# Patient Record
Sex: FEMALE | Race: WHITE | NOT HISPANIC OR LATINO | Employment: OTHER | URBAN - METROPOLITAN AREA
[De-identification: names, ages, dates, MRNs, and addresses within clinical notes are randomized per-mention and may not be internally consistent; named-entity substitution may affect disease eponyms.]

---

## 2017-01-17 ENCOUNTER — OFFICE VISIT (OUTPATIENT)
Dept: INTERNAL MEDICINE | Age: 82
End: 2017-01-17

## 2017-01-17 VITALS
TEMPERATURE: 96.7 F | HEART RATE: 74 BPM | SYSTOLIC BLOOD PRESSURE: 122 MMHG | WEIGHT: 123 LBS | DIASTOLIC BLOOD PRESSURE: 70 MMHG | OXYGEN SATURATION: 98 % | HEIGHT: 57 IN | BODY MASS INDEX: 26.54 KG/M2

## 2017-01-17 DIAGNOSIS — M47.816 OSTEOARTHRITIS OF LUMBAR SPINE, UNSPECIFIED SPINAL OSTEOARTHRITIS COMPLICATION STATUS: Chronic | ICD-10-CM

## 2017-01-17 DIAGNOSIS — I10 ESSENTIAL HYPERTENSION: Primary | Chronic | ICD-10-CM

## 2017-01-17 DIAGNOSIS — E03.9 HYPOTHYROIDISM, UNSPECIFIED TYPE: Chronic | ICD-10-CM

## 2017-01-17 DIAGNOSIS — E78.5 DYSLIPIDEMIA: Chronic | ICD-10-CM

## 2017-01-17 PROCEDURE — 99214 OFFICE O/P EST MOD 30 MIN: CPT | Performed by: INTERNAL MEDICINE

## 2017-01-17 NOTE — PROGRESS NOTES
"Maria Fernanda Gaviria / 89 y.o. / female  01/17/2017    VITALS    Visit Vitals   • /70   • Pulse 74   • Temp 96.7 °F (35.9 °C)   • Ht 57\" (144.8 cm)   • Wt 123 lb (55.8 kg)   • SpO2 98%   • BMI 26.62 kg/m2     BP Readings from Last 3 Encounters:   01/17/17 122/70   07/11/16 144/84   03/24/16 134/64     Wt Readings from Last 3 Encounters:   01/17/17 123 lb (55.8 kg)   07/11/16 121 lb (54.9 kg)   03/24/16 124 lb (56.2 kg)      Body mass index is 26.62 kg/(m^2).    CC:  Main reason(s) for today's visit: Follow up on chronic medical problems      HPI:     Chronic essential hypertension :  Compliant with medication(s).  Denies significant problems or side effects from therapy.  Most recent in-office blood pressure readings have been:   BP Readings from Last 3 Encounters:   01/17/17 122/70   07/11/16 144/84   03/24/16 134/64     Chronic hyperlipidemia :  Compliant with therapy.  Denies significant problems with medication(s).  Most recent labs:   Lab Results   Component Value Date     (H) 02/08/2016    HDL 52 02/08/2016    TRIG 154 (H) 02/08/2016    CHOLHDLRATIO 3.6 02/08/2016     Chronic hypothyroidism :  No significant weight change. No change in energy level, change in bowel movements or cold/heat intolerance. Denies significant mood changes. Compliant with medication. Most recent thyroid labs reviewed with the patient:   Lab Results   Component Value Date    TSH 1.480 02/08/2016    TSH 1.53 06/15/2015    FREET4 1.28 02/08/2016    FREET4 1.09 06/15/2015     OA of lumbar spine on acetaminophen and tramadol as needed for pain w/o significant problems.     ____________________________________________________________________    ASSESSMENT & PLAN:    Problem List Items Addressed This Visit        High    Hypertension - Primary (Chronic)    Overview     Stable. Continue same plan and/or medications.          Relevant Medications    valsartan (DIOVAN) 160 MG tablet    Hypothyroidism (Chronic)    Overview     Stable. " Continue levothyroxine.          Relevant Medications    levothyroxine (SYNTHROID, LEVOTHROID) 50 MCG tablet       Medium    Dyslipidemia (Chronic)    Overview     Stable. Continue atorvastatin.          Relevant Medications    atorvastatin (LIPITOR) 20 MG tablet        No orders of the defined types were placed in this encounter.      Summary/Discussion:     ·       No Follow-up on file.    No future appointments.    ____________________________________________________________________    REVIEW OF SYSTEMS    Review of Systems  As noted per HPI  Constitutional neg   Resp neg   CV neg    Other: As noted per HPI      PHYSICAL EXAMINATION    Physical Exam  Constitutional  No distress   Cardiovascular Rate  normal . Rhythm: regular . Heart sounds:  normal    Pulmonary/Chest  Effort normal. Breath sounds:  normal   Psychiatric  Alert. Judgment and thought content normal. Mood normal      REVIEWED DATA:    Labs:   Lab Results   Component Value Date     03/18/2015    K 4.5 03/18/2015    AST 45 (H) 03/17/2015    ALT 24 03/17/2015    BUN 12 03/18/2015    CREATININE 0.87 03/18/2015    CREATININE 0.72 03/17/2015    CREATININE 0.97 03/16/2015       No results found for: GLU, HGBA1C    Lab Results   Component Value Date     (H) 02/08/2016    HDL 52 02/08/2016    TRIG 154 (H) 02/08/2016    CHOLHDLRATIO 3.6 02/08/2016       Lab Results   Component Value Date    TSH 1.480 02/08/2016    FREET4 1.28 02/08/2016          Lab Results   Component Value Date    WBC 6.0 07/11/2016    HGB 14.8 07/11/2016     07/11/2016        Imaging:        Medical Tests:        Summary of old records / correspondence / consultant report:        Request outside records:          ALLERGIES:    Aspirin and Buffered aspirin    MEDICATIONS:  Current Outpatient Prescriptions   Medication Sig Dispense Refill   • acetaminophen (TYLENOL) 650 MG 8 hr tablet Take  by mouth daily.     • atorvastatin (LIPITOR) 20 MG tablet Take 1 tablet by mouth  daily. 90 tablet 0   • calcium carbonate-cholecalciferol 500-400 MG-UNIT tablet tablet Take 1 tablet by mouth daily.     • cetirizine (ZyrTEC) 10 MG tablet Take 10 mg by mouth daily.     • Cholecalciferol (VITAMIN D3) 2000 UNITS tablet Take  by mouth daily.     • desonide (DESOWEN) 0.05 % cream      • diclofenac (VOLTAREN) 1 % gel gel Place  on the skin.     • fluticasone (VERAMYST) 27.5 MCG/SPRAY nasal spray into each nostril daily.     • Glucosamine-Chondroit-Vit C-Mn (GLUCOSAMINE CHONDR 1500 COMPLX PO) Take 1 capsule by mouth 2 (two) times a day.     • lansoprazole (PREVACID) 30 MG capsule Take 1 capsule by mouth every morning before breakfast. 90 capsule 2   • levothyroxine (SYNTHROID, LEVOTHROID) 50 MCG tablet Take 1 tablet by mouth every morning. 90 tablet 2   • montelukast (SINGULAIR) 10 MG tablet Take 10 mg by mouth daily.     • Multiple Vitamins-Minerals (CENTRUM SILVER PO) Take  by mouth daily.     • raloxifene (EVISTA) 60 MG tablet Take  by mouth daily.     • traMADol-acetaminophen (ULTRACET) 37.5-325 MG per tablet 45 45 tablet 1   • valsartan (DIOVAN) 160 MG tablet TAKE 1 TABLET DAILY 90 tablet 1     No current facility-administered medications for this visit.        [facility]    The following portions of the patient's history were reviewed and updated as appropriate: Allergies / Current Medications / Past Medical History / Surgical History / Social History / Family History    PROBLEM LIST:    Patient Active Problem List   Diagnosis   • Osteoarthritis of lumbar spine   • Osteoarthritis   • Sciatic leg pain   • Asthma   • Biceps tendinitis   • Stenosis of carotid artery   • Dyslipidemia   • Gastroesophageal reflux disease   • Hypertension   • Hypothyroidism   • Osteopenia   • Restless legs syndrome   • Thrombocytopenia       PAST MEDICAL HX:    History reviewed. No pertinent past medical history.    PAST SURGICAL HX:    History reviewed. No pertinent past surgical history.    SOCIAL HX:    Social History      Social History   • Marital status:      Spouse name: N/A   • Number of children: N/A   • Years of education: N/A     Social History Main Topics   • Smoking status: Former Smoker   • Smokeless tobacco: Never Used   • Alcohol use No   • Drug use: None   • Sexual activity: Not Asked     Other Topics Concern   • None     Social History Narrative       FAMILY HX:    History reviewed. No pertinent family history.

## 2017-01-17 NOTE — MR AVS SNAPSHOT
Maria Fernanda Gaviria   1/17/2017 9:40 AM   Office Visit    Dept Phone:  [phone]   Encounter #:  [id number]    Provider:  Henok Salcido MD   Department:  [facility]                Your Full Care Plan              Today's Medication Changes          These changes are accurate as of: 1/17/17 10:21 AM.  If you have any questions, ask your nurse or doctor.               Medication(s)that have changed:     atorvastatin 20 MG tablet   Commonly known as:  LIPITOR   Take 1 tablet by mouth daily.   What changed:  Another medication with the same name was removed. Continue taking this medication, and follow the directions you see here.   Changed by:  Henok Salcido MD                  Your Updated Medication List          This list is accurate as of: 1/17/17 10:21 AM.  Always use your most recent med list.                acetaminophen 650 MG 8 hr tablet   Commonly known as:  TYLENOL       atorvastatin 20 MG tablet   Commonly known as:  LIPITOR       calcium carbonate-cholecalciferol 500-400 MG-UNIT tablet tablet       CENTRUM SILVER PO       cetirizine 10 MG tablet   Commonly known as:  zyrTEC       desonide 0.05 % cream   Commonly known as:  DESOWEN       diclofenac 1 % gel gel   Commonly known as:  VOLTAREN       fluticasone 27.5 MCG/SPRAY nasal spray   Commonly known as:  VERAMYST       GLUCOSAMINE CHONDR 1500 COMPLX PO       lansoprazole 30 MG capsule   Commonly known as:  PREVACID   Take 1 capsule by mouth every morning before breakfast.       levothyroxine 50 MCG tablet   Commonly known as:  SYNTHROID, LEVOTHROID   Take 1 tablet by mouth every morning.       montelukast 10 MG tablet   Commonly known as:  SINGULAIR       raloxifene 60 MG tablet   Commonly known as:  EVISTA       traMADol-acetaminophen 37.5-325 MG per tablet   Commonly known as:  ULTRACET       valsartan 160 MG tablet   Commonly known as:  DIOVAN   TAKE 1 TABLET DAILY       Vitamin D3 2000 UNITS tablet                  We Performed the Following     Comprehensive Metabolic Panel     LDL Cholesterol, Direct     TSH+Free T4       You Were Diagnosed With        Codes Comments    Essential hypertension    -  Primary ICD-10-CM: I10  ICD-9-CM: 401.9     Hypothyroidism, unspecified type     ICD-10-CM: E03.9  ICD-9-CM: 244.9     Dyslipidemia     ICD-10-CM: E78.5  ICD-9-CM: 272.4       Instructions     None    Patient Instructions History      Upcoming Appointments     Visit Type Date Time Department    OFFICE VISIT 2017  9:40 AM [facility]    OFFICE VISIT 2017 10:20 AM [facility]      [employer] Signup     [facility] [employer] allows you to send messages to your doctor, view your test results, renew your prescriptions, schedule appointments, and more. To sign up, go to [employer] and click on the Sign Up Now link in the New User? box. Enter your [employer] Activation Code exactly as it appears below along with the last four digits of your Social Security Number and your Date of Birth () to complete the sign-up process. If you do not sign up before the expiration date, you must request a new code.    [employer] Activation Code: [email or web address]  Expires: 2017 10:16 AM    If you have questions, you can email [email or web address] or call [phone] to talk to our [employer] staff. Remember, [employer] is NOT to be used for urgent needs. For medical emergencies, dial 911.               Other Info from Your Visit           Your Appointments     May 17, 2017 10:20 AM EDT   Office Visit with Henok Salcido MD   [facility] (--)    [address]  [facility] [address] [phone]           Arrive 15 minutes prior to appointment.              Allergies     Aspirin  Nausea And Vomiting, Nausea Only    Buffered Aspirin  Nausea And Vomiting      Reason for Visit     Follow up on chronic medical problems           Vital Signs     Blood Pressure Pulse  "Temperature Height Weight Oxygen Saturation    122/70 74 96.7 °F (35.9 °C) 57\" (144.8 cm) 123 lb (55.8 kg) 98%    Body Mass Index Smoking Status                26.62 kg/m2 Former Smoker          Problems and Diagnoses Noted     Dyslipidemia    High blood pressure    Underactive thyroid        "

## 2017-02-02 RX ORDER — ATORVASTATIN CALCIUM 20 MG/1
TABLET, FILM COATED ORAL
Qty: 90 TABLET | Refills: 1 | Status: SHIPPED | OUTPATIENT
Start: 2017-02-02 | End: 2017-05-17 | Stop reason: SDUPTHER

## 2017-02-17 DIAGNOSIS — I10 ESSENTIAL HYPERTENSION: ICD-10-CM

## 2017-02-17 RX ORDER — VALSARTAN 160 MG/1
TABLET ORAL
Qty: 90 TABLET | Refills: 1 | Status: SHIPPED | OUTPATIENT
Start: 2017-02-17 | End: 2017-08-16 | Stop reason: SDUPTHER

## 2017-04-26 DIAGNOSIS — K21.9 GASTROESOPHAGEAL REFLUX DISEASE, ESOPHAGITIS PRESENCE NOT SPECIFIED: Chronic | ICD-10-CM

## 2017-04-26 DIAGNOSIS — E03.9 HYPOTHYROIDISM: ICD-10-CM

## 2017-04-26 RX ORDER — LEVOTHYROXINE SODIUM 0.05 MG/1
TABLET ORAL
Qty: 90 TABLET | Refills: 1 | Status: SHIPPED | OUTPATIENT
Start: 2017-04-26 | End: 2017-10-23 | Stop reason: SDUPTHER

## 2017-04-26 RX ORDER — LANSOPRAZOLE 30 MG/1
CAPSULE, DELAYED RELEASE ORAL
Qty: 90 CAPSULE | Refills: 1 | Status: SHIPPED | OUTPATIENT
Start: 2017-04-26 | End: 2017-10-23 | Stop reason: SDUPTHER

## 2017-05-17 ENCOUNTER — OFFICE VISIT (OUTPATIENT)
Dept: INTERNAL MEDICINE | Age: 82
End: 2017-05-17

## 2017-05-17 VITALS
SYSTOLIC BLOOD PRESSURE: 124 MMHG | BODY MASS INDEX: 24.81 KG/M2 | DIASTOLIC BLOOD PRESSURE: 80 MMHG | WEIGHT: 115 LBS | HEART RATE: 91 BPM | HEIGHT: 57 IN | OXYGEN SATURATION: 95 % | TEMPERATURE: 97.3 F

## 2017-05-17 DIAGNOSIS — G89.29 CHRONIC LEFT SHOULDER PAIN: ICD-10-CM

## 2017-05-17 DIAGNOSIS — M47.816 OSTEOARTHRITIS OF LUMBAR SPINE, UNSPECIFIED SPINAL OSTEOARTHRITIS COMPLICATION STATUS: Chronic | ICD-10-CM

## 2017-05-17 DIAGNOSIS — I10 ESSENTIAL HYPERTENSION: Primary | Chronic | ICD-10-CM

## 2017-05-17 DIAGNOSIS — M25.512 CHRONIC LEFT SHOULDER PAIN: ICD-10-CM

## 2017-05-17 DIAGNOSIS — E78.5 DYSLIPIDEMIA: Chronic | ICD-10-CM

## 2017-05-17 DIAGNOSIS — E03.9 HYPOTHYROIDISM, UNSPECIFIED TYPE: Chronic | ICD-10-CM

## 2017-05-17 LAB
ALBUMIN SERPL-MCNC: 4.5 G/DL (ref 3.5–5.2)
ALBUMIN/GLOB SERPL: 1.7 G/DL
ALP SERPL-CCNC: 77 U/L (ref 39–117)
ALT SERPL-CCNC: 20 U/L (ref 1–33)
AST SERPL-CCNC: 34 U/L (ref 1–32)
BILIRUB SERPL-MCNC: 0.8 MG/DL (ref 0.1–1.2)
BUN SERPL-MCNC: 13 MG/DL (ref 8–23)
BUN/CREAT SERPL: 14.3 (ref 7–25)
CALCIUM SERPL-MCNC: 10.1 MG/DL (ref 8.2–9.6)
CHLORIDE SERPL-SCNC: 101 MMOL/L (ref 98–107)
CHOLEST SERPL-MCNC: 185 MG/DL (ref 0–200)
CHOLEST/HDLC SERPL: 2.89 {RATIO}
CO2 SERPL-SCNC: 29.6 MMOL/L (ref 22–29)
CREAT SERPL-MCNC: 0.91 MG/DL (ref 0.57–1)
GLOBULIN SER CALC-MCNC: 2.7 GM/DL
GLUCOSE SERPL-MCNC: 91 MG/DL (ref 65–99)
HDLC SERPL-MCNC: 64 MG/DL (ref 40–60)
LDLC SERPL CALC-MCNC: 97 MG/DL (ref 0–100)
POTASSIUM SERPL-SCNC: 4 MMOL/L (ref 3.5–5.2)
PROT SERPL-MCNC: 7.2 G/DL (ref 6–8.5)
SODIUM SERPL-SCNC: 143 MMOL/L (ref 136–145)
T4 FREE SERPL-MCNC: 1.1 NG/DL (ref 0.93–1.7)
TRIGL SERPL-MCNC: 122 MG/DL (ref 0–150)
TSH SERPL DL<=0.005 MIU/L-ACNC: 1.71 MIU/ML (ref 0.27–4.2)
VLDLC SERPL CALC-MCNC: 24.4 MG/DL (ref 5–40)

## 2017-05-17 PROCEDURE — 99214 OFFICE O/P EST MOD 30 MIN: CPT | Performed by: INTERNAL MEDICINE

## 2017-05-17 RX ORDER — ATORVASTATIN CALCIUM 20 MG/1
20 TABLET, FILM COATED ORAL NIGHTLY
Qty: 90 TABLET | Refills: 1 | COMMUNITY
Start: 2017-05-17 | End: 2018-01-29 | Stop reason: SDUPTHER

## 2017-05-18 ENCOUNTER — TELEPHONE (OUTPATIENT)
Dept: INTERNAL MEDICINE | Age: 82
End: 2017-05-18

## 2017-07-14 ENCOUNTER — APPOINTMENT (OUTPATIENT)
Dept: CT IMAGING | Facility: HOSPITAL | Age: 82
End: 2017-07-14

## 2017-07-14 ENCOUNTER — HOSPITAL ENCOUNTER (EMERGENCY)
Facility: HOSPITAL | Age: 82
Discharge: HOME | End: 2017-07-14
Attending: FAMILY MEDICINE | Admitting: FAMILY MEDICINE

## 2017-07-14 VITALS
TEMPERATURE: 98.1 F | HEART RATE: 57 BPM | WEIGHT: 116 LBS | BODY MASS INDEX: 23.39 KG/M2 | OXYGEN SATURATION: 98 % | SYSTOLIC BLOOD PRESSURE: 129 MMHG | RESPIRATION RATE: 18 BRPM | HEIGHT: 59 IN | DIASTOLIC BLOOD PRESSURE: 75 MMHG

## 2017-07-14 DIAGNOSIS — S22.22XA CLOSED FRACTURE OF BODY OF STERNUM, INITIAL ENCOUNTER: Primary | ICD-10-CM

## 2017-07-14 LAB
ALBUMIN SERPL-MCNC: 4.3 G/DL (ref 3.5–5.2)
ALBUMIN/GLOB SERPL: 1.4 G/DL
ALP SERPL-CCNC: 77 U/L (ref 39–117)
ALT SERPL W P-5'-P-CCNC: 21 U/L (ref 1–33)
ANION GAP SERPL CALCULATED.3IONS-SCNC: 12.1 MMOL/L
AST SERPL-CCNC: 33 U/L (ref 1–32)
BILIRUB SERPL-MCNC: 0.7 MG/DL (ref 0.1–1.2)
BUN BLD-MCNC: 13 MG/DL (ref 8–23)
BUN/CREAT SERPL: 14.8 (ref 7–25)
CALCIUM SPEC-SCNC: 9.8 MG/DL (ref 8.2–9.6)
CHLORIDE SERPL-SCNC: 104 MMOL/L (ref 98–107)
CO2 SERPL-SCNC: 25.9 MMOL/L (ref 22–29)
CREAT BLD-MCNC: 0.88 MG/DL (ref 0.57–1)
GFR SERPL CREATININE-BSD FRML MDRD: 60 ML/MIN/1.73
GLOBULIN UR ELPH-MCNC: 3 GM/DL
GLUCOSE BLD-MCNC: 106 MG/DL (ref 65–99)
POTASSIUM BLD-SCNC: 4.2 MMOL/L (ref 3.5–5.2)
PROT SERPL-MCNC: 7.3 G/DL (ref 6–8.5)
SODIUM BLD-SCNC: 142 MMOL/L (ref 136–145)
TROPONIN T SERPL-MCNC: <0.01 NG/ML (ref 0–0.03)
TROPONIN T SERPL-MCNC: <0.01 NG/ML (ref 0–0.03)

## 2017-07-14 PROCEDURE — 71275 CT ANGIOGRAPHY CHEST: CPT

## 2017-07-14 PROCEDURE — 93010 ELECTROCARDIOGRAM REPORT: CPT | Performed by: INTERNAL MEDICINE

## 2017-07-14 PROCEDURE — 93005 ELECTROCARDIOGRAM TRACING: CPT | Performed by: FAMILY MEDICINE

## 2017-07-14 PROCEDURE — 25010000002 ONDANSETRON PER 1 MG: Performed by: FAMILY MEDICINE

## 2017-07-14 PROCEDURE — 96374 THER/PROPH/DIAG INJ IV PUSH: CPT

## 2017-07-14 PROCEDURE — 84484 ASSAY OF TROPONIN QUANT: CPT | Performed by: FAMILY MEDICINE

## 2017-07-14 PROCEDURE — 0 IOPAMIDOL PER 1 ML: Performed by: FAMILY MEDICINE

## 2017-07-14 PROCEDURE — 96375 TX/PRO/DX INJ NEW DRUG ADDON: CPT

## 2017-07-14 PROCEDURE — 25010000002 MORPHINE PER 10 MG: Performed by: FAMILY MEDICINE

## 2017-07-14 PROCEDURE — 99284 EMERGENCY DEPT VISIT MOD MDM: CPT

## 2017-07-14 PROCEDURE — 80053 COMPREHEN METABOLIC PANEL: CPT | Performed by: FAMILY MEDICINE

## 2017-07-14 RX ORDER — HYDROCODONE BITARTRATE AND ACETAMINOPHEN 5; 325 MG/1; MG/1
1 TABLET ORAL EVERY 4 HOURS PRN
Qty: 18 TABLET | Refills: 0 | Status: SHIPPED | OUTPATIENT
Start: 2017-07-14 | End: 2017-11-17

## 2017-07-14 RX ORDER — MORPHINE SULFATE 2 MG/ML
2 INJECTION, SOLUTION INTRAMUSCULAR; INTRAVENOUS ONCE
Status: COMPLETED | OUTPATIENT
Start: 2017-07-14 | End: 2017-07-14

## 2017-07-14 RX ORDER — ONDANSETRON 2 MG/ML
4 INJECTION INTRAMUSCULAR; INTRAVENOUS ONCE
Status: COMPLETED | OUTPATIENT
Start: 2017-07-14 | End: 2017-07-14

## 2017-07-14 RX ADMIN — ONDANSETRON 4 MG: 2 INJECTION INTRAMUSCULAR; INTRAVENOUS at 13:40

## 2017-07-14 RX ADMIN — MORPHINE SULFATE 2 MG: 2 INJECTION, SOLUTION INTRAMUSCULAR; INTRAVENOUS at 13:44

## 2017-07-14 RX ADMIN — IOPAMIDOL 85 ML: 755 INJECTION, SOLUTION INTRAVENOUS at 12:46
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
## 2017-07-17 ENCOUNTER — TELEPHONE (OUTPATIENT)
Dept: SOCIAL WORK | Facility: HOSPITAL | Age: 82
End: 2017-07-17

## 2017-07-17 ENCOUNTER — OFFICE VISIT (OUTPATIENT)
Dept: INTERNAL MEDICINE | Age: 82
End: 2017-07-17

## 2017-07-17 VITALS
SYSTOLIC BLOOD PRESSURE: 120 MMHG | BODY MASS INDEX: 23.59 KG/M2 | HEIGHT: 59 IN | WEIGHT: 117 LBS | TEMPERATURE: 98.7 F | OXYGEN SATURATION: 95 % | DIASTOLIC BLOOD PRESSURE: 66 MMHG | HEART RATE: 96 BPM

## 2017-07-17 DIAGNOSIS — M47.816 OSTEOARTHRITIS OF LUMBAR SPINE, UNSPECIFIED SPINAL OSTEOARTHRITIS COMPLICATION STATUS: Chronic | ICD-10-CM
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
## 2017-07-17 DIAGNOSIS — S22.22XD CLOSED FRACTURE OF BODY OF STERNUM WITH ROUTINE HEALING: Primary | ICD-10-CM
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
## 2017-07-17 PROCEDURE — 99214 OFFICE O/P EST MOD 30 MIN: CPT | Performed by: INTERNAL MEDICINE
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
## 2017-07-17 NOTE — TELEPHONE ENCOUNTER
ED f/u phone call: spoke w/ daughter, Ephraim, who states that patient is taking prescribed pain meds and following d/c inatructions and still has some soreness. Has f/u hermes't w/ PCP today. No questions/concerns

## 2017-07-18 ENCOUNTER — TELEPHONE (OUTPATIENT)
Dept: INTERNAL MEDICINE | Age: 82
End: 2017-07-18

## 2017-07-18 NOTE — TELEPHONE ENCOUNTER
----- Message from Henok Salcido MD sent at 7/17/2017  4:14 PM EDT -----  Regarding: Call in Rx today for tramadolo/apap

## 2017-08-01 DIAGNOSIS — E78.5 DYSLIPIDEMIA: Primary | ICD-10-CM

## 2017-08-01 RX ORDER — ATORVASTATIN CALCIUM 20 MG/1
TABLET, FILM COATED ORAL
Qty: 90 TABLET | Refills: 0 | Status: SHIPPED | OUTPATIENT
Start: 2017-08-01 | End: 2017-10-18

## 2017-08-16 DIAGNOSIS — I10 ESSENTIAL HYPERTENSION: ICD-10-CM

## 2017-08-16 RX ORDER — VALSARTAN 160 MG/1
TABLET ORAL
Qty: 90 TABLET | Refills: 0 | Status: SHIPPED | OUTPATIENT
Start: 2017-08-16 | End: 2017-11-14 | Stop reason: SDUPTHER

## 2017-10-03 ENCOUNTER — TELEPHONE (OUTPATIENT)
Dept: INTERNAL MEDICINE | Age: 82
End: 2017-10-03

## 2017-10-03 RX ORDER — CEPHALEXIN 250 MG/1
250 CAPSULE ORAL 2 TIMES DAILY
Qty: 6 CAPSULE | Refills: 0 | Status: SHIPPED | OUTPATIENT
Start: 2017-10-03 | End: 2017-10-18

## 2017-10-03 NOTE — TELEPHONE ENCOUNTER
Pt called stating she has been urinating a lot, 5-6 times last night, stated a little pain in her urinary track. Pt also stated last night she got very cold and was shivering a lot. Denies any fever, cloudy urine or odor.  Pt said symptoms has been going on for a week.  Pt's # [phone]  Thanks SP

## 2017-10-03 NOTE — TELEPHONE ENCOUNTER
Advised with Dr. Salcido and he put the pt on keflex 250 mg bid x's 3 days if not better come back to be seen

## 2017-10-18 ENCOUNTER — OFFICE VISIT (OUTPATIENT)
Dept: INTERNAL MEDICINE | Age: 82
End: 2017-10-18

## 2017-10-18 VITALS
HEIGHT: 59 IN | HEART RATE: 78 BPM | DIASTOLIC BLOOD PRESSURE: 66 MMHG | OXYGEN SATURATION: 97 % | BODY MASS INDEX: 22.38 KG/M2 | WEIGHT: 111 LBS | TEMPERATURE: 97.9 F | SYSTOLIC BLOOD PRESSURE: 114 MMHG

## 2017-10-18 DIAGNOSIS — N39.0 URINARY TRACT INFECTION WITH HEMATURIA, SITE UNSPECIFIED: Primary | ICD-10-CM

## 2017-10-18 DIAGNOSIS — R31.9 URINARY TRACT INFECTION WITH HEMATURIA, SITE UNSPECIFIED: Primary | ICD-10-CM

## 2017-10-18 DIAGNOSIS — R30.0 DYSURIA: Primary | ICD-10-CM

## 2017-10-18 LAB
BILIRUB BLD-MCNC: ABNORMAL MG/DL
CLARITY, POC: CLEAR
COLOR UR: YELLOW
GLUCOSE UR STRIP-MCNC: NEGATIVE MG/DL
KETONES UR QL: NEGATIVE
LEUKOCYTE EST, POC: ABNORMAL
NITRITE UR-MCNC: NEGATIVE MG/ML
PH UR: 5.5 [PH] (ref 5–8)
PROT UR STRIP-MCNC: NEGATIVE MG/DL
RBC # UR STRIP: ABNORMAL /UL
SP GR UR: 1.01 (ref 1–1.03)
UROBILINOGEN UR QL: NORMAL

## 2017-10-18 PROCEDURE — 99213 OFFICE O/P EST LOW 20 MIN: CPT | Performed by: INTERNAL MEDICINE

## 2017-10-18 PROCEDURE — 81003 URINALYSIS AUTO W/O SCOPE: CPT | Performed by: INTERNAL MEDICINE

## 2017-10-18 RX ORDER — SULFAMETHOXAZOLE AND TRIMETHOPRIM 800; 160 MG/1; MG/1
1 TABLET ORAL 2 TIMES DAILY
Qty: 10 TABLET | Refills: 0 | Status: SHIPPED | OUTPATIENT
Start: 2017-10-18 | End: 2017-10-19 | Stop reason: SDUPTHER
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
## 2017-10-19 DIAGNOSIS — R31.9 URINARY TRACT INFECTION WITH HEMATURIA, SITE UNSPECIFIED: ICD-10-CM

## 2017-10-19 DIAGNOSIS — N39.0 URINARY TRACT INFECTION WITH HEMATURIA, SITE UNSPECIFIED: ICD-10-CM

## 2017-10-19 RX ORDER — SULFAMETHOXAZOLE AND TRIMETHOPRIM 800; 160 MG/1; MG/1
1 TABLET ORAL 2 TIMES DAILY
Qty: 10 TABLET | Refills: 0 | Status: SHIPPED | OUTPATIENT
Start: 2017-10-19 | End: 2017-10-24

## 2017-10-20 LAB
BACTERIA UR CULT: ABNORMAL
BACTERIA UR CULT: ABNORMAL
OTHER ANTIBIOTIC SUSC ISLT: ABNORMAL

## 2017-10-23 ENCOUNTER — TELEPHONE (OUTPATIENT)
Dept: INTERNAL MEDICINE | Age: 82
End: 2017-10-23

## 2017-10-23 DIAGNOSIS — E03.9 HYPOTHYROIDISM: ICD-10-CM

## 2017-10-23 DIAGNOSIS — K21.9 GASTROESOPHAGEAL REFLUX DISEASE, ESOPHAGITIS PRESENCE NOT SPECIFIED: Chronic | ICD-10-CM

## 2017-10-23 RX ORDER — LEVOTHYROXINE SODIUM 0.05 MG/1
TABLET ORAL
Qty: 90 TABLET | Refills: 1 | Status: SHIPPED | OUTPATIENT
Start: 2017-10-23 | End: 2018-04-21 | Stop reason: SDUPTHER

## 2017-10-23 RX ORDER — LANSOPRAZOLE 30 MG/1
CAPSULE, DELAYED RELEASE ORAL
Qty: 90 CAPSULE | Refills: 1 | Status: SHIPPED | OUTPATIENT
Start: 2017-10-23 | End: 2018-04-21 | Stop reason: SDUPTHER

## 2017-10-23 NOTE — TELEPHONE ENCOUNTER
----- Message from Henok Salcido MD sent at 10/23/2017 12:48 PM EDT -----  Send result letter to patient.     Maria Fernanda, these are your recent lab results:    Urine culture grew E. Coli bacteria which is sensitive to antibiotic prescribed.  Call if not better.     Please call my office if you have any questions. Otherwise, we can discuss the results in further detail on your next visit.

## 2017-10-24 ENCOUNTER — TELEPHONE (OUTPATIENT)
Dept: INTERNAL MEDICINE | Age: 82
End: 2017-10-24

## 2017-10-24 NOTE — TELEPHONE ENCOUNTER
In re: to LDOS 10/18/17, p/pt completed antibiotics for UTI. Pt states she feels weak but she thinks once she starts eating again & gains control of her appetite that her weakness will go away. She states her weakness came from laying around so much from the UTI.     P/pt no sx of burning while urinating, no fever or chills. Feels fine just weak & working on getting her appetite back.    Pt can be reached #[phone].

## 2017-10-30 DIAGNOSIS — E78.5 DYSLIPIDEMIA: ICD-10-CM

## 2017-10-30 RX ORDER — ATORVASTATIN CALCIUM 20 MG/1
TABLET, FILM COATED ORAL
Qty: 90 TABLET | Refills: 0 | Status: SHIPPED | OUTPATIENT
Start: 2017-10-30 | End: 2017-11-17

## 2017-11-14 DIAGNOSIS — I10 ESSENTIAL HYPERTENSION: ICD-10-CM

## 2017-11-14 RX ORDER — VALSARTAN 160 MG/1
TABLET ORAL
Qty: 90 TABLET | Refills: 0 | Status: SHIPPED | OUTPATIENT
Start: 2017-11-14 | End: 2018-02-13 | Stop reason: SDUPTHER

## 2017-11-17 ENCOUNTER — OFFICE VISIT (OUTPATIENT)
Dept: INTERNAL MEDICINE | Age: 82
End: 2017-11-17

## 2017-11-17 VITALS
WEIGHT: 112 LBS | BODY MASS INDEX: 22.58 KG/M2 | HEART RATE: 74 BPM | DIASTOLIC BLOOD PRESSURE: 80 MMHG | HEIGHT: 59 IN | TEMPERATURE: 97.4 F | SYSTOLIC BLOOD PRESSURE: 128 MMHG | OXYGEN SATURATION: 98 %

## 2017-11-17 DIAGNOSIS — M47.816 OSTEOARTHRITIS OF LUMBAR SPINE, UNSPECIFIED SPINAL OSTEOARTHRITIS COMPLICATION STATUS: Chronic | ICD-10-CM

## 2017-11-17 DIAGNOSIS — E78.5 DYSLIPIDEMIA: Chronic | ICD-10-CM

## 2017-11-17 DIAGNOSIS — I10 ESSENTIAL HYPERTENSION: Primary | Chronic | ICD-10-CM

## 2017-11-17 DIAGNOSIS — E03.9 HYPOTHYROIDISM, UNSPECIFIED TYPE: Chronic | ICD-10-CM

## 2017-11-17 PROCEDURE — 99214 OFFICE O/P EST MOD 30 MIN: CPT | Performed by: INTERNAL MEDICINE

## 2017-11-17 RX ORDER — TRIAMCINOLONE ACETONIDE 55 UG/1
2 SPRAY, METERED NASAL DAILY PRN
Status: ON HOLD | COMMUNITY
End: 2023-02-22
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
## 2017-11-18 LAB
T4 FREE SERPL-MCNC: 1.47 NG/DL (ref 0.93–1.7)
TSH SERPL DL<=0.005 MIU/L-ACNC: 0.66 MIU/ML (ref 0.27–4.2)

## 2017-11-20 ENCOUNTER — TELEPHONE (OUTPATIENT)
Dept: INTERNAL MEDICINE | Age: 82
End: 2017-11-20

## 2017-11-20 NOTE — TELEPHONE ENCOUNTER
----- Message from Henok Salcido MD sent at 11/20/2017  8:28 AM EST -----  Send result letter to patient.     Maria Fernanda, your recent thyroid labs were normal/stable. Continue current plans.     Please call my office if you have any questions. Otherwise, we can discuss the results in further detail on your next visit.

## 2017-12-12 ENCOUNTER — PROCEDURE VISIT (OUTPATIENT)
Dept: OBSTETRICS AND GYNECOLOGY | Facility: CLINIC | Age: 82
End: 2017-12-12

## 2017-12-12 ENCOUNTER — OFFICE VISIT (OUTPATIENT)
Dept: OBSTETRICS AND GYNECOLOGY | Facility: CLINIC | Age: 82
End: 2017-12-12

## 2017-12-12 VITALS — DIASTOLIC BLOOD PRESSURE: 92 MMHG | BODY MASS INDEX: 22.68 KG/M2 | SYSTOLIC BLOOD PRESSURE: 120 MMHG | WEIGHT: 112.3 LBS

## 2017-12-12 DIAGNOSIS — M85.80 OSTEOPENIA, UNSPECIFIED LOCATION: Primary | ICD-10-CM

## 2017-12-12 DIAGNOSIS — Z13.820 SCREENING FOR OSTEOPOROSIS: Primary | ICD-10-CM

## 2017-12-12 DIAGNOSIS — M85.88 OSTEOPENIA OF OTHER SITE: ICD-10-CM

## 2017-12-12 PROCEDURE — 77080 DXA BONE DENSITY AXIAL: CPT | Performed by: OBSTETRICS & GYNECOLOGY

## 2017-12-12 PROCEDURE — 99212 OFFICE O/P EST SF 10 MIN: CPT | Performed by: OBSTETRICS & GYNECOLOGY

## 2017-12-12 NOTE — PROGRESS NOTES
HPI   Maria Fernanda Gaviria  is a 89 y.o. female who presents for review of her bone density     Chief Complaint   Patient presents with   • Results     Discuss bone density results she does not want an exam       Past Medical History:   Diagnosis Date   • Arthritis    • Cancer     skin   • Disease of thyroid gland    • GERD (gastroesophageal reflux disease)    • Hyperlipidemia    • Hypertension    • Osteoporosis        Past Surgical History:   Procedure Laterality Date   • REPLACEMENT TOTAL KNEE Right    • THYROID SURGERY     • TUBAL ABDOMINAL LIGATION         Social History     Social History   • Marital status:      Spouse name: N/A   • Number of children: N/A   • Years of education: N/A     Occupational History   • Not on file.     Social History Main Topics   • Smoking status: Former Smoker   • Smokeless tobacco: Never Used   • Alcohol use Yes      Comment: wine at night   • Drug use: No   • Sexual activity: Defer     Other Topics Concern   • Not on file     Social History Narrative       The following portions of the patient's history were reviewed and updated as appropriate: allergies, current medications, past family history, past medical history, past social history, past surgical history and problem list.    Review of Systems    Objective     Physical Exam   Constitutional: She appears well-developed and well-nourished.   Nursing note and vitals reviewed.      Assessment    Maria Fernanda was seen today for results.    Diagnoses and all orders for this visit:    Osteopenia, unspecified location        Plan  1. Counseling visit regarding osteopenia.  10 minutes of a 15 minute visit were spent in direct face-to-face counseling on this issue.  The patient's bone density was reviewed and discussed with her.  It shows normal bone density at the spine and moderate osteopenia at the hip.  He discussed options for management.  Patient is taking calcium, vitamin D and regular weightbearing exercise.  I recommend that  she continue this, rather than adding a prescription medication.  She agrees with this recommendation.    2. Return in about 1 year (around 12/12/2018).    History   Smoking Status   • Former Smoker   3.     4.

## 2018-01-28 DIAGNOSIS — E78.5 DYSLIPIDEMIA: ICD-10-CM

## 2018-01-29 RX ORDER — ATORVASTATIN CALCIUM 20 MG/1
TABLET, FILM COATED ORAL
Qty: 90 TABLET | Refills: 1 | Status: SHIPPED | OUTPATIENT
Start: 2018-01-29 | End: 2018-07-28 | Stop reason: SDUPTHER

## 2018-02-13 DIAGNOSIS — I10 ESSENTIAL HYPERTENSION: ICD-10-CM

## 2018-02-13 RX ORDER — VALSARTAN 160 MG/1
TABLET ORAL
Qty: 90 TABLET | Refills: 0 | Status: SHIPPED | OUTPATIENT
Start: 2018-02-13 | End: 2018-05-08 | Stop reason: SDUPTHER

## 2018-03-20 ENCOUNTER — OFFICE VISIT (OUTPATIENT)
Dept: INTERNAL MEDICINE | Age: 83
End: 2018-03-20

## 2018-03-20 VITALS
OXYGEN SATURATION: 98 % | TEMPERATURE: 97.2 F | BODY MASS INDEX: 22.58 KG/M2 | SYSTOLIC BLOOD PRESSURE: 124 MMHG | WEIGHT: 112 LBS | DIASTOLIC BLOOD PRESSURE: 66 MMHG | HEIGHT: 59 IN | HEART RATE: 104 BPM

## 2018-03-20 DIAGNOSIS — E03.9 HYPOTHYROIDISM, UNSPECIFIED TYPE: Chronic | ICD-10-CM

## 2018-03-20 DIAGNOSIS — E78.5 DYSLIPIDEMIA: Chronic | ICD-10-CM

## 2018-03-20 DIAGNOSIS — I10 ESSENTIAL HYPERTENSION: Primary | Chronic | ICD-10-CM

## 2018-03-20 LAB
T4 FREE SERPL-MCNC: 1.31 NG/DL (ref 0.93–1.7)
TSH SERPL DL<=0.005 MIU/L-ACNC: 0.88 MIU/ML (ref 0.27–4.2)

## 2018-03-20 PROCEDURE — 99214 OFFICE O/P EST MOD 30 MIN: CPT | Performed by: INTERNAL MEDICINE
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
## 2018-03-21 ENCOUNTER — TELEPHONE (OUTPATIENT)
Dept: INTERNAL MEDICINE | Age: 83
End: 2018-03-21

## 2018-03-21 NOTE — TELEPHONE ENCOUNTER
----- Message from Henok Salcido MD sent at 3/20/2018  5:41 PM EDT -----  Send result letter to patient.     Maria Fernanda, your recent thyroid labs were normal/stable. Continue current plans.     Please call my office if you have any questions. Otherwise, we can discuss the results in further detail on your next visit.

## 2018-04-21 DIAGNOSIS — E03.9 HYPOTHYROIDISM: ICD-10-CM

## 2018-04-21 DIAGNOSIS — K21.9 GASTROESOPHAGEAL REFLUX DISEASE, ESOPHAGITIS PRESENCE NOT SPECIFIED: Chronic | ICD-10-CM

## 2018-04-23 RX ORDER — LEVOTHYROXINE SODIUM 0.05 MG/1
TABLET ORAL
Qty: 90 TABLET | Refills: 1 | Status: SHIPPED | OUTPATIENT
Start: 2018-04-23 | End: 2018-10-20 | Stop reason: SDUPTHER

## 2018-04-23 RX ORDER — LANSOPRAZOLE 30 MG/1
CAPSULE, DELAYED RELEASE ORAL
Qty: 90 CAPSULE | Refills: 1 | Status: SHIPPED | OUTPATIENT
Start: 2018-04-23 | End: 2018-10-20 | Stop reason: SDUPTHER

## 2018-04-26 ENCOUNTER — TELEPHONE (OUTPATIENT)
Dept: INTERNAL MEDICINE | Age: 83
End: 2018-04-26

## 2018-04-26 NOTE — TELEPHONE ENCOUNTER
Pt recd the HepA vaccine on Tuesday, 4/17/18 from [facility] [place].    Pt states diarrhea took effect on Sunday, 4/22/18 & pt called [facility] today to hear the side effects of the Hep a vaccine & was told diarrhea is NOT a side effect.    Pt states she does not have a fever nor stomach pains. Pt is having diarrhea every 30-45mins.    Pt is asking what can she take to stop her diarrhea sx.    Pls advise.    Pt can be reached #[phone].

## 2018-04-26 NOTE — TELEPHONE ENCOUNTER
Patient can take OTC Imodium as long as she has no fever, abdominal pain, blood or mucus in stool. I would recommend that if diarrhea continues, she come in to be seen tomorrow.

## 2018-04-27 ENCOUNTER — OFFICE VISIT (OUTPATIENT)
Dept: INTERNAL MEDICINE | Age: 83
End: 2018-04-27

## 2018-04-27 VITALS
WEIGHT: 110.8 LBS | HEART RATE: 99 BPM | DIASTOLIC BLOOD PRESSURE: 72 MMHG | OXYGEN SATURATION: 98 % | BODY MASS INDEX: 22.34 KG/M2 | SYSTOLIC BLOOD PRESSURE: 124 MMHG | TEMPERATURE: 97.4 F | HEIGHT: 59 IN

## 2018-04-27 DIAGNOSIS — R19.7 ACUTE DIARRHEA: Primary | ICD-10-CM

## 2018-04-27 PROCEDURE — 99214 OFFICE O/P EST MOD 30 MIN: CPT | Performed by: NURSE PRACTITIONER

## 2018-04-27 NOTE — PROGRESS NOTES
Subjective   Maria Fernanda Gaviria is a 89 y.o. female.     Diarrhea    This is a new problem. Episode onset: 5 days ago. The problem occurs 5 to 10 times per day. The stool consistency is described as watery (one episode of bleeding, one episode of mucus). Pertinent negatives include no abdominal pain, arthralgias, chills, coughing, fever or vomiting. There are no known risk factors (Recently ate at [employer] prior to onset of diarrhea. No recent antibiotics, sick contacts, hospitalization, or travel. ). She has tried anti-motility drug for the symptoms. The treatment provided moderate relief. There is no history of bowel resection, inflammatory bowel disease, irritable bowel syndrome, malabsorption, a recent abdominal surgery or short gut syndrome.    Unknown last colonoscopy.     The following portions of the patient's history were reviewed and updated as appropriate: allergies, current medications, past family history, past medical history, past social history, past surgical history and problem list.    Review of Systems   Constitutional: Negative for chills and fever.   HENT: Negative for congestion, ear pain, rhinorrhea and sore throat.    Respiratory: Negative for cough and shortness of breath.    Gastrointestinal: Positive for diarrhea. Negative for abdominal distention, abdominal pain, blood in stool, nausea and vomiting.   Genitourinary: Negative for dysuria, flank pain and frequency.   Musculoskeletal: Negative for arthralgias.       Objective   Physical Exam   Constitutional: She appears well-developed and well-nourished. She is active. She does not appear ill. No distress.   Cardiovascular: Normal rate, regular rhythm and normal heart sounds.    Pulmonary/Chest: Effort normal and breath sounds normal. She has no decreased breath sounds. She has no wheezes. She has no rhonchi. She has no rales.   Abdominal: Soft. Normal appearance. There is no hepatosplenomegaly. There is no tenderness. There is no  CVA tenderness.   Neurological: She is alert.   Nursing note and vitals reviewed.        Assessment/Plan   Problems Addressed this Visit     None      Visit Diagnoses     Acute diarrhea    -  Primary    Relevant Orders    Clostridium Difficile Toxin, PCR - Stool, Per Rectum    Ova & Parasite Examination - Stool, Per Rectum    Stool Culture - Stool, Per Rectum    Fecal Leukocytes - Stool, Per Rectum    CBC & Differential    Comprehensive Metabolic Panel    Urinalysis With / Culture If Indicated - Urine, Clean Catch        1. Acute diarrhea  Patient presents with onset of acute diarrhea ×5 days.  She denies any abdominal pain, fever, chills.  No significant abdominal or GI history.  She is taking Imodium, which has helped with the diarrhea.  She had a couple of isolated episodes of blood when wiping and mucus in the stool, which has since resolved.  Exam today within normal limits.  I suspect this is likely viral in nature, but will check labs today in addition to stool testing to rule out bacterial infection.  We'll also check stool for occult blood, patient has been given collection equipment for home and has been directed to return sample to office for testing as she was unable to produce sample in office today.  Discussed importance of increased by mouth fluid intake, may continue bland diet as tolerated.  Instructed patient should her diarrhea worsen or develop new symptoms such as fever, significant abdominal pain, she will need to go to the ER immediately for evaluation.  Patient expresses understanding.    - Clostridium Difficile Toxin, PCR - Stool, Per Rectum  - Ova & Parasite Examination - Stool, Per Rectum  - Stool Culture - Stool, Per Rectum  - Fecal Leukocytes - Stool, Per Rectum  - CBC & Differential  - Comprehensive Metabolic Panel  - Urinalysis With / Culture If Indicated - Urine, Clean Catch

## 2018-05-02 LAB
ALBUMIN SERPL-MCNC: 4.4 G/DL (ref 3.5–5.2)
ALBUMIN/GLOB SERPL: 2 G/DL
ALP SERPL-CCNC: 81 U/L (ref 39–117)
ALT SERPL-CCNC: 11 U/L (ref 1–33)
APPEARANCE UR: CLEAR
AST SERPL-CCNC: 22 U/L (ref 1–32)
BACTERIA #/AREA URNS HPF: ABNORMAL /HPF
BACTERIA UR CULT: ABNORMAL
BACTERIA UR CULT: ABNORMAL
BASOPHILS # BLD AUTO: 0.03 10*3/MM3 (ref 0–0.2)
BASOPHILS NFR BLD AUTO: 0.5 % (ref 0–1.5)
BILIRUB SERPL-MCNC: 0.7 MG/DL (ref 0.1–1.2)
BILIRUB UR QL STRIP: NEGATIVE
BUN SERPL-MCNC: 11 MG/DL (ref 8–23)
BUN/CREAT SERPL: 13.1 (ref 7–25)
CALCIUM SERPL-MCNC: 9.7 MG/DL (ref 8.2–9.6)
CHLORIDE SERPL-SCNC: 103 MMOL/L (ref 98–107)
CO2 SERPL-SCNC: 28.9 MMOL/L (ref 22–29)
COLOR UR: YELLOW
CREAT SERPL-MCNC: 0.84 MG/DL (ref 0.57–1)
CRYSTALS URNS MICRO: ABNORMAL
EOSINOPHIL # BLD AUTO: 0.28 10*3/MM3 (ref 0–0.7)
EOSINOPHIL NFR BLD AUTO: 5.1 % (ref 0.3–6.2)
EPI CELLS #/AREA URNS HPF: ABNORMAL /HPF
ERYTHROCYTE [DISTWIDTH] IN BLOOD BY AUTOMATED COUNT: 13.6 % (ref 11.7–13)
GFR SERPLBLD CREATININE-BSD FMLA CKD-EPI: 63 ML/MIN/1.73
GFR SERPLBLD CREATININE-BSD FMLA CKD-EPI: 76 ML/MIN/1.73
GLOBULIN SER CALC-MCNC: 2.2 GM/DL
GLUCOSE SERPL-MCNC: 83 MG/DL (ref 65–99)
GLUCOSE UR QL: NEGATIVE
HCT VFR BLD AUTO: 45.9 % (ref 35.6–45.5)
HGB BLD-MCNC: 15.2 G/DL (ref 11.9–15.5)
HGB UR QL STRIP: NEGATIVE
IMM GRANULOCYTES # BLD: 0 10*3/MM3 (ref 0–0.03)
IMM GRANULOCYTES NFR BLD: 0 % (ref 0–0.5)
KETONES UR QL STRIP: NEGATIVE
LEUKOCYTE ESTERASE UR QL STRIP: ABNORMAL
LYMPHOCYTES # BLD AUTO: 1.17 10*3/MM3 (ref 0.9–4.8)
LYMPHOCYTES NFR BLD AUTO: 21.2 % (ref 19.6–45.3)
MCH RBC QN AUTO: 31.6 PG (ref 26.9–32)
MCHC RBC AUTO-ENTMCNC: 33.1 G/DL (ref 32.4–36.3)
MCV RBC AUTO: 95.4 FL (ref 80.5–98.2)
MICRO URNS: ABNORMAL
MONOCYTES # BLD AUTO: 0.5 10*3/MM3 (ref 0.2–1.2)
MONOCYTES NFR BLD AUTO: 9.1 % (ref 5–12)
MUCOUS THREADS URNS QL MICRO: PRESENT /HPF
NEUTROPHILS # BLD AUTO: 3.54 10*3/MM3 (ref 1.9–8.1)
NEUTROPHILS NFR BLD AUTO: 64.1 % (ref 42.7–76)
NITRITE UR QL STRIP: NEGATIVE
OTHER ANTIBIOTIC SUSC ISLT: ABNORMAL
PH UR STRIP: 5 [PH] (ref 5–7.5)
PLATELET # BLD AUTO: 165 10*3/MM3 (ref 140–500)
POTASSIUM SERPL-SCNC: 3.8 MMOL/L (ref 3.5–5.2)
PROT SERPL-MCNC: 6.6 G/DL (ref 6–8.5)
PROT UR QL STRIP: NEGATIVE
RBC # BLD AUTO: 4.81 10*6/MM3 (ref 3.9–5.2)
RBC #/AREA URNS HPF: ABNORMAL /HPF
SODIUM SERPL-SCNC: 143 MMOL/L (ref 136–145)
SP GR UR: 1.02 (ref 1–1.03)
UNIDENT CRYS URNS QL MICRO: PRESENT /LPF
URINALYSIS REFLEX: ABNORMAL
UROBILINOGEN UR STRIP-MCNC: 0.2 MG/DL (ref 0.2–1)
WBC # BLD AUTO: 5.52 10*3/MM3 (ref 4.5–10.7)
WBC #/AREA URNS HPF: ABNORMAL /HPF

## 2018-05-03 DIAGNOSIS — N39.0 ACUTE LOWER UTI: Primary | ICD-10-CM

## 2018-05-03 LAB — C DIFF TOX GENS STL QL NAA+PROBE: NEGATIVE

## 2018-05-03 RX ORDER — SULFAMETHOXAZOLE AND TRIMETHOPRIM 800; 160 MG/1; MG/1
1 TABLET ORAL 2 TIMES DAILY
Qty: 6 TABLET | Refills: 0 | Status: SHIPPED | OUTPATIENT
Start: 2018-05-03 | End: 2018-05-06

## 2018-05-06 LAB
BACTERIA SPEC CULT: NORMAL
BACTERIA SPEC CULT: NORMAL
CAMPYLOBACTER STL CULT: NORMAL
E COLI SXT STL QL IA: NEGATIVE
O+P SPEC MICRO: NORMAL
O+P STL CONC: NORMAL
SALM + SHIG STL CULT: NORMAL
WBC STL QL MICRO: NORMAL
WBC STL QL MICRO: NORMAL

## 2018-05-08 DIAGNOSIS — I10 ESSENTIAL HYPERTENSION: ICD-10-CM

## 2018-05-08 RX ORDER — VALSARTAN 160 MG/1
160 TABLET ORAL DAILY
Qty: 90 TABLET | Refills: 1 | Status: SHIPPED | OUTPATIENT
Start: 2018-05-08 | End: 2018-09-18

## 2018-07-23 ENCOUNTER — TELEPHONE (OUTPATIENT)
Dept: INTERNAL MEDICINE | Age: 83
End: 2018-07-23

## 2018-07-23 RX ORDER — LOSARTAN POTASSIUM 50 MG/1
50 TABLET ORAL DAILY
Qty: 90 TABLET | Refills: 1 | Status: SHIPPED | OUTPATIENT
Start: 2018-07-23 | End: 2018-08-22 | Stop reason: SDUPTHER

## 2018-07-23 RX ORDER — LOSARTAN POTASSIUM 50 MG/1
50 TABLET ORAL DAILY
Qty: 30 TABLET | Refills: 0 | Status: SHIPPED | OUTPATIENT
Start: 2018-07-23 | End: 2018-07-23 | Stop reason: SDUPTHER

## 2018-07-23 NOTE — TELEPHONE ENCOUNTER
Pt is taking Valsartan and [facility] told her it was recalled. She now needs a new prescription sent to [facility].     She wants to know if she should take the Valsartan today? I told the patient to not take it until we can as the doctor and call her back.     Since all of her prescriptions go thru [facility] she won't have any of the new prescription and request 1 weeks of the medication be sent to her local pharmacy, [facility] off [address].

## 2018-07-23 NOTE — TELEPHONE ENCOUNTER
Please advise on alternative for Valsartan. Cannot get through to [facility] to find out where they get their Valsartan from.     Please advise.

## 2018-07-23 NOTE — TELEPHONE ENCOUNTER
Discontinue valsartan and start losartan 50 mg qd (dx: hypertension)  May send in to local pharmacy and mail order.   Send blood pressure readings in 1 month.     (REMINDER: Update med list, maintain dx association, and update change on CURRENT ASSESSMENT/PLAN)

## 2018-07-28 DIAGNOSIS — E78.5 DYSLIPIDEMIA: ICD-10-CM

## 2018-07-30 RX ORDER — ATORVASTATIN CALCIUM 20 MG/1
TABLET, FILM COATED ORAL
Qty: 90 TABLET | Refills: 1 | Status: SHIPPED | OUTPATIENT
Start: 2018-07-30 | End: 2019-01-26 | Stop reason: SDUPTHER

## 2018-08-21 ENCOUNTER — TELEPHONE (OUTPATIENT)
Dept: INTERNAL MEDICINE | Age: 83
End: 2018-08-21

## 2018-08-21 DIAGNOSIS — I10 ESSENTIAL HYPERTENSION: Primary | Chronic | ICD-10-CM

## 2018-08-21 NOTE — TELEPHONE ENCOUNTER
Pt pharmacy faxed over a request for a refill on Valsartan Due to the recall. What alternative would you like to prescribe? Please advise?

## 2018-08-22 RX ORDER — LOSARTAN POTASSIUM 50 MG/1
100 TABLET ORAL DAILY
Qty: 90 TABLET | Refills: 3 | Status: SHIPPED | OUTPATIENT
Start: 2018-08-22 | End: 2019-01-02 | Stop reason: SDUPTHER

## 2018-08-22 NOTE — TELEPHONE ENCOUNTER
Losartan 100 mg qd.     (REMINDER: Update med list, maintain dx association, and update change on CURRENT ASSESSMENT/PLAN)

## 2018-09-18 ENCOUNTER — OFFICE VISIT (OUTPATIENT)
Dept: INTERNAL MEDICINE | Age: 83
End: 2018-09-18

## 2018-09-18 VITALS
OXYGEN SATURATION: 97 % | BODY MASS INDEX: 22.58 KG/M2 | WEIGHT: 112 LBS | HEART RATE: 84 BPM | SYSTOLIC BLOOD PRESSURE: 160 MMHG | DIASTOLIC BLOOD PRESSURE: 82 MMHG | HEIGHT: 59 IN | TEMPERATURE: 98.2 F

## 2018-09-18 DIAGNOSIS — E78.5 DYSLIPIDEMIA: Chronic | ICD-10-CM

## 2018-09-18 DIAGNOSIS — I10 ESSENTIAL HYPERTENSION: Primary | Chronic | ICD-10-CM

## 2018-09-18 DIAGNOSIS — E03.9 HYPOTHYROIDISM, UNSPECIFIED TYPE: Chronic | ICD-10-CM

## 2018-09-18 DIAGNOSIS — S41.111A LACERATION OF RIGHT UPPER EXTREMITY, INITIAL ENCOUNTER: Primary | ICD-10-CM

## 2018-09-18 DIAGNOSIS — T14.8XXA AVULSION OF SKIN: ICD-10-CM

## 2018-09-18 PROCEDURE — 90715 TDAP VACCINE 7 YRS/> IM: CPT | Performed by: INTERNAL MEDICINE

## 2018-09-18 PROCEDURE — 90471 IMMUNIZATION ADMIN: CPT | Performed by: INTERNAL MEDICINE

## 2018-09-18 PROCEDURE — 99214 OFFICE O/P EST MOD 30 MIN: CPT | Performed by: INTERNAL MEDICINE

## 2018-09-18 NOTE — ASSESSMENT & PLAN NOTE
Previously changed to losartan. Take 50 mg 2 tabs daily. Will change to 100 mg qd later when refill needed.     Let me know blood pressure readings this Friday.
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
## 2018-09-20 ENCOUNTER — APPOINTMENT (OUTPATIENT)
Dept: GENERAL RADIOLOGY | Facility: HOSPITAL | Age: 83
End: 2018-09-20

## 2018-09-20 ENCOUNTER — TELEPHONE (OUTPATIENT)
Dept: INTERNAL MEDICINE | Age: 83
End: 2018-09-20

## 2018-09-20 ENCOUNTER — HOSPITAL ENCOUNTER (EMERGENCY)
Facility: HOSPITAL | Age: 83
Discharge: HOME | End: 2018-09-20
Attending: EMERGENCY MEDICINE | Admitting: EMERGENCY MEDICINE

## 2018-09-20 VITALS
SYSTOLIC BLOOD PRESSURE: 175 MMHG | DIASTOLIC BLOOD PRESSURE: 91 MMHG | HEIGHT: 59 IN | WEIGHT: 115 LBS | BODY MASS INDEX: 23.18 KG/M2 | HEART RATE: 64 BPM | TEMPERATURE: 98.5 F | RESPIRATION RATE: 16 BRPM | OXYGEN SATURATION: 98 %

## 2018-09-20 DIAGNOSIS — R42 LIGHTHEADEDNESS: Primary | ICD-10-CM

## 2018-09-20 DIAGNOSIS — S41.111A SKIN TEAR OF RIGHT UPPER ARM WITHOUT COMPLICATION, INITIAL ENCOUNTER: ICD-10-CM

## 2018-09-20 DIAGNOSIS — I10 ELEVATED BLOOD PRESSURE READING WITH DIAGNOSIS OF HYPERTENSION: ICD-10-CM

## 2018-09-20 LAB
ALBUMIN SERPL-MCNC: 4.2 G/DL (ref 3.5–5.2)
ALBUMIN/GLOB SERPL: 1.6 G/DL
ALP SERPL-CCNC: 100 U/L (ref 39–117)
ALT SERPL W P-5'-P-CCNC: 13 U/L (ref 1–33)
ANION GAP SERPL CALCULATED.3IONS-SCNC: 9.9 MMOL/L
AST SERPL-CCNC: 25 U/L (ref 1–32)
BASOPHILS # BLD AUTO: 0.03 10*3/MM3 (ref 0–0.2)
BASOPHILS NFR BLD AUTO: 0.6 % (ref 0–1.5)
BILIRUB SERPL-MCNC: 0.5 MG/DL (ref 0.1–1.2)
BILIRUB UR QL STRIP: NEGATIVE
BUN BLD-MCNC: 13 MG/DL (ref 8–23)
BUN/CREAT SERPL: 16.5 (ref 7–25)
CALCIUM SPEC-SCNC: 9.3 MG/DL (ref 8.2–9.6)
CHLORIDE SERPL-SCNC: 105 MMOL/L (ref 98–107)
CLARITY UR: CLEAR
CO2 SERPL-SCNC: 28.1 MMOL/L (ref 22–29)
COLOR UR: YELLOW
CREAT BLD-MCNC: 0.79 MG/DL (ref 0.57–1)
DEPRECATED RDW RBC AUTO: 46.6 FL (ref 37–54)
EOSINOPHIL # BLD AUTO: 0.09 10*3/MM3 (ref 0–0.7)
EOSINOPHIL NFR BLD AUTO: 1.8 % (ref 0.3–6.2)
ERYTHROCYTE [DISTWIDTH] IN BLOOD BY AUTOMATED COUNT: 13.8 % (ref 11.7–13)
GFR SERPL CREATININE-BSD FRML MDRD: 68 ML/MIN/1.73
GLOBULIN UR ELPH-MCNC: 2.7 GM/DL
GLUCOSE BLD-MCNC: 88 MG/DL (ref 65–99)
GLUCOSE UR STRIP-MCNC: NEGATIVE MG/DL
HCT VFR BLD AUTO: 43.4 % (ref 35.6–45.5)
HGB BLD-MCNC: 14.6 G/DL (ref 11.9–15.5)
HGB UR QL STRIP.AUTO: NEGATIVE
HOLD SPECIMEN: NORMAL
HOLD SPECIMEN: NORMAL
IMM GRANULOCYTES # BLD: 0.02 10*3/MM3 (ref 0–0.03)
IMM GRANULOCYTES NFR BLD: 0.4 % (ref 0–0.5)
KETONES UR QL STRIP: NEGATIVE
LEUKOCYTE ESTERASE UR QL STRIP.AUTO: NEGATIVE
LYMPHOCYTES # BLD AUTO: 1.03 10*3/MM3 (ref 0.9–4.8)
LYMPHOCYTES NFR BLD AUTO: 20.2 % (ref 19.6–45.3)
MCH RBC QN AUTO: 30.9 PG (ref 26.9–32)
MCHC RBC AUTO-ENTMCNC: 33.6 G/DL (ref 32.4–36.3)
MCV RBC AUTO: 91.9 FL (ref 80.5–98.2)
MONOCYTES # BLD AUTO: 0.48 10*3/MM3 (ref 0.2–1.2)
MONOCYTES NFR BLD AUTO: 9.4 % (ref 5–12)
NEUTROPHILS # BLD AUTO: 3.46 10*3/MM3 (ref 1.9–8.1)
NEUTROPHILS NFR BLD AUTO: 68 % (ref 42.7–76)
NITRITE UR QL STRIP: NEGATIVE
PH UR STRIP.AUTO: 6.5 [PH] (ref 5–8)
PLATELET # BLD AUTO: 141 10*3/MM3 (ref 140–500)
PMV BLD AUTO: 9.7 FL (ref 6–12)
POTASSIUM BLD-SCNC: 4.2 MMOL/L (ref 3.5–5.2)
PROT SERPL-MCNC: 6.9 G/DL (ref 6–8.5)
PROT UR QL STRIP: NEGATIVE
RBC # BLD AUTO: 4.72 10*6/MM3 (ref 3.9–5.2)
SODIUM BLD-SCNC: 143 MMOL/L (ref 136–145)
SP GR UR STRIP: 1.01 (ref 1–1.03)
TROPONIN T SERPL-MCNC: <0.01 NG/ML (ref 0–0.03)
UROBILINOGEN UR QL STRIP: NORMAL
WBC NRBC COR # BLD: 5.09 10*3/MM3 (ref 4.5–10.7)
WHOLE BLOOD HOLD SPECIMEN: NORMAL
WHOLE BLOOD HOLD SPECIMEN: NORMAL

## 2018-09-20 PROCEDURE — 93005 ELECTROCARDIOGRAM TRACING: CPT | Performed by: PHYSICIAN ASSISTANT

## 2018-09-20 PROCEDURE — 81003 URINALYSIS AUTO W/O SCOPE: CPT | Performed by: PHYSICIAN ASSISTANT

## 2018-09-20 PROCEDURE — 99285 EMERGENCY DEPT VISIT HI MDM: CPT

## 2018-09-20 PROCEDURE — 93010 ELECTROCARDIOGRAM REPORT: CPT | Performed by: INTERNAL MEDICINE

## 2018-09-20 PROCEDURE — 85025 COMPLETE CBC W/AUTO DIFF WBC: CPT | Performed by: PHYSICIAN ASSISTANT

## 2018-09-20 PROCEDURE — 71045 X-RAY EXAM CHEST 1 VIEW: CPT

## 2018-09-20 PROCEDURE — 84484 ASSAY OF TROPONIN QUANT: CPT | Performed by: PHYSICIAN ASSISTANT

## 2018-09-20 PROCEDURE — 96374 THER/PROPH/DIAG INJ IV PUSH: CPT

## 2018-09-20 PROCEDURE — 80053 COMPREHEN METABOLIC PANEL: CPT | Performed by: PHYSICIAN ASSISTANT

## 2018-09-20 RX ORDER — SODIUM CHLORIDE 0.9 % (FLUSH) 0.9 %
10 SYRINGE (ML) INJECTION AS NEEDED
Status: DISCONTINUED | OUTPATIENT
Start: 2018-09-20 | End: 2018-09-20 | Stop reason: HOSPADM

## 2018-09-20 RX ADMIN — Medication 10 ML: at 16:54

## 2018-09-20 RX ADMIN — METOPROLOL TARTRATE 5 MG: 5 INJECTION, SOLUTION INTRAVENOUS at 16:49
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
## 2018-09-20 NOTE — DISCHARGE INSTRUCTIONS
Follow up with Dr. Salcido regarding your blood pressure    Use caution changing positions, get up slowly    Leave steri strips on the right arm wound until they fall off    Return Precautions    Although you are being discharged from the ED today, I encourage you to return for worsening symptoms.  Things can, and do, change such that treatment at home with medication may not be adequate.      Specifically, return for any of the following:    Chest pain, shortness of breath, pain or nausea and vomiting not controlled by medications provided.    Please make a follow up with your Primary Care Provider for a blood pressure recheck.   \

## 2018-09-20 NOTE — ED PROVIDER NOTES
The OLLIE and I have discussed this patient's history, physical exam, and treatment plan. I have reviewed the documentation and personally had a face to face interaction with the patient  I affirm the documentation and agree with the treatment and plan.  The following describes my personal findings.    The patient presents complaining of lightheaded episode 2 days ago while at home. Pt c/o fall without LOC at that time. She fell into the wall and reports a skin tear to her upper R arm. Pt denies LOC. Pt reports another episode of lightheadedness this morning and had to sit down. Pt denies weakness/numbness, confusion, new problems with balance/vision, CP, SOA, palpitations, HA. Pt ambulates with a cane. Pt was supposed to be taking 100mg Losartan but was only taking 50mg. Pt started taking 100mg 2 days ago.     Patient is nontoxic appearing, alert, oriented  LALITHA, oropharynx WNL  cspine nonTTPalp   Lungs/cardiovascular: CTAB, RRR  Abdomen: soft, non tender  Back/extremities: RUE abrasion seen distal to clean, dry, intact bandage, good ROM, pulse, sens intact bilat x 4 ext  Ears: TMs normal bilaterally       Pt has ambulated well with a cane in the ED    5:11 PM  Discussed having a family member stay with the pt tonight. Advised pt to keep well hydrated and have a good diet.       Documentation assistance provided by taran Arellano.  Information recorded by the taran was done at my direction and has been verified and validated by me.           Ruth Arellano  09/20/18 [id number]       Rita Dawson MD  09/22/18 [id number]

## 2018-09-20 NOTE — TELEPHONE ENCOUNTER
Pt had a fall prior to coming in for her appt to see  on Tuesday, 9/18/18. Pt states she did hit her head & currently experiencing some dizziness & thinks it could be vertigo.    I asked pt if she was sent to [facility] ER on 9/18/18 for further eval & pt declined.    I instructed pt to visit her nearest ER in re: to hitting her head on 9/18/18 from the fall to have further testing done. Pt verbalized understanding. MO    I did make  & his MA-Leobardo aware of my instructions to the pt.  & Leobardo agreed.

## 2018-09-20 NOTE — ED TRIAGE NOTES
Pt reports that for the last month or so she has had intermittent dizziness. Pt reports that she gets light headed. Pt had a fall earlier this week causing a skin tear on her right arm. Pt states that she has had her BP meds adjusted lately.

## 2018-09-21 ENCOUNTER — TELEPHONE (OUTPATIENT)
Dept: INTERNAL MEDICINE | Age: 83
End: 2018-09-21

## 2018-09-21 NOTE — TELEPHONE ENCOUNTER
Blood pressure looks fine currently. Let us know is she develops significant lightheadedness. We may need to reduce her blood pressure medication. If she is feeling lightheaded when she stands, have her take losartan 50 mg just one daily.     She may be having increased allergy symptoms. If not major, monitor and continue same medications. If she is having worsening problems, she can call her allergist.

## 2018-09-21 NOTE — TELEPHONE ENCOUNTER
Phoned pt and gave her Dr. Salcido's recommendations. PT VERBALIZED AN UNDERSTANDING.    Pt will monitor bp over the weekend and let us know if she has any more light headed episodes.

## 2018-09-21 NOTE — TELEPHONE ENCOUNTER
Patient was dizzy and felt lightheaded and just didn't feel well.  They went to the hospital 201/119 standing  And then laying down   189/100 after 5mg metoprolol after that it went to 175/91  They could not find anything else wrong with her.  They suggested she call her PCP and see if it may be her new medication.  She was taking it incorrectly but since Tuesday when she saw you she has been taking the correct dosage. Do you want to change the medication or have her be seen.  Please call patient at [phone]

## 2018-09-21 NOTE — TELEPHONE ENCOUNTER
S/W pt she stated her BP was much better it was 128/74 early this morning and @ 11:00a it was 121/95. However pt was calling stating she was light headed and has head congestion, she thinks it maybe allergies and she would like to know if she should see an ENT? She see's Dr. Shah her allergist, she gets allergy injections with his offices and she is currently Nasacort, afrin, zyrtec, and singular.       Would you like a referral placed for an ENT or for pt to contact her Allergist?     Please advise?

## 2018-09-24 ENCOUNTER — TELEPHONE (OUTPATIENT)
Dept: INTERNAL MEDICINE | Age: 83
End: 2018-09-24

## 2018-09-24 NOTE — TELEPHONE ENCOUNTER
S/W she called back to give me an update on her dizzy spells, she states she is just light headed enough that she has to use her cane just incase she might fall. I again ask her to call her allergist because her blood pressure has been regular and not fluctuating. PT VERBALIZED AN UNDERSTANDING.

## 2018-09-28 ENCOUNTER — TELEPHONE (OUTPATIENT)
Dept: INTERNAL MEDICINE | Age: 83
End: 2018-09-28

## 2018-09-28 ENCOUNTER — OFFICE VISIT (OUTPATIENT)
Dept: INTERNAL MEDICINE | Age: 83
End: 2018-09-28

## 2018-09-28 VITALS
TEMPERATURE: 97.3 F | OXYGEN SATURATION: 98 % | HEIGHT: 59 IN | HEART RATE: 78 BPM | BODY MASS INDEX: 22.58 KG/M2 | DIASTOLIC BLOOD PRESSURE: 70 MMHG | WEIGHT: 112 LBS | SYSTOLIC BLOOD PRESSURE: 110 MMHG

## 2018-09-28 DIAGNOSIS — R42 DIZZINESS: Primary | ICD-10-CM

## 2018-09-28 DIAGNOSIS — Z23 NEED FOR INFLUENZA VACCINATION: ICD-10-CM

## 2018-09-28 DIAGNOSIS — R94.31 ABNORMAL EKG: ICD-10-CM

## 2018-09-28 DIAGNOSIS — I10 ESSENTIAL HYPERTENSION: Chronic | ICD-10-CM

## 2018-09-28 PROCEDURE — 99214 OFFICE O/P EST MOD 30 MIN: CPT | Performed by: INTERNAL MEDICINE

## 2018-09-28 PROCEDURE — 90662 IIV NO PRSV INCREASED AG IM: CPT | Performed by: INTERNAL MEDICINE

## 2018-09-28 PROCEDURE — G0008 ADMIN INFLUENZA VIRUS VAC: HCPCS | Performed by: INTERNAL MEDICINE
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
## 2018-09-28 NOTE — TELEPHONE ENCOUNTER
Pt Daughter called and stated pt is still having HA and dizzy spells. Pt has a high BP when she gets up in the morning of 150/89 or 152/90. After pt takes her Losartan 100mg her BP levels out. Pt see's the allergist on 10.08.18 to discuss dizziness, HA and congestion. But pt daughter would like to see if pt can take a 50mg losartan in the AM and one in the PM?       Please advise

## 2018-09-28 NOTE — TELEPHONE ENCOUNTER
She seems to be having lot of issues and we have gone back and forth with her about her blood pressure. See me today.

## 2018-09-28 NOTE — ASSESSMENT & PLAN NOTE
EKG from 2017 and recent EKG shows probably anterior/inferior MI's. She is asymptomatic. She has no known prior history of heart disease. Referral to cardiologist for further assessment. She cannot tolerate ASA. On atorvastatin.

## 2018-10-03 ENCOUNTER — OFFICE VISIT (OUTPATIENT)
Dept: CARDIOLOGY | Age: 83
End: 2018-10-03

## 2018-10-03 VITALS
SYSTOLIC BLOOD PRESSURE: 147 MMHG | HEIGHT: 59 IN | BODY MASS INDEX: 22.78 KG/M2 | WEIGHT: 113 LBS | DIASTOLIC BLOOD PRESSURE: 75 MMHG | HEART RATE: 84 BPM

## 2018-10-03 DIAGNOSIS — E78.5 DYSLIPIDEMIA: Chronic | ICD-10-CM

## 2018-10-03 DIAGNOSIS — I10 ESSENTIAL HYPERTENSION: Chronic | ICD-10-CM

## 2018-10-03 DIAGNOSIS — R94.31 ABNORMAL EKG: Primary | ICD-10-CM

## 2018-10-03 DIAGNOSIS — I49.1 PAC (PREMATURE ATRIAL CONTRACTION): ICD-10-CM

## 2018-10-03 PROCEDURE — 99203 OFFICE O/P NEW LOW 30 MIN: CPT | Performed by: INTERNAL MEDICINE
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
## 2018-10-10 ENCOUNTER — APPOINTMENT (OUTPATIENT)
Dept: PHYSICAL THERAPY | Facility: HOSPITAL | Age: 83
End: 2018-10-10

## 2018-10-20 DIAGNOSIS — E03.9 HYPOTHYROIDISM: ICD-10-CM

## 2018-10-20 DIAGNOSIS — K21.9 GASTROESOPHAGEAL REFLUX DISEASE, ESOPHAGITIS PRESENCE NOT SPECIFIED: Chronic | ICD-10-CM

## 2018-10-22 RX ORDER — LEVOTHYROXINE SODIUM 0.05 MG/1
TABLET ORAL
Qty: 90 TABLET | Refills: 3 | Status: SHIPPED | OUTPATIENT
Start: 2018-10-22 | End: 2019-10-19 | Stop reason: SDUPTHER

## 2018-10-22 RX ORDER — LANSOPRAZOLE 30 MG/1
CAPSULE, DELAYED RELEASE ORAL
Qty: 90 CAPSULE | Refills: 3 | Status: SHIPPED | OUTPATIENT
Start: 2018-10-22 | End: 2019-10-19 | Stop reason: SDUPTHER

## 2018-11-28 ENCOUNTER — OFFICE VISIT (OUTPATIENT)
Dept: INTERNAL MEDICINE | Age: 83
End: 2018-11-28

## 2018-11-28 VITALS
TEMPERATURE: 98.1 F | HEIGHT: 59 IN | WEIGHT: 113 LBS | DIASTOLIC BLOOD PRESSURE: 76 MMHG | OXYGEN SATURATION: 96 % | HEART RATE: 94 BPM | SYSTOLIC BLOOD PRESSURE: 124 MMHG | BODY MASS INDEX: 22.78 KG/M2

## 2018-11-28 DIAGNOSIS — I10 ESSENTIAL HYPERTENSION: Primary | Chronic | ICD-10-CM

## 2018-11-28 DIAGNOSIS — E78.5 DYSLIPIDEMIA: Chronic | ICD-10-CM

## 2018-11-28 DIAGNOSIS — R94.31 ABNORMAL EKG: ICD-10-CM
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
## 2018-11-28 PROCEDURE — 99214 OFFICE O/P EST MOD 30 MIN: CPT | Performed by: INTERNAL MEDICINE

## 2018-11-28 NOTE — PROGRESS NOTES
[facility] [place] [facility]  HERNANDEZ ROBERTSON M.D.      Maria Fernanda Gaviria / 89 y.o. / female  11/28/2018      ASSESSMENT & PLAN:    Problem List Items Addressed This Visit        High    Hypertension - Primary (Chronic)    Relevant Medications    losartan (COZAAR) 50 MG tablet    Abnormal EKG       Medium    Dyslipidemia (Chronic)    Overview     Stable. Continue atorvastatin.          Relevant Medications    atorvastatin (LIPITOR) 20 MG tablet        No orders of the defined types were placed in this encounter.    No orders of the defined types were placed in this encounter.      Summary/Discussion:  ·     No Follow-up on file.    ____________________________________________________________________    MEDICATIONS  Current Outpatient Medications   Medication Sig Dispense Refill   • acetaminophen (TYLENOL) 650 MG 8 hr tablet Take  by mouth daily.     • atorvastatin (LIPITOR) 20 MG tablet TAKE 1 TABLET DAILY 90 tablet 1   • cetirizine (ZyrTEC) 10 MG tablet Take 10 mg by mouth daily.     • Cholecalciferol (VITAMIN D3) 2000 UNITS tablet Take  by mouth daily.     • desonide (DESOWEN) 0.05 % cream Apply  topically As Needed.     • diclofenac (VOLTAREN) 1 % gel gel Place  on the skin.     • fluticasone (VERAMYST) 27.5 MCG/SPRAY nasal spray into each nostril daily.     • Glucosamine-Chondroit-Vit C-Mn (GLUCOSAMINE CHONDR 1500 COMPLX PO) Take 1 capsule by mouth 2 (two) times a day.     • lansoprazole (PREVACID) 30 MG capsule TAKE 1 CAPSULE EVERY MORNING BEFORE BREAKFAST 90 capsule 3   • levothyroxine (SYNTHROID, LEVOTHROID) 50 MCG tablet TAKE 1 TABLET EVERY MORNING 90 tablet 3   • losartan (COZAAR) 50 MG tablet Take 2 tablets by mouth Daily. 90 tablet 3   • montelukast (SINGULAIR) 10 MG tablet Take 10 mg by mouth daily.     • Triamcinolone Acetonide (NASACORT) 55 MCG/ACT nasal inhaler 2 sprays into each nostril Daily.       No current facility-administered medications for this visit.          VITALS:    Visit Vitals  /76   Pulse  "94   Temp 98.1 °F (36.7 °C)   Ht 149.9 cm (59.02\")   Wt 51.3 kg (113 lb)   SpO2 96%   BMI 22.81 kg/m²       BP Readings from Last 3 Encounters:   11/28/18 124/76   10/03/18 147/75   09/28/18 110/70     Wt Readings from Last 3 Encounters:   11/28/18 51.3 kg (113 lb)   10/03/18 51.3 kg (113 lb)   09/28/18 50.8 kg (112 lb)      Body mass index is 22.81 kg/m².    CC:  Main reason(s) for today's visit: Hypertension      HPI:     ***    Patient Care Team:  Henok Salcido MD as PCP - General  Christiano Dewitt MD as Consulting Physician (Obstetrics and Gynecology)  Rodolfo Shah MD as Consulting Physician (Allergy)  Michael Mendoza MD as Consulting Physician (Dermatology)    ____________________________________________________________________    REVIEW OF SYSTEMS    Review of Systems  Constitutional neg  Resp neg  CV neg      PHYSICAL EXAMINATION    Physical Exam  Constitutional  No distress  Cardiovascular Rate  normal . Rhythm: regular . Heart sounds:  Normal  Pulmonary/Chest  Effort normal. Breath sounds:  Normal  Psychiatric  Alert. Judgment and thought content normal. Mood normal    REVIEWED DATA:    Labs:     Lab Results   Component Value Date     09/20/2018    K 4.2 09/20/2018    AST 25 09/20/2018    ALT 13 09/20/2018    BUN 13 09/20/2018    CREATININE 0.79 09/20/2018    CREATININE 0.84 04/27/2018    CREATININE 0.88 07/14/2017    EGFRIFNONA 68 09/20/2018    EGFRIFAFRI 76 04/27/2018       Lab Results   Component Value Date    GLUCOSE 88 09/20/2018    GLUCOSE 106 (H) 07/14/2017    GLUCOSE 85 03/18/2015       Lab Results   Component Value Date    LDL 97 05/17/2017     (H) 02/08/2016    HDL 64 (H) 05/17/2017    HDL 52 02/08/2016    TRIG 122 05/17/2017    TRIG 154 (H) 02/08/2016    CHOLHDLRATIO 2.89 05/17/2017    CHOLHDLRATIO 3.6 02/08/2016       Lab Results   Component Value Date    TSH 0.875 03/20/2018    FREET4 1.31 03/20/2018       Lab Results   Component Value Date    WBC 5.09 09/20/2018    HGB 14.6 " 09/20/2018    HGB 15.2 04/27/2018    HGB 14.8 07/11/2016     09/20/2018       Lab Results   Component Value Date    PROTEIN Negative 04/27/2018    GLUCOSEU Negative 09/20/2018    BLOODU Negative 09/20/2018    NITRITEU Negative 09/20/2018    LEUKOCYTESUR Negative 09/20/2018       Imaging:         Medical Tests:         Summary of old records / correspondence / consultant report:         Request outside records:         ALLERGIES  Allergies   Allergen Reactions   • Aspirin Nausea And Vomiting and Nausea Only   • Buffered Aspirin Nausea And Vomiting        PFSH:     The following portions of the patient's history were reviewed and updated as appropriate: Allergies / Current Medications / Past Medical History / Surgical History / Social History / Family History    PROBLEM LIST   Patient Active Problem List   Diagnosis   • Osteoarthritis of lumbar spine   • Osteoarthritis   • Sciatic leg pain   • Asthma   • Stenosis of carotid artery   • Dyslipidemia   • Gastroesophageal reflux disease   • Hypertension   • Hypothyroidism   • Osteopenia   • Restless legs syndrome   • Thrombocytopenia (CMS/HCC)   • Abnormal EKG       PAST MEDICAL HISTORY  Past Medical History:   Diagnosis Date   • Arthritis    • Cancer (CMS/HCC)     skin   • Disease of thyroid gland    • GERD (gastroesophageal reflux disease)    • Hyperlipidemia    • Hypertension    • Osteoporosis        SURGICAL HISTORY  Past Surgical History:   Procedure Laterality Date   • REPLACEMENT TOTAL KNEE Right    • THYROID SURGERY     • TUBAL ABDOMINAL LIGATION         SOCIAL HISTORY  Social History     Socioeconomic History   • Marital status:      Spouse name: Not on file   • Number of children: Not on file   • Years of education: Not on file   • Highest education level: Not on file   Tobacco Use   • Smoking status: Former Smoker   • Smokeless tobacco: Never Used   Substance and Sexual Activity   • Alcohol use: Yes     Comment: wine at night   • Drug use: No   •  Sexual activity: Defer       FAMILY HISTORY  History reviewed. No pertinent family history.      **Keith Disclaimer:   Much of this encounter note is an electronic transcription/translation of spoken language to printed text. The electronic translation of spoken language may permit erroneous, or at times, nonsensical words or phrases to be inadvertently transcribed. Although I have reviewed the note for such errors, some may still exist.

## 2018-11-28 NOTE — PROGRESS NOTES
[facility] [place] [facility]  HERNANDEZ ROBERTSON M.D.      Maria Fernanda Gaviria / 89 y.o. / female  11/28/2018      ASSESSMENT & PLAN:    Problem List Items Addressed This Visit        High    Hypertension - Primary (Chronic)    Overview     Stable. Continue losartan 50 mg qd.          Current Assessment & Plan     Verify recall status with pharmacy.         Relevant Medications    losartan (COZAAR) 50 MG tablet    Abnormal EKG    Current Assessment & Plan     Patient cancelled the stress test but was evaluated by cardiologist.   Denies any chest pain or ANNE. Will follow.             Medium    Dyslipidemia (Chronic)    Overview     Stable. Continue atorvastatin 20 mg.          Relevant Medications    atorvastatin (LIPITOR) 20 MG tablet        No orders of the defined types were placed in this encounter.    No orders of the defined types were placed in this encounter.      Summary/Discussion:  ·     Return in about 4 months (around 3/28/2019) for Reassess chronic medical problems, COME FASTING FOR LABS.    ____________________________________________________________________    MEDICATIONS  Current Outpatient Medications   Medication Sig Dispense Refill   • acetaminophen (TYLENOL) 650 MG 8 hr tablet Take  by mouth daily.     • atorvastatin (LIPITOR) 20 MG tablet TAKE 1 TABLET DAILY 90 tablet 1   • cetirizine (ZyrTEC) 10 MG tablet Take 10 mg by mouth daily.     • Cholecalciferol (VITAMIN D3) 2000 UNITS tablet Take  by mouth daily.     • desonide (DESOWEN) 0.05 % cream Apply  topically As Needed.     • diclofenac (VOLTAREN) 1 % gel gel Place  on the skin.     • fluticasone (VERAMYST) 27.5 MCG/SPRAY nasal spray into each nostril daily.     • Glucosamine-Chondroit-Vit C-Mn (GLUCOSAMINE CHONDR 1500 COMPLX PO) Take 1 capsule by mouth 2 (two) times a day.     • lansoprazole (PREVACID) 30 MG capsule TAKE 1 CAPSULE EVERY MORNING BEFORE BREAKFAST 90 capsule 3   • levothyroxine (SYNTHROID, LEVOTHROID) 50 MCG tablet TAKE 1 TABLET EVERY MORNING 90  "tablet 3   • losartan (COZAAR) 50 MG tablet Take 2 tablets by mouth Daily. 90 tablet 3   • montelukast (SINGULAIR) 10 MG tablet Take 10 mg by mouth daily.     • Triamcinolone Acetonide (NASACORT) 55 MCG/ACT nasal inhaler 2 sprays into each nostril Daily.       No current facility-administered medications for this visit.          VITALS:    Visit Vitals  /76   Pulse 94   Temp 98.1 °F (36.7 °C)   Ht 149.9 cm (59.02\")   Wt 51.3 kg (113 lb)   SpO2 96%   BMI 22.81 kg/m²       BP Readings from Last 3 Encounters:   11/28/18 124/76   10/03/18 147/75   09/28/18 110/70     Wt Readings from Last 3 Encounters:   11/28/18 51.3 kg (113 lb)   10/03/18 51.3 kg (113 lb)   09/28/18 50.8 kg (112 lb)      Body mass index is 22.81 kg/m².    CC:  Main reason(s) for today's visit: Hypertension      HPI:     She saw the cardiologist regarding abnormal EKG but canceled the scheduled stress test.  Patient denies angina, dyspnea on exertions or palpitations.    Hypertension remains controlled on losartan 50 mg daily.    Dyslipidemia on atorvastatin 20 mg without significant myalgia.    Hypothyroidism remained stable on levothyroxine 50 µg.    Patient Care Team:  Henok Salcido MD as PCP - General  Christiano Dewitt MD as Consulting Physician (Obstetrics and Gynecology)  Rodolfo Shah MD as Consulting Physician (Allergy)  Michael Mendoza MD as Consulting Physician (Dermatology)    ____________________________________________________________________    REVIEW OF SYSTEMS    Review of Systems  Constitutional neg  Resp neg  CV neg  GI neg    PHYSICAL EXAMINATION    Physical Exam  Constitutional  No distress  Cardiovascular Rate  normal . Rhythm: regular . Heart sounds:  Normal  Pulmonary/Chest  Effort normal. Breath sounds:  Normal  Psychiatric  Alert. Judgment and thought content normal. Mood normal    REVIEWED DATA:    Labs:     Lab Results   Component Value Date     09/20/2018    K 4.2 09/20/2018    AST 25 09/20/2018    ALT 13 " 09/20/2018    BUN 13 09/20/2018    CREATININE 0.79 09/20/2018    CREATININE 0.84 04/27/2018    CREATININE 0.88 07/14/2017    EGFRIFNONA 68 09/20/2018    EGFRIFAFRI 76 04/27/2018       Lab Results   Component Value Date    GLUCOSE 88 09/20/2018    GLUCOSE 106 (H) 07/14/2017    GLUCOSE 85 03/18/2015       Lab Results   Component Value Date    LDL 97 05/17/2017     (H) 02/08/2016    HDL 64 (H) 05/17/2017    HDL 52 02/08/2016    TRIG 122 05/17/2017    TRIG 154 (H) 02/08/2016    CHOLHDLRATIO 2.89 05/17/2017    CHOLHDLRATIO 3.6 02/08/2016       Lab Results   Component Value Date    TSH 0.875 03/20/2018    FREET4 1.31 03/20/2018       Lab Results   Component Value Date    WBC 5.09 09/20/2018    HGB 14.6 09/20/2018    HGB 15.2 04/27/2018    HGB 14.8 07/11/2016     09/20/2018       Lab Results   Component Value Date    PROTEIN Negative 04/27/2018    GLUCOSEU Negative 09/20/2018    BLOODU Negative 09/20/2018    NITRITEU Negative 09/20/2018    LEUKOCYTESUR Negative 09/20/2018       Imaging:         Medical Tests:         Summary of old records / correspondence / consultant report:     Cardiologist office note: stress test recommended    Request outside records:         ALLERGIES  Allergies   Allergen Reactions   • Aspirin Nausea And Vomiting and Nausea Only   • Buffered Aspirin Nausea And Vomiting        PFSH:     The following portions of the patient's history were reviewed and updated as appropriate: Allergies / Current Medications / Past Medical History / Surgical History / Social History / Family History    PROBLEM LIST   Patient Active Problem List   Diagnosis   • Osteoarthritis of lumbar spine   • Osteoarthritis   • Sciatic leg pain   • Asthma   • Stenosis of carotid artery   • Dyslipidemia   • Gastroesophageal reflux disease   • Hypertension   • Hypothyroidism   • Osteopenia   • Restless legs syndrome   • Thrombocytopenia (CMS/HCC)   • Abnormal EKG       PAST MEDICAL HISTORY  Past Medical History:    Diagnosis Date   • Arthritis    • Cancer (CMS/HCC)     skin   • Disease of thyroid gland    • GERD (gastroesophageal reflux disease)    • Hyperlipidemia    • Hypertension    • Osteoporosis        SURGICAL HISTORY  Past Surgical History:   Procedure Laterality Date   • REPLACEMENT TOTAL KNEE Right    • THYROID SURGERY     • TUBAL ABDOMINAL LIGATION         SOCIAL HISTORY  Social History     Socioeconomic History   • Marital status:      Spouse name: Not on file   • Number of children: Not on file   • Years of education: Not on file   • Highest education level: Not on file   Tobacco Use   • Smoking status: Former Smoker   • Smokeless tobacco: Never Used   Substance and Sexual Activity   • Alcohol use: Yes     Comment: wine at night   • Drug use: No   • Sexual activity: Defer       FAMILY HISTORY  History reviewed. No pertinent family history.      **Dragon Disclaimer:   Much of this encounter note is an electronic transcription/translation of spoken language to printed text. The electronic translation of spoken language may permit erroneous, or at times, nonsensical words or phrases to be inadvertently transcribed. Although I have reviewed the note for such errors, some may still exist.

## 2018-11-28 NOTE — ASSESSMENT & PLAN NOTE
Patient cancelled the stress test but was evaluated by cardiologist.   Denies any chest pain or ANNE. Will follow.

## 2018-12-26 ENCOUNTER — TELEPHONE (OUTPATIENT)
Dept: INTERNAL MEDICINE | Age: 83
End: 2018-12-26

## 2018-12-26 NOTE — TELEPHONE ENCOUNTER
losartan (COZAAR) 50 MG tablet  (90 day supply)    [facility] - [place], [place] [address] [phone] [facility] [phone] [facility]    Refill

## 2018-12-26 NOTE — TELEPHONE ENCOUNTER
Please return patient's call. She was given a years worth [facility]. Thank you

## 2019-01-02 RX ORDER — LOSARTAN POTASSIUM 50 MG/1
TABLET ORAL
Qty: 90 TABLET | Refills: 3 | Status: SHIPPED | OUTPATIENT
Start: 2019-01-02 | End: 2019-04-09 | Stop reason: SDUPTHER

## 2019-01-02 RX ORDER — LOSARTAN POTASSIUM 50 MG/1
100 TABLET ORAL DAILY
Qty: 30 TABLET | Refills: 3 | Status: SHIPPED | OUTPATIENT
Start: 2019-01-02 | End: 2019-04-09

## 2019-01-02 NOTE — TELEPHONE ENCOUNTER
Patient is waiting on [facility] to send her a refill of her Losartin but the patient is now out. She needs a 30 day prescription of this sent to [facility] off [facility]. Patient is requesting this be sent there today.   Where she is currently living they have been having trouble with [facility] delivery being sent to them.

## 2019-01-26 DIAGNOSIS — E78.5 DYSLIPIDEMIA: ICD-10-CM

## 2019-01-28 RX ORDER — ATORVASTATIN CALCIUM 20 MG/1
TABLET, FILM COATED ORAL
Qty: 90 TABLET | Refills: 3 | Status: SHIPPED | OUTPATIENT
Start: 2019-01-28 | End: 2019-07-09 | Stop reason: HOSPADM

## 2019-03-05 ENCOUNTER — TELEPHONE (OUTPATIENT)
Dept: INTERNAL MEDICINE | Age: 84
End: 2019-03-05

## 2019-03-05 NOTE — TELEPHONE ENCOUNTER
Patient called and said that [facility] pharmacy told her that the Losartin 50mg has a manu factor backorder on it.  What would you like to switch her to?  Send new RX to [facility].

## 2019-04-09 ENCOUNTER — OFFICE VISIT (OUTPATIENT)
Dept: INTERNAL MEDICINE | Age: 84
End: 2019-04-09

## 2019-04-09 VITALS
TEMPERATURE: 97.5 F | HEART RATE: 84 BPM | BODY MASS INDEX: 22.78 KG/M2 | DIASTOLIC BLOOD PRESSURE: 82 MMHG | OXYGEN SATURATION: 97 % | SYSTOLIC BLOOD PRESSURE: 132 MMHG | WEIGHT: 113 LBS | HEIGHT: 59 IN

## 2019-04-09 DIAGNOSIS — I10 ESSENTIAL HYPERTENSION: Primary | Chronic | ICD-10-CM

## 2019-04-09 DIAGNOSIS — E78.5 DYSLIPIDEMIA: Chronic | ICD-10-CM

## 2019-04-09 DIAGNOSIS — E03.9 HYPOTHYROIDISM, UNSPECIFIED TYPE: Chronic | ICD-10-CM

## 2019-04-09 LAB
BUN SERPL-MCNC: 15 MG/DL (ref 8–23)
BUN/CREAT SERPL: 20.5 (ref 7–25)
CALCIUM SERPL-MCNC: 9.3 MG/DL (ref 8.2–9.6)
CHLORIDE SERPL-SCNC: 102 MMOL/L (ref 98–107)
CHOLEST SERPL-MCNC: 184 MG/DL (ref 0–200)
CHOLEST/HDLC SERPL: 3.41 {RATIO}
CO2 SERPL-SCNC: 27.5 MMOL/L (ref 22–29)
CREAT SERPL-MCNC: 0.73 MG/DL (ref 0.57–1)
GLUCOSE SERPL-MCNC: 93 MG/DL (ref 65–99)
HDLC SERPL-MCNC: 54 MG/DL (ref 40–60)
LDLC SERPL CALC-MCNC: 104 MG/DL (ref 0–100)
POTASSIUM SERPL-SCNC: 4 MMOL/L (ref 3.5–5.2)
SODIUM SERPL-SCNC: 143 MMOL/L (ref 136–145)
T4 FREE SERPL-MCNC: 1.24 NG/DL (ref 0.93–1.7)
TRIGL SERPL-MCNC: 132 MG/DL (ref 0–150)
TSH SERPL DL<=0.005 MIU/L-ACNC: 1.24 MIU/ML (ref 0.27–4.2)
VLDLC SERPL CALC-MCNC: 26.4 MG/DL (ref 5–40)

## 2019-04-09 PROCEDURE — 99214 OFFICE O/P EST MOD 30 MIN: CPT | Performed by: INTERNAL MEDICINE

## 2019-04-09 RX ORDER — LOSARTAN POTASSIUM 50 MG/1
50 TABLET ORAL 2 TIMES DAILY
Qty: 60 TABLET | Refills: 3 | COMMUNITY
Start: 2019-04-09 | End: 2019-04-10 | Stop reason: SDUPTHER

## 2019-04-09 NOTE — PROGRESS NOTES
[facility] [place] [facility]  HERNANDEZ ROBERTSON M.D.      Maria Fernanda Gaviria / 89 y.o. / female  04/09/2019      ASSESSMENT & PLAN:    Problem List Items Addressed This Visit        High    Hypertension - Primary (Chronic)    Current Assessment & Plan     Verify if taking losartan 50 mg BID or QD.          Relevant Medications    losartan (COZAAR) 50 MG tablet    Other Relevant Orders    Basic Metabolic Panel       Medium    Dyslipidemia (Chronic)    Overview     Stable. Continue atorvastatin 20 mg.          Relevant Medications    atorvastatin (LIPITOR) 20 MG tablet    Other Relevant Orders    Lipid Panel With / Chol / HDL Ratio    Hypothyroidism (Chronic)    Overview     Stable. Continue current dose of levothyroxine 50 mcg.         Relevant Medications    levothyroxine (SYNTHROID, LEVOTHROID) 50 MCG tablet    Other Relevant Orders    TSH+Free T4        Orders Placed This Encounter   Procedures   • Basic Metabolic Panel   • Lipid Panel With / Chol / HDL Ratio   • TSH+Free T4     No orders of the defined types were placed in this encounter.      Summary/Discussion:      Return in about 4 months (around 8/9/2019) for COMBINED INITIAL AWV and medical f/u (30 MIN).  ____________________________________________________________________    MEDICATIONS  Current Outpatient Medications   Medication Sig Dispense Refill   • acetaminophen (TYLENOL) 650 MG 8 hr tablet Take  by mouth daily.     • atorvastatin (LIPITOR) 20 MG tablet TAKE 1 TABLET DAILY 90 tablet 3   • cetirizine (ZyrTEC) 10 MG tablet Take 10 mg by mouth daily.     • Cholecalciferol (VITAMIN D3) 2000 UNITS tablet Take  by mouth daily.     • desonide (DESOWEN) 0.05 % cream Apply  topically As Needed.     • diclofenac (VOLTAREN) 1 % gel gel Place  on the skin.     • fluticasone (VERAMYST) 27.5 MCG/SPRAY nasal spray into each nostril daily.     • Glucosamine-Chondroit-Vit C-Mn (GLUCOSAMINE CHONDR 1500 COMPLX PO) Take 1 capsule by mouth 2 (two) times a day.     • lansoprazole  "(PREVACID) 30 MG capsule TAKE 1 CAPSULE EVERY MORNING BEFORE BREAKFAST 90 capsule 3   • levothyroxine (SYNTHROID, LEVOTHROID) 50 MCG tablet TAKE 1 TABLET EVERY MORNING 90 tablet 3   • losartan (COZAAR) 50 MG tablet Take 2 tablets by mouth Daily. 30 tablet 3   • montelukast (SINGULAIR) 10 MG tablet Take 10 mg by mouth daily.     • Triamcinolone Acetonide (NASACORT) 55 MCG/ACT nasal inhaler 2 sprays into each nostril Daily.       No current facility-administered medications for this visit.        VITALS    Visit Vitals  /82   Pulse 84   Temp 97.5 °F (36.4 °C)   Ht 149.9 cm (59.02\")   Wt 51.3 kg (113 lb)   SpO2 97%   BMI 22.81 kg/m²       BP Readings from Last 3 Encounters:   04/09/19 132/82   11/28/18 124/76   10/03/18 147/75     Wt Readings from Last 3 Encounters:   04/09/19 51.3 kg (113 lb)   11/28/18 51.3 kg (113 lb)   10/03/18 51.3 kg (113 lb)      Body mass index is 22.81 kg/m².    CC:  Main reason(s) for today's visit: Follow-up for hypertension     HPI:     Chronic essential hypertension:  Since prior visit: compliant with medication(s) and denies significant problems with medication(s).  Most recent in-office blood pressure readings:   BP Readings from Last 3 Encounters:   04/09/19 132/82   11/28/18 124/76   10/03/18 147/75     Chronic hyperlipidemia:  Current therapy include atorvastatin, denies problems with medication.  Most recent labs:   Lab Results   Component Value Date    LDL 97 05/17/2017     (H) 02/08/2016    HDL 64 (H) 05/17/2017    HDL 52 02/08/2016    TRIG 122 05/17/2017    CHOLHDLRATIO 2.89 05/17/2017     Chronic hypothyroidism:  On stable dose of levothyroxine, no changes in physical symptoms  Lab Results   Component Value Date    TSH 0.875 03/20/2018    TSH 0.664 11/17/2017    FREET4 1.31 03/20/2018    FREET4 1.47 11/17/2017         Patient Care Team:  Henok Salcido MD as PCP - General  Christiano Dewitt MD as Consulting Physician (Obstetrics and Gynecology)  Rodolfo Shah MD as " Consulting Physician (Allergy)  Michael Mendoza MD as Consulting Physician (Dermatology)  ____________________________________________________________________      REVIEW OF SYSTEMS    Review of Systems  As noted per HPI  Constitutional neg  Resp neg  CV neg   Neuro neg  Psych neg       PHYSICAL EXAMINATION    Physical Exam  Constitutional  No distress  Cardiovascular Rate  normal . Rhythm: regular . Heart sounds:  Normal. No carotid bruits   Pulmonary/Chest  Effort normal. Breath sounds:  normal  Psychiatric  Alert. Judgment and thought content normal. Mood normal    REVIEWED DATA:    Labs:   Lab Results   Component Value Date     09/20/2018    K 4.2 09/20/2018    AST 25 09/20/2018    ALT 13 09/20/2018    BUN 13 09/20/2018    CREATININE 0.79 09/20/2018    CREATININE 0.84 04/27/2018    CREATININE 0.88 07/14/2017    EGFRIFNONA 68 09/20/2018    EGFRIFAFRI 76 04/27/2018       Lab Results   Component Value Date    GLUCOSE 88 09/20/2018    GLUCOSE 106 (H) 07/14/2017    GLUCOSE 85 03/18/2015       Lab Results   Component Value Date    LDL 97 05/17/2017     (H) 02/08/2016    HDL 64 (H) 05/17/2017    HDL 52 02/08/2016    TRIG 122 05/17/2017    TRIG 154 (H) 02/08/2016    CHOLHDLRATIO 2.89 05/17/2017    CHOLHDLRATIO 3.6 02/08/2016       Lab Results   Component Value Date    TSH 0.875 03/20/2018    FREET4 1.31 03/20/2018          Lab Results   Component Value Date    WBC 5.09 09/20/2018    HGB 14.6 09/20/2018    HGB 15.2 04/27/2018    HGB 14.8 07/11/2016     09/20/2018       Lab Results   Component Value Date    PROTEIN Negative 04/27/2018    GLUCOSEU Negative 09/20/2018    BLOODU Negative 09/20/2018    NITRITEU Negative 09/20/2018    LEUKOCYTESUR Negative 09/20/2018       Imaging:        Medical Tests:     12 Lead EKG       Narrative     RR Interval= 923 ms  DE Interval= 178 ms  QRSD Interval= 101 ms  QT Interval= 406 ms  QTc Interval= 423 ms  Heart Rate= 65 ms  P Axis= 69 deg  QRS Axis= -5 deg  T Wave  Axis= 58 deg  I: 40 Axis= -13 deg  T: 40 Axis= 159 deg  ST Axis= 72 deg  Sinus rhythm  Atrial premature complexes  Inferior infarct, old  Anterior infarct, old  No Change from Prior Tracing  Electronically Signed by:  Deny Khanna ([facility]) 20-Sep-2018 19:13:05                Summary of old records / correspondence / consultant report:        Request outside records:        ALLERGIES  Allergies   Allergen Reactions   • Aspirin Nausea And Vomiting and Nausea Only   • Buffered Aspirin Nausea And Vomiting        PFSH:     The following portions of the patient's history were reviewed and updated as appropriate: Allergies / Current Medications / Past Medical History / Surgical History / Social History / Family History    PROBLEM LIST   Patient Active Problem List   Diagnosis   • Osteoarthritis of lumbar spine   • Osteoarthritis   • Sciatic leg pain   • Asthma   • Stenosis of carotid artery   • Dyslipidemia   • Gastroesophageal reflux disease   • Hypertension   • Hypothyroidism   • Osteopenia   • Restless legs syndrome   • Thrombocytopenia (CMS/HCC)       PAST MEDICAL HISTORY  Past Medical History:   Diagnosis Date   • Arthritis    • Cancer (CMS/HCC)     skin   • Disease of thyroid gland    • GERD (gastroesophageal reflux disease)    • Hyperlipidemia    • Hypertension    • Osteoporosis        SURGICAL HISTORY  Past Surgical History:   Procedure Laterality Date   • REPLACEMENT TOTAL KNEE Right    • THYROID SURGERY     • TUBAL ABDOMINAL LIGATION         SOCIAL HISTORY  Social History     Socioeconomic History   • Marital status:      Spouse name: Not on file   • Number of children: Not on file   • Years of education: Not on file   • Highest education level: Not on file   Tobacco Use   • Smoking status: Former Smoker   • Smokeless tobacco: Never Used   Substance and Sexual Activity   • Alcohol use: Yes     Comment: wine at night   • Drug use: No   • Sexual activity: Defer       FAMILY HISTORY  History reviewed.  No pertinent family history.      **Keith Disclaimer:   Much of this encounter note is an electronic transcription/translation of spoken language to printed text. The electronic translation of spoken language may permit erroneous, or at times, nonsensical words or phrases to be inadvertently transcribed. Although I have reviewed the note for such errors, some may still exist.       Template created by Clayton Salcido MD

## 2019-04-10 ENCOUNTER — TELEPHONE (OUTPATIENT)
Dept: INTERNAL MEDICINE | Age: 84
End: 2019-04-10

## 2019-04-10 DIAGNOSIS — I10 ESSENTIAL HYPERTENSION: Chronic | ICD-10-CM

## 2019-04-10 RX ORDER — LOSARTAN POTASSIUM 50 MG/1
50 TABLET ORAL DAILY
Qty: 30 TABLET | Refills: 3 | COMMUNITY
Start: 2019-04-10 | End: 2019-12-30

## 2019-07-06 ENCOUNTER — APPOINTMENT (OUTPATIENT)
Dept: CT IMAGING | Facility: HOSPITAL | Age: 84
End: 2019-07-06

## 2019-07-06 ENCOUNTER — HOSPITAL ENCOUNTER (INPATIENT)
Facility: HOSPITAL | Age: 84
LOS: 3 days | Discharge: HOME | End: 2019-07-09
Attending: EMERGENCY MEDICINE | Admitting: INTERNAL MEDICINE

## 2019-07-06 ENCOUNTER — APPOINTMENT (OUTPATIENT)
Dept: ULTRASOUND IMAGING | Facility: HOSPITAL | Age: 84
End: 2019-07-06

## 2019-07-06 DIAGNOSIS — E80.6 HYPERBILIRUBINEMIA: ICD-10-CM

## 2019-07-06 DIAGNOSIS — R41.82 ALTERED MENTAL STATUS, UNSPECIFIED ALTERED MENTAL STATUS TYPE: Primary | ICD-10-CM

## 2019-07-06 DIAGNOSIS — R79.89 ABNORMAL LFTS: ICD-10-CM

## 2019-07-06 DIAGNOSIS — E87.20 LACTIC ACIDOSIS: ICD-10-CM

## 2019-07-06 PROBLEM — B17.9 ACUTE HEPATITIS: Status: ACTIVE | Noted: 2019-07-06

## 2019-07-06 LAB
ALBUMIN SERPL-MCNC: 4.2 G/DL (ref 3.5–5.2)
ALBUMIN/GLOB SERPL: 1.6 G/DL
ALP SERPL-CCNC: 168 U/L (ref 39–117)
ALT SERPL W P-5'-P-CCNC: 665 U/L (ref 1–33)
ANION GAP SERPL CALCULATED.3IONS-SCNC: 12.3 MMOL/L (ref 5–15)
APAP SERPL-MCNC: <5 MCG/ML (ref 10–30)
APTT PPP: 27.3 SECONDS (ref 22.7–35.4)
AST SERPL-CCNC: 1764 U/L (ref 1–32)
BACTERIA UR QL AUTO: ABNORMAL /HPF
BILIRUB SERPL-MCNC: 2.8 MG/DL (ref 0.2–1.2)
BILIRUB UR QL STRIP: NEGATIVE
BUN BLD-MCNC: 14 MG/DL (ref 8–23)
BUN/CREAT SERPL: 18.4 (ref 7–25)
CALCIUM SPEC-SCNC: 9.9 MG/DL (ref 8.2–9.6)
CHLORIDE SERPL-SCNC: 96 MMOL/L (ref 98–107)
CLARITY UR: CLEAR
CO2 SERPL-SCNC: 26.7 MMOL/L (ref 22–29)
COLOR UR: ABNORMAL
CREAT BLD-MCNC: 0.76 MG/DL (ref 0.57–1)
D-LACTATE SERPL-SCNC: 2 MMOL/L (ref 0.5–2)
D-LACTATE SERPL-SCNC: 2.7 MMOL/L (ref 0.5–2)
DEPRECATED RDW RBC AUTO: 43.8 FL (ref 37–54)
ERYTHROCYTE [DISTWIDTH] IN BLOOD BY AUTOMATED COUNT: 13 % (ref 12.3–15.4)
GFR SERPL CREATININE-BSD FRML MDRD: 71 ML/MIN/1.73
GLOBULIN UR ELPH-MCNC: 2.6 GM/DL
GLUCOSE BLD-MCNC: 171 MG/DL (ref 65–99)
GLUCOSE UR STRIP-MCNC: ABNORMAL MG/DL
HAV IGM SERPL QL IA: NORMAL
HBV CORE IGM SERPL QL IA: NORMAL
HBV SURFACE AG SERPL QL IA: NORMAL
HCT VFR BLD AUTO: 44.2 % (ref 34–46.6)
HCV AB SER DONR QL: NORMAL
HGB BLD-MCNC: 15 G/DL (ref 12–15.9)
HGB UR QL STRIP.AUTO: ABNORMAL
HOLD SPECIMEN: NORMAL
HYALINE CASTS UR QL AUTO: ABNORMAL /LPF
INR PPP: 1.01 (ref 0.9–1.1)
KETONES UR QL STRIP: NEGATIVE
LEUKOCYTE ESTERASE UR QL STRIP.AUTO: NEGATIVE
LIPASE SERPL-CCNC: 30 U/L (ref 13–60)
LYMPHOCYTES # BLD MANUAL: 0.08 10*3/MM3 (ref 0.7–3.1)
LYMPHOCYTES NFR BLD MANUAL: 1 % (ref 19.6–45.3)
LYMPHOCYTES NFR BLD MANUAL: 2 % (ref 5–12)
MCH RBC QN AUTO: 31.2 PG (ref 26.6–33)
MCHC RBC AUTO-ENTMCNC: 33.9 G/DL (ref 31.5–35.7)
MCV RBC AUTO: 91.9 FL (ref 79–97)
MONOCYTES # BLD AUTO: 0.16 10*3/MM3 (ref 0.1–0.9)
NEUTROPHILS # BLD AUTO: 7.8 10*3/MM3 (ref 1.7–7)
NEUTROPHILS NFR BLD MANUAL: 97 % (ref 42.7–76)
NITRITE UR QL STRIP: NEGATIVE
PH UR STRIP.AUTO: 6.5 [PH] (ref 5–8)
PLAT MORPH BLD: NORMAL
PLATELET # BLD AUTO: 120 10*3/MM3 (ref 140–450)
PMV BLD AUTO: 10 FL (ref 6–12)
POTASSIUM BLD-SCNC: 3.4 MMOL/L (ref 3.5–5.2)
PROT SERPL-MCNC: 6.8 G/DL (ref 6–8.5)
PROT UR QL STRIP: ABNORMAL
PROTHROMBIN TIME: 13 SECONDS (ref 11.7–14.2)
RBC # BLD AUTO: 4.81 10*6/MM3 (ref 3.77–5.28)
RBC # UR: ABNORMAL /HPF
RBC CASTS #/AREA URNS LPF: ABNORMAL /LPF
RBC MORPH BLD: NORMAL
REF LAB TEST METHOD: ABNORMAL
SODIUM BLD-SCNC: 135 MMOL/L (ref 136–145)
SP GR UR STRIP: 1.02 (ref 1–1.03)
SQUAMOUS #/AREA URNS HPF: ABNORMAL /HPF
TROPONIN T SERPL-MCNC: <0.01 NG/ML (ref 0–0.03)
UROBILINOGEN UR QL STRIP: ABNORMAL
WBC MORPH BLD: NORMAL
WBC NRBC COR # BLD: 8.04 10*3/MM3 (ref 3.4–10.8)
WBC UR QL AUTO: ABNORMAL /HPF

## 2019-07-06 PROCEDURE — 93005 ELECTROCARDIOGRAM TRACING: CPT | Performed by: EMERGENCY MEDICINE

## 2019-07-06 PROCEDURE — 84484 ASSAY OF TROPONIN QUANT: CPT | Performed by: EMERGENCY MEDICINE

## 2019-07-06 PROCEDURE — 70450 CT HEAD/BRAIN W/O DYE: CPT

## 2019-07-06 PROCEDURE — 85730 THROMBOPLASTIN TIME PARTIAL: CPT | Performed by: EMERGENCY MEDICINE

## 2019-07-06 PROCEDURE — 83690 ASSAY OF LIPASE: CPT | Performed by: INTERNAL MEDICINE

## 2019-07-06 PROCEDURE — 99222 1ST HOSP IP/OBS MODERATE 55: CPT | Performed by: INTERNAL MEDICINE

## 2019-07-06 PROCEDURE — 85610 PROTHROMBIN TIME: CPT | Performed by: EMERGENCY MEDICINE

## 2019-07-06 PROCEDURE — 83605 ASSAY OF LACTIC ACID: CPT | Performed by: EMERGENCY MEDICINE

## 2019-07-06 PROCEDURE — P9612 CATHETERIZE FOR URINE SPEC: HCPCS

## 2019-07-06 PROCEDURE — 99285 EMERGENCY DEPT VISIT HI MDM: CPT

## 2019-07-06 PROCEDURE — 36415 COLL VENOUS BLD VENIPUNCTURE: CPT | Performed by: EMERGENCY MEDICINE

## 2019-07-06 PROCEDURE — 80307 DRUG TEST PRSMV CHEM ANLYZR: CPT | Performed by: INTERNAL MEDICINE

## 2019-07-06 PROCEDURE — 81001 URINALYSIS AUTO W/SCOPE: CPT | Performed by: EMERGENCY MEDICINE

## 2019-07-06 PROCEDURE — 85025 COMPLETE CBC W/AUTO DIFF WBC: CPT | Performed by: EMERGENCY MEDICINE

## 2019-07-06 PROCEDURE — 76705 ECHO EXAM OF ABDOMEN: CPT

## 2019-07-06 PROCEDURE — 25010000002 ENOXAPARIN PER 10 MG: Performed by: INTERNAL MEDICINE

## 2019-07-06 PROCEDURE — 93010 ELECTROCARDIOGRAM REPORT: CPT | Performed by: INTERNAL MEDICINE

## 2019-07-06 PROCEDURE — 80074 ACUTE HEPATITIS PANEL: CPT | Performed by: INTERNAL MEDICINE

## 2019-07-06 PROCEDURE — 80053 COMPREHEN METABOLIC PANEL: CPT | Performed by: EMERGENCY MEDICINE

## 2019-07-06 PROCEDURE — 85007 BL SMEAR W/DIFF WBC COUNT: CPT | Performed by: EMERGENCY MEDICINE

## 2019-07-06 RX ORDER — LOSARTAN POTASSIUM 50 MG/1
50 TABLET ORAL DAILY
Status: DISCONTINUED | OUTPATIENT
Start: 2019-07-06 | End: 2019-07-09 | Stop reason: HOSPADM

## 2019-07-06 RX ORDER — ONDANSETRON 2 MG/ML
4 INJECTION INTRAMUSCULAR; INTRAVENOUS EVERY 6 HOURS PRN
Status: DISCONTINUED | OUTPATIENT
Start: 2019-07-06 | End: 2019-07-09 | Stop reason: HOSPADM

## 2019-07-06 RX ORDER — POTASSIUM CHLORIDE 750 MG/1
40 CAPSULE, EXTENDED RELEASE ORAL ONCE
Status: COMPLETED | OUTPATIENT
Start: 2019-07-06 | End: 2019-07-06

## 2019-07-06 RX ORDER — SODIUM CHLORIDE 0.9 % (FLUSH) 0.9 %
3-10 SYRINGE (ML) INJECTION AS NEEDED
Status: DISCONTINUED | OUTPATIENT
Start: 2019-07-06 | End: 2019-07-09 | Stop reason: HOSPADM

## 2019-07-06 RX ORDER — LEVOTHYROXINE SODIUM 0.05 MG/1
50 TABLET ORAL EVERY MORNING
Status: DISCONTINUED | OUTPATIENT
Start: 2019-07-06 | End: 2019-07-09 | Stop reason: HOSPADM

## 2019-07-06 RX ORDER — SODIUM CHLORIDE 0.9 % (FLUSH) 0.9 %
3 SYRINGE (ML) INJECTION EVERY 12 HOURS SCHEDULED
Status: DISCONTINUED | OUTPATIENT
Start: 2019-07-06 | End: 2019-07-09 | Stop reason: HOSPADM

## 2019-07-06 RX ORDER — SODIUM CHLORIDE 9 MG/ML
100 INJECTION, SOLUTION INTRAVENOUS CONTINUOUS
Status: DISCONTINUED | OUTPATIENT
Start: 2019-07-06 | End: 2019-07-08

## 2019-07-06 RX ORDER — SODIUM CHLORIDE 0.9 % (FLUSH) 0.9 %
10 SYRINGE (ML) INJECTION AS NEEDED
Status: DISCONTINUED | OUTPATIENT
Start: 2019-07-06 | End: 2019-07-09 | Stop reason: HOSPADM

## 2019-07-06 RX ORDER — PANTOPRAZOLE SODIUM 40 MG/1
40 TABLET, DELAYED RELEASE ORAL EVERY MORNING
Status: DISCONTINUED | OUTPATIENT
Start: 2019-07-06 | End: 2019-07-09 | Stop reason: HOSPADM

## 2019-07-06 RX ADMIN — PANTOPRAZOLE SODIUM 40 MG: 40 TABLET, DELAYED RELEASE ORAL at 12:07

## 2019-07-06 RX ADMIN — SODIUM CHLORIDE, PRESERVATIVE FREE 3 ML: 5 INJECTION INTRAVENOUS at 11:10

## 2019-07-06 RX ADMIN — LEVOTHYROXINE SODIUM 50 MCG: 50 TABLET ORAL at 12:07

## 2019-07-06 RX ADMIN — ENOXAPARIN SODIUM 40 MG: 40 INJECTION SUBCUTANEOUS at 12:07

## 2019-07-06 RX ADMIN — POTASSIUM CHLORIDE 40 MEQ: 750 CAPSULE, EXTENDED RELEASE ORAL at 09:49

## 2019-07-06 RX ADMIN — SODIUM CHLORIDE 100 ML/HR: 9 INJECTION, SOLUTION INTRAVENOUS at 10:58

## 2019-07-06 RX ADMIN — LOSARTAN POTASSIUM 50 MG: 50 TABLET, FILM COATED ORAL at 12:07

## 2019-07-06 RX ADMIN — SODIUM CHLORIDE 100 ML/HR: 9 INJECTION, SOLUTION INTRAVENOUS at 20:19

## 2019-07-06 RX ADMIN — SODIUM CHLORIDE, PRESERVATIVE FREE 3 ML: 5 INJECTION INTRAVENOUS at 20:19

## 2019-07-06 NOTE — H&P
[facility] Admission H&P    Patient Care Team:  Henok Salcido MD as PCP - General  Christiano Dewitt MD as Consulting Physician (Obstetrics and Gynecology)  Rodolfo Shah MD as Consulting Physician (Allergy)  Michael Mendoza MD as Consulting Physician (Dermatology)    Chief complaint: Nausea, vomiting, disorientation    History of Present Illness    This is a 89-year-old relatively healthy female who presented to the emergency room with acute onset of nausea with vomiting at home late last night/early this morning.  She called her daughters who came over to check on her around 3:00 this morning and they felt that she was also disoriented.  She was brought to the emergency room and had work-up including head CT that was negative.  Her liver function tests were found to be significantly elevated.  The patient denies any previous history of hepatitis or other liver disorders.  She drinks alcohol occasionally but not on a daily basis.  She does not use Tylenol.  Her nausea and vomiting have subsided and her disorientation is resolved at this time.  She complains of a little bit of epigastric discomfort that she states is chronic and due to her reflux.  She was having worsening of the symptoms yesterday throughout the day but otherwise had a pretty normal day and was out and about most of the day with her daughter.  Her daughter does not think that she ate much yesterday.  She has not had any diarrhea, fevers, chills.    Past Medical History:   Diagnosis Date   • Arthritis    • Cancer (CMS/HCC)     skin   • Disease of thyroid gland    • GERD (gastroesophageal reflux disease)    • Hyperlipidemia    • Hypertension    • Osteoporosis      Past Surgical History:   Procedure Laterality Date   • REPLACEMENT TOTAL KNEE Right    • THYROID SURGERY     • TUBAL ABDOMINAL LIGATION       History reviewed. No pertinent family history.  Social History     Tobacco Use   • Smoking status: Former Smoker   • Smokeless tobacco: Never Used    Substance Use Topics   • Alcohol use: Yes     Comment: wine at night   • Drug use: No       (Not in a hospital admission)  Allergies:  Aspirin and Buffered aspirin    Review of Systems   Constitutional: Negative for chills and fever.   HENT: Negative for congestion and sore throat.    Eyes: Negative for visual disturbance.   Respiratory: Negative for cough, chest tightness, shortness of breath and wheezing.    Cardiovascular: Negative for chest pain, palpitations and leg swelling.   Gastrointestinal: Positive for nausea and vomiting. Negative for abdominal distention, abdominal pain and diarrhea.        Mild epigastric pain   Endocrine: Negative for polydipsia and polyuria.   Genitourinary: Negative for difficulty urinating, dysuria, frequency and urgency.   Musculoskeletal: Negative for arthralgias and myalgias.   Skin: Negative for color change and rash.   Neurological: Negative for dizziness and light-headedness.   Psychiatric/Behavioral: Positive for confusion.        PHYSICAL EXAM    Vital Signs  tMax 24 hrs:  Temp (24hrs), Av.3 °F (36.3 °C), Min:97.3 °F (36.3 °C), Max:97.3 °F (36.3 °C)    Vitals Ranges:  Temp:  [97.3 °F (36.3 °C)] 97.3 °F (36.3 °C)  Heart Rate:  [88-97] 90  Resp:  [16-20] 16  BP: (147-158)/(78) 147/78    Physical Exam   Constitutional: She is oriented to person, place, and time. She appears well-developed.   Elderly and somewhat frail-appearing   HENT:   Head: Normocephalic and atraumatic.   Eyes: EOM are normal. Pupils are equal, round, and reactive to light.   Neck: Neck supple. No tracheal deviation present.   Cardiovascular: Normal rate and regular rhythm. Exam reveals no gallop.   No murmur heard.  Pulmonary/Chest: Effort normal. No respiratory distress. She has no wheezes.   Abdominal: Soft. Bowel sounds are normal. She exhibits no distension.   Very slight epigastric and right upper quadrant tenderness upon deep palpation.  Otherwise, a very benign appearing abdomen    Musculoskeletal: She exhibits no edema or tenderness.   Neurological: She is alert and oriented to person, place, and time. No cranial nerve deficit.   No disorientation at this time   Skin: Skin is warm and dry.   Nursing note and vitals reviewed.      Results Review:  Results from last 7 days   Lab Units 07/06/19  0511   WBC 10*3/mm3 8.04   HEMOGLOBIN g/dL 15.0   HEMATOCRIT % 44.2   PLATELETS 10*3/mm3 120*     Results from last 7 days   Lab Units 07/06/19  0511   SODIUM mmol/L 135*   POTASSIUM mmol/L 3.4*   CHLORIDE mmol/L 96*   CO2 mmol/L 26.7   BUN mg/dL 14   CREATININE mg/dL 0.76   CALCIUM mg/dL 9.9*   BILIRUBIN mg/dL 2.8*   ALK PHOS U/L 168*   ALT (SGPT) U/L 665*   AST (SGOT) U/L 1,764*   GLUCOSE mg/dL 171*        I reviewed the patient's new clinical results.  I reviewed the patient's new imaging results and agree with the interpretation.        Active Hospital Problems    Diagnosis  POA   • **Acute hepatitis [B17.9]  Unknown   • Altered mental status [R41.82]  Yes   • Hypertension [I10]  Yes   • Hypothyroidism [E03.9]  Yes   • Dyslipidemia [E78.5]  Yes   • Gastroesophageal reflux disease [K21.9]  Yes      Resolved Hospital Problems   No resolved problems to display.       Assessment & Plan    The patient will be admitted.  The etiology of her elevated liver function tests is not clear at this time.  Will start by checking an acute hepatitis panel, right upper quadrant ultrasound, Tylenol level, lipase.  Her abdominal exam is benign at this time.  Will ask gastroenterology to evaluate.  I will hold her statin.  Continue other outpatient medications for hypothyroid and hypertension.  Her disorientation is resolved at this time and most likely due to her underlying acute illness.  Head CT was negative.  Supportive care with IV fluids, antiemetics.  Her lactic acid was elevated upon presentation and we are waiting a repeat level.  I do not think that she is septic.  Replace potassium this morning.  Additional  plans based on her clinical course.    I discussed the patients findings and my recommendations with patient and family    Morgan Alan MD  07/06/19  9:21 AM

## 2019-07-06 NOTE — CONSULTS
[facility]  Initial Inpatient Consult Note    Referring Provider: MCKINLEY    Reason for Consultation: Hepatitis    Subjective     History of present illness:    89 y.o. female, not previously known to our service, that we are asked to see for abdominal pain, nausea vomiting and elevated LFTs.  Patient reports being in her usual state of health until yesterday.  She developed upper abdominal pain that radiated around to her sides under her rib cage and concentrated in her back.  She reports that the back pain was very intense.  She was nauseated and had an episode of vomiting associated with this pain.  She denies any change in her bowel habits or blood in her stool.  She had no noted fever.  She reports that she had no recent change in her appetite.  She does have occasional acid reflux but this pain felt different to her.  Denies any new medications or recent antibiotic use.  She reports that she takes her blood pressure every other day and it is been normal.    Her LFTs were noted to be markedly abnormal.  Her AST was 1764 with an ALT of 665.  Alkaline phosphatase was 168 with a total bilirubin of 2.8.  Acute viral hepatitis panel was normal.  Acetaminophen level was normal.  Lactic acid was normal.  She has no prior history of liver disease.    Past Medical History:  Past Medical History:   Diagnosis Date   • Arthritis    • Cancer (CMS/HCC)     skin   • Disease of thyroid gland    • GERD (gastroesophageal reflux disease)    • Hyperlipidemia    • Hypertension    • Osteoporosis      Past Surgical History:  Past Surgical History:   Procedure Laterality Date   • REPLACEMENT TOTAL KNEE Right    • THYROID SURGERY     • TUBAL ABDOMINAL LIGATION        Social History:   Social History     Tobacco Use   • Smoking status: Former Smoker   • Smokeless tobacco: Never Used   Substance Use Topics   • Alcohol use: Yes     Comment: wine at night      Family History:  History reviewed. No pertinent family  history.    Home Meds:  Medications Prior to Admission   Medication Sig Dispense Refill Last Dose   • acetaminophen (TYLENOL) 650 MG 8 hr tablet Take  by mouth daily.   Past Week at Unknown time   • atorvastatin (LIPITOR) 20 MG tablet TAKE 1 TABLET DAILY 90 tablet 3 7/5/2019 at Unknown time   • Cholecalciferol (VITAMIN D3) 2000 UNITS tablet Take  by mouth daily.   7/5/2019 at Unknown time   • lansoprazole (PREVACID) 30 MG capsule TAKE 1 CAPSULE EVERY MORNING BEFORE BREAKFAST 90 capsule 3 7/5/2019 at Unknown time   • levothyroxine (SYNTHROID, LEVOTHROID) 50 MCG tablet TAKE 1 TABLET EVERY MORNING 90 tablet 3 7/5/2019 at Unknown time   • losartan (COZAAR) 50 MG tablet Take 1 tablet by mouth Daily. 30 tablet 3 7/5/2019 at Unknown time   • montelukast (SINGULAIR) 10 MG tablet Take 10 mg by mouth daily.   7/5/2019 at Unknown time   • Triamcinolone Acetonide (NASACORT) 55 MCG/ACT nasal inhaler 2 sprays into each nostril Daily.   7/5/2019 at Unknown time     Current Meds:     enoxaparin 40 mg Subcutaneous Q24H   levothyroxine 50 mcg Oral QAM   losartan 50 mg Oral Daily   pantoprazole 40 mg Oral QAM   sodium chloride 3 mL Intravenous Q12H     Allergies:  Allergies   Allergen Reactions   • Aspirin Nausea And Vomiting and Nausea Only   • Buffered Aspirin Nausea And Vomiting     Review of Systems  Pertinent items are noted in HPI, all other systems reviewed and negative     Objective     Vital Signs  Temp:  [97.3 °F (36.3 °C)-98.2 °F (36.8 °C)] 97.8 °F (36.6 °C)  Heart Rate:  [79-98] 81  Resp:  [16-20] 16  BP: (130-158)/(71-87) 130/71  Physical Exam:  General Appearance:    Alert, cooperative, in no acute distress   Head:    Normocephalic, without obvious abnormality, atraumatic   Eyes:            Lids and lashes normal, conjunctivae and sclerae normal, no   icterus   Throat:   No oral lesions, no thrush, oral mucosa moist   Neck:  Supple, no adenopathy   Lungs:     Clear to auscultation,respirations regular, even and                    unlabored    Heart:    Regular rhythm and normal rate, normal S1 and S2, no            Murmur    Chest Wall:    No abnormalities observed   Abdomen:    Soft, nontender, nondistended-normal bowel sounds   Rectal:     Deferred   Extremities:   no edema, no cyanosis, no redness   Skin:   No bleeding, bruising or rash       Psychiatric:  Judgement and insight: normal   Orientation to person place and time: normal   Mood and affect: normal   Results Review:   I reviewed the patient's new clinical results.    Results from last 7 days   Lab Units 07/06/19  0511   WBC 10*3/mm3 8.04   HEMOGLOBIN g/dL 15.0   HEMATOCRIT % 44.2   PLATELETS 10*3/mm3 120*     Results from last 7 days   Lab Units 07/06/19  0511   SODIUM mmol/L 135*   POTASSIUM mmol/L 3.4*   CHLORIDE mmol/L 96*   CO2 mmol/L 26.7   BUN mg/dL 14   CREATININE mg/dL 0.76   CALCIUM mg/dL 9.9*   BILIRUBIN mg/dL 2.8*   ALK PHOS U/L 168*   ALT (SGPT) U/L 665*   AST (SGOT) U/L 1,764*   GLUCOSE mg/dL 171*     Results from last 7 days   Lab Units 07/06/19  0511   INR  1.01     Lab Results   Lab Value Date/Time    LIPASE 30 07/06/2019 1049       Radiology:  CT Head Without Contrast   Final Result   No acute intracranial process identified.       Radiation dose reduction techniques were utilized, including automated   exposure control and exposure modulation based on body size.       This report was finalized on 7/6/2019 5:27 AM by Dr. Ricarda Cespedes M.D.          US Gallbladder    (Results Pending)       Assessment/Plan   Patient Active Problem List   Diagnosis   • Osteoarthritis of lumbar spine   • Osteoarthritis   • Sciatic leg pain   • Asthma   • Stenosis of carotid artery   • Dyslipidemia   • Gastroesophageal reflux disease   • Hypertension   • Hypothyroidism   • Osteopenia   • Restless legs syndrome   • Thrombocytopenia (CMS/HCC)   • Altered mental status   • Acute hepatitis     Assessment-  1.  Acute hepatitis  2.  Abdominal pain, nausea and  vomiting    Plan-  · Liver ultrasound today  · Doppler ultrasound of the liver vasculature in the morning  · We will draw additional serologies and follow trend of the liver enzymes  · Clinically she is feeling better- will continue to follow closely        I discussed the patients findings and my recommendations with patient and family.    Tiffani Singh MD

## 2019-07-06 NOTE — PLAN OF CARE
Problem: Patient Care Overview  Goal: Plan of Care Review  Outcome: Ongoing (interventions implemented as appropriate)   07/06/19 8221   Coping/Psychosocial   Plan of Care Reviewed With patient;family   Plan of Care Review   Progress no change   OTHER   Outcome Summary Admitted through ER vomiting and confusion. A&Ox4 now and no c/o nausea or pain. Gallbladder ultrasound tonight, liver doppler tomorrow. GI consulted. Family at bedside.

## 2019-07-06 NOTE — ED NOTES
Pt states that she was going to bed when she started having vomiting, head and neck pain, unsteady on her feet. Pt having difficulty putting a full idea together.     Rosette Milligan RN  07/06/19 4787

## 2019-07-07 ENCOUNTER — APPOINTMENT (OUTPATIENT)
Dept: CARDIOLOGY | Facility: HOSPITAL | Age: 84
End: 2019-07-07

## 2019-07-07 PROBLEM — Z66 DNR (DO NOT RESUSCITATE): Status: ACTIVE | Noted: 2019-07-07

## 2019-07-07 PROBLEM — J30.89 NON-SEASONAL ALLERGIC RHINITIS: Status: ACTIVE | Noted: 2019-07-07

## 2019-07-07 LAB
ALBUMIN SERPL-MCNC: 2.8 G/DL (ref 3.5–5.2)
ALBUMIN/GLOB SERPL: 1.4 G/DL
ALP SERPL-CCNC: 118 U/L (ref 39–117)
ALT SERPL W P-5'-P-CCNC: 371 U/L (ref 1–33)
ANION GAP SERPL CALCULATED.3IONS-SCNC: 11.2 MMOL/L (ref 5–15)
AST SERPL-CCNC: 471 U/L (ref 1–32)
BH CV VAS HEPATIC PORTAL VEIN DIAMETER: 1 CM
BH CV VAS SMA AORTA PSV: 67 CM/S
BH CV VAS SMA HEPATIC EDV: 19 CM/S
BH CV VAS SMA HEPATIC PSV: 99 CM/S
BH CV VAS SMA SPLENIC EDV: 21 CM/S
BH CV VAS SMA SPLENIC PSV: 133 CM/S
BILIRUB SERPL-MCNC: 3.9 MG/DL (ref 0.2–1.2)
BUN BLD-MCNC: 12 MG/DL (ref 8–23)
BUN/CREAT SERPL: 16 (ref 7–25)
CALCIUM SPEC-SCNC: 7.6 MG/DL (ref 8.2–9.6)
CHLORIDE SERPL-SCNC: 111 MMOL/L (ref 98–107)
CO2 SERPL-SCNC: 20.8 MMOL/L (ref 22–29)
CREAT BLD-MCNC: 0.75 MG/DL (ref 0.57–1)
DEPRECATED RDW RBC AUTO: 45.1 FL (ref 37–54)
EOSINOPHIL # BLD MANUAL: 0.15 10*3/MM3 (ref 0–0.4)
EOSINOPHIL NFR BLD MANUAL: 2 % (ref 0.3–6.2)
ERYTHROCYTE [DISTWIDTH] IN BLOOD BY AUTOMATED COUNT: 13.4 % (ref 12.3–15.4)
GFR SERPL CREATININE-BSD FRML MDRD: 72 ML/MIN/1.73
GLOBULIN UR ELPH-MCNC: 2 GM/DL
GLUCOSE BLD-MCNC: 81 MG/DL (ref 65–99)
HCT VFR BLD AUTO: 35.3 % (ref 34–46.6)
HGB BLD-MCNC: 12 G/DL (ref 12–15.9)
INR PPP: 1.31 (ref 0.9–1.1)
LYMPHOCYTES # BLD MANUAL: 0.08 10*3/MM3 (ref 0.7–3.1)
LYMPHOCYTES NFR BLD MANUAL: 1 % (ref 19.6–45.3)
LYMPHOCYTES NFR BLD MANUAL: 3 % (ref 5–12)
MCH RBC QN AUTO: 31.3 PG (ref 26.6–33)
MCHC RBC AUTO-ENTMCNC: 34 G/DL (ref 31.5–35.7)
MCV RBC AUTO: 92.2 FL (ref 79–97)
MONOCYTES # BLD AUTO: 0.23 10*3/MM3 (ref 0.1–0.9)
NEUTROPHILS # BLD AUTO: 7.07 10*3/MM3 (ref 1.7–7)
NEUTROPHILS NFR BLD MANUAL: 94 % (ref 42.7–76)
PLAT MORPH BLD: NORMAL
PLATELET # BLD AUTO: 88 10*3/MM3 (ref 140–450)
PMV BLD AUTO: 10.7 FL (ref 6–12)
POTASSIUM BLD-SCNC: 3.4 MMOL/L (ref 3.5–5.2)
PROT SERPL-MCNC: 4.8 G/DL (ref 6–8.5)
PROTHROMBIN TIME: 16 SECONDS (ref 11.7–14.2)
RBC # BLD AUTO: 3.83 10*6/MM3 (ref 3.77–5.28)
RBC MORPH BLD: NORMAL
SODIUM BLD-SCNC: 143 MMOL/L (ref 136–145)
WBC MORPH BLD: NORMAL
WBC NRBC COR # BLD: 7.52 10*3/MM3 (ref 3.4–10.8)

## 2019-07-07 PROCEDURE — 83516 IMMUNOASSAY NONANTIBODY: CPT | Performed by: INTERNAL MEDICINE

## 2019-07-07 PROCEDURE — 99232 SBSQ HOSP IP/OBS MODERATE 35: CPT | Performed by: INTERNAL MEDICINE

## 2019-07-07 PROCEDURE — 85025 COMPLETE CBC W/AUTO DIFF WBC: CPT | Performed by: INTERNAL MEDICINE

## 2019-07-07 PROCEDURE — 85610 PROTHROMBIN TIME: CPT | Performed by: INTERNAL MEDICINE

## 2019-07-07 PROCEDURE — 25010000002 ENOXAPARIN PER 10 MG: Performed by: INTERNAL MEDICINE

## 2019-07-07 PROCEDURE — 80053 COMPREHEN METABOLIC PANEL: CPT | Performed by: INTERNAL MEDICINE

## 2019-07-07 PROCEDURE — 93975 VASCULAR STUDY: CPT

## 2019-07-07 PROCEDURE — 85007 BL SMEAR W/DIFF WBC COUNT: CPT | Performed by: INTERNAL MEDICINE

## 2019-07-07 RX ORDER — FLUTICASONE PROPIONATE 50 MCG
2 SPRAY, SUSPENSION (ML) NASAL DAILY
Status: DISCONTINUED | OUTPATIENT
Start: 2019-07-07 | End: 2019-07-09 | Stop reason: HOSPADM

## 2019-07-07 RX ORDER — OXYMETAZOLINE HYDROCHLORIDE 0.05 G/100ML
1 SPRAY NASAL DAILY PRN
Status: DISCONTINUED | OUTPATIENT
Start: 2019-07-07 | End: 2019-07-09 | Stop reason: HOSPADM

## 2019-07-07 RX ORDER — CETIRIZINE HYDROCHLORIDE 10 MG/1
10 TABLET ORAL DAILY
Status: DISCONTINUED | OUTPATIENT
Start: 2019-07-07 | End: 2019-07-09 | Stop reason: HOSPADM

## 2019-07-07 RX ORDER — MONTELUKAST SODIUM 10 MG/1
10 TABLET ORAL DAILY
Status: DISCONTINUED | OUTPATIENT
Start: 2019-07-07 | End: 2019-07-09 | Stop reason: HOSPADM

## 2019-07-07 RX ADMIN — FLUTICASONE PROPIONATE 2 SPRAY: 50 SPRAY, METERED NASAL at 18:22

## 2019-07-07 RX ADMIN — SODIUM CHLORIDE, PRESERVATIVE FREE 3 ML: 5 INJECTION INTRAVENOUS at 20:54

## 2019-07-07 RX ADMIN — LEVOTHYROXINE SODIUM 50 MCG: 50 TABLET ORAL at 06:05

## 2019-07-07 RX ADMIN — ENOXAPARIN SODIUM 40 MG: 40 INJECTION SUBCUTANEOUS at 11:33

## 2019-07-07 RX ADMIN — LOSARTAN POTASSIUM 50 MG: 50 TABLET, FILM COATED ORAL at 08:09

## 2019-07-07 RX ADMIN — SODIUM CHLORIDE 100 ML/HR: 9 INJECTION, SOLUTION INTRAVENOUS at 23:09

## 2019-07-07 RX ADMIN — CETIRIZINE HYDROCHLORIDE 10 MG: 10 TABLET, FILM COATED ORAL at 18:21

## 2019-07-07 RX ADMIN — PANTOPRAZOLE SODIUM 40 MG: 40 TABLET, DELAYED RELEASE ORAL at 06:05

## 2019-07-07 RX ADMIN — MONTELUKAST SODIUM 10 MG: 10 TABLET, FILM COATED ORAL at 18:21

## 2019-07-07 RX ADMIN — SODIUM CHLORIDE, PRESERVATIVE FREE 3 ML: 5 INJECTION INTRAVENOUS at 08:59

## 2019-07-07 RX ADMIN — SODIUM CHLORIDE 100 ML/HR: 9 INJECTION, SOLUTION INTRAVENOUS at 03:29

## 2019-07-07 NOTE — PLAN OF CARE
Problem: Patient Care Overview  Goal: Plan of Care Review  Outcome: Ongoing (interventions implemented as appropriate)   07/07/19 0415   Coping/Psychosocial   Plan of Care Reviewed With patient   Plan of Care Review   Progress improving   OTHER   Outcome Summary pt is alert and oriented, room air, no complaints of pain/nausea, IV fluids, no falls, up with assist, bed alarm on, continue to monitor     Goal: Individualization and Mutuality  Outcome: Ongoing (interventions implemented as appropriate)    Goal: Discharge Needs Assessment  Outcome: Ongoing (interventions implemented as appropriate)      Problem: Nausea/Vomiting (Adult)  Goal: Symptom Relief  Outcome: Ongoing (interventions implemented as appropriate)    Goal: Adequate Hydration  Outcome: Ongoing (interventions implemented as appropriate)      Problem: Pain, Acute (Adult)  Goal: Acceptable Pain Control/Comfort Level  Outcome: Ongoing (interventions implemented as appropriate)

## 2019-07-07 NOTE — PROGRESS NOTES
[facility]  Inpatient Progress Note    Reason for Follow Up:  hepatitis    Subjective     Interval History:   Feels better today - no n/v/abdominal pain.  Appetite is at baseline.  Had normal BM this am    Current Facility-Administered Medications:   •  enoxaparin (LOVENOX) syringe 40 mg, 40 mg, Subcutaneous, Q24H, Morgan Alan MD, 40 mg at 07/06/19 1207  •  levothyroxine (SYNTHROID, LEVOTHROID) tablet 50 mcg, 50 mcg, Oral, QAALEJANDRA, Morgan Alan MD, 50 mcg at 07/07/19 0605  •  losartan (COZAAR) tablet 50 mg, 50 mg, Oral, Daily, Morgan Alan MD, 50 mg at 07/07/19 0809  •  ondansetron (ZOFRAN) injection 4 mg, 4 mg, Intravenous, Q6H PRN, Morgan Alan MD  •  pantoprazole (PROTONIX) EC tablet 40 mg, 40 mg, Oral, QA, Morgan Alan MD, 40 mg at 07/07/19 0605  •  [COMPLETED] Insert peripheral IV, , , Once **AND** sodium chloride 0.9 % flush 10 mL, 10 mL, Intravenous, PRN, Daniel Fontenot MD  •  sodium chloride 0.9 % flush 3 mL, 3 mL, Intravenous, Q12H, Morgan Alan MD, 3 mL at 07/06/19 2019  •  sodium chloride 0.9 % flush 3-10 mL, 3-10 mL, Intravenous, PRN, Morgan Alan MD  •  sodium chloride 0.9 % infusion, 100 mL/hr, Intravenous, Continuous, Morgan Alan MD, Last Rate: 100 mL/hr at 07/07/19 0329, 100 mL/hr at 07/07/19 0329  Review of Systems:    The following systems were reviewed and negative;  constitution and gastrointestinal    Objective     Vital Signs  Temp:  [97.6 °F (36.4 °C)-98.3 °F (36.8 °C)] 98 °F (36.7 °C)  Heart Rate:  [64-98] 83  Resp:  [16-18] 16  BP: (120-137)/(58-87) 137/67  Body mass index is 23.21 kg/m².    Intake/Output Summary (Last 24 hours) at 7/7/2019 0826  Last data filed at 7/7/2019 0740  Gross per 24 hour   Intake 360 ml   Output 750 ml   Net -390 ml     I/O this shift:  In: -   Out: 300 [Urine:300]     Physical Exam:   General: patient awake, alert and  cooperative   Eyes: Normal lids and lashes, no scleral icterus   Neck: supple, normal ROM   Skin: warm and dry, not jaundiced   Abdomen: soft, nontender, nondistended; normal bowel sounds   Rectal: deferred   Extremities: no rash or edema   Psychiatric: Normal mood and behavior; memory intact     Results Review:     I reviewed the patient's new clinical results.    Results from last 7 days   Lab Units 07/07/19  0551 07/06/19  0511   WBC 10*3/mm3 7.52 8.04   HEMOGLOBIN g/dL 12.0 15.0   HEMATOCRIT % 35.3 44.2   PLATELETS 10*3/mm3 88* 120*     Results from last 7 days   Lab Units 07/07/19  0551 07/06/19  0511   SODIUM mmol/L 143 135*   POTASSIUM mmol/L 3.4* 3.4*   CHLORIDE mmol/L 111* 96*   CO2 mmol/L 20.8* 26.7   BUN mg/dL 12 14   CREATININE mg/dL 0.75 0.76   CALCIUM mg/dL 7.6* 9.9*   BILIRUBIN mg/dL 3.9* 2.8*   ALK PHOS U/L 118* 168*   ALT (SGPT) U/L 371* 665*   AST (SGOT) U/L 471* 1,764*   GLUCOSE mg/dL 81 171*     Results from last 7 days   Lab Units 07/07/19  0551 07/06/19  0511   INR  1.31* 1.01     Lab Results   Lab Value Date/Time    LIPASE 30 07/06/2019 1049       Radiology:  US Gallbladder   Final Result       1. Mildly distended gallbladder with gallbladder sludge identified.   There is also some gallbladder wall edema. The edema is nonspecific.  It   can be seen in etiologies such as hepatitis and right-sided heart   failure, but cholecystitis cannot be excluded. Correlation with clinical   presentation is recommended. If there is clinical concern for   cholecystitis, correlation with HIDA scan is recommended.       This report was finalized on 7/6/2019 7:37 PM by Dr. Ricarda Cespedes M.D.          CT Head Without Contrast   Final Result   No acute intracranial process identified.       Radiation dose reduction techniques were utilized, including automated   exposure control and exposure modulation based on body size.       This report was finalized on 7/6/2019 5:27 AM by Dr. Ricarda Cespedes M.D.             GALLBLADDER ULTRASOUND     HISTORY: Abnormal elevated liver function test     COMPARISON: 07/14/2017     TECHNIQUE: Gray scale and color Doppler sonographic images were obtained  through the right upper quadrant     FINDINGS:  Visualized portions of the pancreas appear unremarkable. The liver  contains at least 2 cysts. The larger of the cysts measures up to 6.4 x  6.8 x 6.7 cm. This was also present on the prior exam from 07/14/2017.  The common bile duct is mildly prominent at 7 mm. No obvious obstructing  lesion is identified. This actually may be age related in this  89-year-old patient. I do not think it is significantly changed when  compared to July 2017. The gallbladder appears mildly distended. There  is sludge identified layering within the gallbladder, and there is some  gallbladder wall edema. The gallbladder wall is at the upper limits of  normal, measuring 3 mm. The right kidney measures 8.9 x 3.2 x 3.9 cm.  There is no hydronephrosis. No solid or cystic renal masses are seen.     IMPRESSION:     1. Mildly distended gallbladder with gallbladder sludge identified.  There is also some gallbladder wall edema. The edema is nonspecific.  It  can be seen in etiologies such as hepatitis and right-sided heart  failure, but cholecystitis cannot be excluded. Correlation with clinical  presentation is recommended. If there is clinical concern for  cholecystitis, correlation with HIDA scan is recommended.       Assessment/Plan     Patient Active Problem List   Diagnosis   • Osteoarthritis of lumbar spine   • Osteoarthritis   • Sciatic leg pain   • Asthma   • Stenosis of carotid artery   • Dyslipidemia   • Gastroesophageal reflux disease   • Hypertension   • Hypothyroidism   • Osteopenia   • Restless legs syndrome   • Thrombocytopenia (CMS/HCC)   • Altered mental status   • Acute hepatitis     Assessment-  1.  Acute hepatitis  2.  Abdominal pain, nausea and vomiting     Plan-  · Liver ultrasound with GB  sludge - cbd dilated but may be age related  · Doppler ultrasound of the liver vasculature pending  · Await results additional serologies   · LFTs trending down - continue to follow  · Etiology unclear at this point - higher than expected with passing sludge or stone from GB - could be infection related - f/u as above           I discussed the patients findings and my recommendations with patient and family.    Tiffani Singh MD

## 2019-07-07 NOTE — PROGRESS NOTES
Name: Maria Fernanda Gaviria ADMIT: 2019   : 1923  PCP: Henok Salcido MD    MRN: [id number] LOS: 1 days   AGE/SEX: 89 y.o. female  ROOM: [facility]   Subjective   Chief Complaint   Patient presents with   • Altered Mental Status   • Dizziness   • Vomiting     Abdominal pain resolved  Nausea resolved  Had Doppler earlier  IV fluids    ROS  No f/c  No n/v  No cp/palp  No soa/cough    Objective   Vital Signs  Temp:  [97.6 °F (36.4 °C)-98.3 °F (36.8 °C)] 98 °F (36.7 °C)  Heart Rate:  [64-83] 76  Resp:  [16-18] 16  BP: (120-144)/(58-71) 144/70  SpO2:  [93 %-98 %] 93 %  on   ;   Device (Oxygen Therapy): room air  Body mass index is 23.21 kg/m².    Physical Exam   Constitutional: She is oriented to person, place, and time. She appears well-developed and well-nourished.   HENT:   Head: Normocephalic and atraumatic.   Eyes: Conjunctivae are normal. No scleral icterus.   Neck: Neck supple. No tracheal deviation present.   Cardiovascular: Normal rate and regular rhythm.   No murmur heard.  Pulmonary/Chest: Effort normal and breath sounds normal.   Abdominal: Soft. There is no tenderness. There is no guarding.   Neurological: She is alert and oriented to person, place, and time. She exhibits normal muscle tone.   Skin: Skin is warm and dry.   Psychiatric: She has a normal mood and affect. Her behavior is normal.       Results Review:       I reviewed the patient's new clinical results.  Results from last 7 days   Lab Units 19  0551 19  0511   WBC 10*3/mm3 7.52 8.04   HEMOGLOBIN g/dL 12.0 15.0   PLATELETS 10*3/mm3 88* 120*     Results from last 7 days   Lab Units 19  0551 19  0511   SODIUM mmol/L 143 135*   POTASSIUM mmol/L 3.4* 3.4*   CHLORIDE mmol/L 111* 96*   CO2 mmol/L 20.8* 26.7   BUN mg/dL 12 14   CREATININE mg/dL 0.75 0.76   GLUCOSE mg/dL 81 171*   Estimated Creatinine Clearance: 33.8 mL/min (by C-G formula based on SCr of 0.75 mg/dL).  Results from last 7 days   Lab Units 19  0981  07/06/19  0511   ALBUMIN g/dL 2.80* 4.20   BILIRUBIN mg/dL 3.9* 2.8*   ALK PHOS U/L 118* 168*   AST (SGOT) U/L 471* 1,764*   ALT (SGPT) U/L 371* 665*     Results from last 7 days   Lab Units 07/07/19  0551 07/06/19  0511   CALCIUM mg/dL 7.6* 9.9*   ALBUMIN g/dL 2.80* 4.20     Results from last 7 days   Lab Units 07/06/19  1049 07/06/19  0511   LACTATE mmol/L 2.0 2.7*       Coag   Results from last 7 days   Lab Units 07/07/19  0551 07/06/19  0511   INR  1.31* 1.01   APTT seconds  --  27.3     HbA1C No results found for: HGBA1C  Infection     Radiology(recent) Ct Head Without Contrast    Result Date: 7/6/2019  No acute intracranial process identified.  Radiation dose reduction techniques were utilized, including automated exposure control and exposure modulation based on body size.  This report was finalized on 7/6/2019 5:27 AM by Dr. Ricarda Cespedes M.D.      Us Gallbladder    Result Date: 7/6/2019   1. Mildly distended gallbladder with gallbladder sludge identified. There is also some gallbladder wall edema. The edema is nonspecific.  It can be seen in etiologies such as hepatitis and right-sided heart failure, but cholecystitis cannot be excluded. Correlation with clinical presentation is recommended. If there is clinical concern for cholecystitis, correlation with HIDA scan is recommended.  This report was finalized on 7/6/2019 7:37 PM by Dr. Ricarda Cespedes M.D.      Lab Results   Component Value Date    TROPONINT <0.010 07/06/2019     No components found for: TSH;2      cetirizine 10 mg Oral Daily   enoxaparin 40 mg Subcutaneous Q24H   fluticasone 2 spray Each Nare Daily   levothyroxine 50 mcg Oral QAM   losartan 50 mg Oral Daily   montelukast 10 mg Oral Daily   pantoprazole 40 mg Oral QAM   sodium chloride 3 mL Intravenous Q12H       sodium chloride 100 mL/hr Last Rate: 100 mL/hr (07/07/19 0329)   Diet Regular      Assessment/Plan      Active Hospital Problems    Diagnosis  POA   • **Acute hepatitis [B17.9]   Yes   • DNR (do not resuscitate) [Z66]  Unknown   • Non-seasonal allergic rhinitis [J30.89]  Unknown   • Altered mental status [R41.82]  Yes   • Hypertension [I10]  Yes   • Hypothyroidism [E03.9]  Yes   • Dyslipidemia [E78.5]  Yes   • Gastroesophageal reflux disease [K21.9]  Yes      Resolved Hospital Problems   No resolved problems to display.       · Monitor LFTs, symptoms have resolved  · GI following, follow up full read of doppler  · Follow up pending labs  · Above medications  · Add agents for her AR  · Discussed code status- DNR/DNI and will order      DW RN  ESPERANZA family    Forrest Shaver MD  [facility]  07/07/19  4:22 PM

## 2019-07-08 LAB
ALBUMIN SERPL-MCNC: 3.4 G/DL (ref 3.5–5.2)
ALBUMIN/GLOB SERPL: 1.3 G/DL
ALP SERPL-CCNC: 204 U/L (ref 39–117)
ALT SERPL W P-5'-P-CCNC: 278 U/L (ref 1–33)
ANION GAP SERPL CALCULATED.3IONS-SCNC: 10.4 MMOL/L (ref 5–15)
AST SERPL-CCNC: 210 U/L (ref 1–32)
BASOPHILS # BLD AUTO: 0.02 10*3/MM3 (ref 0–0.2)
BASOPHILS NFR BLD AUTO: 0.3 % (ref 0–1.5)
BILIRUB SERPL-MCNC: 2.7 MG/DL (ref 0.2–1.2)
BUN BLD-MCNC: 9 MG/DL (ref 8–23)
BUN/CREAT SERPL: 13.2 (ref 7–25)
CALCIUM SPEC-SCNC: 8.7 MG/DL (ref 8.2–9.6)
CHLORIDE SERPL-SCNC: 107 MMOL/L (ref 98–107)
CO2 SERPL-SCNC: 24.6 MMOL/L (ref 22–29)
CREAT BLD-MCNC: 0.68 MG/DL (ref 0.57–1)
DEPRECATED RDW RBC AUTO: 44.4 FL (ref 37–54)
EOSINOPHIL # BLD AUTO: 0.23 10*3/MM3 (ref 0–0.4)
EOSINOPHIL NFR BLD AUTO: 3.5 % (ref 0.3–6.2)
ERYTHROCYTE [DISTWIDTH] IN BLOOD BY AUTOMATED COUNT: 13.1 % (ref 12.3–15.4)
GFR SERPL CREATININE-BSD FRML MDRD: 80 ML/MIN/1.73
GLOBULIN UR ELPH-MCNC: 2.6 GM/DL
GLUCOSE BLD-MCNC: 89 MG/DL (ref 65–99)
HCT VFR BLD AUTO: 40.2 % (ref 34–46.6)
HGB BLD-MCNC: 13.6 G/DL (ref 12–15.9)
IMM GRANULOCYTES # BLD AUTO: 0.03 10*3/MM3 (ref 0–0.05)
IMM GRANULOCYTES NFR BLD AUTO: 0.5 % (ref 0–0.5)
INR PPP: 1.06 (ref 0.9–1.1)
LYMPHOCYTES # BLD AUTO: 0.69 10*3/MM3 (ref 0.7–3.1)
LYMPHOCYTES NFR BLD AUTO: 10.6 % (ref 19.6–45.3)
MCH RBC QN AUTO: 30.9 PG (ref 26.6–33)
MCHC RBC AUTO-ENTMCNC: 33.8 G/DL (ref 31.5–35.7)
MCV RBC AUTO: 91.4 FL (ref 79–97)
MONOCYTES # BLD AUTO: 0.44 10*3/MM3 (ref 0.1–0.9)
MONOCYTES NFR BLD AUTO: 6.8 % (ref 5–12)
NEUTROPHILS # BLD AUTO: 5.1 10*3/MM3 (ref 1.7–7)
NEUTROPHILS NFR BLD AUTO: 78.3 % (ref 42.7–76)
NRBC BLD AUTO-RTO: 0 /100 WBC (ref 0–0.2)
PLATELET # BLD AUTO: 96 10*3/MM3 (ref 140–450)
PMV BLD AUTO: 10.3 FL (ref 6–12)
POTASSIUM BLD-SCNC: 3.2 MMOL/L (ref 3.5–5.2)
PROT SERPL-MCNC: 6 G/DL (ref 6–8.5)
PROTHROMBIN TIME: 13.5 SECONDS (ref 11.7–14.2)
RBC # BLD AUTO: 4.4 10*6/MM3 (ref 3.77–5.28)
SODIUM BLD-SCNC: 142 MMOL/L (ref 136–145)
WBC NRBC COR # BLD: 6.51 10*3/MM3 (ref 3.4–10.8)

## 2019-07-08 PROCEDURE — 25010000002 ENOXAPARIN PER 10 MG: Performed by: INTERNAL MEDICINE

## 2019-07-08 PROCEDURE — 80053 COMPREHEN METABOLIC PANEL: CPT | Performed by: INTERNAL MEDICINE

## 2019-07-08 PROCEDURE — 99232 SBSQ HOSP IP/OBS MODERATE 35: CPT | Performed by: INTERNAL MEDICINE

## 2019-07-08 PROCEDURE — 85610 PROTHROMBIN TIME: CPT | Performed by: INTERNAL MEDICINE

## 2019-07-08 PROCEDURE — 85025 COMPLETE CBC W/AUTO DIFF WBC: CPT | Performed by: INTERNAL MEDICINE

## 2019-07-08 RX ORDER — POTASSIUM CHLORIDE 1.5 G/1.77G
40 POWDER, FOR SOLUTION ORAL AS NEEDED
Status: DISCONTINUED | OUTPATIENT
Start: 2019-07-08 | End: 2019-07-09 | Stop reason: HOSPADM

## 2019-07-08 RX ORDER — MAGNESIUM SULFATE HEPTAHYDRATE 40 MG/ML
2 INJECTION, SOLUTION INTRAVENOUS AS NEEDED
Status: DISCONTINUED | OUTPATIENT
Start: 2019-07-08 | End: 2019-07-09 | Stop reason: HOSPADM

## 2019-07-08 RX ORDER — MAGNESIUM SULFATE HEPTAHYDRATE 40 MG/ML
4 INJECTION, SOLUTION INTRAVENOUS AS NEEDED
Status: DISCONTINUED | OUTPATIENT
Start: 2019-07-08 | End: 2019-07-09 | Stop reason: HOSPADM

## 2019-07-08 RX ORDER — POTASSIUM CHLORIDE 750 MG/1
40 CAPSULE, EXTENDED RELEASE ORAL AS NEEDED
Status: DISCONTINUED | OUTPATIENT
Start: 2019-07-08 | End: 2019-07-09 | Stop reason: HOSPADM

## 2019-07-08 RX ADMIN — PANTOPRAZOLE SODIUM 40 MG: 40 TABLET, DELAYED RELEASE ORAL at 06:11

## 2019-07-08 RX ADMIN — LOSARTAN POTASSIUM 50 MG: 50 TABLET, FILM COATED ORAL at 08:18

## 2019-07-08 RX ADMIN — LEVOTHYROXINE SODIUM 50 MCG: 50 TABLET ORAL at 06:10

## 2019-07-08 RX ADMIN — SODIUM CHLORIDE, PRESERVATIVE FREE 3 ML: 5 INJECTION INTRAVENOUS at 08:21

## 2019-07-08 RX ADMIN — CETIRIZINE HYDROCHLORIDE 10 MG: 10 TABLET, FILM COATED ORAL at 08:18

## 2019-07-08 RX ADMIN — SODIUM CHLORIDE 100 ML/HR: 9 INJECTION, SOLUTION INTRAVENOUS at 12:31

## 2019-07-08 RX ADMIN — POTASSIUM CHLORIDE 40 MEQ: 1.5 POWDER, FOR SOLUTION ORAL at 11:36

## 2019-07-08 RX ADMIN — SODIUM CHLORIDE, PRESERVATIVE FREE 3 ML: 5 INJECTION INTRAVENOUS at 21:26

## 2019-07-08 RX ADMIN — FLUTICASONE PROPIONATE 2 SPRAY: 50 SPRAY, METERED NASAL at 08:19

## 2019-07-08 RX ADMIN — MONTELUKAST SODIUM 10 MG: 10 TABLET, FILM COATED ORAL at 08:19

## 2019-07-08 RX ADMIN — ENOXAPARIN SODIUM 40 MG: 40 INJECTION SUBCUTANEOUS at 11:37

## 2019-07-08 NOTE — PROGRESS NOTES
Name: Maria Fernanda Gaviria ADMIT: 2019   : 1923  PCP: Henok Salcido MD    MRN: [id number] LOS: 2 days   AGE/SEX: 89 y.o. female  ROOM: [facility]   Subjective   Chief Complaint   Patient presents with   • Altered Mental Status   • Dizziness   • Vomiting     Abdominal pain resolved  Nausea resolved  Had Doppler yesterday      ROS  No f/c  No n/v  No cp/palp  No soa/cough    Objective   Vital Signs  Temp:  [97 °F (36.1 °C)-98 °F (36.7 °C)] 97.7 °F (36.5 °C)  Heart Rate:  [] 72  Resp:  [16-18] 16  BP: (159-163)/(77-93) 163/77  SpO2:  [95 %-98 %] 97 %  on   ;   Device (Oxygen Therapy): room air  Body mass index is 23.21 kg/m².    Physical Exam   Constitutional: She is oriented to person, place, and time. She appears well-developed and well-nourished.   HENT:   Head: Normocephalic and atraumatic.   Eyes: Conjunctivae are normal. No scleral icterus.   Neck: Neck supple. No tracheal deviation present.   Cardiovascular: Normal rate and regular rhythm.   No murmur heard.  Pulmonary/Chest: Effort normal and breath sounds normal.   Abdominal: Soft. There is no tenderness. There is no guarding.   Neurological: She is alert and oriented to person, place, and time. She exhibits normal muscle tone.   Skin: Skin is warm and dry.   Psychiatric: She has a normal mood and affect. Her behavior is normal.       Results Review:       I reviewed the patient's new clinical results.  Results from last 7 days   Lab Units 19  0602 19  0551 19  0511   WBC 10*3/mm3 6.51 7.52 8.04   HEMOGLOBIN g/dL 13.6 12.0 15.0   PLATELETS 10*3/mm3 96* 88* 120*     Results from last 7 days   Lab Units 19  0602 19  0551 19  0511   SODIUM mmol/L 142 143 135*   POTASSIUM mmol/L 3.2* 3.4* 3.4*   CHLORIDE mmol/L 107 111* 96*   CO2 mmol/L 24.6 20.8* 26.7   BUN mg/dL 9 12 14   CREATININE mg/dL 0.68 0.75 0.76   GLUCOSE mg/dL 89 81 171*   Estimated Creatinine Clearance: 33.8 mL/min (by C-G formula based on SCr of 0.68  mg/dL).  Results from last 7 days   Lab Units 07/08/19  0602 07/07/19  0551 07/06/19  0511   ALBUMIN g/dL 3.40* 2.80* 4.20   BILIRUBIN mg/dL 2.7* 3.9* 2.8*   ALK PHOS U/L 204* 118* 168*   AST (SGOT) U/L 210* 471* 1,764*   ALT (SGPT) U/L 278* 371* 665*     Results from last 7 days   Lab Units 07/08/19  0602 07/07/19  0551 07/06/19  0511   CALCIUM mg/dL 8.7 7.6* 9.9*   ALBUMIN g/dL 3.40* 2.80* 4.20     Results from last 7 days   Lab Units 07/06/19  1049 07/06/19  0511   LACTATE mmol/L 2.0 2.7*       Coag   Results from last 7 days   Lab Units 07/08/19  0602 07/07/19  0551 07/06/19  0511   INR  1.06 1.31* 1.01   APTT seconds  --   --  27.3     HbA1C No results found for: HGBA1C  Infection     Radiology(recent) Us Gallbladder    Result Date: 7/6/2019   1. Mildly distended gallbladder with gallbladder sludge identified. There is also some gallbladder wall edema. The edema is nonspecific.  It can be seen in etiologies such as hepatitis and right-sided heart failure, but cholecystitis cannot be excluded. Correlation with clinical presentation is recommended. If there is clinical concern for cholecystitis, correlation with HIDA scan is recommended.  This report was finalized on 7/6/2019 7:37 PM by Dr. Ricarda Cespedes M.D.      Lab Results   Component Value Date    TROPONINT <0.010 07/06/2019     No components found for: TSH;2      cetirizine 10 mg Oral Daily   enoxaparin 40 mg Subcutaneous Q24H   fluticasone 2 spray Each Nare Daily   levothyroxine 50 mcg Oral QAM   losartan 50 mg Oral Daily   montelukast 10 mg Oral Daily   pantoprazole 40 mg Oral QAM   sodium chloride 3 mL Intravenous Q12H       sodium chloride 100 mL/hr Last Rate: 100 mL/hr (07/08/19 1231)   Diet Regular      Assessment/Plan      Active Hospital Problems    Diagnosis  POA   • **Acute hepatitis [B17.9]  Yes   • DNR (do not resuscitate) [Z66]  Unknown   • Non-seasonal allergic rhinitis [J30.89]  Unknown   • Altered mental status [R41.82]  Yes   •  Hypertension [I10]  Yes   • Hypothyroidism [E03.9]  Yes   • Dyslipidemia [E78.5]  Yes   • Gastroesophageal reflux disease [K21.9]  Yes      Resolved Hospital Problems   No resolved problems to display.       · Monitor LFTs, symptoms have resolved. Discussed with pt and family and would rather not be too aggressive (wouldn't want to have surgery unless was absolutely necessary)  · GI following  · Follow up pending labs  · Above medications  · agents for her AR  · Discussed code status- DNR/DNI  · Stop fluids, replace K      DW RN  DW family    Potential discharge 7/9 if no further symptoms or testing planned from GI    Forrest Shaver MD  [facility]  07/08/19  4:22 PM

## 2019-07-08 NOTE — PROGRESS NOTES
Discharge Planning Assessment  [facility]     Patient Name: Maria Fernanda Gaviria  MRN: [id number]  Today's Date: 7/8/2019    Admit Date: 7/6/2019    Discharge Needs Assessment     Row Name 07/08/19 1219       Living Environment    Lives With  alone    Current Living Arrangements  home/apartment/condo    Primary Care Provided by  self    Provides Primary Care For  no one    Family Caregiver if Needed  child(dejon), adult    Quality of Family Relationships  helpful;involved;supportive    Able to Return to Prior Arrangements  yes       Resource/Environmental Concerns    Resource/Environmental Concerns  none    Transportation Concerns  car, none       Transition Planning    Patient/Family Anticipates Transition to  home    Patient/Family Anticipated Services at Transition  none    Transportation Anticipated  family or friend will provide       Discharge Needs Assessment    Readmission Within the Last 30 Days  no previous admission in last 30 days    Concerns to be Addressed  no discharge needs identified;denies needs/concerns at this time    Equipment Currently Used at Home  none    Anticipated Changes Related to Illness  none    Equipment Needed After Discharge  none    Offered/Gave Vendor List  yes    Current Discharge Risk  lives alone        Discharge Plan     Row Name 07/08/19 1212       Plan    Plan  Home denies any dc needs- daughters to transport    Patient/Family in Agreement with Plan  yes    Plan Comments  Spoke with pt and daughters Ephraim ([phone]) and Jillian Jorge ([phone]) at bedside, introduced self and explained CCP role. Verified facesheet and confirmed local pharmacy is [facility]. Pt denies any problems with medications or management. Prior to admission pt was IADL at home with mobility. Pt lives alone in a 1st floor apartment with 2 steps to enter. Pt denies any DME. Pt has been to [facility] ([place] now) in the past, but denies HH. Pt is familiar with HH service as her  spouse used [facility]. Pt denies any dc needs or concerns. Her daughters at bedside verbalize agreement as well. Plan is home with daughters to transport. kuldeep PETERS/CCP        Destination      No service coordination in this encounter.      Durable Medical Equipment      No service coordination in this encounter.      Dialysis/Infusion      No service coordination in this encounter.      Home Medical Care      No service coordination in this encounter.      Therapy      No service coordination in this encounter.      Community Resources      No service coordination in this encounter.          Demographic Summary     Row Name 07/08/19 1218       General Information    Admission Type  inpatient    Referral Source  admission list    Reason for Consult  discharge planning    Preferred Language  English     Used During This Interaction  no       Contact Information    Permission Granted to Share Info With  family/designee        Functional Status     Row Name 07/08/19 1219       Functional Status    Usual Activity Tolerance  excellent;good    Current Activity Tolerance  good       Functional Status, IADL    Medications  independent    Meal Preparation  independent    Housekeeping  independent    Laundry  independent    Shopping  independent       Mental Status    General Appearance WDL  WDL       Mental Status Summary    Recent Changes in Mental Status/Cognitive Functioning  no changes       Employment/    Employment Status  retired        Psychosocial    No documentation.       Abuse/Neglect    No documentation.       Legal    No documentation.       Substance Abuse    No documentation.       Patient Forms     Row Name 07/08/19 1219       Patient Forms    Provider Choice List  Delivered    Delivered to  Patient    Method of delivery  In person            Cony Nelson, RN

## 2019-07-08 NOTE — PLAN OF CARE
Problem: Patient Care Overview  Goal: Plan of Care Review  Outcome: Ongoing (interventions implemented as appropriate)   07/08/19 0633   Coping/Psychosocial   Plan of Care Reviewed With patient   Plan of Care Review   Progress improving   OTHER   Outcome Summary No c/o pain during the shift. Frequent urination causing exhaustion for patient. Pure wick applied. Normal sinus rhythm on the monitor. Denies chest pain and no soa.       Problem: Nausea/Vomiting (Adult)  Goal: Identify Related Risk Factors and Signs and Symptoms  Outcome: Ongoing (interventions implemented as appropriate)

## 2019-07-08 NOTE — PROGRESS NOTES
[facility], [place], operated by [facility]/[place]     Inpatient Follow Up Note    Patient Identification:  Name: Maria Fernanda Gaviria  Age: 89 y.o.  Sex: female  :  1923  MRN: [id number]    Information from:patient and family     CC: Abnormal LFTs    History:   Feeling better.  She has no nausea or abdominal pain.  She is tolerating a diet although she is very picky about what she eats and does not much care for the hospital who is seen.  She reports that with her acute onset of present illness that she had severe abdominal pain that radiated across her upper abdomen in a bandlike fashion and it was associated with intense nausea.  Laboratory evaluation demonstrates that her liver chemistries are improving.  Hepatitis panel was negative.  Vascular studies of the liver were negative.  Autoimmune studies are pending.  I discussed whether there is any chance she may have inadvertently taken too much Tylenol and she says no, she keeps all of her medications under close control and she is very conscientious about their usage.    Review of Systems:  Constitutional:  Negative   Cardiovascular:  Negative   Respiratory:  Negative             Problem List:  Patient Active Problem List    Diagnosis   • *Acute hepatitis [B17.9]   • DNR (do not resuscitate) [Z66]   • Non-seasonal allergic rhinitis [J30.89]   • Altered mental status [R41.82]   • Thrombocytopenia (CMS/HCC) [D69.6]   • Restless legs syndrome [G25.81]   • Osteoarthritis of lumbar spine [M47.816]   • Sciatic leg pain [M54.30]   • Osteoarthritis [M19.90]   • Asthma [J45.909]   • Stenosis of carotid artery [I65.29]   • Dyslipidemia [E78.5]   • Gastroesophageal reflux disease [K21.9]   • Hypertension [I10]   • Hypothyroidism [E03.9]   • Osteopenia [M85.80]     Current Meds:  MAR Reviewed  Scheduled Meds:  cetirizine 10 mg Oral Daily   enoxaparin 40 mg Subcutaneous Q24H   fluticasone 2 spray Each Nare Daily   levothyroxine 50 mcg Oral QAM   losartan 50 mg Oral Daily  "  montelukast 10 mg Oral Daily   pantoprazole 40 mg Oral QAM   sodium chloride 3 mL Intravenous Q12H     Continuous Infusions:  sodium chloride 100 mL/hr Last Rate: 100 mL/hr (19 1231)     PRN Meds:.magnesium sulfate **OR** magnesium sulfate **OR** magnesium sulfate  •  ondansetron  •  oxymetazoline  •  potassium chloride  •  potassium chloride  •  [COMPLETED] Insert peripheral IV **AND** sodium chloride  •  sodium chloride  Allergies:  Allergies   Allergen Reactions   • Aspirin Nausea And Vomiting and Nausea Only   • Buffered Aspirin Nausea And Vomiting       Intake/Output:     Intake/Output Summary (Last 24 hours) at 2019 1754  Last data filed at 2019 1710  Gross per 24 hour   Intake --   Output 1725 ml   Net -1725 ml     New allergies/reactions:  None    Physical Exam:  Vitals:   Temp (24hrs), Av.5 °F (36.4 °C), Min:97 °F (36.1 °C), Max:98 °F (36.7 °C)    Temp:  [97 °F (36.1 °C)-98 °F (36.7 °C)] 97.7 °F (36.5 °C)  Heart Rate:  [] 72  Resp:  [16-18] 16  BP: (159-163)/(77-93) 163/77  /77 (BP Location: Right arm, Patient Position: Lying)   Pulse 72   Temp 97.7 °F (36.5 °C) (Oral)   Resp 16   Ht 149.9 cm (59\")   Wt 52.1 kg (114 lb 14.4 oz)   SpO2 97%   BMI 23.21 kg/m²     Exam:  NAD  PERRLA. Sclerae and conjunctivae normal  HENT: external inspection normal. Hearing intact.  No respiratory distress. Lungs clear.  Cardiac: no MRG.  Abdomen: bowel sounds active. Soft, non-tender, no HSM.  Alert, oriented, normal affect.         DATA:  Radiology and Labs:   Recent Results (from the past 24 hour(s))   Protime-INR    Collection Time: 19  6:02 AM   Result Value Ref Range    Protime 13.5 11.7 - 14.2 Seconds    INR 1.06 0.90 - 1.10   Comprehensive Metabolic Panel    Collection Time: 19  6:02 AM   Result Value Ref Range    Glucose 89 65 - 99 mg/dL    BUN 9 8 - 23 mg/dL    Creatinine 0.68 0.57 - 1.00 mg/dL    Sodium 142 136 - 145 mmol/L    Potassium 3.2 (L) 3.5 - 5.2 mmol/L    " Chloride 107 98 - 107 mmol/L    CO2 24.6 22.0 - 29.0 mmol/L    Calcium 8.7 8.2 - 9.6 mg/dL    Total Protein 6.0 6.0 - 8.5 g/dL    Albumin 3.40 (L) 3.50 - 5.20 g/dL    ALT (SGPT) 278 (H) 1 - 33 U/L    AST (SGOT) 210 (H) 1 - 32 U/L    Alkaline Phosphatase 204 (H) 39 - 117 U/L    Total Bilirubin 2.7 (H) 0.2 - 1.2 mg/dL    eGFR Non African Amer 80 >60 mL/min/1.73    Globulin 2.6 gm/dL    A/G Ratio 1.3 g/dL    BUN/Creatinine Ratio 13.2 7.0 - 25.0    Anion Gap 10.4 5.0 - 15.0 mmol/L   CBC Auto Differential    Collection Time: 07/08/19  6:02 AM   Result Value Ref Range    WBC 6.51 3.40 - 10.80 10*3/mm3    RBC 4.40 3.77 - 5.28 10*6/mm3    Hemoglobin 13.6 12.0 - 15.9 g/dL    Hematocrit 40.2 34.0 - 46.6 %    MCV 91.4 79.0 - 97.0 fL    MCH 30.9 26.6 - 33.0 pg    MCHC 33.8 31.5 - 35.7 g/dL    RDW 13.1 12.3 - 15.4 %    RDW-SD 44.4 37.0 - 54.0 fl    MPV 10.3 6.0 - 12.0 fL    Platelets 96 (L) 140 - 450 10*3/mm3    Neutrophil % 78.3 (H) 42.7 - 76.0 %    Lymphocyte % 10.6 (L) 19.6 - 45.3 %    Monocyte % 6.8 5.0 - 12.0 %    Eosinophil % 3.5 0.3 - 6.2 %    Basophil % 0.3 0.0 - 1.5 %    Immature Grans % 0.5 0.0 - 0.5 %    Neutrophils, Absolute 5.10 1.70 - 7.00 10*3/mm3    Lymphocytes, Absolute 0.69 (L) 0.70 - 3.10 10*3/mm3    Monocytes, Absolute 0.44 0.10 - 0.90 10*3/mm3    Eosinophils, Absolute 0.23 0.00 - 0.40 10*3/mm3    Basophils, Absolute 0.02 0.00 - 0.20 10*3/mm3    Immature Grans, Absolute 0.03 0.00 - 0.05 10*3/mm3    nRBC 0.0 0.0 - 0.2 /100 WBC       Assessment:   Problem List:     Acute hepatitis    Dyslipidemia    Gastroesophageal reflux disease    Hypertension    Hypothyroidism    Altered mental status    DNR (do not resuscitate)    Non-seasonal allergic rhinitis    This attack sounds like biliary colic.  The right upper quadrant ultrasound suggested the possibility with a minimally dilated common bile duct and the presence of gallbladder sludge and mild gallbladder wall edema.    Plan:   She is improving rapidly and there  is nothing to suggest at this time persistent choledocholithiasis, if indeed she passed a stone.  For this reason I would follow her conservatively.  However, should she experience another attack, she would need at the minimum an MRCP and quite possibly an ERCP.  If her liver chemistries continue to improve she will probably be able to go home tomorrow.      Ousmane Rhodes MD  [facility]  7/8/2019

## 2019-07-09 VITALS
WEIGHT: 114.9 LBS | DIASTOLIC BLOOD PRESSURE: 87 MMHG | RESPIRATION RATE: 16 BRPM | OXYGEN SATURATION: 92 % | TEMPERATURE: 98.5 F | SYSTOLIC BLOOD PRESSURE: 159 MMHG | BODY MASS INDEX: 23.16 KG/M2 | HEART RATE: 74 BPM | HEIGHT: 59 IN

## 2019-07-09 LAB
ACTIN IGG SERPL-ACNC: 5 UNITS (ref 0–19)
ALBUMIN SERPL-MCNC: 3.1 G/DL (ref 3.5–5.2)
ALP SERPL-CCNC: 237 U/L (ref 39–117)
ALT SERPL W P-5'-P-CCNC: 187 U/L (ref 1–33)
AST SERPL-CCNC: 90 U/L (ref 1–32)
BILIRUB CONJ SERPL-MCNC: 0.8 MG/DL (ref 0.2–0.3)
BILIRUB INDIRECT SERPL-MCNC: 0.7 MG/DL
BILIRUB SERPL-MCNC: 1.5 MG/DL (ref 0.2–1.2)
DEPRECATED MITOCHONDRIA M2 IGG SER-ACNC: <20 UNITS (ref 0–20)
INR PPP: 1 (ref 0.9–1.1)
PROT SERPL-MCNC: 5.8 G/DL (ref 6–8.5)
PROTHROMBIN TIME: 12.9 SECONDS (ref 11.7–14.2)

## 2019-07-09 PROCEDURE — 85610 PROTHROMBIN TIME: CPT | Performed by: INTERNAL MEDICINE

## 2019-07-09 PROCEDURE — 80076 HEPATIC FUNCTION PANEL: CPT | Performed by: INTERNAL MEDICINE

## 2019-07-09 RX ADMIN — MONTELUKAST SODIUM 10 MG: 10 TABLET, FILM COATED ORAL at 08:35

## 2019-07-09 RX ADMIN — SODIUM CHLORIDE, PRESERVATIVE FREE 3 ML: 5 INJECTION INTRAVENOUS at 08:36

## 2019-07-09 RX ADMIN — FLUTICASONE PROPIONATE 2 SPRAY: 50 SPRAY, METERED NASAL at 08:35

## 2019-07-09 RX ADMIN — LOSARTAN POTASSIUM 50 MG: 50 TABLET, FILM COATED ORAL at 08:35

## 2019-07-09 RX ADMIN — PANTOPRAZOLE SODIUM 40 MG: 40 TABLET, DELAYED RELEASE ORAL at 06:06

## 2019-07-09 RX ADMIN — LEVOTHYROXINE SODIUM 50 MCG: 50 TABLET ORAL at 06:06

## 2019-07-09 RX ADMIN — CETIRIZINE HYDROCHLORIDE 10 MG: 10 TABLET, FILM COATED ORAL at 08:35

## 2019-07-09 NOTE — DISCHARGE SUMMARY
NAME: Maria Fernanda Gaviria ADMIT: 2019   : 1923  PCP: Henok Salcido MD    MRN: [id number] LOS: 3 days   AGE/SEX: 89 y.o. female  ROOM: [facility]     Date of Admission:  2019  Date of Discharge:  2019    PCP: Henok Salcido MD    CHIEF COMPLAINT  Altered Mental Status; Dizziness; and Vomiting      DISCHARGE DIAGNOSIS  Active Hospital Problems    Diagnosis  POA   • **Acute hepatitis [B17.9]  Yes   • DNR (do not resuscitate) [Z66]  No   • Non-seasonal allergic rhinitis [J30.89]  No   • Altered mental status [R41.82]  Yes   • Hypertension [I10]  Yes   • Hypothyroidism [E03.9]  Yes   • Dyslipidemia [E78.5]  Yes   • Gastroesophageal reflux disease [K21.9]  Yes      Resolved Hospital Problems   No resolved problems to display.       SECONDARY DIAGNOSES  Past Medical History:   Diagnosis Date   • Arthritis    • Cancer (CMS/HCC)     skin   • Disease of thyroid gland    • GERD (gastroesophageal reflux disease)    • Hyperlipidemia    • Hypertension    • Osteoporosis        CONSULTS   GI    HOSPITAL COURSE  Patient is a 89 y.o. female currently healthy who presents with acute onset of nausea, vomiting as well as abdominal pain.  She had significantly elevated LFTs with AST 1700, , bilirubin 2.8.  She had imaging including gallbladder ultrasound which did not show any cholecystitis but had mildly distended gallbladder with sludge, hepatic Doppler had incidental liver cysts but no evidence of any thrombus.  She was seen by GI and her LFTs gradually improved.  Etiology was not totally clear but it sounded like biliary colic.  With the patient's age and the fact that now she is been multiple days symptom-free with improving LFTs both GI, the patient, and her family all felt conservative approach would be best.  They wish for discharge today now that she is better, however if she experienced another attack she likely would need MRCP or ERCP or may be even cholecystectomy.  Hold statin at discharge.            DIAGNOSTICS    Radiology Results (last 21 days)     Procedure Component Value Units Date/Time   Duplex Portal Hepatic Complete CAR [[id number]] Reviewed: 07/08/19 0718   Order Status: Completed Collected: 07/07/19 1419    Updated: 07/07/19 1836    Portal Vein Diameter 1 cm     SMA Aorta PSV 67 cm/s     SMA Hepatic PSV 99 cm/s     SMA Hepatic EDV 19 cm/s     SMA Splenic  cm/s     SMA Splenic EDV 21 cm/s    Narrative:     · No arterial or venous thrombus noted. Normal flow directions throughout.  · Incidental finding of liver cysts noted.      US Gallbladder [[id number]] Chad as Reviewed   Order Status: Completed Collected: 07/06/19 1931    Updated: 07/06/19 1940   Narrative:        GALLBLADDER ULTRASOUND     HISTORY: Abnormal elevated liver function test     COMPARISON: 07/14/2017     TECHNIQUE: Gray scale and color Doppler sonographic images were obtained  through the right upper quadrant     FINDINGS:  Visualized portions of the pancreas appear unremarkable. The liver  contains at least 2 cysts. The larger of the cysts measures up to 6.4 x  6.8 x 6.7 cm. This was also present on the prior exam from 07/14/2017.  The common bile duct is mildly prominent at 7 mm. No obvious obstructing  lesion is identified. This actually may be age related in this  89-year-old patient. I do not think it is significantly changed when  compared to July 2017. The gallbladder appears mildly distended. There  is sludge identified layering within the gallbladder, and there is some  gallbladder wall edema. The gallbladder wall is at the upper limits of  normal, measuring 3 mm. The right kidney measures 8.9 x 3.2 x 3.9 cm.  There is no hydronephrosis. No solid or cystic renal masses are seen.      Impression:        1. Mildly distended gallbladder with gallbladder sludge identified.  There is also some gallbladder wall edema. The edema is nonspecific.  It  can be seen in etiologies such as hepatitis and right-sided heart  failure,  but cholecystitis cannot be excluded. Correlation with clinical  presentation is recommended. If there is clinical concern for  cholecystitis, correlation with HIDA scan is recommended.     This report was finalized on 7/6/2019 7:37 PM by Dr. Ricarda Cespedes M.D.      CT Head Without Contrast [[id number]] Chad as Reviewed   Order Status: Completed Collected: 07/06/19 0523    Updated: 07/06/19 0530   Narrative:     CT OF THE HEAD WITHOUT CONTRAST     HISTORY: Confusion and delirium.     COMPARISON: 03/16/2015     TECHNIQUE: Axial CT imaging was obtained from vertex of skull to the  skull base. No contrast was administered     FINDINGS:  No acute intracranial hemorrhage is seen. There is diffuse atrophy.  There is periventricular and deep white matter microangiopathic disease.  There is no midline shift or mass effect. 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 The visualized paranasal sinuses are  essentially clear. The right mastoid air cells are mildly under  pneumatized. This may reflect changes of chronic mastoiditis/otitis  media.      Impression:     No acute intracranial process identified.     07/09/2019 0504  07/09/2019 0623  Protime-INR [[id number]]   Blood     Final result  Protime 12.9 Seconds   INR 1.00           07/09/2019 0504  07/09/2019 0634  Hepatic Function Panel [[id number]]   (Abnormal)   Blood     Final result  Total Protein 5.8 Abnormally low  g/dL   Albumin 3.10 Abnormally low  g/dL   ALT (SGPT) 187 Abnormally high  U/L   AST (SGOT) 90 Abnormally high  U/L   Alkaline Phosphatase 237 Abnormally high  U/L   Total Bilirubin 1.5 Abnormally high  mg/dL   Bilirubin, Direct 0.8 Abnormally high  mg/dL   Bilirubin, Indirect 0.7 mg/dL          07/08/2019 0602  07/08/2019 0706  Protime-INR [[id number]]   Blood     Final result  Protime 13.5 Seconds   INR 1.06             PHYSICAL EXAM  Objective    Alert  nad  No resp distress  Soft, nt    CONDITION ON DISCHARGE  Stable.      DISCHARGE DISPOSITION    Home or Self Care      DISCHARGE MEDICATIONS       Your medication list      CONTINUE taking these medications      Instructions Last Dose Given Next Dose Due   acetaminophen 650 MG 8 hr tablet  Commonly known as:  TYLENOL      Take  by mouth daily.       lansoprazole 30 MG capsule  Commonly known as:  PREVACID      TAKE 1 CAPSULE EVERY MORNING BEFORE BREAKFAST       levothyroxine 50 MCG tablet  Commonly known as:  SYNTHROID, LEVOTHROID      TAKE 1 TABLET EVERY MORNING       losartan 50 MG tablet  Commonly known as:  COZAAR      Take 1 tablet by mouth Daily.       montelukast 10 MG tablet  Commonly known as:  SINGULAIR      Take 10 mg by mouth daily.       Triamcinolone Acetonide 55 MCG/ACT nasal inhaler  Commonly known as:  NASACORT      2 sprays into each nostril Daily.       Vitamin D3 2000 units tablet      Take  by mouth daily.          STOP taking these medications    atorvastatin 20 MG tablet  Commonly known as:  LIPITOR                Future Appointments   Date Time Provider Department Center   8/22/2019 10:45 AM Henok Salcido MD [facility] None   12/17/2019 10:15 AM [facility] None   12/17/2019 10:30 AM [facility] None   12/17/2019 11:00 AM Christiano Dewitt MD [facility] None     Additional Instructions for the Follow-ups that You Need to Schedule     Discharge Follow-up with PCP   As directed       Currently Documented PCP:    Henok Salcido MD    PCP Phone Number:    [phone]     Follow Up Details:  2 weeks         Discharge Follow-up with Specialty: GI   As directed      Specialty:  GI    Follow Up Details:  2-3 weeks           Follow-up Information     Henok Salcido MD .    Specialty:  Internal Medicine  Why:  2 weeks  Contact information:  [address] [place] [address]  [phone]                   TEST  RESULTS PENDING AT DISCHARGE   Order Current Status    Anti-Smooth Muscle Antibody Titer In process    Mitochondrial Antibodies,  M2 In process             Forrest Shaver MD  [facility]  07/09/19  10:59 AM      Time: greater than 32 minutes on discharge  It was a pleasure taking care of this patient while in the hospital.

## 2019-07-09 NOTE — PLAN OF CARE
Problem: Patient Care Overview  Goal: Plan of Care Review  Outcome: Ongoing (interventions implemented as appropriate)   07/09/19 0624   Coping/Psychosocial   Plan of Care Reviewed With patient   Plan of Care Review   Progress improving   OTHER   Outcome Summary No c/o pain or n/v. Large bm this morning.        Problem: Nausea/Vomiting (Adult)  Goal: Identify Related Risk Factors and Signs and Symptoms  Outcome: Ongoing (interventions implemented as appropriate)      Problem: Pain, Acute (Adult)  Goal: Identify Related Risk Factors and Signs and Symptoms  Outcome: Ongoing (interventions implemented as appropriate)

## 2019-07-10 ENCOUNTER — READMISSION MANAGEMENT (OUTPATIENT)
Dept: CALL CENTER | Facility: HOSPITAL | Age: 84
End: 2019-07-10

## 2019-07-10 NOTE — OUTREACH NOTE
Prep Survey      Responses   Facility patient discharged from?  [place]   Is patient eligible?  Yes   Discharge diagnosis  Acute hepatitis    Does the patient have one of the following disease processes/diagnoses(primary or secondary)?  Other   Does the patient have Home health ordered?  No   Is there a DME ordered?  No   Prep survey completed?  Yes          Bertha Dos Santos RN

## 2019-07-11 ENCOUNTER — READMISSION MANAGEMENT (OUTPATIENT)
Dept: CALL CENTER | Facility: HOSPITAL | Age: 84
End: 2019-07-11

## 2019-07-11 NOTE — OUTREACH NOTE
Medical Week 1 Survey      Responses   Facility patient discharged from?  [place]   Does the patient have one of the following disease processes/diagnoses(primary or secondary)?  Other   Is there a successful TCM telephone encounter documented?  No   Week 1 attempt successful?  Yes   Call start time  1544   Call end time  1549   Discharge diagnosis  Acute hepatitis    Is patient permission given to speak with other caregiver?  No   Meds reviewed with patient/caregiver?  Yes   Is the patient having any side effects they believe may be caused by any medication additions or changes?  No   Does the patient have all medications ordered at discharge?  Yes   Is the patient taking all medications as directed (includes completed medication regime)?  Yes   Does the patient have a primary care provider?   Yes   Does the patient have an appointment with their PCP within 7 days of discharge?  Yes   Comments regarding PCP  Henok Salcido MD, Wednesday July 17th @ 3:00    Has the patient kept scheduled appointments due by today?  N/A   Has home health visited the patient within 72 hours of discharge?  N/A   Psychosocial issues?  No   Did the patient receive a copy of their discharge instructions?  Yes   Nursing interventions  Reviewed instructions with patient   What is the patient's perception of their health status since discharge?  Improving   Is the patient/caregiver able to teach back signs and symptoms related to disease process for when to call PCP?  Yes   Is the patient/caregiver able to teach back signs and symptoms related to disease process for when to call 911?  Yes   Is the patient/caregiver able to teach back the hierarchy of who to call/visit for symptoms/problems? PCP, Specialist, Home health nurse, Urgent Care, ED, 911  Yes   Week 1 call completed?  Yes          Krystle Valles RN

## 2019-07-17 ENCOUNTER — OFFICE VISIT (OUTPATIENT)
Dept: INTERNAL MEDICINE | Age: 84
End: 2019-07-17

## 2019-07-17 VITALS
WEIGHT: 112.4 LBS | HEART RATE: 70 BPM | RESPIRATION RATE: 18 BRPM | OXYGEN SATURATION: 97 % | DIASTOLIC BLOOD PRESSURE: 70 MMHG | TEMPERATURE: 97.4 F | HEIGHT: 59 IN | SYSTOLIC BLOOD PRESSURE: 136 MMHG | BODY MASS INDEX: 22.66 KG/M2

## 2019-07-17 DIAGNOSIS — E78.5 DYSLIPIDEMIA: Chronic | ICD-10-CM

## 2019-07-17 DIAGNOSIS — B17.9 ACUTE HEPATITIS: Primary | ICD-10-CM

## 2019-07-17 PROCEDURE — 99214 OFFICE O/P EST MOD 30 MIN: CPT | Performed by: INTERNAL MEDICINE

## 2019-07-17 RX ORDER — ATORVASTATIN CALCIUM 10 MG/1
10 TABLET, FILM COATED ORAL DAILY
COMMUNITY
End: 2019-07-17

## 2019-07-17 RX ORDER — CETIRIZINE HYDROCHLORIDE 10 MG/1
10 TABLET ORAL DAILY
COMMUNITY
End: 2021-03-02 | Stop reason: ALTCHOICE

## 2019-07-17 NOTE — ASSESSMENT & PLAN NOTE
Atorvastatin was held upon hospital DC July 2019 for acute hepatitis.  She had resumed it mistakenly for about a week. Instructed to discontinue medication at this time.

## 2019-07-18 ENCOUNTER — READMISSION MANAGEMENT (OUTPATIENT)
Dept: CALL CENTER | Facility: HOSPITAL | Age: 84
End: 2019-07-18

## 2019-07-18 LAB
ALBUMIN SERPL-MCNC: 3.8 G/DL (ref 3.5–5.2)
ALBUMIN/GLOB SERPL: 1.4 G/DL
ALP SERPL-CCNC: 277 U/L (ref 39–117)
ALT SERPL-CCNC: 51 U/L (ref 1–33)
AST SERPL-CCNC: 57 U/L (ref 1–32)
BASOPHILS # BLD AUTO: 0.06 10*3/MM3 (ref 0–0.2)
BASOPHILS NFR BLD AUTO: 0.9 % (ref 0–1.5)
BILIRUB SERPL-MCNC: 0.7 MG/DL (ref 0.2–1.2)
BUN SERPL-MCNC: 12 MG/DL (ref 8–23)
BUN/CREAT SERPL: 15.4 (ref 7–25)
CALCIUM SERPL-MCNC: 9 MG/DL (ref 8.2–9.6)
CHLORIDE SERPL-SCNC: 100 MMOL/L (ref 98–107)
CO2 SERPL-SCNC: 28.7 MMOL/L (ref 22–29)
CREAT SERPL-MCNC: 0.78 MG/DL (ref 0.57–1)
EOSINOPHIL # BLD AUTO: 0.22 10*3/MM3 (ref 0–0.4)
EOSINOPHIL NFR BLD AUTO: 3.2 % (ref 0.3–6.2)
ERYTHROCYTE [DISTWIDTH] IN BLOOD BY AUTOMATED COUNT: 13.4 % (ref 12.3–15.4)
GLOBULIN SER CALC-MCNC: 2.7 GM/DL
GLUCOSE SERPL-MCNC: 75 MG/DL (ref 65–99)
HCT VFR BLD AUTO: 44.6 % (ref 34–46.6)
HGB BLD-MCNC: 14.1 G/DL (ref 12–15.9)
IMM GRANULOCYTES # BLD AUTO: 0.04 10*3/MM3 (ref 0–0.05)
IMM GRANULOCYTES NFR BLD AUTO: 0.6 % (ref 0–0.5)
LYMPHOCYTES # BLD AUTO: 1.4 10*3/MM3 (ref 0.7–3.1)
LYMPHOCYTES NFR BLD AUTO: 20.6 % (ref 19.6–45.3)
MCH RBC QN AUTO: 30.3 PG (ref 26.6–33)
MCHC RBC AUTO-ENTMCNC: 31.6 G/DL (ref 31.5–35.7)
MCV RBC AUTO: 95.9 FL (ref 79–97)
MONOCYTES # BLD AUTO: 0.57 10*3/MM3 (ref 0.1–0.9)
MONOCYTES NFR BLD AUTO: 8.4 % (ref 5–12)
NEUTROPHILS # BLD AUTO: 4.52 10*3/MM3 (ref 1.7–7)
NEUTROPHILS NFR BLD AUTO: 66.3 % (ref 42.7–76)
NRBC BLD AUTO-RTO: 0 /100 WBC (ref 0–0.2)
PLATELET # BLD AUTO: 232 10*3/MM3 (ref 140–450)
POTASSIUM SERPL-SCNC: 4.4 MMOL/L (ref 3.5–5.2)
PROT SERPL-MCNC: 6.5 G/DL (ref 6–8.5)
RBC # BLD AUTO: 4.65 10*6/MM3 (ref 3.77–5.28)
SODIUM SERPL-SCNC: 142 MMOL/L (ref 136–145)
WBC # BLD AUTO: 6.81 10*3/MM3 (ref 3.4–10.8)

## 2019-07-18 NOTE — OUTREACH NOTE
Medical Week 2 Survey      Responses   Facility patient discharged from?  [facility]   Does the patient have one of the following disease processes/diagnoses(primary or secondary)?  Other   Week 2 attempt successful?  Yes   Call start time  1102   Discharge diagnosis  Acute hepatitis    Call end time  1110   Meds reviewed with patient/caregiver?  Yes   Is the patient having any side effects they believe may be caused by any medication additions or changes?  No   Does the patient have all medications ordered at discharge?  N/A   Prescription comments  Tylenol has been d/c'd, pt states had misunderstood med instructions and was still taking Lipitor until 7/17 when it was clarified by MD   Is the patient taking all medications as directed (includes completed medication regime)?  N/A   Does the patient have a primary care provider?   Yes   Does the patient have an appointment with their PCP within 7 days of discharge?  Yes   Has the patient kept scheduled appointments due by today?  Yes   Has home health visited the patient within 72 hours of discharge?  N/A   Psychosocial issues?  No   Comments  pt states feeling weak, but otherwise feels great   Did the patient receive a copy of their discharge instructions?  Yes   Nursing interventions  Reviewed instructions with patient   What is the patient's perception of their health status since discharge?  Improving   Is the patient/caregiver able to teach back signs and symptoms related to disease process for when to call PCP?  Yes   Is the patient/caregiver able to teach back signs and symptoms related to disease process for when to call 911?  Yes   Is the patient/caregiver able to teach back the hierarchy of who to call/visit for symptoms/problems? PCP, Specialist, Home health nurse, Urgent Care, ED, 911  Yes   Week 2 Call Completed?  Yes          Anna Atkins RN

## 2019-07-18 NOTE — PROGRESS NOTES
Call patient with results:    Liver labs are improving. Bilirubin level has normalized. Alkaline phosphatase level remains mildly elevated. Low platelets have normalized.     Continue current plans as we discussed. Continue to hold atorvastatin.

## 2019-07-25 ENCOUNTER — READMISSION MANAGEMENT (OUTPATIENT)
Dept: CALL CENTER | Facility: HOSPITAL | Age: 84
End: 2019-07-25

## 2019-07-25 NOTE — OUTREACH NOTE
Medical Week 3 Survey      Responses   Facility patient discharged from?  [place]   Does the patient have one of the following disease processes/diagnoses(primary or secondary)?  Other   Week 3 attempt successful?  Yes   Call start time  1600   Call end time  1602   Discharge diagnosis  Acute hepatitis    Meds reviewed with patient/caregiver?  Yes   Is the patient taking all medications as directed (includes completed medication regime)?  Yes   Has the patient kept scheduled appointments due by today?  Yes   What is the patient's perception of their health status since discharge?  Improving   Is the patient/caregiver able to teach back the hierarchy of who to call/visit for symptoms/problems? PCP, Specialist, Home health nurse, Urgent Care, ED, 911  Yes   Additional teach back comments  Pt says she is doing well, no questions or concerns at this time.   Week 3 Call Completed?  Yes          Ivonne Decker RN

## 2019-08-01 ENCOUNTER — READMISSION MANAGEMENT (OUTPATIENT)
Dept: CALL CENTER | Facility: HOSPITAL | Age: 84
End: 2019-08-01

## 2019-08-01 NOTE — OUTREACH NOTE
Medical Week 4 Survey      Responses   Facility patient discharged from?  [facility]   Does the patient have one of the following disease processes/diagnoses(primary or secondary)?  Other   Week 4 attempt successful?  Yes   Call start time  1523   Call end time  1527   Discharge diagnosis  Acute hepatitis    Meds reviewed with patient/caregiver?  Yes   Is the patient having any side effects they believe may be caused by any medication additions or changes?  No   Is the patient taking all medications as directed (includes completed medication regime)?  Yes   Has the patient kept scheduled appointments due by today?  Yes   Is the patient still receiving Home Health Services?  N/A   Psychosocial issues?  No   What is the patient's perception of their health status since discharge?  Improving   Is the patient/caregiver able to teach back signs and symptoms related to disease process for when to call PCP?  Yes   Is the patient/caregiver able to teach back signs and symptoms related to disease process for when to call 911?  Yes   Is the patient/caregiver able to teach back the hierarchy of who to call/visit for symptoms/problems? PCP, Specialist, Home health nurse, Urgent Care, ED, 911  Yes   Week 4 Call Completed?  Yes   Would the patient like one additional call?  No   Graduated  Yes          Hernan Longoria RN

## 2019-08-22 ENCOUNTER — OFFICE VISIT (OUTPATIENT)
Dept: INTERNAL MEDICINE | Age: 84
End: 2019-08-22

## 2019-08-22 VITALS
WEIGHT: 110.6 LBS | DIASTOLIC BLOOD PRESSURE: 76 MMHG | TEMPERATURE: 97.1 F | HEIGHT: 59 IN | BODY MASS INDEX: 22.3 KG/M2 | SYSTOLIC BLOOD PRESSURE: 126 MMHG | OXYGEN SATURATION: 95 % | HEART RATE: 98 BPM

## 2019-08-22 DIAGNOSIS — E78.5 DYSLIPIDEMIA: Chronic | ICD-10-CM

## 2019-08-22 DIAGNOSIS — I10 ESSENTIAL HYPERTENSION: Primary | Chronic | ICD-10-CM

## 2019-08-22 DIAGNOSIS — E03.9 HYPOTHYROIDISM, UNSPECIFIED TYPE: Chronic | ICD-10-CM

## 2019-08-22 PROBLEM — B17.9 ACUTE HEPATITIS: Status: RESOLVED | Noted: 2019-07-06 | Resolved: 2019-08-22

## 2019-08-22 PROCEDURE — G0438 PPPS, INITIAL VISIT: HCPCS | Performed by: INTERNAL MEDICINE

## 2019-08-22 PROCEDURE — 99214 OFFICE O/P EST MOD 30 MIN: CPT | Performed by: INTERNAL MEDICINE

## 2019-08-22 RX ORDER — ATORVASTATIN CALCIUM 20 MG/1
20 TABLET, FILM COATED ORAL NIGHTLY
COMMUNITY
End: 2019-08-22

## 2019-08-22 NOTE — PATIENT INSTRUCTIONS
** IMPORTANT MESSAGE FROM DR. ROBERTSON **    In our office, your satisfaction is VERY important to us.     You may receive a survey from [facility] by mail or E-mail for you to provide feedback about your visit. This information is invaluable for me to know what we can do to improve our services.     I ask that you please take a few minutes to complete the survey and let us know how we are doing in serving your needs. (You may receive the survey more than once for multiple visits)    Thank You !    Dr. Robertson & Staff    __________________________________________________________      ADDITIONAL INSTRUCTION / REMINDERS FROM DR. ROBERTSON

## 2019-08-22 NOTE — PROGRESS NOTES
"08/22/2019      MEDICARE ANNUAL WELLNESS VISIT     Maria Fernanda Gaviria is a 89 y.o. female who presents for Medicare Wellness-Initial Visit (4 mo f/u); Hypertension; Hyperlipidemia; and Hypothyroidism      Patient's general assessment of her health since a year ago:     - Compared to one year ago, she feels her physical health is about the same without significant change.    - Compared to one year ago, she feels her mental health is about the same without significant change.      HPI for other active medical problems:     Chronic essential hypertension:  Since prior visit: compliant with medication(s) and denies significant problems with medication(s).  Most recent in-office blood pressure readings:   BP Readings from Last 3 Encounters:   08/22/19 126/76   07/17/19 136/70   07/09/19 159/87     Chronic hypothyroidism:  On stable dose of levothyroxine, no changes in physical symptoms  Lab Results   Component Value Date    TSH 1.240 04/09/2019    TSH 0.875 03/20/2018    FREET4 1.24 04/09/2019    FREET4 1.31 03/20/2018     Chronic hyperlipidemia: .    Most recent labs:   Lab Results   Component Value Date     (H) 04/09/2019    LDL 97 05/17/2017     (H) 02/08/2016    HDL 54 04/09/2019    HDL 64 (H) 05/17/2017    HDL 52 02/08/2016    TRIG 132 04/09/2019    CHOLHDLRATIO 3.41 04/09/2019    CHOLHDLRATIO 2.89 05/17/2017    CHOLHDLRATIO 3.6 02/08/2016           * The required components of Health Risk Assessment (HRA) that were completed by the patient and/or my staff are contained within this note and in the scanned documents titled \"Health Risk Assessment\" within the media section of the patient's chart in [facility].       HISTORY    Recent Hospitalizations:    Recent hospitalization?:     If YES, location, date, and diagnoses:     · Location:   · Date:   · Principle Discharge Dx:   · Secondary Dx:       Patient Care Team:    Patient Care Team:  Henok Salcido MD as PCP - Christiano Harmon MD as Consulting " Physician (Obstetrics and Gynecology)  Rodolfo Shah MD as Consulting Physician (Allergy)  Michael Mendoza MD as Consulting Physician (Dermatology)  Jluis Bain MD as Consulting Physician (Otolaryngology)  Lucas Rueda OD (Optometry)  Ousmane Rhodes MD as Consulting Physician (Gastroenterology)      Allergies:  Aspirin and Buffered aspirin    Medications:  Current Outpatient Medications   Medication Sig Dispense Refill   • acetaminophen (TYLENOL) 650 MG 8 hr tablet Take  by mouth daily.     • cetirizine (zyrTEC) 10 MG tablet Take 10 mg by mouth Daily.     • Cholecalciferol (VITAMIN D3) 2000 UNITS tablet Take  by mouth daily.     • Glucosamine-Chondroitin 250-200 MG tablet Take  by mouth.     • lansoprazole (PREVACID) 30 MG capsule TAKE 1 CAPSULE EVERY MORNING BEFORE BREAKFAST 90 capsule 3   • levothyroxine (SYNTHROID, LEVOTHROID) 50 MCG tablet TAKE 1 TABLET EVERY MORNING 90 tablet 3   • losartan (COZAAR) 50 MG tablet Take 1 tablet by mouth Daily. 30 tablet 3   • Misc Natural Products (OSTEO BI-FLEX JOINT SHIELD PO) Take  by mouth.     • montelukast (SINGULAIR) 10 MG tablet Take 10 mg by mouth daily.     • Triamcinolone Acetonide (NASACORT) 55 MCG/ACT nasal inhaler 2 sprays into each nostril Daily.       No current facility-administered medications for this visit.         PFSH:     The following portions of the patient's history were reviewed and updated as appropriate: Allergies / Current Medications / Past Medical History / Surgical History / Social History / Family History    Problem List:  Patient Active Problem List   Diagnosis   • Osteoarthritis of lumbar spine   • Osteoarthritis   • Sciatic leg pain   • Asthma   • Stenosis of carotid artery   • Dyslipidemia   • Gastroesophageal reflux disease   • Hypertension   • Hypothyroidism   • Osteopenia   • Restless legs syndrome   • Thrombocytopenia (CMS/HCC)   • Altered mental status   • DNR (do not resuscitate)   • Non-seasonal allergic rhinitis  "      Past Medical History:  Past Medical History:   Diagnosis Date   • Arthritis    • Cancer (CMS/HCC)     skin   • Disease of thyroid gland    • GERD (gastroesophageal reflux disease)    • Hyperlipidemia    • Hypertension    • Osteoporosis        Past Surgical History:  Past Surgical History:   Procedure Laterality Date   • REPLACEMENT TOTAL KNEE Right    • THYROID SURGERY     • TUBAL ABDOMINAL LIGATION         Social History:  Social History     Socioeconomic History   • Marital status:      Spouse name: Not on file   • Number of children: Not on file   • Years of education: Not on file   • Highest education level: Not on file   Tobacco Use   • Smoking status: Former Smoker   • Smokeless tobacco: Never Used   Substance and Sexual Activity   • Alcohol use: Yes     Comment: wine at night   • Drug use: No   • Sexual activity: Defer       Family History:  Family History   Problem Relation Age of Onset   • Hypertension Mother    • Heart disease Mother    • Thyroid disease Mother    • Hypertension Father    • Cancer Father    • Heart disease Brother    • Hypertension Daughter    • Thyroid disease Daughter    • Lung disease Daughter    • Depression Daughter    • Migraines Daughter    • Arthritis Daughter    • Heart disease Paternal Grandmother    • Stroke Paternal Grandfather    • Kidney disease Paternal Grandfather          PATIENT ASSESSMENT    Vitals:  /76   Pulse 98   Temp 97.1 °F (36.2 °C)   Ht 149.9 cm (59\")   Wt 50.2 kg (110 lb 9.6 oz)   SpO2 95%   BMI 22.34 kg/m²   BP Readings from Last 3 Encounters:   08/22/19 126/76   07/17/19 136/70   07/09/19 159/87     Wt Readings from Last 3 Encounters:   08/22/19 50.2 kg (110 lb 9.6 oz)   07/17/19 51 kg (112 lb 6.4 oz)   07/06/19 52.1 kg (114 lb 14.4 oz)      Body mass index is 22.34 kg/m².    Pain Score    08/22/19 1043   PainSc:   2   PainLoc: Hip         Review of Systems:    Review of Systems  Constitutional neg  Resp neg  CV neg   GI neg   " neg  Neuro neg  Psych neg     Physical Exam:    Physical Exam  Constitutional  No distress  Cardiovascular Rate  normal . Rhythm: regular . Heart sounds:  normal  Pulmonary/Chest  Effort normal. Breath sounds:  normal  Psychiatric  Alert. Judgment and thought content normal. Mood normal     Reviewed Data:    Labs:   Lab Results   Component Value Date     07/17/2019    K 4.4 07/17/2019    CALCIUM 9.0 07/17/2019    AST 57 (H) 07/17/2019    ALT 51 (H) 07/17/2019    BUN 12 07/17/2019    CREATININE 0.78 07/17/2019    CREATININE 0.68 07/08/2019    CREATININE 0.75 07/07/2019    EGFRIFNONA 68 07/17/2019    EGFRIFAFRI 83 07/17/2019       Lab Results   Component Value Date    GLU 75 07/17/2019    GLU 93 04/09/2019    GLU 83 04/27/2018       Lab Results   Component Value Date     (H) 04/09/2019    LDL 97 05/17/2017     (H) 02/08/2016    HDL 54 04/09/2019    TRIG 132 04/09/2019    CHOLHDLRATIO 3.41 04/09/2019       Lab Results   Component Value Date    TSH 1.240 04/09/2019    FREET4 1.24 04/09/2019          Lab Results   Component Value Date    WBC 6.81 07/17/2019    HGB 14.1 07/17/2019    HGB 13.6 07/08/2019    HGB 12.0 07/07/2019     07/17/2019                 No results found for: PSA    Imaging:          Medical Tests:          Screening for Glaucoma:  Previous screening for glaucoma?: Yes      Hearing Loss Screen:  Finger Rub Hearing Test (right ear): passed  Finger Rub Hearing Test (left ear): passed      Urinary Incontinence Screen:  Episodes of urinary incontinence? : No      Depression Screen:  PHQ-2/PHQ-9 Depression Screening 8/22/2019   Little interest or pleasure in doing things 0   Feeling down, depressed, or hopeless 0   Total Score 0        PHQ-2: 0 (Not depressed)    PHQ-9:        FUNCTIONAL, FALL RISK, & COGNITIVE SCREENING (Components below):    DATA:    Functional & Cognitive Status 8/22/2019   Do you have difficulty preparing food and eating? No   Do you have difficulty bathing  yourself, getting dressed or grooming yourself? No   Do you have difficulty using the toilet? No   Do you have difficulty moving around from place to place? No   Do you have trouble with steps or getting out of a bed or a chair? No   Current Diet Well Balanced Diet   Dental Exam Up to date   Eye Exam Not up to date   Exercise (times per week) 7 times per week   Current Exercise Activities Include Walking   Do you need help using the phone?  No   Are you deaf or do you have serious difficulty hearing?  No   Do you need help with transportation? No   Do you need help shopping? No   Do you need help preparing meals?  No   Do you need help with housework?  No   Do you need help with laundry? No   Do you need help taking your medications? No   Do you need help managing money? No   Do you ever drive or ride in a car without wearing a seat belt? No   Do you have difficulty concentrating, remembering or making decisions? No         A) Assessment of Functional Ability:  (Assessment of ability to perform ADL's (showering/bathing, using toilet, dressing, feeding self, moving self around) and IADL's (use telephone, shop, prepare food, housekeep, do laundry, transport independently, take medications independently, and handle finances)    Degree of functional impairment: MILD (based on assessment noted above)      B) Assessment of Fall Risk:  Fall Risk Assessment was completed, and patient is at low risk for falls.       Need for further evaluation of gait, strength, and balance? : No    Timed Up and Go (TUG):   (>= 12 seconds indicates high risk for falling)    Observable abnormalities included: Normal gait pattern       C. Assessment of Cognitive Function:    Mini-Cog Test:     1) Registration (3 objects): Yes   2) Number of objects recalled: 3   3) Clock Draw: Passed? : N/A       Further evaluation required? : No      COUNSELING    A. Identification of Health Risk Factors:    Risk factors include: cardiovascular risk  factors      B. Age-Appropriate Screening Schedule:  (Refer to the list below for future screening recommendations based on patient's age, sex and/or medical conditions. Orders for these recommended tests are listed in the plan section. The patient has been provided with a written plan)    Health Maintenance Topics  Health Maintenance   Topic Date Due   • INFLUENZA VACCINE  08/01/2019   • DXA SCAN  12/12/2019   • MAMMOGRAM  12/20/2019   • LIPID PANEL  04/09/2020   • TDAP/TD VACCINES (2 - Td) 09/18/2028   • PNEUMOCOCCAL VACCINES (65+ LOW/MEDIUM RISK)  Completed   • ZOSTER VACCINE  Completed       Health Maintenance Topics Due or Over-Due  Health Maintenance Due   Topic Date Due   • INFLUENZA VACCINE  08/01/2019         C. Advanced Care Planning:    has an advanced directive which is on file and has a medical power of       D. Patient Self-Management and Personalized Health Advice:    She has been provided with personalized counseling/information (including brochures/handouts) about:     -- reducing risk for cardiovascular disease (heart, stroke, vascular), fall prevention and management of chronic pain with focus on minimizing use of narcotic pain medications      She has been recommended for the following preventative services which has been performed today, will be ordered today or ordered/performed on upcoming follow-up visit:     -- nutrition counseling provided, exercise counseling provided, counseling for cardiovascular disease risk reduction, fall risk assessment / plan of care completed, urinary incontinence assessment done, screening for osteoporosis recommended (managed by gyne and DEXA will be performed through that office), screening for breast cancer is managed by another provider and mammogram will be arranged through that office, vaccination for influenza administered/recommended, vaccination for Hepatitis A administered (or recommended at pharmacy)      E. Miscellaneous Items:    -Aspirin use  counseling: Does not need ASA (and currently is not on it)    -Discussed BMI with her. The BMI is in the acceptable range    -Reviewed use of high risk medication in the elderly: YES    -Reviewed for potential of harmful drug interactions in the elderly: YES      IV. WRAP-UP    Assessment & Plan:    1) MEDICARE ANNUAL WELLNESS VISIT    2) OTHER MEDICAL CONDITIONS ADDRESSED TODAY:              Problem List Items Addressed This Visit        High    Hypertension - Primary (Chronic)    Overview     Continue losartan 50 mg qd.          Relevant Medications    losartan (COZAAR) 50 MG tablet       Medium    Dyslipidemia (Chronic)    Current Assessment & Plan     Hold off atorvastatin due to prior acute hepatitis. Discussed risks vs benefits.          Hypothyroidism (Chronic)    Overview     Stable. Continue current dose of levothyroxine 50 mcg.         Relevant Medications    levothyroxine (SYNTHROID, LEVOTHROID) 50 MCG tablet                  No orders of the defined types were placed in this encounter.      Discussion / Summary:        Medications as of TODAY:              Current Outpatient Medications   Medication Sig Dispense Refill   • acetaminophen (TYLENOL) 650 MG 8 hr tablet Take  by mouth daily.     • cetirizine (zyrTEC) 10 MG tablet Take 10 mg by mouth Daily.     • Cholecalciferol (VITAMIN D3) 2000 UNITS tablet Take  by mouth daily.     • Glucosamine-Chondroitin 250-200 MG tablet Take  by mouth.     • lansoprazole (PREVACID) 30 MG capsule TAKE 1 CAPSULE EVERY MORNING BEFORE BREAKFAST 90 capsule 3   • levothyroxine (SYNTHROID, LEVOTHROID) 50 MCG tablet TAKE 1 TABLET EVERY MORNING 90 tablet 3   • losartan (COZAAR) 50 MG tablet Take 1 tablet by mouth Daily. 30 tablet 3   • Misc Natural Products (OSTEO BI-FLEX JOINT SHIELD PO) Take  by mouth.     • montelukast (SINGULAIR) 10 MG tablet Take 10 mg by mouth daily.     • Triamcinolone Acetonide (NASACORT) 55 MCG/ACT nasal inhaler 2 sprays into each nostril Daily.        No current facility-administered medications for this visit.          FOLLOW-UP:            Return in about 6 months (around 2/22/2020) for Reassess chronic medical problems.              Future Appointments   Date Time Provider Department Center   12/17/2019 10:15 AM [facility] None   12/17/2019 10:30 AM [facility] None   12/17/2019 11:00 AM Christiano Dewitt MD [facility] None   2/20/2020  9:15 AM Henok Salcido MD [facility] None         ______________________________________________________________________      A printed After Visit Summary (AVS) including the Personalized Prevention  Plan Services (PPPS) was given to the patient at check-out today.     Educational Handouts    Strong & Stable / Balance / Physical Activity / Pain / Depression /  Forgetfulness / Healthy Eating / Alcohol / Smoking /  Medicine / Sexuality / Scams     **Dragon Disclaimer:   Much of this encounter note is an electronic transcription/translation of spoken language to printed text. The electronic translation of spoken language may permit erroneous, or at times, nonsensical words or phrases to be inadvertently transcribed. Although I have reviewed the note for such errors, some may still exist.     This template was created by Clayton Salcido MD.

## 2019-10-16 ENCOUNTER — HOSPITAL ENCOUNTER (EMERGENCY)
Facility: HOSPITAL | Age: 84
Discharge: HOME | End: 2019-10-17
Attending: EMERGENCY MEDICINE | Admitting: EMERGENCY MEDICINE

## 2019-10-16 ENCOUNTER — APPOINTMENT (OUTPATIENT)
Dept: CT IMAGING | Facility: HOSPITAL | Age: 84
End: 2019-10-16

## 2019-10-16 VITALS
SYSTOLIC BLOOD PRESSURE: 166 MMHG | HEART RATE: 87 BPM | DIASTOLIC BLOOD PRESSURE: 85 MMHG | WEIGHT: 110.6 LBS | BODY MASS INDEX: 22.3 KG/M2 | OXYGEN SATURATION: 94 % | TEMPERATURE: 97.5 F | HEIGHT: 59 IN | RESPIRATION RATE: 18 BRPM

## 2019-10-16 DIAGNOSIS — R10.10 PAIN OF UPPER ABDOMEN: Primary | ICD-10-CM

## 2019-10-16 LAB
ALBUMIN SERPL-MCNC: 4.1 G/DL (ref 3.5–5.2)
ALBUMIN/GLOB SERPL: 1.3 G/DL
ALP SERPL-CCNC: 88 U/L (ref 39–117)
ALT SERPL W P-5'-P-CCNC: 25 U/L (ref 1–33)
ANION GAP SERPL CALCULATED.3IONS-SCNC: 10 MMOL/L (ref 5–15)
AST SERPL-CCNC: 62 U/L (ref 1–32)
BASOPHILS # BLD AUTO: 0.04 10*3/MM3 (ref 0–0.2)
BASOPHILS NFR BLD AUTO: 0.3 % (ref 0–1.5)
BILIRUB SERPL-MCNC: 0.7 MG/DL (ref 0.2–1.2)
BILIRUB UR QL STRIP: NEGATIVE
BUN BLD-MCNC: 13 MG/DL (ref 8–23)
BUN/CREAT SERPL: 16 (ref 7–25)
CALCIUM SPEC-SCNC: 9.5 MG/DL (ref 8.2–9.6)
CHLORIDE SERPL-SCNC: 102 MMOL/L (ref 98–107)
CLARITY UR: CLEAR
CO2 SERPL-SCNC: 30 MMOL/L (ref 22–29)
COLOR UR: YELLOW
CREAT BLD-MCNC: 0.81 MG/DL (ref 0.57–1)
DEPRECATED RDW RBC AUTO: 45.2 FL (ref 37–54)
EOSINOPHIL # BLD AUTO: 0.06 10*3/MM3 (ref 0–0.4)
EOSINOPHIL NFR BLD AUTO: 0.5 % (ref 0.3–6.2)
ERYTHROCYTE [DISTWIDTH] IN BLOOD BY AUTOMATED COUNT: 13.2 % (ref 12.3–15.4)
GFR SERPL CREATININE-BSD FRML MDRD: 66 ML/MIN/1.73
GLOBULIN UR ELPH-MCNC: 3.2 GM/DL
GLUCOSE BLD-MCNC: 111 MG/DL (ref 65–99)
GLUCOSE UR STRIP-MCNC: NEGATIVE MG/DL
HCT VFR BLD AUTO: 47.6 % (ref 34–46.6)
HGB BLD-MCNC: 15.7 G/DL (ref 12–15.9)
HGB UR QL STRIP.AUTO: NEGATIVE
IMM GRANULOCYTES # BLD AUTO: 0.07 10*3/MM3 (ref 0–0.05)
IMM GRANULOCYTES NFR BLD AUTO: 0.6 % (ref 0–0.5)
KETONES UR QL STRIP: NEGATIVE
LEUKOCYTE ESTERASE UR QL STRIP.AUTO: NEGATIVE
LIPASE SERPL-CCNC: 15 U/L (ref 13–60)
LYMPHOCYTES # BLD AUTO: 0.54 10*3/MM3 (ref 0.7–3.1)
LYMPHOCYTES NFR BLD AUTO: 4.6 % (ref 19.6–45.3)
MCH RBC QN AUTO: 30.8 PG (ref 26.6–33)
MCHC RBC AUTO-ENTMCNC: 33 G/DL (ref 31.5–35.7)
MCV RBC AUTO: 93.5 FL (ref 79–97)
MONOCYTES # BLD AUTO: 0.75 10*3/MM3 (ref 0.1–0.9)
MONOCYTES NFR BLD AUTO: 6.4 % (ref 5–12)
NEUTROPHILS # BLD AUTO: 10.32 10*3/MM3 (ref 1.7–7)
NEUTROPHILS NFR BLD AUTO: 87.6 % (ref 42.7–76)
NITRITE UR QL STRIP: NEGATIVE
NRBC BLD AUTO-RTO: 0 /100 WBC (ref 0–0.2)
PH UR STRIP.AUTO: <=5 [PH] (ref 5–8)
PLATELET # BLD AUTO: 170 10*3/MM3 (ref 140–450)
PMV BLD AUTO: 10 FL (ref 6–12)
POTASSIUM BLD-SCNC: 4 MMOL/L (ref 3.5–5.2)
PROT SERPL-MCNC: 7.3 G/DL (ref 6–8.5)
PROT UR QL STRIP: NEGATIVE
RBC # BLD AUTO: 5.09 10*6/MM3 (ref 3.77–5.28)
SODIUM BLD-SCNC: 142 MMOL/L (ref 136–145)
SP GR UR STRIP: 1.01 (ref 1–1.03)
TROPONIN T SERPL-MCNC: <0.01 NG/ML (ref 0–0.03)
UROBILINOGEN UR QL STRIP: NORMAL
WBC NRBC COR # BLD: 11.78 10*3/MM3 (ref 3.4–10.8)

## 2019-10-16 PROCEDURE — 84484 ASSAY OF TROPONIN QUANT: CPT | Performed by: NURSE PRACTITIONER

## 2019-10-16 PROCEDURE — 80053 COMPREHEN METABOLIC PANEL: CPT | Performed by: NURSE PRACTITIONER

## 2019-10-16 PROCEDURE — 25010000002 IOPAMIDOL 61 % SOLUTION: Performed by: EMERGENCY MEDICINE

## 2019-10-16 PROCEDURE — 99284 EMERGENCY DEPT VISIT MOD MDM: CPT

## 2019-10-16 PROCEDURE — 93005 ELECTROCARDIOGRAM TRACING: CPT | Performed by: NURSE PRACTITIONER

## 2019-10-16 PROCEDURE — 93010 ELECTROCARDIOGRAM REPORT: CPT | Performed by: INTERNAL MEDICINE

## 2019-10-16 PROCEDURE — 85025 COMPLETE CBC W/AUTO DIFF WBC: CPT | Performed by: NURSE PRACTITIONER

## 2019-10-16 PROCEDURE — 81003 URINALYSIS AUTO W/O SCOPE: CPT | Performed by: NURSE PRACTITIONER

## 2019-10-16 PROCEDURE — 83690 ASSAY OF LIPASE: CPT | Performed by: NURSE PRACTITIONER

## 2019-10-16 PROCEDURE — 74177 CT ABD & PELVIS W/CONTRAST: CPT

## 2019-10-16 RX ORDER — LIDOCAINE HYDROCHLORIDE 20 MG/ML
15 SOLUTION OROPHARYNGEAL ONCE
Status: COMPLETED | OUTPATIENT
Start: 2019-10-16 | End: 2019-10-16

## 2019-10-16 RX ORDER — ALUMINA, MAGNESIA, AND SIMETHICONE 2400; 2400; 240 MG/30ML; MG/30ML; MG/30ML
15 SUSPENSION ORAL ONCE
Status: COMPLETED | OUTPATIENT
Start: 2019-10-16 | End: 2019-10-16

## 2019-10-16 RX ORDER — SODIUM CHLORIDE 0.9 % (FLUSH) 0.9 %
10 SYRINGE (ML) INJECTION AS NEEDED
Status: DISCONTINUED | OUTPATIENT
Start: 2019-10-16 | End: 2019-10-17 | Stop reason: HOSPADM

## 2019-10-16 RX ADMIN — LIDOCAINE HYDROCHLORIDE 15 ML: 20 SOLUTION ORAL; TOPICAL at 23:57

## 2019-10-16 RX ADMIN — ALUMINUM HYDROXIDE, MAGNESIUM HYDROXIDE, AND DIMETHICONE 15 ML: 400; 400; 40 SUSPENSION ORAL at 23:57

## 2019-10-16 RX ADMIN — IOPAMIDOL 85 ML: 612 INJECTION, SOLUTION INTRAVENOUS at 22:46

## 2019-10-17 NOTE — ED PROVIDER NOTES
MD ATTESTATION NOTE    The OLLIE and I have discussed this patient's history, physical exam, and treatment plan.  I have reviewed the documentation and personally had a face to face interaction with the patient. I affirm the documentation and agree with the treatment and plan.  The attached note describes my personal findings.      History  89-year-old female presents with abdominal pain onset earlier today.  Admitted earlier this year with similar symptoms possibly due to gallbladder disease.    Physical Exam  Vital Signs reviewed  Alert, Well Appearing in NAD  Abdomen-there is mild diffuse tenderness    EKG          EKG time: 2141  Rhythm/Rate: Sinus 80  P waves and GA: Normal P waves and GA interval  QRS, axis: Left axis deviation, inferior and anterior Q waves  ST and T waves: Unremarkable    Interpreted Contemporaneously by me, independently viewed  Bundle branch block noted before is no longer present Q waves are persistent.      Disposition  We will get labs and CT scan of abdomen for further evaluation.     Hernan Angel MD  10/16/19 [id number]

## 2019-10-17 NOTE — ED NOTES
"Pt c/o abdominal pain starting around 3:30pm this afternoon. Pt tried \"ginger ale\" with decreasing symptoms. Pt states she is on an acid reflux diet for the last 2-3 months. Pt states around 5pm this afternoon that the pain increased and induced vomiting - pt states she \"has only eaten a small pastry today.\" Pt states pain is located in upper middle quadrant of the abdomin, with radiating pain \"around my ribs and to my back.\"      Masood Del Cid, RN  10/16/19 2028    "

## 2019-10-17 NOTE — DISCHARGE INSTRUCTIONS
Continue current home medications  Diet as tolerated  Increase fluids  Follow up with PMD in 3 to 5 days if symptoms not improving  Keep appointment with GI in November  Return to er for fever, chills, vomiting, diarrhea, increased pain or any new or worsening symptoms
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
## 2019-10-18 ENCOUNTER — OFFICE VISIT (OUTPATIENT)
Dept: INTERNAL MEDICINE | Age: 84
End: 2019-10-18

## 2019-10-18 VITALS
SYSTOLIC BLOOD PRESSURE: 122 MMHG | TEMPERATURE: 96.8 F | HEART RATE: 77 BPM | DIASTOLIC BLOOD PRESSURE: 68 MMHG | HEIGHT: 59 IN | OXYGEN SATURATION: 95 % | WEIGHT: 103 LBS | BODY MASS INDEX: 20.76 KG/M2

## 2019-10-18 DIAGNOSIS — R10.10 UPPER ABDOMINAL PAIN: Primary | ICD-10-CM

## 2019-10-18 PROCEDURE — 99214 OFFICE O/P EST MOD 30 MIN: CPT | Performed by: INTERNAL MEDICINE

## 2019-10-18 NOTE — PROGRESS NOTES
"[facility] [place] [facility]  HERNANDEZ SALCIDO M.D.      Maria Fernanda Gaviria / 89 y.o. / female  10/18/2019    VITALS    Visit Vitals  /68   Pulse 77   Temp 96.8 °F (36 °C)   Ht 149.9 cm (59\")   Wt 46.7 kg (103 lb)   SpO2 95%   BMI 20.80 kg/m²       BP Readings from Last 3 Encounters:   10/18/19 122/68   10/16/19 166/85   08/22/19 126/76     Wt Readings from Last 3 Encounters:   10/18/19 46.7 kg (103 lb)   10/16/19 50.2 kg (110 lb 9.6 oz)   08/22/19 50.2 kg (110 lb 9.6 oz)      Body mass index is 20.8 kg/m².    CC:  Main reason(s) for today's visit: er follow up (10/15/19); Vomiting; and Abdominal Pain      HPI:     Reviewed chief complaint and details of complaint as documented by staff.     Date of emergency department encounter: 10/15/19  Emergency department: [facility]  Principle Dx: Epigastric abdominal pain  Secondary Dx:   History prior to ED visit: acute episode of epig abdominal pain and later episode of vomiting   Evaluation/Treatment: CT negative, lipase negative. White blood count mild elevated. Hemoglobin normal. Told to follow-up with GI.   Course: Denies ongoing abdominal pain, nausea/vomiting. No melena or bleeding. Apprehensive about eating due to \"bloating\". Weight loss noted.   Previously known to have GB sludge.     Patient Care Team:  Henok Salcido MD as PCP - General  Christiano Dewitt MD as Consulting Physician (Obstetrics and Gynecology)  Rodolfo Shah MD as Consulting Physician (Allergy)  Michael Mendoza MD as Consulting Physician (Dermatology)  Jluis Bain MD as Consulting Physician (Otolaryngology)  Lucas Rueda OD (Optometry)  Ousmane Rhodes MD as Consulting Physician (Gastroenterology)  ____________________________________________________________________    ASSESSMENT & PLAN:    1. Upper abdominal pain      Orders Placed This Encounter   Procedures   • US Gallbladder     No orders of the defined types were placed in this " encounter.      Summary/Discussion:  ·     Return for Return/call if not improving, Next scheduled follow up.    Future Appointments   Date Time Provider Department Center   11/5/2019  1:00 PM Ousmane Rhodes MD [facility] None   12/17/2019 10:15 AM [facility] None   12/17/2019 10:30 AM [facility] None   12/17/2019 11:00 AM Christiano Dewitt MD [facility] None   2/20/2020  9:15 AM Henok Salcido MD [facility] None     ____________________________________________________________________    REVIEW OF SYSTEMS    Review of Systems   Constitutional: Positive for appetite change and unexpected weight change (wt loss 7 lbs). Negative for chills and fever.   Respiratory: Negative for shortness of breath.    Cardiovascular: Negative for chest pain.   Gastrointestinal: Negative for blood in stool (no melena). Abdominal pain: not currently.         PHYSICAL EXAMINATION    Physical Exam   Constitutional: No distress.   Cardiovascular: Normal rate and regular rhythm.   Pulmonary/Chest: Effort normal and breath sounds normal.   Abdominal: Soft. Bowel sounds are normal. She exhibits no distension and no mass. There is no tenderness. There is no rebound and no guarding. No hernia.   Neurological: She is alert.   Skin:   No jaundice         REVIEWED DATA:    Labs:   Admission on 10/16/2019, Discharged on 10/17/2019   Component Date Value Ref Range Status   • Glucose 10/16/2019 111* 65 - 99 mg/dL Final   • BUN 10/16/2019 13  8 - 23 mg/dL Final   • Creatinine 10/16/2019 0.81  0.57 - 1.00 mg/dL Final   • Sodium 10/16/2019 142  136 - 145 mmol/L Final   • Potassium 10/16/2019 4.0  3.5 - 5.2 mmol/L Final   • Chloride 10/16/2019 102  98 - 107 mmol/L Final   • CO2 10/16/2019 30.0* 22.0 - 29.0 mmol/L Final   • Calcium 10/16/2019 9.5  8.2 - 9.6 mg/dL Final   • Total Protein 10/16/2019 7.3  6.0 - 8.5 g/dL Final   • Albumin 10/16/2019 4.10  3.50 - 5.20 g/dL Final   • ALT (SGPT) 10/16/2019 25   1 - 33 U/L Final   • AST (SGOT) 10/16/2019 62* 1 - 32 U/L Final   • Alkaline Phosphatase 10/16/2019 88  39 - 117 U/L Final   • Total Bilirubin 10/16/2019 0.7  0.2 - 1.2 mg/dL Final   • eGFR Non African Amer 10/16/2019 66  >60 mL/min/1.73 Final   • Globulin 10/16/2019 3.2  gm/dL Final   • A/G Ratio 10/16/2019 1.3  g/dL Final   • BUN/Creatinine Ratio 10/16/2019 16.0  7.0 - 25.0 Final   • Anion Gap 10/16/2019 10.0  5.0 - 15.0 mmol/L Final   • Lipase 10/16/2019 15  13 - 60 U/L Final   • Troponin T 10/16/2019 <0.010  0.000 - 0.030 ng/mL Final   • Color, UA 10/16/2019 Yellow  Yellow, Straw Final   • Appearance, UA 10/16/2019 Clear  Clear Final   • pH, UA 10/16/2019 <=5.0  5.0 - 8.0 Final   • Specific Gravity, UA 10/16/2019 1.011  1.005 - 1.030 Final   • Glucose, UA 10/16/2019 Negative  Negative Final   • Ketones, UA 10/16/2019 Negative  Negative Final   • Bilirubin, UA 10/16/2019 Negative  Negative Final   • Blood, UA 10/16/2019 Negative  Negative Final   • Protein, UA 10/16/2019 Negative  Negative Final   • Leuk Esterase, UA 10/16/2019 Negative  Negative Final   • Nitrite, UA 10/16/2019 Negative  Negative Final   • Urobilinogen, UA 10/16/2019 0.2 E.U./dL  0.2 - 1.0 E.U./dL Final   • WBC 10/16/2019 11.78* 3.40 - 10.80 10*3/mm3 Final   • RBC 10/16/2019 5.09  3.77 - 5.28 10*6/mm3 Final   • Hemoglobin 10/16/2019 15.7  12.0 - 15.9 g/dL Final   • Hematocrit 10/16/2019 47.6* 34.0 - 46.6 % Final   • MCV 10/16/2019 93.5  79.0 - 97.0 fL Final   • MCH 10/16/2019 30.8  26.6 - 33.0 pg Final   • MCHC 10/16/2019 33.0  31.5 - 35.7 g/dL Final   • RDW 10/16/2019 13.2  12.3 - 15.4 % Final   • RDW-SD 10/16/2019 45.2  37.0 - 54.0 fl Final   • MPV 10/16/2019 10.0  6.0 - 12.0 fL Final   • Platelets 10/16/2019 170  140 - 450 10*3/mm3 Final   • Neutrophil % 10/16/2019 87.6* 42.7 - 76.0 % Final   • Lymphocyte % 10/16/2019 4.6* 19.6 - 45.3 % Final   • Monocyte % 10/16/2019 6.4  5.0 - 12.0 % Final   • Eosinophil % 10/16/2019 0.5  0.3 - 6.2 % Final    • Basophil % 10/16/2019 0.3  0.0 - 1.5 % Final   • Immature Grans % 10/16/2019 0.6* 0.0 - 0.5 % Final   • Neutrophils, Absolute 10/16/2019 10.32* 1.70 - 7.00 10*3/mm3 Final   • Lymphocytes, Absolute 10/16/2019 0.54* 0.70 - 3.10 10*3/mm3 Final   • Monocytes, Absolute 10/16/2019 0.75  0.10 - 0.90 10*3/mm3 Final   • Eosinophils, Absolute 10/16/2019 0.06  0.00 - 0.40 10*3/mm3 Final   • Basophils, Absolute 10/16/2019 0.04  0.00 - 0.20 10*3/mm3 Final   • Immature Grans, Absolute 10/16/2019 0.07* 0.00 - 0.05 10*3/mm3 Final   • nRBC 10/16/2019 0.0  0.0 - 0.2 /100 WBC Final         Imaging:   Ct Abdomen Pelvis With Contrast    Result Date: 10/17/2019  Narrative: CT SCANS ABDOMEN AND PELVIS WITH IV CONTRAST.  HISTORY: upper abd pain  COMPARISON: None.  TECHNIQUE: Radiation dose reduction techniques were utilized, including automated exposure control and exposure modulation based on body size. Axial images were obtained from the lung bases to the symphysis pubis with IV contrast only.. Oral contrast was not administered per request.  FINDINGS :  There are multiple low-density liver lesions which are favored to reflect cysts. The largest located centrally measures 7 x 5.9 cm. Some measure less than 1 cm and are too small for detailed assessment. Low-density renal lesions, left greater than right are favored to reflect small cysts. Largest on the left measures 13 mm.  Remaining solid organs have an unremarkable appearance. Aorta nonaneurysmal. The appendix is not identified with certainty on this exam without oral contrast. Extensive diverticulosis. The GI tract not opacified for assessment but non obstructive in appearance. There is a broad-based urinary bladder diverticulum along the far right lateral aspect.        Impression: IMPRESSION : 1. Low-density renal hepatic lesions, likely cysts. Some are too small for detailed assessment. 2. Extensive colonic diverticulosis. 3. Small urinary bladder diverticulum   This report  was finalized on 10/17/2019 3:48 AM by Matthew Merritt M.D.         GALLBLADDER ULTRASOUND     HISTORY: Abnormal elevated liver function test     COMPARISON: 07/14/2017     TECHNIQUE: Gray scale and color Doppler sonographic images were obtained  through the right upper quadrant     FINDINGS:  Visualized portions of the pancreas appear unremarkable. The liver  contains at least 2 cysts. The larger of the cysts measures up to 6.4 x  6.8 x 6.7 cm. This was also present on the prior exam from 07/14/2017.  The common bile duct is mildly prominent at 7 mm. No obvious obstructing  lesion is identified. This actually may be age related in this  89-year-old patient. I do not think it is significantly changed when  compared to July 2017. The gallbladder appears mildly distended. There  is sludge identified layering within the gallbladder, and there is some  gallbladder wall edema. The gallbladder wall is at the upper limits of  normal, measuring 3 mm. The right kidney measures 8.9 x 3.2 x 3.9 cm.  There is no hydronephrosis. No solid or cystic renal masses are seen.     IMPRESSION:     1. Mildly distended gallbladder with gallbladder sludge identified.  There is also some gallbladder wall edema. The edema is nonspecific.  It  can be seen in etiologies such as hepatitis and right-sided heart  failure, but cholecystitis cannot be excluded. Correlation with clinical  presentation is recommended. If there is clinical concern for  cholecystitis, correlation with HIDA scan is recommended.     This report was finalized on 7/6/2019    Medical Tests:         Summary of old records / correspondence / consultant report:   ED encounter note re: issues addressed on HPI    Request outside records:       ALLERGIES  Allergies   Allergen Reactions   • Aspirin Nausea And Vomiting and Nausea Only   • Buffered Aspirin Nausea And Vomiting        MEDICATIONS  Current Outpatient Medications   Medication Sig Dispense Refill   • acetaminophen  (TYLENOL) 650 MG 8 hr tablet Take  by mouth daily.     • cetirizine (zyrTEC) 10 MG tablet Take 10 mg by mouth Daily.     • Cholecalciferol (VITAMIN D3) 2000 UNITS tablet Take  by mouth daily.     • Glucosamine-Chondroitin 250-200 MG tablet Take  by mouth.     • lansoprazole (PREVACID) 30 MG capsule TAKE 1 CAPSULE EVERY MORNING BEFORE BREAKFAST 90 capsule 3   • levothyroxine (SYNTHROID, LEVOTHROID) 50 MCG tablet TAKE 1 TABLET EVERY MORNING 90 tablet 3   • losartan (COZAAR) 50 MG tablet Take 1 tablet by mouth Daily. 30 tablet 3   • Misc Natural Products (OSTEO BI-FLEX JOINT SHIELD PO) Take  by mouth.     • montelukast (SINGULAIR) 10 MG tablet Take 10 mg by mouth daily.     • Triamcinolone Acetonide (NASACORT) 55 MCG/ACT nasal inhaler 2 sprays into each nostril Daily.       No current facility-administered medications for this visit.        PFSH:     The following portions of the patient's history were reviewed and updated as appropriate: Allergies / Current Medications / Past Medical History / Surgical History / Social History / Family History    PROBLEM LIST   Patient Active Problem List   Diagnosis   • Osteoarthritis of lumbar spine   • Osteoarthritis   • Sciatic leg pain   • Asthma   • Stenosis of carotid artery   • Dyslipidemia   • Gastroesophageal reflux disease   • Hypertension   • Hypothyroidism   • Osteopenia   • Restless legs syndrome   • Thrombocytopenia (CMS/HCC)   • Altered mental status   • DNR (do not resuscitate)   • Non-seasonal allergic rhinitis   • Upper abdominal pain       PAST MEDICAL HISTORY  Past Medical History:   Diagnosis Date   • Arthritis    • Cancer (CMS/HCC)     skin   • Disease of thyroid gland    • GERD (gastroesophageal reflux disease)    • Hyperlipidemia    • Hypertension    • Osteoporosis        SURGICAL HISTORY  Past Surgical History:   Procedure Laterality Date   • REPLACEMENT TOTAL KNEE Right    • THYROID SURGERY     • TUBAL ABDOMINAL LIGATION         SOCIAL HISTORY  Social  History     Socioeconomic History   • Marital status:      Spouse name: Not on file   • Number of children: Not on file   • Years of education: Not on file   • Highest education level: Not on file   Tobacco Use   • Smoking status: Former Smoker   • Smokeless tobacco: Never Used   Substance and Sexual Activity   • Alcohol use: Yes     Comment: wine at night   • Drug use: No   • Sexual activity: Defer       FAMILY HISTORY  Family History   Problem Relation Age of Onset   • Hypertension Mother    • Heart disease Mother    • Thyroid disease Mother    • Hypertension Father    • Cancer Father    • Heart disease Brother    • Hypertension Daughter    • Thyroid disease Daughter    • Lung disease Daughter    • Depression Daughter    • Migraines Daughter    • Arthritis Daughter    • Heart disease Paternal Grandmother    • Stroke Paternal Grandfather    • Kidney disease Paternal Grandfather          **Dragon Disclaimer:   Much of this encounter note is an electronic transcription/translation of spoken language to printed text. The electronic translation of spoken language may permit erroneous, or at times, nonsensical words or phrases to be inadvertently transcribed. Although I have reviewed the note for such errors, some may still exist.       Template created by Clayton Salcido MD

## 2019-10-18 NOTE — ASSESSMENT & PLAN NOTE
CT in ED was negative along with lipase.   She has known gallbladder sludge in the past.   Recheck gallbladder US next week.   Has appointment to see GI.   Continue PPI.

## 2019-10-19 DIAGNOSIS — K21.9 GASTROESOPHAGEAL REFLUX DISEASE, ESOPHAGITIS PRESENCE NOT SPECIFIED: Chronic | ICD-10-CM

## 2019-10-19 DIAGNOSIS — E03.9 HYPOTHYROIDISM: ICD-10-CM

## 2019-10-21 RX ORDER — LANSOPRAZOLE 30 MG/1
CAPSULE, DELAYED RELEASE ORAL
Qty: 90 CAPSULE | Refills: 3 | Status: SHIPPED | OUTPATIENT
Start: 2019-10-21 | End: 2020-10-13

## 2019-10-21 RX ORDER — LEVOTHYROXINE SODIUM 0.05 MG/1
TABLET ORAL
Qty: 90 TABLET | Refills: 3 | Status: SHIPPED | OUTPATIENT
Start: 2019-10-21 | End: 2020-10-13

## 2019-11-01 ENCOUNTER — HOSPITAL ENCOUNTER (OUTPATIENT)
Dept: ULTRASOUND IMAGING | Facility: HOSPITAL | Age: 84
Discharge: HOME | End: 2019-11-01
Admitting: INTERNAL MEDICINE

## 2019-11-01 PROCEDURE — 76705 ECHO EXAM OF ABDOMEN: CPT

## 2019-11-05 ENCOUNTER — TELEPHONE (OUTPATIENT)
Dept: INTERNAL MEDICINE | Age: 84
End: 2019-11-05

## 2019-11-05 ENCOUNTER — OFFICE VISIT (OUTPATIENT)
Dept: GASTROENTEROLOGY | Facility: CLINIC | Age: 84
End: 2019-11-05

## 2019-11-05 VITALS
BODY MASS INDEX: 21.57 KG/M2 | HEART RATE: 78 BPM | WEIGHT: 107 LBS | DIASTOLIC BLOOD PRESSURE: 78 MMHG | HEIGHT: 59 IN | SYSTOLIC BLOOD PRESSURE: 138 MMHG

## 2019-11-05 DIAGNOSIS — R10.10 UPPER ABDOMINAL PAIN: Primary | ICD-10-CM

## 2019-11-05 DIAGNOSIS — R79.89 ABNORMAL LIVER FUNCTION TESTS: ICD-10-CM

## 2019-11-05 PROCEDURE — 99213 OFFICE O/P EST LOW 20 MIN: CPT | Performed by: INTERNAL MEDICINE

## 2019-11-05 RX ORDER — ATORVASTATIN CALCIUM 20 MG/1
20 TABLET, FILM COATED ORAL DAILY
COMMUNITY
Start: 2019-10-25 | End: 2020-01-23 | Stop reason: SDUPTHER

## 2019-11-05 NOTE — TELEPHONE ENCOUNTER
Pt doing great on safe diet . Saw dr Dugan  . Told her nothing wrong , only couple cyst on the organ

## 2019-11-05 NOTE — TELEPHONE ENCOUNTER
Pt is requesting to hear her U/S Gallbladder result.    Pls advise.    Pt can be reached #[phone].    Pt is having some computer issues which complicates her from accessing her MyChart.

## 2019-11-05 NOTE — PROGRESS NOTES
Subjective   Maria Fernanda Gaviria is a 89 y.o.. female is here today for follow-up.    Chief Complaint   Patient presents with   • Elevated Hepatic Enzymes     Hosp FU   • Abdominal Pain     Resolved       History of Present Illness  Patient is doing quite well.  She has no further abdominal pain and really no particular symptoms.  She had been hospitalized with an attack of abdominal pain and encephalopathy.  Her liver chemistries were quite abnormal at that time but they have been dropping steadily down to or at least towards normal ever since.  Imaging studies as of late have included CT scan of the abdomen demonstrating multiple cystic lesions of the liver, and a right upper quadrant ultrasound showing the same.  There are no evidence of gallstones or biliary ductal dilation.    The following portions of the patient's history were reviewed and updated as appropriate: allergies, current medications, past family history, past medical history, past social history, past surgical history and problem list.      Current Outpatient Medications:   •  acetaminophen (TYLENOL) 650 MG 8 hr tablet, Take  by mouth daily., Disp: , Rfl:   •  atorvastatin (LIPITOR) 20 MG tablet, Take 20 mg by mouth Daily., Disp: , Rfl:   •  cetirizine (zyrTEC) 10 MG tablet, Take 10 mg by mouth Daily., Disp: , Rfl:   •  Cholecalciferol (VITAMIN D3) 2000 UNITS tablet, Take  by mouth daily., Disp: , Rfl:   •  lansoprazole (PREVACID) 30 MG capsule, TAKE 1 CAPSULE EVERY MORNING BEFORE BREAKFAST, Disp: 90 capsule, Rfl: 3  •  levothyroxine (SYNTHROID, LEVOTHROID) 50 MCG tablet, TAKE 1 TABLET EVERY MORNING, Disp: 90 tablet, Rfl: 3  •  losartan (COZAAR) 50 MG tablet, Take 1 tablet by mouth Daily., Disp: 30 tablet, Rfl: 3  •  Misc Natural Products (OSTEO BI-FLEX JOINT SHIELD PO), Take  by mouth., Disp: , Rfl:   •  montelukast (SINGULAIR) 10 MG tablet, Take 10 mg by mouth daily., Disp: , Rfl:   •  Triamcinolone Acetonide (NASACORT) 55 MCG/ACT nasal inhaler, 2  sprays into each nostril Daily., Disp: , Rfl:     Family History   Problem Relation Age of Onset   • Hypertension Mother    • Heart disease Mother    • Thyroid disease Mother    • Hypertension Father    • Cancer Father    • Heart disease Brother    • Hypertension Daughter    • Thyroid disease Daughter    • Lung disease Daughter    • Depression Daughter    • Migraines Daughter    • Arthritis Daughter    • Heart disease Paternal Grandmother    • Stroke Paternal Grandfather    • Kidney disease Paternal Grandfather        Review of Systems   Respiratory: Negative for shortness of breath.    Cardiovascular: Negative for chest pain.   All other systems reviewed and are negative.      Objective   Physical Exam   Constitutional: She is oriented to person, place, and time. She appears well-developed and well-nourished.   HENT:   Head: Normocephalic and atraumatic.   Right Ear: External ear normal.   Left Ear: External ear normal.   Eyes: Conjunctivae and EOM are normal. Pupils are equal, round, and reactive to light.   Pulmonary/Chest: Effort normal.   Neurological: She is alert and oriented to person, place, and time.   Psychiatric: She has a normal mood and affect. Her behavior is normal. Judgment and thought content normal.   Nursing note and vitals reviewed.      Pertinent laboratory results were reviewed. , Pertinent old records were reviewed.  and Pertinent radiology results were reviewed.     Assessment/Plan   Problems Addressed this Visit        Nervous and Auditory    Upper abdominal pain - Primary       Other    Abnormal liver function tests        What triggered this all is unclear but in any case she is doing well for now and I do not think we need to pursue any additional evaluation.  I would have her continue current medical regimen.  If she experiences a relapse of symptoms she is to let me know.

## 2019-12-17 ENCOUNTER — APPOINTMENT (OUTPATIENT)
Dept: WOMENS IMAGING | Facility: HOSPITAL | Age: 84
End: 2019-12-17

## 2019-12-17 ENCOUNTER — OFFICE VISIT (OUTPATIENT)
Dept: OBSTETRICS AND GYNECOLOGY | Facility: CLINIC | Age: 84
End: 2019-12-17

## 2019-12-17 ENCOUNTER — PROCEDURE VISIT (OUTPATIENT)
Dept: OBSTETRICS AND GYNECOLOGY | Facility: CLINIC | Age: 84
End: 2019-12-17

## 2019-12-17 VITALS
BODY MASS INDEX: 22.46 KG/M2 | SYSTOLIC BLOOD PRESSURE: 126 MMHG | HEIGHT: 58 IN | DIASTOLIC BLOOD PRESSURE: 78 MMHG | WEIGHT: 107 LBS

## 2019-12-17 DIAGNOSIS — Z87.39 HISTORY OF OSTEOPENIA: ICD-10-CM

## 2019-12-17 DIAGNOSIS — Z00.00 ANNUAL PHYSICAL EXAM: Primary | ICD-10-CM

## 2019-12-17 DIAGNOSIS — Z00.00 PREVENTATIVE HEALTH CARE: Primary | ICD-10-CM

## 2019-12-17 DIAGNOSIS — Z12.31 VISIT FOR SCREENING MAMMOGRAM: Primary | ICD-10-CM

## 2019-12-17 DIAGNOSIS — M85.859 OSTEOPENIA OF HIP, UNSPECIFIED LATERALITY: ICD-10-CM

## 2019-12-17 PROCEDURE — 77067 SCR MAMMO BI INCL CAD: CPT | Performed by: RADIOLOGY

## 2019-12-17 PROCEDURE — 77063 BREAST TOMOSYNTHESIS BI: CPT | Performed by: RADIOLOGY

## 2019-12-17 PROCEDURE — 77063 BREAST TOMOSYNTHESIS BI: CPT | Performed by: OBSTETRICS & GYNECOLOGY

## 2019-12-17 PROCEDURE — 77067 SCR MAMMO BI INCL CAD: CPT | Performed by: OBSTETRICS & GYNECOLOGY

## 2019-12-17 PROCEDURE — 99397 PER PM REEVAL EST PAT 65+ YR: CPT | Performed by: OBSTETRICS & GYNECOLOGY

## 2019-12-17 PROCEDURE — 77080 DXA BONE DENSITY AXIAL: CPT | Performed by: OBSTETRICS & GYNECOLOGY

## 2019-12-17 NOTE — PROGRESS NOTES
HPI   Maria Fernanda Gaviria  is a 89 y.o. female who presents for a routine gynecologic exam.  She is feeling well.  Bowels and bladder are functioning normally.  Mammogram was performed recently.  The patient is current with colon cancer screening.    Chief Complaint   Patient presents with   • Gynecologic Exam     Patient is here for a annual.       Past Medical History:   Diagnosis Date   • Arthritis    • Cancer (CMS/HCC)     skin   • Disease of thyroid gland    • GERD (gastroesophageal reflux disease)    • Hyperlipidemia    • Hypertension    • Osteoporosis        Past Surgical History:   Procedure Laterality Date   • CATARACT EXTRACTION     • COLONOSCOPY     • HYSTERECTOMY     • REPLACEMENT TOTAL KNEE Right    • THYROID SURGERY     • TUBAL ABDOMINAL LIGATION         Social History     Socioeconomic History   • Marital status:      Spouse name: Not on file   • Number of children: Not on file   • Years of education: Not on file   • Highest education level: Not on file   Tobacco Use   • Smoking status: Former Smoker   • Smokeless tobacco: Never Used   Substance and Sexual Activity   • Alcohol use: Yes     Comment: wine at night   • Drug use: No   • Sexual activity: Defer       The following portions of the patient's history were reviewed and updated as appropriate: allergies, current medications, past family history, past medical history, past social history, past surgical history and problem list.    Review of Systems   Constitutional: Negative.    HENT: Negative.    Respiratory: Negative.    Cardiovascular: Negative.    Gastrointestinal: Negative.    Genitourinary: Negative.    Musculoskeletal: Negative.    Skin: Negative.    Allergic/Immunologic: Negative.    Psychiatric/Behavioral: Negative.              Physical Exam   Constitutional: She is oriented to person, place, and time. She appears well-developed and well-nourished.   HENT:   Head: Normocephalic and atraumatic.   Cardiovascular: Normal rate and  regular rhythm.   Pulmonary/Chest: Effort normal and breath sounds normal. She has no wheezes. She has no rales.   The breasts are homogeneous.  There are no palpable lumps.  Nipple discharge and axillary adenopathy are absent.   Abdominal: Soft. She exhibits no distension. There is no tenderness.   Genitourinary: Vagina normal and uterus normal. There is no lesion on the right labia. There is no lesion on the left labia. Cervix exhibits no motion tenderness. Right adnexum displays no mass and no tenderness. Left adnexum displays no mass and no tenderness. No vaginal discharge found.   Neurological: She is alert and oriented to person, place, and time.   Skin: Skin is warm and dry.   Nursing note and vitals reviewed.      Assessment    Maria Fernanda was seen today for gynecologic exam.    Diagnoses and all orders for this visit:    Annual physical exam    Osteopenia of hip, unspecified laterality        Plan  1. Normal gynecologic exam  2. The patient had a mammogram today.  3. The patient is current with colon cancer screening  4. Osteopenia.  Counseled.  DEXA was reviewed and discussed with the patient.  She had a small decrease in bone density at the hip and is normal at the spine.  She takes calcium, vitamin D and exercise.    5. Return in about 2 years (around 12/17/2021).    Social History     Tobacco Use   Smoking Status Former Smoker   6.

## 2019-12-29 DIAGNOSIS — I10 ESSENTIAL HYPERTENSION: Chronic | ICD-10-CM

## 2019-12-30 RX ORDER — LOSARTAN POTASSIUM 50 MG/1
TABLET ORAL
Qty: 90 TABLET | Refills: 3 | Status: SHIPPED | OUTPATIENT
Start: 2019-12-30 | End: 2020-12-28 | Stop reason: SDUPTHER

## 2020-01-23 DIAGNOSIS — E78.5 DYSLIPIDEMIA: Primary | ICD-10-CM

## 2020-01-23 RX ORDER — ATORVASTATIN CALCIUM 20 MG/1
TABLET, FILM COATED ORAL
Qty: 90 TABLET | Refills: 3 | Status: SHIPPED | OUTPATIENT
Start: 2020-01-23 | End: 2021-01-18

## 2020-02-17 NOTE — TELEPHONE ENCOUNTER
"Subjective:       Patient ID: Lawanda Francis is a 60 y.o. female.    Chief Complaint: Diarrhea    Ms. Francis is here to discuss her "chronic diarrhea," that has been on going for 3-4 months.  She averages 3-5 BMs before lunch, and then "slows down" to 1-2 after lunch/at night.  No blood in the BMs.  Hasn't changed her diet any recently.  Trying to eat healthy.  Is eating spinach, broccoli, apples.  Has been for several years.\  And then became more worried when a friend  with pancreatic cancer, and his main symptom was diarrhea.  And weight loss.   But she has not lost any weight in the last 6 months.  (see weight table below.  Very stable).  Not on any new meds.  No changes in her thyroid meds.  Is due to see Dr. Vance for a check in the next few weeks.   No recent abx usage.  See last colonoscopy below.  Main finding: very redundant colon.      Last colonoscopy 2018  Impression:  - Non-bleeding internal hemorrhoids.                       - Redundant colon.                       - The examination was otherwise normal.                       - The examined portion of the ileum was normal.                       - No specimens collected.  Recommendation:      - Discharge patient to home.                       - High fiber diet.                       - Take a PROBIOTIC, such as a carton of GREEK YOGURT                        (Chobani or Oikos, or Activia or Dannon); or tablets                        of ALIGN or CULTURELLE or DREA-Q (all                        non-prescription), every day for a month.                       - Repeat colonoscopy in 10 years for screening                        purposes.                       - !!! USE ADULT COLONOSCOPE FOR FUTURE EXAMS. !!!                       - Continue present medications.                       - Return to normal activities tomorrow.  Juan Zhong MD  2018      Wt Readings from Last 20 Encounters:  20 : 77.1 kg (169 lb 15.6 " .   oz)  02/10/20 : 75.3 kg (166 lb 0.1 oz)  02/06/20 : 77 kg (169 lb 12.1 oz)  03/27/19 : 76.8 kg (169 lb 5 oz)  09/27/18 : 71.8 kg (158 lb 4.6 oz)  06/25/18 : 76.6 kg (168 lb 12.8 oz)  05/10/18 : 77.1 kg (170 lb)  04/02/18 : 81.7 kg (180 lb 1.9 oz)  02/28/18 : 77.1 kg (170 lb)  11/28/17 : 78.4 kg (172 lb 13.5 oz)  11/06/17 : 77 kg (169 lb 12.1 oz)  09/06/17 : 76.6 kg (168 lb 14 oz)  06/29/17 : 73.9 kg (162 lb 14.7 oz)  04/11/17 : 74.1 kg (163 lb 5.8 oz)  03/21/17 : 73.3 kg (161 lb 9.6 oz)  12/19/16 : 71.8 kg (158 lb 4.6 oz)  09/20/16 : 74.6 kg (164 lb 7.4 oz)  03/21/16 : 76.2 kg (167 lb 15.9 oz)  09/17/15 : 71.9 kg (158 lb 8.2 oz)  06/12/15 : 73.1 kg (161 lb 3.2 oz)      Review of Systems   Constitutional: Negative for appetite change, fever and unexpected weight change.   HENT: Negative.  Negative for mouth sores, sore throat and trouble swallowing.    Eyes: Negative.    Respiratory: Negative.  Negative for cough and choking.    Cardiovascular: Negative.  Negative for chest pain and leg swelling.   Gastrointestinal: Positive for diarrhea. Negative for abdominal distention, abdominal pain, anal bleeding, blood in stool, constipation, nausea and vomiting.   Genitourinary: Negative.    Musculoskeletal: Negative.    Skin: Negative.  Negative for color change and rash.   Neurological: Negative.    Hematological: Negative for adenopathy.   Psychiatric/Behavioral: Negative.        Objective:      Physical Exam   Constitutional: She is oriented to person, place, and time. She appears well-developed and well-nourished.   HENT:   Mouth/Throat: Oropharynx is clear and moist. No oropharyngeal exudate.   Eyes: No scleral icterus.   Neck: No tracheal deviation present. No thyromegaly present.   Cardiovascular: Normal rate, regular rhythm and normal heart sounds.   Pulmonary/Chest: Effort normal and breath sounds normal.   Abdominal: Soft. Bowel sounds are normal. She exhibits no distension and no mass. There is no tenderness.  There is no guarding.   Corpulent.  Flat, with normal bowel sounds.  Soft.  Nontender.  No masses, no organomegaly.   Lymphadenopathy:     She has no cervical adenopathy.   Neurological: She is alert and oriented to person, place, and time.   Skin: Skin is warm and dry. No rash noted.   Psychiatric: She has a normal mood and affect.   Nursing note and vitals reviewed.      Assessment:         Diarrhea, unspecified type    Redundant colon    Other orders  -     dicyclomine (BENTYL) 10 MG capsule; Take 1 capsule (10 mg total) by mouth 4 (four) times daily before meals and nightly. To ease cramps and diarrhea.  Dispense: 120 capsule; Refill: 11        Plan:        1. Collect stool for culture.   2. Fiber supplement, such as Metamucil or Citrucel or Konsyl.   3. Probiotics.   4. Add Bentyl.   5. RTC ~6 weeks.

## 2020-03-02 ENCOUNTER — OFFICE VISIT (OUTPATIENT)
Dept: INTERNAL MEDICINE | Age: 85
End: 2020-03-02

## 2020-03-02 VITALS
SYSTOLIC BLOOD PRESSURE: 124 MMHG | DIASTOLIC BLOOD PRESSURE: 82 MMHG | WEIGHT: 108 LBS | HEART RATE: 79 BPM | BODY MASS INDEX: 22.67 KG/M2 | HEIGHT: 58 IN | TEMPERATURE: 96.9 F | OXYGEN SATURATION: 96 %

## 2020-03-02 DIAGNOSIS — E78.5 DYSLIPIDEMIA: Chronic | ICD-10-CM

## 2020-03-02 DIAGNOSIS — I10 ESSENTIAL HYPERTENSION: Primary | Chronic | ICD-10-CM

## 2020-03-02 DIAGNOSIS — E03.9 HYPOTHYROIDISM, UNSPECIFIED TYPE: Chronic | ICD-10-CM

## 2020-03-02 PROCEDURE — 99214 OFFICE O/P EST MOD 30 MIN: CPT | Performed by: INTERNAL MEDICINE

## 2020-03-02 NOTE — PATIENT INSTRUCTIONS
** IMPORTANT MESSAGE FROM DR. ROBERTSON **    In our office, your satisfaction is VERY important to us.     You may receive a survey from [facility] by mail or E-mail for you to provide feedback about your visit. This information is invaluable for me to know what we can do to improve our services.     I ask that you please take a few minutes to complete the survey and let us know how we are doing in serving your needs. (You may receive the survey more than once for multiple visits)    Thank You !    Dr. Robertson & Staff    _________________________________________________________________________________________________________________________      ** ADDITIONAL INSTRUCTION / REMINDERS FROM DR. ROBERTSON **

## 2020-03-02 NOTE — PROGRESS NOTES
[facility] INTERNAL MEDICINE  HERNANDEZ ROBERTSON M.D.      Maria Fernanda Gaviria / 89 y.o. / female  03/02/2020      CHIEF COMPLAINT     Hypertension; Hypothyroidism; and dyslipidemia      ASSESSMENT & PLAN     Problem List Items Addressed This Visit        High    Hypertension - Primary (Chronic)    Current Assessment & Plan     Continue losartan 50 mg qd.          Relevant Medications    losartan (COZAAR) 50 MG tablet       Medium    Dyslipidemia (Chronic)    Current Assessment & Plan     Continue atorvastatin 20 mg daily.          Relevant Medications    atorvastatin (LIPITOR) 20 MG tablet    Other Relevant Orders    Comprehensive Metabolic Panel    Lipid Panel With / Chol / HDL Ratio    Hypothyroidism (Chronic)    Current Assessment & Plan     Lab Results   Component Value Date    TSH 1.240 04/09/2019    TSH 0.875 03/20/2018    TSH 0.664 11/17/2017    FREET4 1.24 04/09/2019    FREET4 1.31 03/20/2018    FREET4 1.47 11/17/2017      Stable. Continue current dose of levothyroxine 50 mcg.  Check lab(s).          Relevant Medications    levothyroxine (SYNTHROID, LEVOTHROID) 50 MCG tablet    Other Relevant Orders    TSH+Free T4        Orders Placed This Encounter   Procedures   • Comprehensive Metabolic Panel   • TSH+Free T4   • Lipid Panel With / Chol / HDL Ratio     No orders of the defined types were placed in this encounter.      Summary/Discussion:  Advance Care Planning   ACP discussion was held with the patient during this visit. Patient has an advance directive, copy requested.        Next Appointment with me: Visit date not found    Return in about 6 months (around 9/2/2020) for Reassess chronic medical problems, Schedule AWV with APRN in SEPTEMBER.      MEDICATIONS     Current Outpatient Medications   Medication Sig Dispense Refill   • acetaminophen (TYLENOL) 650 MG 8 hr tablet Take  by mouth daily.     • atorvastatin (LIPITOR) 20 MG tablet TAKE 1 TABLET DAILY 90 tablet 3   • cetirizine (zyrTEC) 10 MG tablet Take 10 mg by  "mouth Daily.     • Cholecalciferol (VITAMIN D3) 2000 UNITS tablet Take  by mouth daily.     • lansoprazole (PREVACID) 30 MG capsule TAKE 1 CAPSULE EVERY MORNING BEFORE BREAKFAST 90 capsule 3   • levothyroxine (SYNTHROID, LEVOTHROID) 50 MCG tablet TAKE 1 TABLET EVERY MORNING 90 tablet 3   • losartan (COZAAR) 50 MG tablet TAKE 1 TABLET DAILY 90 tablet 3   • Misc Natural Products (OSTEO BI-FLEX JOINT SHIELD PO) Take  by mouth.     • montelukast (SINGULAIR) 10 MG tablet Take 10 mg by mouth daily.     • Triamcinolone Acetonide (NASACORT) 55 MCG/ACT nasal inhaler 2 sprays into each nostril Daily.       No current facility-administered medications for this visit.           VITAL SIGNS     Visit Vitals  /82   Pulse 79   Temp 96.9 °F (36.1 °C)   Ht 147.3 cm (58\")   Wt 49 kg (108 lb)   SpO2 96%   BMI 22.57 kg/m²       BP Readings from Last 3 Encounters:   03/02/20 124/82   12/17/19 126/78   11/05/19 138/78     Wt Readings from Last 3 Encounters:   03/02/20 49 kg (108 lb)   12/17/19 48.5 kg (107 lb)   11/05/19 48.5 kg (107 lb)      Body mass index is 22.57 kg/m².      HISTORY OF PRESENT ILLNESS     Doing fine overall.   Saw GI re: upper abdominal discomfort, doing better with change in diet (for GB sludge).     Hypertension remains stable on losartan 50 mg    Hyperlipidemia is stable on atorvastatin 20 mg without problems.     Hypothyroidism on levothyroxine 50 mcg.        Patient Care Team:  Henok Salcido MD as PCP - General  Christiano Dewitt MD as Consulting Physician (Obstetrics and Gynecology)  Rodolfo Shah MD as Consulting Physician (Allergy)  Michael Mendoza MD as Consulting Physician (Dermatology)  Jluis Bain MD as Consulting Physician (Otolaryngology)  Lucas Rueda OD (Optometry)  Ousmane Rhodes MD as Consulting Physician (Gastroenterology)      REVIEW OF SYSTEMS     Review of Systems  Constitutional neg  Resp neg  CV neg  GI neg    PHYSICAL EXAMINATION     Physical Exam  Constitutional  No " distress  Cardiovascular Rate  normal . Rhythm: regular . Heart sounds:  Normal  Pulmonary/Chest: Effort normal and breath sounds normal.    Psychiatric  Alert. Judgment intact. Thought content normal. Mood normal    REVIEWED DATA     Labs:     Lab Results   Component Value Date     10/16/2019    K 4.0 10/16/2019    CALCIUM 9.5 10/16/2019    AST 62 (H) 10/16/2019    ALT 25 10/16/2019    BUN 13 10/16/2019    CREATININE 0.81 10/16/2019    CREATININE 0.78 07/17/2019    CREATININE 0.68 07/08/2019    EGFRIFNONA 66 10/16/2019    EGFRIFAFRI 83 07/17/2019       Lab Results   Component Value Date    GLU 75 07/17/2019    GLU 93 04/09/2019    GLU 83 04/27/2018       Lab Results   Component Value Date     (H) 04/09/2019    LDL 97 05/17/2017     (H) 02/08/2016    HDL 54 04/09/2019    HDL 64 (H) 05/17/2017    HDL 52 02/08/2016    TRIG 132 04/09/2019    TRIG 122 05/17/2017    TRIG 154 (H) 02/08/2016    CHOLHDLRATIO 3.41 04/09/2019    CHOLHDLRATIO 2.89 05/17/2017    CHOLHDLRATIO 3.6 02/08/2016       Lab Results   Component Value Date    TSH 1.240 04/09/2019    FREET4 1.24 04/09/2019       Lab Results   Component Value Date    WBC 11.78 (H) 10/16/2019    HGB 15.7 10/16/2019    HGB 14.1 07/17/2019    HGB 13.6 07/08/2019     10/16/2019       Lab Results   Component Value Date    PROTEIN Negative 04/27/2018    GLUCOSEU Negative 10/16/2019    BLOODU Negative 10/16/2019    NITRITEU Negative 10/16/2019    LEUKOCYTESUR Negative 10/16/2019       Imaging:         Medical Tests:         Summary of old records / correspondence / consultant report:         Request outside records:         ALLERGIES  Allergies   Allergen Reactions   • Aspirin Nausea And Vomiting and Nausea Only   • Buffered Aspirin Nausea And Vomiting        PFSH:     The following portions of the patient's history were reviewed and updated as appropriate: Allergies / Current Medications / Past Medical History / Surgical History / Social History /  Family History    PROBLEM LIST   Patient Active Problem List   Diagnosis   • Osteoarthritis of lumbar spine   • Osteoarthritis   • Sciatic leg pain   • Asthma   • Stenosis of carotid artery   • Dyslipidemia   • Gastroesophageal reflux disease   • Hypertension   • Hypothyroidism   • Osteopenia   • Restless legs syndrome   • Thrombocytopenia (CMS/HCC)   • Altered mental status   • DNR (do not resuscitate)   • Non-seasonal allergic rhinitis   • Abnormal liver function tests       PAST MEDICAL HISTORY  Past Medical History:   Diagnosis Date   • Arthritis    • Cancer (CMS/HCC)     skin   • Disease of thyroid gland    • GERD (gastroesophageal reflux disease)    • Hyperlipidemia    • Hypertension    • Osteoporosis        SURGICAL HISTORY  Past Surgical History:   Procedure Laterality Date   • CATARACT EXTRACTION     • COLONOSCOPY     • HYSTERECTOMY     • REPLACEMENT TOTAL KNEE Right    • THYROID SURGERY     • TUBAL ABDOMINAL LIGATION         SOCIAL HISTORY  Social History     Socioeconomic History   • Marital status:      Spouse name: Not on file   • Number of children: Not on file   • Years of education: Not on file   • Highest education level: Not on file   Tobacco Use   • Smoking status: Former Smoker   • Smokeless tobacco: Never Used   Substance and Sexual Activity   • Alcohol use: Yes     Comment: wine at night   • Drug use: No   • Sexual activity: Defer       FAMILY HISTORY  Family History   Problem Relation Age of Onset   • Hypertension Mother    • Heart disease Mother    • Thyroid disease Mother    • Hypertension Father    • Cancer Father    • Heart disease Brother    • Hypertension Daughter    • Thyroid disease Daughter    • Lung disease Daughter    • Depression Daughter    • Migraines Daughter    • Arthritis Daughter    • Heart disease Paternal Grandmother    • Stroke Paternal Grandfather    • Kidney disease Paternal Grandfather          **Keith Disclaimer:   Much of this encounter note is an electronic  transcription/translation of spoken language to printed text. The electronic translation of spoken language may permit erroneous, or at times, nonsensical words or phrases to be inadvertently transcribed. Although I have reviewed the note for such errors, some may still exist.       Template created by Clayton Salcido MD

## 2020-03-02 NOTE — ASSESSMENT & PLAN NOTE
Lab Results   Component Value Date    TSH 1.240 04/09/2019    TSH 0.875 03/20/2018    TSH 0.664 11/17/2017    FREET4 1.24 04/09/2019    FREET4 1.31 03/20/2018    FREET4 1.47 11/17/2017      Stable. Continue current dose of levothyroxine 50 mcg.  Check lab(s).

## 2020-03-03 LAB
ALBUMIN SERPL-MCNC: 4.4 G/DL (ref 3.5–5.2)
ALBUMIN/GLOB SERPL: 1.8 G/DL
ALP SERPL-CCNC: 103 U/L (ref 39–117)
ALT SERPL-CCNC: 11 U/L (ref 1–33)
AST SERPL-CCNC: 20 U/L (ref 1–32)
BILIRUB SERPL-MCNC: 0.6 MG/DL (ref 0.2–1.2)
BUN SERPL-MCNC: 14 MG/DL (ref 8–23)
BUN/CREAT SERPL: 18.2 (ref 7–25)
CALCIUM SERPL-MCNC: 9.4 MG/DL (ref 8.2–9.6)
CHLORIDE SERPL-SCNC: 104 MMOL/L (ref 98–107)
CHOLEST SERPL-MCNC: 234 MG/DL (ref 0–200)
CHOLEST/HDLC SERPL: 4.11 {RATIO}
CO2 SERPL-SCNC: 28.3 MMOL/L (ref 22–29)
CREAT SERPL-MCNC: 0.77 MG/DL (ref 0.57–1)
GLOBULIN SER CALC-MCNC: 2.5 GM/DL
GLUCOSE SERPL-MCNC: 91 MG/DL (ref 65–99)
HDLC SERPL-MCNC: 57 MG/DL (ref 40–60)
LDLC SERPL CALC-MCNC: 158 MG/DL (ref 0–100)
POTASSIUM SERPL-SCNC: 4.4 MMOL/L (ref 3.5–5.2)
PROT SERPL-MCNC: 6.9 G/DL (ref 6–8.5)
SODIUM SERPL-SCNC: 144 MMOL/L (ref 136–145)
T4 FREE SERPL-MCNC: 1.33 NG/DL (ref 0.93–1.7)
TRIGL SERPL-MCNC: 97 MG/DL (ref 0–150)
TSH SERPL DL<=0.005 MIU/L-ACNC: 0.99 UIU/ML (ref 0.27–4.2)
VLDLC SERPL CALC-MCNC: 19.4 MG/DL

## 2020-09-04 ENCOUNTER — OFFICE VISIT (OUTPATIENT)
Dept: INTERNAL MEDICINE | Age: 85
End: 2020-09-04

## 2020-09-04 VITALS
OXYGEN SATURATION: 97 % | SYSTOLIC BLOOD PRESSURE: 156 MMHG | HEIGHT: 58 IN | BODY MASS INDEX: 22.25 KG/M2 | TEMPERATURE: 95.5 F | HEART RATE: 85 BPM | WEIGHT: 106 LBS | DIASTOLIC BLOOD PRESSURE: 74 MMHG

## 2020-09-04 DIAGNOSIS — E03.9 HYPOTHYROIDISM, UNSPECIFIED TYPE: Chronic | ICD-10-CM

## 2020-09-04 DIAGNOSIS — I10 ESSENTIAL HYPERTENSION: Primary | Chronic | ICD-10-CM

## 2020-09-04 DIAGNOSIS — E78.5 DYSLIPIDEMIA: Chronic | ICD-10-CM

## 2020-09-04 PROCEDURE — 99214 OFFICE O/P EST MOD 30 MIN: CPT | Performed by: INTERNAL MEDICINE

## 2020-09-04 RX ORDER — TRIAMCINOLONE ACETONIDE 1 MG/G
CREAM TOPICAL
COMMUNITY
Start: 2020-06-19 | End: 2021-03-02

## 2020-09-04 NOTE — PROGRESS NOTES
"[facility] [place] [facility]  HERNANDEZ ROBERTSON M.D.      Maria Fernandatodd Gaviria / 89 y.o. / female  09/04/2020      VITAL SIGNS     Visit Vitals  /74 (BP Location: Left arm, Patient Position: Sitting)   Pulse 85   Temp 95.5 °F (35.3 °C)   Ht 147.3 cm (58\")   Wt 48.1 kg (106 lb)   SpO2 97%   BMI 22.15 kg/m²       BP Readings from Last 3 Encounters:   09/04/20 156/74   03/02/20 124/82   12/17/19 126/78     Wt Readings from Last 3 Encounters:   09/04/20 48.1 kg (106 lb)   03/02/20 49 kg (108 lb)   12/17/19 48.5 kg (107 lb)     Body mass index is 22.15 kg/m².      MEDICATIONS     Current Outpatient Medications   Medication Sig Dispense Refill   • acetaminophen (TYLENOL) 650 MG 8 hr tablet Take  by mouth daily.     • atorvastatin (LIPITOR) 20 MG tablet TAKE 1 TABLET DAILY 90 tablet 3   • Cholecalciferol (VITAMIN D3) 2000 UNITS tablet Take  by mouth daily.     • lansoprazole (PREVACID) 30 MG capsule TAKE 1 CAPSULE EVERY MORNING BEFORE BREAKFAST 90 capsule 3   • levothyroxine (SYNTHROID, LEVOTHROID) 50 MCG tablet TAKE 1 TABLET EVERY MORNING 90 tablet 3   • losartan (COZAAR) 50 MG tablet TAKE 1 TABLET DAILY 90 tablet 3   • Misc Natural Products (OSTEO BI-FLEX JOINT SHIELD PO) Take  by mouth.     • montelukast (SINGULAIR) 10 MG tablet Take 10 mg by mouth daily.     • Triamcinolone Acetonide (NASACORT) 55 MCG/ACT nasal inhaler 2 sprays into each nostril Daily.     • cetirizine (zyrTEC) 10 MG tablet Take 10 mg by mouth Daily.     • triamcinolone (KENALOG) 0.1 % cream        No current facility-administered medications for this visit.        CHIEF COMPLAINT     Hypertension; Hypothyroidism; and dyslipidemia      ASSESSMENT & PLAN     Problem List Items Addressed This Visit        High    Hypertension - Primary (Chronic)    Overview     Continue losartan 50 mg qd.          Relevant Medications    losartan (COZAAR) 50 MG tablet       Medium    Dyslipidemia (Chronic)    Overview     Continue atorvastatin 20 mg qd.          Relevant " Medications    atorvastatin (LIPITOR) 20 MG tablet    Hypothyroidism (Chronic)    Overview     Continue current dose of levothyroxine 50 mcg.          Relevant Medications    levothyroxine (SYNTHROID, LEVOTHROID) 50 MCG tablet        No orders of the defined types were placed in this encounter.    No orders of the defined types were placed in this encounter.      Summary/Discussion:  •     Next Appointment with me: Visit date not found    Return in about 3 months (around 12/4/2020) for Hypertension.    _____________________________________________________________________________________    HISTORY OF PRESENT ILLNESS     Hypertension is stable on losartan. Lower at home.   On atorvastatin for hyperlipidemia without problems.   Hypothyroidism on levothyroxine 50 mcg.       Patient Care Team:  Henok Salcido MD as PCP - General  Christiano Dewitt MD as Consulting Physician (Obstetrics and Gynecology)  Rodolfo Shah MD as Consulting Physician (Allergy)  Michael Mendoza MD as Consulting Physician (Dermatology)  Jluis Bain MD as Consulting Physician (Otolaryngology)  Lucas Rueda OD (Optometry)  Ousmane Rhodes MD (Inactive) as Consulting Physician (Gastroenterology)      REVIEW OF SYSTEMS     Review of Systems  Constitutional neg except per HPI   Resp neg  CV neg   Neuro neg        PHYSICAL EXAMINATION     Physical Exam  Constitutional  No distress  Cardiovascular Rate  normal . Rhythm: regular . Heart sounds:  Normal  Pulmonary/Chest: Effort normal and breath sounds normal.    Psychiatric  Alert. Judgment and thought content normal. Mood normal     REVIEWED DATA     Labs:     Lab Results   Component Value Date     03/02/2020    K 4.4 03/02/2020    CALCIUM 9.4 03/02/2020    AST 20 03/02/2020    ALT 11 03/02/2020    BUN 14 03/02/2020    CREATININE 0.77 03/02/2020    CREATININE 0.81 10/16/2019    CREATININE 0.78 07/17/2019    EGFRIFNONA 69 03/02/2020    EGFRIFAFRI 84 03/02/2020       Lab Results    Component Value Date    GLU 91 03/02/2020    GLU 75 07/17/2019    GLU 93 04/09/2019       Lab Results   Component Value Date     (H) 03/02/2020     (H) 04/09/2019    LDL 97 05/17/2017    HDL 57 03/02/2020    HDL 54 04/09/2019    HDL 64 (H) 05/17/2017    TRIG 97 03/02/2020    TRIG 132 04/09/2019    TRIG 122 05/17/2017    CHOLHDLRATIO 4.11 03/02/2020    CHOLHDLRATIO 3.41 04/09/2019    CHOLHDLRATIO 2.89 05/17/2017       Lab Results   Component Value Date    TSH 0.988 03/02/2020    FREET4 1.33 03/02/2020       Lab Results   Component Value Date    WBC 11.78 (H) 10/16/2019    HGB 15.7 10/16/2019    HGB 14.1 07/17/2019    HGB 13.6 07/08/2019     10/16/2019       Lab Results   Component Value Date    PROTEIN Negative 04/27/2018    GLUCOSEU Negative 10/16/2019    BLOODU Negative 10/16/2019    NITRITEU Negative 10/16/2019    LEUKOCYTESUR Negative 10/16/2019       Imaging:         Medical Tests:         Summary of old records / correspondence / consultant report:         Request outside records:         ALLERGIES  Allergies   Allergen Reactions   • Aspirin Nausea And Vomiting and Nausea Only   • Buffered Aspirin Nausea And Vomiting        PFSH:     The following portions of the patient's history were reviewed and updated as appropriate: Allergies / Current Medications / Past Medical History / Surgical History / Social History / Family History    PROBLEM LIST   Patient Active Problem List   Diagnosis   • Osteoarthritis of lumbar spine   • Osteoarthritis   • Sciatic leg pain   • Asthma   • Stenosis of carotid artery   • Dyslipidemia   • Gastroesophageal reflux disease   • Hypertension   • Hypothyroidism   • Osteopenia   • Restless legs syndrome   • Thrombocytopenia (CMS/HCC)   • Altered mental status   • DNR (do not resuscitate)   • Non-seasonal allergic rhinitis   • Abnormal liver function tests       PAST MEDICAL HISTORY  Past Medical History:   Diagnosis Date   • Arthritis    • Cancer (CMS/HCC)      skin   • Disease of thyroid gland    • GERD (gastroesophageal reflux disease)    • Hyperlipidemia    • Hypertension    • Osteoporosis        SURGICAL HISTORY  Past Surgical History:   Procedure Laterality Date   • CATARACT EXTRACTION     • COLONOSCOPY     • HYSTERECTOMY     • REPLACEMENT TOTAL KNEE Right    • THYROID SURGERY     • TUBAL ABDOMINAL LIGATION         SOCIAL HISTORY  Social History     Socioeconomic History   • Marital status:      Spouse name: Not on file   • Number of children: Not on file   • Years of education: Not on file   • Highest education level: Not on file   Tobacco Use   • Smoking status: Former Smoker   • Smokeless tobacco: Never Used   Substance and Sexual Activity   • Alcohol use: Yes     Comment: wine at night   • Drug use: No   • Sexual activity: Defer       FAMILY HISTORY  Family History   Problem Relation Age of Onset   • Hypertension Mother    • Heart disease Mother    • Thyroid disease Mother    • Hypertension Father    • Cancer Father    • Heart disease Brother    • Hypertension Daughter    • Thyroid disease Daughter    • Lung disease Daughter    • Depression Daughter    • Migraines Daughter    • Arthritis Daughter    • Heart disease Paternal Grandmother    • Stroke Paternal Grandfather    • Kidney disease Paternal Grandfather          Examiner was wearing KN95 mask, face shield and exam gloves during the entire duration of the visit. Patient was masked the entire time.   Minimum social distance of 6 ft maintained entire visit except if physical contact was necessary as documented.     **Dragon Disclaimer:   Much of this encounter note is an electronic transcription/translation of spoken language to printed text. The electronic translation of spoken language may permit erroneous, or at times, nonsensical words or phrases to be inadvertently transcribed. Although I have reviewed the note for such errors, some may still exist.     Template created by Clayton Salcido MD

## 2020-09-29 ENCOUNTER — TELEPHONE (OUTPATIENT)
Dept: INTERNAL MEDICINE | Age: 85
End: 2020-09-29

## 2020-09-29 NOTE — TELEPHONE ENCOUNTER
Patient faxed in her B/P readings and still has not herd anything from our office. She wants to know if she needs to change her B/P medications or not. Either way she needs a refill sent to [facility].

## 2020-10-13 DIAGNOSIS — E03.9 HYPOTHYROIDISM: ICD-10-CM

## 2020-10-13 DIAGNOSIS — K21.9 GASTROESOPHAGEAL REFLUX DISEASE: ICD-10-CM

## 2020-10-13 RX ORDER — LANSOPRAZOLE 30 MG/1
CAPSULE, DELAYED RELEASE ORAL
Qty: 90 CAPSULE | Refills: 3 | Status: ON HOLD | OUTPATIENT
Start: 2020-10-13 | End: 2021-06-04

## 2020-10-13 RX ORDER — LEVOTHYROXINE SODIUM 0.05 MG/1
TABLET ORAL
Qty: 90 TABLET | Refills: 3 | Status: SHIPPED | OUTPATIENT
Start: 2020-10-13 | End: 2021-09-07

## 2020-10-20 ENCOUNTER — OFFICE VISIT (OUTPATIENT)
Dept: INTERNAL MEDICINE | Age: 85
End: 2020-10-20

## 2020-10-20 VITALS
HEART RATE: 99 BPM | WEIGHT: 107.8 LBS | TEMPERATURE: 97.4 F | HEIGHT: 58 IN | SYSTOLIC BLOOD PRESSURE: 136 MMHG | DIASTOLIC BLOOD PRESSURE: 74 MMHG | OXYGEN SATURATION: 99 % | BODY MASS INDEX: 22.63 KG/M2

## 2020-10-20 DIAGNOSIS — Z00.00 MEDICARE ANNUAL WELLNESS VISIT, SUBSEQUENT: Primary | ICD-10-CM

## 2020-10-20 PROCEDURE — G0439 PPPS, SUBSEQ VISIT: HCPCS | Performed by: NURSE PRACTITIONER

## 2020-10-20 RX ORDER — FEXOFENADINE HCL 180 MG/1
180 TABLET ORAL DAILY
COMMUNITY

## 2020-10-20 NOTE — PATIENT INSTRUCTIONS
Medicare Wellness  Personal Prevention Plan of Service     Date of Office Visit:  10/20/2020  Encounter Provider:  GRIFFIN Ramos  Place of Service:  [facility]  Patient Name: Maria Fernanda Gaviria  :  1923    As part of the Medicare Wellness portion of your visit today, we are providing you with this personalized preventive plan of services (PPPS). This plan is based upon recommendations of the [place] Preventive Services Task Force (USPSTF) and the Advisory Committee on Immunization Practices (ACIP).    This lists the preventive care services that should be considered, and provides dates of when you are due. Items listed as completed are up-to-date and do not require any further intervention.      Age-Appropriate Screening Schedule:  (Refer to the list below for future screening recommendations based on patient's age, sex and/or medical conditions. Orders for these recommended tests are listed in the plan section. The patient has been provided with a written plan)    Health Maintenance Topics  Health Maintenance   Topic Date Due   • LIPID PANEL  2021   • ANNUAL WELLNESS VISIT  10/20/2021   • MAMMOGRAM  2021   • DXA SCAN  2021   • TDAP/TD VACCINES (2 - Td) 2028   • INFLUENZA VACCINE  Completed   • Pneumococcal Vaccine 65+  Completed   • ZOSTER VACCINE  Completed       Health Maintenance Topics Due or Over-Due  There are no preventive care reminders to display for this patient.      ADDITIONAL RESOURCES:    For excellent information on senior health and wellness refer to the [place] [employer] web-site at:    WWW.NIHSENIORHEALTH.GOV

## 2020-10-20 NOTE — PROGRESS NOTES
"10/20/2020      MEDICARE ANNUAL WELLNESS VISIT     Maria Fernanda Gaviria is a 89 y.o. female who presents for Medicare Wellness-subsequent      Patient's general assessment of her health since a year ago:     - Compared to one year ago, she feels her physical health is about the same without significant change.    - Compared to one year ago, she feels her mental health is about the same without significant change.      HPI for other active medical problems:           * The required components of Health Risk Assessment (HRA) that were completed by the patient and/or my staff are contained within this note and in the scanned documents titled \"Health Risk Assessment\" within the media section of the patient's chart in [employer].       HISTORY    Recent Hospitalizations:    Recent hospitalization?:  NO     If YES, location, date, and diagnoses:     · Location:   · Date:   · Principle Discharge Dx:   · Secondary Dx:       Patient Care Team:    Patient Care Team:  Henok Salcido MD as PCP - General  Christiano Dewitt MD as Consulting Physician (Obstetrics and Gynecology)  Rodolfo Shah MD as Consulting Physician (Allergy)  Michael Mendoza MD as Consulting Physician (Dermatology)  Jluis Bain MD as Consulting Physician (Otolaryngology)  Lucas Rueda OD (Optometry)  Ousmane Rhodes MD (Inactive) as Consulting Physician (Gastroenterology)      Allergies:  Aspirin and Buffered aspirin    Medications:  Current Outpatient Medications   Medication Sig Dispense Refill   • acetaminophen (TYLENOL) 650 MG 8 hr tablet Take  by mouth daily.     • atorvastatin (LIPITOR) 20 MG tablet TAKE 1 TABLET DAILY 90 tablet 3   • Cholecalciferol (VITAMIN D3) 2000 UNITS tablet Take  by mouth daily.     • fexofenadine (ALLEGRA) 180 MG tablet Take 180 mg by mouth Daily.     • lansoprazole (PREVACID) 30 MG capsule TAKE 1 CAPSULE EVERY MORNING BEFORE BREAKFAST 90 capsule 3   • levothyroxine (SYNTHROID, LEVOTHROID) 50 MCG tablet TAKE 1 TABLET " EVERY MORNING 90 tablet 3   • losartan (COZAAR) 50 MG tablet TAKE 1 TABLET DAILY 90 tablet 3   • Misc Natural Products (OSTEO BI-FLEX JOINT SHIELD PO) Take  by mouth.     • montelukast (SINGULAIR) 10 MG tablet Take 10 mg by mouth daily.     • triamcinolone (KENALOG) 0.1 % cream      • Triamcinolone Acetonide (NASACORT) 55 MCG/ACT nasal inhaler 2 sprays into each nostril Daily.     • cetirizine (zyrTEC) 10 MG tablet Take 10 mg by mouth Daily.       No current facility-administered medications for this visit.         PFSH:     The following portions of the patient's history were reviewed and updated as appropriate: Allergies / Current Medications / Past Medical History / Surgical History / Social History / Family History    Problem List:  Patient Active Problem List   Diagnosis   • Osteoarthritis of lumbar spine   • Osteoarthritis   • Sciatic leg pain   • Asthma   • Stenosis of carotid artery   • Dyslipidemia   • Gastroesophageal reflux disease   • Hypertension   • Hypothyroidism   • Osteopenia   • Restless legs syndrome   • Thrombocytopenia (CMS/HCC)   • Altered mental status   • DNR (do not resuscitate)   • Non-seasonal allergic rhinitis   • Abnormal liver function tests       Past Medical History:  Past Medical History:   Diagnosis Date   • Arthritis    • Cancer (CMS/HCC)     skin   • Disease of thyroid gland    • GERD (gastroesophageal reflux disease)    • Hyperlipidemia    • Hypertension    • Osteoporosis        Past Surgical History:  Past Surgical History:   Procedure Laterality Date   • CATARACT EXTRACTION     • COLONOSCOPY     • HYSTERECTOMY     • REPLACEMENT TOTAL KNEE Right    • THYROID SURGERY     • TUBAL ABDOMINAL LIGATION         Social History:  Social History     Socioeconomic History   • Marital status:      Spouse name: Not on file   • Number of children: Not on file   • Years of education: Not on file   • Highest education level: Not on file   Tobacco Use   • Smoking status: Former Smoker   •  "Smokeless tobacco: Never Used   Substance and Sexual Activity   • Alcohol use: Yes     Comment: wine at night   • Drug use: No   • Sexual activity: Defer       Family History:  Family History   Problem Relation Age of Onset   • Hypertension Mother    • Heart disease Mother    • Thyroid disease Mother    • Hypertension Father    • Cancer Father    • Heart disease Brother    • Hypertension Daughter    • Thyroid disease Daughter    • Lung disease Daughter    • Depression Daughter    • Migraines Daughter    • Arthritis Daughter    • Heart disease Paternal Grandmother    • Stroke Paternal Grandfather    • Kidney disease Paternal Grandfather          PATIENT ASSESSMENT    Vitals:  /74   Pulse 99   Temp 97.4 °F (36.3 °C) (Temporal)   Ht 147.3 cm (57.99\")   Wt 48.9 kg (107 lb 12.8 oz)   SpO2 99%   BMI 22.54 kg/m²   BP Readings from Last 3 Encounters:   10/20/20 136/74   09/04/20 156/74   03/02/20 124/82     Wt Readings from Last 3 Encounters:   10/20/20 48.9 kg (107 lb 12.8 oz)   09/04/20 48.1 kg (106 lb)   03/02/20 49 kg (108 lb)      Body mass index is 22.54 kg/m².    There were no vitals filed for this visit.      Review of Systems:    Review of Systems   Constitutional: Negative for activity change, appetite change and unexpected weight change.   HENT: Negative for tinnitus.    Eyes: Negative for visual disturbance.   Respiratory: Negative for cough, chest tightness and shortness of breath.    Cardiovascular: Negative for chest pain, palpitations and leg swelling.   Neurological: Negative for dizziness, light-headedness and headaches.       Physical Exam:    Physical Exam  Vitals signs and nursing note reviewed.   Constitutional:       General: She is not in acute distress.     Appearance: She is well-developed. She is not ill-appearing.   Cardiovascular:      Rate and Rhythm: Normal rate and regular rhythm.      Heart sounds: Normal heart sounds, S1 normal and S2 normal. No murmur.   Pulmonary:      Effort: " Pulmonary effort is normal.      Breath sounds: Normal breath sounds. No decreased breath sounds, wheezing, rhonchi or rales.   Skin:     General: Skin is warm and dry.   Neurological:      Mental Status: She is alert and oriented to person, place, and time.   Psychiatric:         Speech: Speech normal.         Behavior: Behavior normal. Behavior is cooperative.         Thought Content: Thought content normal.         Judgment: Judgment normal.         Reviewed Data:    Labs:   Lab Results   Component Value Date     03/02/2020    K 4.4 03/02/2020    CALCIUM 9.4 03/02/2020    AST 20 03/02/2020    ALT 11 03/02/2020    BUN 14 03/02/2020    CREATININE 0.77 03/02/2020    CREATININE 0.81 10/16/2019    CREATININE 0.78 07/17/2019    EGFRIFNONA 69 03/02/2020    EGFRIFAFRI 84 03/02/2020       Lab Results   Component Value Date    GLU 91 03/02/2020    GLU 75 07/17/2019    GLU 93 04/09/2019       Lab Results   Component Value Date     (H) 03/02/2020     (H) 04/09/2019    LDL 97 05/17/2017    HDL 57 03/02/2020    TRIG 97 03/02/2020    CHOLHDLRATIO 4.11 03/02/2020       Lab Results   Component Value Date    TSH 0.988 03/02/2020    FREET4 1.33 03/02/2020          Lab Results   Component Value Date    WBC 11.78 (H) 10/16/2019    HGB 15.7 10/16/2019    HGB 14.1 07/17/2019    HGB 13.6 07/08/2019     10/16/2019                 No results found for: PSA    Imaging:          Medical Tests:          Screening for Glaucoma:  Previous screening for glaucoma?: Yes      Hearing Loss Screen:  Finger Rub Hearing Test (right ear): passed  Finger Rub Hearing Test (left ear): passed      Urinary Incontinence Screen:  Episodes of urinary incontinence? : No      Depression Screen:  PHQ-2/PHQ-9 Depression Screening 10/20/2020   Little interest or pleasure in doing things 0   Feeling down, depressed, or hopeless 0   Total Score 0        PHQ-2: 0 (Not depressed)    PHQ-9: 0 (Negative screening for depression)        FUNCTIONAL, FALL RISK, & COGNITIVE SCREENING (Components below):    DATA:    Functional & Cognitive Status 10/20/2020   Do you have difficulty preparing food and eating? No   Do you have difficulty bathing yourself, getting dressed or grooming yourself? No   Do you have difficulty using the toilet? No   Do you have difficulty moving around from place to place? No   Do you have trouble with steps or getting out of a bed or a chair? No   Current Diet Well Balanced Diet   Dental Exam Up to date   Eye Exam Up to date   Exercise (times per week) 7 times per week   Current Exercise Activities Include Walking   Do you need help using the phone?  No   Are you deaf or do you have serious difficulty hearing?  No   Do you need help with transportation? No   Do you need help shopping? No   Do you need help preparing meals?  No   Do you need help with housework?  No   Do you need help with laundry? No   Do you need help taking your medications? No   Do you need help managing money? No   Do you ever drive or ride in a car without wearing a seat belt? No   Do you have difficulty concentrating, remembering or making decisions? No         A) Assessment of Functional Ability:  (Assessment of ability to perform ADL's (showering/bathing, using toilet, dressing, feeding self, moving self around) and IADL's (use telephone, shop, prepare food, housekeep, do laundry, transport independently, take medications independently, and handle finances)    Degree of functional impairment: NONE (based on assessment noted above)      B) Assessment of Fall Risk:  Fall Risk Assessment was completed, and patient is at moderate risk for falls.       Need for further evaluation of gait, strength, and balance? : Yes    Timed Up and Go (TUG):   (>= 12 seconds indicates high risk for falling)    Observable abnormalities included: slow tentative pace       C. Assessment of Cognitive Function:    Mini-Cog Test:     1) Registration (3 objects): N/A   2)  Number of objects recalled: 3   3) Clock Draw: Passed? : Yes       Further evaluation required? : No      COUNSELING    A. Identification of Health Risk Factors:    Risk factors include: increased fall risk and inadequate social support      B. Age-Appropriate Screening Schedule:  (Refer to the list below for future screening recommendations based on patient's age, sex and/or medical conditions. Orders for these recommended tests are listed in the plan section. The patient has been provided with a written plan)    Health Maintenance Topics  Health Maintenance   Topic Date Due   • LIPID PANEL  03/02/2021   • MAMMOGRAM  12/17/2021   • DXA SCAN  12/17/2021   • TDAP/TD VACCINES (2 - Td) 09/18/2028   • INFLUENZA VACCINE  Completed   • ZOSTER VACCINE  Completed       Health Maintenance Topics Due or Over-Due  There are no preventive care reminders to display for this patient.      C. Advanced Care Planning:    Advance Care Planning   ACP discussion was held with the patient during this visit. Patient has an advance directive (not in EMR), copy requested.       D. Patient Self-Management and Personalized Health Advice:    She has been provided with personalized counseling/information (including brochures/handouts) about:     -- fall prevention      She has been recommended for the following preventative services which has been performed today, will be ordered today or ordered/performed on upcoming follow-up visit:     -- screening for breast cancer is managed by another provider and mammogram will be arranged through that office      E. Miscellaneous Items:    -Aspirin use counseling: Does not need ASA (and currently is not on it)    -Discussed BMI with her. The BMI is in the acceptable range    -Reviewed use of high risk medication in the elderly: YES    -Reviewed for potential of harmful drug interactions in the elderly: YES      IV. WRAP-UP    Assessment & Plan:    1) MEDICARE ANNUAL WELLNESS VISIT    2) OTHER MEDICAL  CONDITIONS ADDRESSED TODAY:            Problem List Items Addressed This Visit     None      Visit Diagnoses     Medicare annual wellness visit, subsequent    -  Primary                  No orders of the defined types were placed in this encounter.      Discussion / Summary:    1. Medicare annual wellness visit, subsequent          Medications as of TODAY:              Current Outpatient Medications   Medication Sig Dispense Refill   • acetaminophen (TYLENOL) 650 MG 8 hr tablet Take  by mouth daily.     • atorvastatin (LIPITOR) 20 MG tablet TAKE 1 TABLET DAILY 90 tablet 3   • Cholecalciferol (VITAMIN D3) 2000 UNITS tablet Take  by mouth daily.     • fexofenadine (ALLEGRA) 180 MG tablet Take 180 mg by mouth Daily.     • lansoprazole (PREVACID) 30 MG capsule TAKE 1 CAPSULE EVERY MORNING BEFORE BREAKFAST 90 capsule 3   • levothyroxine (SYNTHROID, LEVOTHROID) 50 MCG tablet TAKE 1 TABLET EVERY MORNING 90 tablet 3   • losartan (COZAAR) 50 MG tablet TAKE 1 TABLET DAILY 90 tablet 3   • Misc Natural Products (OSTEO BI-FLEX JOINT SHIELD PO) Take  by mouth.     • montelukast (SINGULAIR) 10 MG tablet Take 10 mg by mouth daily.     • triamcinolone (KENALOG) 0.1 % cream      • Triamcinolone Acetonide (NASACORT) 55 MCG/ACT nasal inhaler 2 sprays into each nostril Daily.     • cetirizine (zyrTEC) 10 MG tablet Take 10 mg by mouth Daily.       No current facility-administered medications for this visit.          FOLLOW-UP:            No follow-ups on file.                 Future Appointments   Date Time Provider Department Center   12/15/2020 10:45 AM Henok Salcido MD [facility] None         ______________________________________________________________________      After Visit Summary (AVS) including the Personalized Prevention  Plan Services (PPPS) was either printed and given to the patient at check-out today and/or sent to [facility] for review.       @[email or web address]@    **Keith Disclaimer:   Much of this encounter note is an electronic  transcription/translation of spoken language to printed text. The electronic translation of spoken language may permit erroneous, or at times, nonsensical words or phrases to be inadvertently transcribed. Although I have reviewed the note for such errors, some may still exist.     This template was created by Clayton Salcido MD.

## 2020-11-17 ENCOUNTER — TELEPHONE (OUTPATIENT)
Dept: OBSTETRICS AND GYNECOLOGY | Facility: CLINIC | Age: 85
End: 2020-11-17

## 2020-11-17 NOTE — TELEPHONE ENCOUNTER
Patient called wanted to schedule her mammogram and her dexa scan I got her scheduled for mammo but she would like her dexa scan same day if possible and she would like a call back. She is older and will be needing to find a ride for appointment so would like to try and do all one day.

## 2020-12-24 DIAGNOSIS — I10 ESSENTIAL HYPERTENSION: Chronic | ICD-10-CM

## 2020-12-28 RX ORDER — LOSARTAN POTASSIUM 50 MG/1
TABLET ORAL
Qty: 90 TABLET | Refills: 3 | Status: SHIPPED | OUTPATIENT
Start: 2020-12-28 | End: 2021-12-03

## 2021-01-17 DIAGNOSIS — E78.5 DYSLIPIDEMIA: ICD-10-CM

## 2021-01-18 RX ORDER — ATORVASTATIN CALCIUM 20 MG/1
TABLET, FILM COATED ORAL
Qty: 90 TABLET | Refills: 3 | Status: ON HOLD | OUTPATIENT
Start: 2021-01-18 | End: 2021-06-07 | Stop reason: SDUPTHER

## 2021-02-01 ENCOUNTER — APPOINTMENT (OUTPATIENT)
Dept: GENERAL RADIOLOGY | Facility: HOSPITAL | Age: 86
End: 2021-02-01

## 2021-02-01 ENCOUNTER — TELEPHONE (OUTPATIENT)
Dept: INTERNAL MEDICINE | Age: 86
End: 2021-02-01

## 2021-02-01 ENCOUNTER — HOSPITAL ENCOUNTER (OUTPATIENT)
Facility: HOSPITAL | Age: 86
Setting detail: OBSERVATION
Discharge: HOME | End: 2021-02-03
Attending: EMERGENCY MEDICINE | Admitting: INTERNAL MEDICINE

## 2021-02-01 ENCOUNTER — APPOINTMENT (OUTPATIENT)
Dept: CT IMAGING | Facility: HOSPITAL | Age: 86
End: 2021-02-01

## 2021-02-01 DIAGNOSIS — R10.11 RIGHT UPPER QUADRANT ABDOMINAL PAIN: Primary | ICD-10-CM

## 2021-02-01 DIAGNOSIS — D72.829 LEUKOCYTOSIS, UNSPECIFIED TYPE: ICD-10-CM

## 2021-02-01 DIAGNOSIS — R93.5 ABNORMAL ABDOMINAL CT SCAN: ICD-10-CM

## 2021-02-01 LAB
ALBUMIN SERPL-MCNC: 3.9 G/DL (ref 3.5–5.2)
ALBUMIN/GLOB SERPL: 1.6 G/DL
ALP SERPL-CCNC: 77 U/L (ref 39–117)
ALT SERPL W P-5'-P-CCNC: 27 U/L (ref 1–33)
ANION GAP SERPL CALCULATED.3IONS-SCNC: 9.7 MMOL/L (ref 5–15)
AST SERPL-CCNC: 65 U/L (ref 1–32)
BASOPHILS # BLD AUTO: 0.08 10*3/MM3 (ref 0–0.2)
BASOPHILS NFR BLD AUTO: 0.5 % (ref 0–1.5)
BILIRUB SERPL-MCNC: 0.8 MG/DL (ref 0–1.2)
BILIRUB UR QL STRIP: NEGATIVE
BUN SERPL-MCNC: 14 MG/DL (ref 8–23)
BUN/CREAT SERPL: 17.1 (ref 7–25)
CALCIUM SPEC-SCNC: 9.6 MG/DL (ref 8.2–9.6)
CHLORIDE SERPL-SCNC: 103 MMOL/L (ref 98–107)
CLARITY UR: CLEAR
CO2 SERPL-SCNC: 28.3 MMOL/L (ref 22–29)
COLOR UR: YELLOW
CREAT SERPL-MCNC: 0.82 MG/DL (ref 0.57–1)
D-LACTATE SERPL-SCNC: 1.4 MMOL/L (ref 0.5–2)
DEPRECATED RDW RBC AUTO: 43.1 FL (ref 37–54)
EOSINOPHIL # BLD AUTO: 0.04 10*3/MM3 (ref 0–0.4)
EOSINOPHIL NFR BLD AUTO: 0.3 % (ref 0.3–6.2)
ERYTHROCYTE [DISTWIDTH] IN BLOOD BY AUTOMATED COUNT: 12.8 % (ref 12.3–15.4)
GFR SERPL CREATININE-BSD FRML MDRD: 64 ML/MIN/1.73
GLOBULIN UR ELPH-MCNC: 2.5 GM/DL
GLUCOSE SERPL-MCNC: 125 MG/DL (ref 65–99)
GLUCOSE UR STRIP-MCNC: NEGATIVE MG/DL
HCT VFR BLD AUTO: 47.2 % (ref 34–46.6)
HGB BLD-MCNC: 16.2 G/DL (ref 12–15.9)
HGB UR QL STRIP.AUTO: NEGATIVE
IMM GRANULOCYTES # BLD AUTO: 0.07 10*3/MM3 (ref 0–0.05)
IMM GRANULOCYTES NFR BLD AUTO: 0.5 % (ref 0–0.5)
KETONES UR QL STRIP: NEGATIVE
LEUKOCYTE ESTERASE UR QL STRIP.AUTO: NEGATIVE
LIPASE SERPL-CCNC: 21 U/L (ref 13–60)
LYMPHOCYTES # BLD AUTO: 1.09 10*3/MM3 (ref 0.7–3.1)
LYMPHOCYTES NFR BLD AUTO: 7.5 % (ref 19.6–45.3)
MCH RBC QN AUTO: 31.8 PG (ref 26.6–33)
MCHC RBC AUTO-ENTMCNC: 34.3 G/DL (ref 31.5–35.7)
MCV RBC AUTO: 92.5 FL (ref 79–97)
MONOCYTES # BLD AUTO: 0.57 10*3/MM3 (ref 0.1–0.9)
MONOCYTES NFR BLD AUTO: 3.9 % (ref 5–12)
NEUTROPHILS NFR BLD AUTO: 12.73 10*3/MM3 (ref 1.7–7)
NEUTROPHILS NFR BLD AUTO: 87.3 % (ref 42.7–76)
NITRITE UR QL STRIP: NEGATIVE
NRBC BLD AUTO-RTO: 0 /100 WBC (ref 0–0.2)
PH UR STRIP.AUTO: <=5 [PH] (ref 5–8)
PLATELET # BLD AUTO: 155 10*3/MM3 (ref 140–450)
PMV BLD AUTO: 9.7 FL (ref 6–12)
POTASSIUM SERPL-SCNC: 4.4 MMOL/L (ref 3.5–5.2)
PROT SERPL-MCNC: 6.4 G/DL (ref 6–8.5)
PROT UR QL STRIP: NEGATIVE
RBC # BLD AUTO: 5.1 10*6/MM3 (ref 3.77–5.28)
SARS-COV-2 RNA RESP QL NAA+PROBE: NOT DETECTED
SODIUM SERPL-SCNC: 141 MMOL/L (ref 136–145)
SP GR UR STRIP: 1.01 (ref 1–1.03)
UROBILINOGEN UR QL STRIP: NORMAL
WBC # BLD AUTO: 14.58 10*3/MM3 (ref 3.4–10.8)

## 2021-02-01 PROCEDURE — G0378 HOSPITAL OBSERVATION PER HR: HCPCS

## 2021-02-01 PROCEDURE — 99285 EMERGENCY DEPT VISIT HI MDM: CPT

## 2021-02-01 PROCEDURE — 74177 CT ABD & PELVIS W/CONTRAST: CPT

## 2021-02-01 PROCEDURE — 25010000002 PIPERACILLIN SOD-TAZOBACTAM PER 1 G: Performed by: NURSE PRACTITIONER

## 2021-02-01 PROCEDURE — 87040 BLOOD CULTURE FOR BACTERIA: CPT | Performed by: NURSE PRACTITIONER

## 2021-02-01 PROCEDURE — 25010000002 MORPHINE PER 10 MG: Performed by: NURSE PRACTITIONER

## 2021-02-01 PROCEDURE — 25010000002 IOPAMIDOL 61 % SOLUTION: Performed by: EMERGENCY MEDICINE

## 2021-02-01 PROCEDURE — 71045 X-RAY EXAM CHEST 1 VIEW: CPT

## 2021-02-01 PROCEDURE — 25010000002 ONDANSETRON PER 1 MG: Performed by: NURSE PRACTITIONER

## 2021-02-01 PROCEDURE — U0003 INFECTIOUS AGENT DETECTION BY NUCLEIC ACID (DNA OR RNA); SEVERE ACUTE RESPIRATORY SYNDROME CORONAVIRUS 2 (SARS-COV-2) (CORONAVIRUS DISEASE [COVID-19]), AMPLIFIED PROBE TECHNIQUE, MAKING USE OF HIGH THROUGHPUT TECHNOLOGIES AS DESCRIBED BY CMS-2020-01-R: HCPCS | Performed by: NURSE PRACTITIONER

## 2021-02-01 PROCEDURE — 80053 COMPREHEN METABOLIC PANEL: CPT | Performed by: NURSE PRACTITIONER

## 2021-02-01 PROCEDURE — 96365 THER/PROPH/DIAG IV INF INIT: CPT

## 2021-02-01 PROCEDURE — 99203 OFFICE O/P NEW LOW 30 MIN: CPT | Performed by: SURGERY

## 2021-02-01 PROCEDURE — 83605 ASSAY OF LACTIC ACID: CPT | Performed by: NURSE PRACTITIONER

## 2021-02-01 PROCEDURE — 85025 COMPLETE CBC W/AUTO DIFF WBC: CPT | Performed by: NURSE PRACTITIONER

## 2021-02-01 PROCEDURE — C9803 HOPD COVID-19 SPEC COLLECT: HCPCS

## 2021-02-01 PROCEDURE — 96375 TX/PRO/DX INJ NEW DRUG ADDON: CPT

## 2021-02-01 PROCEDURE — 81003 URINALYSIS AUTO W/O SCOPE: CPT | Performed by: NURSE PRACTITIONER

## 2021-02-01 PROCEDURE — 83690 ASSAY OF LIPASE: CPT | Performed by: NURSE PRACTITIONER

## 2021-02-01 RX ORDER — ACETAMINOPHEN 325 MG/1
650 TABLET ORAL EVERY 4 HOURS PRN
Status: DISCONTINUED | OUTPATIENT
Start: 2021-02-01 | End: 2021-02-03 | Stop reason: HOSPADM

## 2021-02-01 RX ORDER — ACETAMINOPHEN 160 MG/5ML
650 SOLUTION ORAL EVERY 4 HOURS PRN
Status: DISCONTINUED | OUTPATIENT
Start: 2021-02-01 | End: 2021-02-03 | Stop reason: HOSPADM

## 2021-02-01 RX ORDER — SODIUM CHLORIDE 0.9 % (FLUSH) 0.9 %
10 SYRINGE (ML) INJECTION AS NEEDED
Status: DISCONTINUED | OUTPATIENT
Start: 2021-02-01 | End: 2021-02-03 | Stop reason: HOSPADM

## 2021-02-01 RX ORDER — MORPHINE SULFATE 2 MG/ML
2 INJECTION, SOLUTION INTRAMUSCULAR; INTRAVENOUS ONCE
Status: COMPLETED | OUTPATIENT
Start: 2021-02-01 | End: 2021-02-01

## 2021-02-01 RX ORDER — ONDANSETRON 2 MG/ML
4 INJECTION INTRAMUSCULAR; INTRAVENOUS EVERY 6 HOURS PRN
Status: DISCONTINUED | OUTPATIENT
Start: 2021-02-01 | End: 2021-02-03 | Stop reason: HOSPADM

## 2021-02-01 RX ORDER — ACETAMINOPHEN 650 MG/1
650 SUPPOSITORY RECTAL EVERY 4 HOURS PRN
Status: DISCONTINUED | OUTPATIENT
Start: 2021-02-01 | End: 2021-02-03 | Stop reason: HOSPADM

## 2021-02-01 RX ORDER — ONDANSETRON 2 MG/ML
4 INJECTION INTRAMUSCULAR; INTRAVENOUS ONCE
Status: COMPLETED | OUTPATIENT
Start: 2021-02-01 | End: 2021-02-01

## 2021-02-01 RX ADMIN — TAZOBACTAM SODIUM AND PIPERACILLIN SODIUM 3.38 G: 375; 3 INJECTION, SOLUTION INTRAVENOUS at 15:31

## 2021-02-01 RX ADMIN — ONDANSETRON 4 MG: 2 INJECTION INTRAMUSCULAR; INTRAVENOUS at 11:39

## 2021-02-01 RX ADMIN — IOPAMIDOL 85 ML: 612 INJECTION, SOLUTION INTRAVENOUS at 13:49

## 2021-02-01 RX ADMIN — MORPHINE SULFATE 2 MG: 2 INJECTION, SOLUTION INTRAMUSCULAR; INTRAVENOUS at 11:40

## 2021-02-01 NOTE — CONSULTS
Surgery (on-call MD unless specified)  Consult performed by: Alec Chinchilla Jr., MD  Consult ordered by: Moraima Milligan APRN          Patient Care Team:  Henok Salcido MD as PCP - General  Christiano Dewitt MD as Consulting Physician (Obstetrics and Gynecology)  Rodolfo Shah MD as Consulting Physician (Allergy)  Michael Mendoza MD as Consulting Physician (Dermatology)  Jluis Bain MD as Consulting Physician (Otolaryngology)  Lucas Rueda OD (Optometry)  Ousmane Rhodes MD (Inactive) as Consulting Physician (Gastroenterology)    Chief complaint: Abdominal pain    Subjective     History of Present Illness     The patient is a very pleasant 89-year-old female who presented to the emergency room with abdominal pain.  She states that she began having epigastric abdominal pain last night that radiated around her upper abdomen to the right upper quadrant and left upper quadrant.  She also had some radiation to her back.  It was associated with nausea but no vomiting until this morning when she made herself vomit.  After vomiting, her nausea persisted.  She has not had any recent diarrhea or constipation.  She had similar symptoms in October 2019 at which time she visited the emergency room and was told she may have passed a gallstone through her common bile duct.  She was not admitted.    She has a previous history of a pattern to suggest obstructive jaundice, specifically on 7/7/2019 when her ALT was 371, AST was 471, alkaline phosphatase was 118, and total bilirubin is 3.9.  They all returned normal.    While in the emergency room a CT scan of the abdomen and pelvis was performed that showed a dilated common bile duct of 1.2 cm and a distended gallbladder but no evidence of cholecystitis.  Her liver function tests were basically normal with an ALT normal at 27, AST slightly elevated at 65, alkaline phosphatase normal at 77, and total bilirubin normal at 0.8.  Her lipase is 21.    Review of  Systems   Constitutional: Negative for fatigue and fever.   HENT: Negative for trouble swallowing and voice change.    Respiratory: Negative for chest tightness and shortness of breath.    Cardiovascular: Negative for chest pain and palpitations.   Gastrointestinal: Positive for abdominal pain and nausea. Negative for blood in stool, constipation, diarrhea and vomiting.   Psychiatric/Behavioral: Negative for agitation and confusion.        Past Medical History:   Diagnosis Date   • Arthritis    • Cancer (CMS/HCC)     skin   • Disease of thyroid gland    • GERD (gastroesophageal reflux disease)    • Hyperlipidemia    • Hypertension    • Osteoporosis    ,   Past Surgical History:   Procedure Laterality Date   • CATARACT EXTRACTION     • COLONOSCOPY     • HYSTERECTOMY     • REPLACEMENT TOTAL KNEE Right    • THYROID SURGERY     • TUBAL ABDOMINAL LIGATION     ,   Family History   Problem Relation Age of Onset   • Hypertension Mother    • Heart disease Mother    • Thyroid disease Mother    • Hypertension Father    • Cancer Father    • Heart disease Brother    • Hypertension Daughter    • Thyroid disease Daughter    • Lung disease Daughter    • Depression Daughter    • Migraines Daughter    • Arthritis Daughter    • Heart disease Paternal Grandmother    • Stroke Paternal Grandfather    • Kidney disease Paternal Grandfather    ,   Social History     Tobacco Use   • Smoking status: Former Smoker   • Smokeless tobacco: Never Used   Substance Use Topics   • Alcohol use: Yes     Comment: wine at night   • Drug use: No     E-cigarette/Vaping     E-cigarette/Vaping Substances     E-cigarette/Vaping Devices        and Allergies:  Aspirin and Buffered aspirin    Objective      Vital Signs  Temp:  [97.6 °F (36.4 °C)-98.3 °F (36.8 °C)] 98.3 °F (36.8 °C)  Heart Rate:  [61-96] 91  Resp:  [18] 18  BP: (135-175)/() 135/78    Physical Exam  Constitutional:       Appearance: Normal appearance. She is well-developed. She is not  toxic-appearing.   Eyes:      General: No scleral icterus.  Cardiovascular:      Rate and Rhythm: Normal rate and regular rhythm.   Pulmonary:      Effort: Pulmonary effort is normal. No accessory muscle usage or respiratory distress.      Breath sounds: Normal breath sounds.   Abdominal:      General: Bowel sounds are normal. There is no distension.      Palpations: Abdomen is soft.      Tenderness: There is no abdominal tenderness.      Hernia: There is no hernia in the umbilical area or ventral area.   Skin:     General: Skin is warm and dry.   Neurological:      Mental Status: She is alert and oriented to person, place, and time.   Psychiatric:         Behavior: Behavior normal.         Thought Content: Thought content normal.         Judgment: Judgment normal.         Results Review:    I reviewed the patient's new clinical results.        Assessment/Plan       Right upper quadrant abdominal pain      Assessment & Plan     1.  Abdominal pain: The patient has abdominal pain that is suspicious for possible gallbladder disease.  She had similar symptoms almost 2 years ago which resolved with nonoperative management.  At that time she did have elevated liver function test to suggest jaundice.  Today her liver function test are basically normal.  I agree with hospital admission and we will keep her on bowel rest and try to manage conservatively.  We will recheck liver function test in the morning.  The patient and her daughter are not interested in surgical management and there is no indication for surgery at this time.    I discussed the patients findings and my recommendations with patient and family    Alec Chinchilla Jr., MD  02/01/21  17:47 EST

## 2021-02-01 NOTE — ED NOTES
Pt presents to ED with complains of RUQ, with vomiting that was a sudden onset around 0930 today. Pt reports hx of gallbladder problems. Pt is A&OX4, able to ambulate with a cane but placed in wheelchair, and in a mask at this time.      Giovanni Fitch, RN  02/01/21 1019

## 2021-02-01 NOTE — ED PROVIDER NOTES
12:40 EST  Patient seen and examined with nurse practitioner.  Briefly patient presents for evaluation of abdominal pain.  Patient has had similar pains in the past and was told it was her gallbladder.  Patient states pain is right upper quadrant but now radiates across her abdomen.  Has had no vomiting or diarrhea.  Had no fevers.        On exam patient is alert cooperative in no distress.  Patient's heart is regular rate and rhythm and lungs are clear.  His abdomen is soft and tender in the epigastric area.          Will be lab evaluation and CT scan.        MD ATTESTATION NOTE    The OLLIE and I have discussed this patient's history, physical exam, and treatment plan.  I have reviewed the documentation and personally had a face to face interaction with the patient. I affirm the documentation and agree with the treatment and plan.  The attached note describes my personal findings.      Patient was wearing a face mask when I entered the room and they continued to wear a mask throughout their stay in the ED.  I wore PPE, including gloves, face mask with shield or face mask with goggles whenever I was in the room with patient.      Ap Jones MD  02/01/21 4205
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
## 2021-02-01 NOTE — ED NOTES
Nursing report ED to floor  Maria Fernanda Gaviria  89 y.o.  female    HPI (triage note):   Chief Complaint   Patient presents with   • Abdominal Pain       Admitting doctor:   Veronica Tobin MD    Admitting diagnosis:   The primary encounter diagnosis was Right upper quadrant abdominal pain. Diagnoses of Abnormal abdominal CT scan and Leukocytosis, unspecified type were also pertinent to this visit.    Code status:   Current Code Status     Date Active Code Status Order ID Comments User Context       Prior    Advance Care Planning Activity          Allergies:   Aspirin and Buffered aspirin    Weight:   There were no vitals filed for this visit.    Most recent vitals:   Vitals:    02/01/21 1400 02/01/21 1430 02/01/21 1530 02/01/21 1600   BP: 163/84 160/94 153/90 146/82   Pulse: 90 90 96 92   Resp: 18 18 18 18   Temp:       TempSrc:       SpO2: 95% 94% 96% 94%   Height:           Active LDAs/IV Access:   Lines, Drains & Airways    Active LDAs     Name:   Placement date:   Placement time:   Site:   Days:    Peripheral IV 02/01/21 1123 Right Antecubital   02/01/21    1123    Antecubital   less than 1                Labs (abnormal labs have a star):   Labs Reviewed   COMPREHENSIVE METABOLIC PANEL - Abnormal; Notable for the following components:       Result Value    Glucose 125 (*)     AST (SGOT) 65 (*)     All other components within normal limits    Narrative:     GFR Normal >60  Chronic Kidney Disease <60  Kidney Failure <15     CBC WITH AUTO DIFFERENTIAL - Abnormal; Notable for the following components:    WBC 14.58 (*)     Hemoglobin 16.2 (*)     Hematocrit 47.2 (*)     Neutrophil % 87.3 (*)     Lymphocyte % 7.5 (*)     Monocyte % 3.9 (*)     Neutrophils, Absolute 12.73 (*)     Immature Grans, Absolute 0.07 (*)     All other components within normal limits   URINALYSIS W/ MICROSCOPIC IF INDICATED (NO CULTURE) - Normal    Narrative:     Urine microscopic not indicated.   LIPASE - Normal   LACTIC ACID, PLASMA -  Normal   COVID PRE-OP / PRE-PROCEDURE SCREENING ORDER (NO ISOLATION)    Narrative:     The following orders were created for panel order COVID PRE-OP / PRE-PROCEDURE SCREENING ORDER (NO ISOLATION) - Swab, Nasopharynx.  Procedure                               Abnormality         Status                     ---------                               -----------         ------                     COVID-19, [facility] IN-HOUSE...[[id number]]                      In process                   Please view results for these tests on the individual orders.   COVID-19, [facility] IN-HOUSE CEPHEID, NP SWAB IN TRANSPORT MEDIA 8-12 HR TAT   BLOOD CULTURE   BLOOD CULTURE   CBC AND DIFFERENTIAL    Narrative:     The following orders were created for panel order CBC & Differential.  Procedure                               Abnormality         Status                     ---------                               -----------         ------                     CBC Auto Differential[[id number]]        Abnormal            Final result                 Please view results for these tests on the individual orders.       EKG:   No orders to display       Meds given in ED:   Medications   sodium chloride 0.9 % flush 10 mL (has no administration in time range)   morphine injection 2 mg (2 mg Intravenous Given 2/1/21 1140)   ondansetron (ZOFRAN) injection 4 mg (4 mg Intravenous Given 2/1/21 1139)   iopamidol (ISOVUE-300) 61 % injection 100 mL (85 mL Intravenous Given by Other 2/1/21 1349)   piperacillin-tazobactam (ZOSYN) 3.375 g in iso-osmotic dextrose 50 ml (premix) (0 g Intravenous Stopped 2/1/21 1602)       Imaging results:  Ct Abdomen Pelvis With Contrast    Result Date: 2/1/2021  1. Chronically dilated common bile duct again noted, slightly larger than on the previous study now measuring 1.2 cm, previously about 0.9 cm 2. Gallbladder mildly distended, similar to the previous study but no evidence of wall thickening or surrounding inflammation 3. Stable  large cyst within the liver 4. Colonic diverticulosis. Minimal fluid adjacent to the lower sigmoid may indicate early diverticulitis. Correlate clinically. No evidence for abscess  Radiation dose reduction techniques were utilized, including automated exposure control and exposure modulation based on body size.  This report was finalized on 2/1/2021 2:19 PM by Dr. Ousmane Milligan M.D.        Ambulatory status:   With assist x 1. Pt utilizes cane.     Social issues:   Social History     Socioeconomic History   • Marital status:      Spouse name: Not on file   • Number of children: Not on file   • Years of education: Not on file   • Highest education level: Not on file   Tobacco Use   • Smoking status: Former Smoker   • Smokeless tobacco: Never Used   Substance and Sexual Activity   • Alcohol use: Yes     Comment: wine at night   • Drug use: No   • Sexual activity: Defer    Nursing report ED to floor     Savanna Stroud RN  02/01/21 9066

## 2021-02-01 NOTE — ED NOTES
This RN in appropriate ppe while in pt room. Pt wearing mask.        Svaanna Stroud, RN  02/01/21 2950

## 2021-02-01 NOTE — PROGRESS NOTES
Discharge Planning Assessment  [facility]     Patient Name: Maria Fernanda Gaviria  MRN: [id number]  Today's Date: 2/1/2021    Admit Date: 2/1/2021    Discharge Needs Assessment     Row Name 02/01/21 1627       Living Environment    Lives With  alone    Current Living Arrangements  home/apartment/condo    Primary Care Provided by  self    Provides Primary Care For  no one    Family Caregiver if Needed  child(dejon), adult    Family Caregiver Names  Pt has  4  daughters who care for her. 2 daughters live close by and do the most, SCOOTER Ortiz, [phone], and Ephraim Lobo, [facility], [phone]    Quality of Family Relationships  helpful;involved;supportive    Able to Return to Prior Arrangements  yes       Resource/Environmental Concerns    Resource/Environmental Concerns  none    Transportation Concerns  car, none       Transition Planning    Patient/Family Anticipates Transition to  home    Patient/Family Anticipated Services at Transition  none    Transportation Anticipated  family or friend will provide       Discharge Needs Assessment    Readmission Within the Last 30 Days  no previous admission in last 30 days    Equipment Currently Used at Home  cane, straight    Concerns to be Addressed  no discharge needs identified;denies needs/concerns at this time    Anticipated Changes Related to Illness  none    Equipment Needed After Discharge  none    Discharge Coordination/Progress  Pt states that she does not need anything at this time. She is unsure if she will need any home health at discharge        Discharge Plan     Row Name 02/01/21 1632       Plan    Plan  Face sheet verified. Role of CCP explained. Pt denies any need for further DME at this time. She is independent with ADL's. States her children assist her when needed and transports her when necessary. Pt denies any difficulty paying for her medications and obtains meds from [facility]    Plan Comments  Pt intends to return home upon discharge. Denies  any need for DME. Pt unsure if she will need any home health upon discharge. Family will provide transportation and assistance as needed        Continued Care and Services - Admitted Since 2/1/2021    Coordination has not been started for this encounter.         Demographic Summary    No documentation.       Functional Status    No documentation.       Psychosocial    No documentation.       Abuse/Neglect    No documentation.       Legal    No documentation.       Substance Abuse    No documentation.       Patient Forms    No documentation.           Kassy Alonzo RN

## 2021-02-02 LAB
ALBUMIN SERPL-MCNC: 3.4 G/DL (ref 3.5–5.2)
ALBUMIN/GLOB SERPL: 1.5 G/DL
ALP SERPL-CCNC: 63 U/L (ref 39–117)
ALT SERPL W P-5'-P-CCNC: 34 U/L (ref 1–33)
ANION GAP SERPL CALCULATED.3IONS-SCNC: 9.4 MMOL/L (ref 5–15)
AST SERPL-CCNC: 57 U/L (ref 1–32)
BASOPHILS # BLD AUTO: 0.03 10*3/MM3 (ref 0–0.2)
BASOPHILS NFR BLD AUTO: 0.3 % (ref 0–1.5)
BILIRUB SERPL-MCNC: 0.8 MG/DL (ref 0–1.2)
BUN SERPL-MCNC: 18 MG/DL (ref 8–23)
BUN/CREAT SERPL: 18.6 (ref 7–25)
CALCIUM SPEC-SCNC: 9.1 MG/DL (ref 8.2–9.6)
CHLORIDE SERPL-SCNC: 103 MMOL/L (ref 98–107)
CO2 SERPL-SCNC: 27.6 MMOL/L (ref 22–29)
CREAT SERPL-MCNC: 0.97 MG/DL (ref 0.57–1)
DEPRECATED RDW RBC AUTO: 44.2 FL (ref 37–54)
EOSINOPHIL # BLD AUTO: 0.1 10*3/MM3 (ref 0–0.4)
EOSINOPHIL NFR BLD AUTO: 0.9 % (ref 0.3–6.2)
ERYTHROCYTE [DISTWIDTH] IN BLOOD BY AUTOMATED COUNT: 12.9 % (ref 12.3–15.4)
GFR SERPL CREATININE-BSD FRML MDRD: 53 ML/MIN/1.73
GLOBULIN UR ELPH-MCNC: 2.3 GM/DL
GLUCOSE SERPL-MCNC: 85 MG/DL (ref 65–99)
HCT VFR BLD AUTO: 40.6 % (ref 34–46.6)
HGB BLD-MCNC: 13.7 G/DL (ref 12–15.9)
IMM GRANULOCYTES # BLD AUTO: 0.04 10*3/MM3 (ref 0–0.05)
IMM GRANULOCYTES NFR BLD AUTO: 0.4 % (ref 0–0.5)
LYMPHOCYTES # BLD AUTO: 1.43 10*3/MM3 (ref 0.7–3.1)
LYMPHOCYTES NFR BLD AUTO: 13.3 % (ref 19.6–45.3)
MCH RBC QN AUTO: 31.5 PG (ref 26.6–33)
MCHC RBC AUTO-ENTMCNC: 33.7 G/DL (ref 31.5–35.7)
MCV RBC AUTO: 93.3 FL (ref 79–97)
MONOCYTES # BLD AUTO: 0.84 10*3/MM3 (ref 0.1–0.9)
MONOCYTES NFR BLD AUTO: 7.8 % (ref 5–12)
NEUTROPHILS NFR BLD AUTO: 77.3 % (ref 42.7–76)
NEUTROPHILS NFR BLD AUTO: 8.34 10*3/MM3 (ref 1.7–7)
NRBC BLD AUTO-RTO: 0 /100 WBC (ref 0–0.2)
PLATELET # BLD AUTO: 128 10*3/MM3 (ref 140–450)
PMV BLD AUTO: 10.2 FL (ref 6–12)
POTASSIUM SERPL-SCNC: 3.7 MMOL/L (ref 3.5–5.2)
PROT SERPL-MCNC: 5.7 G/DL (ref 6–8.5)
RBC # BLD AUTO: 4.35 10*6/MM3 (ref 3.77–5.28)
SODIUM SERPL-SCNC: 140 MMOL/L (ref 136–145)
WBC # BLD AUTO: 10.78 10*3/MM3 (ref 3.4–10.8)

## 2021-02-02 PROCEDURE — 99212 OFFICE O/P EST SF 10 MIN: CPT | Performed by: SURGERY

## 2021-02-02 PROCEDURE — 80053 COMPREHEN METABOLIC PANEL: CPT | Performed by: SURGERY

## 2021-02-02 PROCEDURE — 85025 COMPLETE CBC W/AUTO DIFF WBC: CPT | Performed by: INTERNAL MEDICINE

## 2021-02-02 PROCEDURE — G0378 HOSPITAL OBSERVATION PER HR: HCPCS

## 2021-02-02 RX ORDER — LOSARTAN POTASSIUM 50 MG/1
50 TABLET ORAL DAILY
Status: DISCONTINUED | OUTPATIENT
Start: 2021-02-02 | End: 2021-02-03 | Stop reason: HOSPADM

## 2021-02-02 RX ORDER — PANTOPRAZOLE SODIUM 40 MG/1
40 TABLET, DELAYED RELEASE ORAL EVERY MORNING
Status: DISCONTINUED | OUTPATIENT
Start: 2021-02-03 | End: 2021-02-03 | Stop reason: HOSPADM

## 2021-02-02 RX ORDER — LEVOTHYROXINE SODIUM 0.05 MG/1
50 TABLET ORAL EVERY MORNING
Status: DISCONTINUED | OUTPATIENT
Start: 2021-02-03 | End: 2021-02-03 | Stop reason: HOSPADM

## 2021-02-02 RX ORDER — MONTELUKAST SODIUM 10 MG/1
10 TABLET ORAL DAILY
Status: DISCONTINUED | OUTPATIENT
Start: 2021-02-02 | End: 2021-02-03 | Stop reason: HOSPADM

## 2021-02-02 RX ORDER — FLUTICASONE PROPIONATE 50 MCG
2 SPRAY, SUSPENSION (ML) NASAL DAILY
Status: DISCONTINUED | OUTPATIENT
Start: 2021-02-02 | End: 2021-02-03 | Stop reason: HOSPADM

## 2021-02-02 NOTE — PROGRESS NOTES
Name: Maria Fernanda Gaviria ADMIT: 2021   : 1923  PCP: Henok Salcido MD    MRN: [id number] LOS: 0 days   AGE/SEX: 89 y.o. female  ROOM: [facility]     Subjective   Subjective   no new complaints or events overnight. abdominal pain has resolved and her diet will be advanced. daughter present at bedside.     Review of Systems   Constitutional: Negative for chills and fever.   Respiratory: Negative for chest tightness and shortness of breath.    Gastrointestinal: Negative for abdominal pain, diarrhea, nausea and vomiting.   Genitourinary: Negative for difficulty urinating and dysuria.   Musculoskeletal: Negative for back pain and gait problem.   Psychiatric/Behavioral: Negative for behavioral problems and confusion.        Objective   Objective   Vital Signs  Temp:  [97 °F (36.1 °C)-98.7 °F (37.1 °C)] 97.6 °F (36.4 °C)  Heart Rate:  [66-91] 66  Resp:  [16-18] 16  BP: ()/(53-78) 117/63  SpO2:  [94 %-96 %] 95 %  on   ;   Device (Oxygen Therapy): room air  Body mass index is 21.82 kg/m².  Physical Exam  Vitals signs and nursing note reviewed.   Constitutional:       Comments: elderly, appears younger than stated age   Cardiovascular:      Rate and Rhythm: Normal rate and regular rhythm.   Pulmonary:      Effort: Pulmonary effort is normal. No respiratory distress.      Breath sounds: Normal breath sounds.   Abdominal:      General: Bowel sounds are normal. There is no distension.      Palpations: Abdomen is soft.      Tenderness: There is no abdominal tenderness.   Musculoskeletal: Normal range of motion.         General: No swelling or tenderness.   Neurological:      Mental Status: She is alert and oriented to person, place, and time. Mental status is at baseline.   Psychiatric:         Mood and Affect: Mood normal.         Behavior: Behavior normal.         Results Review     I reviewed the patient's new clinical results.  Results from last 7 days   Lab Units 21  0701 21  1125   WBC 10*3/mm3  10.78 14.58*   HEMOGLOBIN g/dL 13.7 16.2*   PLATELETS 10*3/mm3 128* 155     Results from last 7 days   Lab Units 02/02/21  0701 02/01/21  1226   SODIUM mmol/L 140 141   POTASSIUM mmol/L 3.7 4.4   CHLORIDE mmol/L 103 103   CO2 mmol/L 27.6 28.3   BUN mg/dL 18 14   CREATININE mg/dL 0.97 0.82   GLUCOSE mg/dL 85 125*   Estimated Creatinine Clearance: 25 mL/min (by C-G formula based on SCr of 0.97 mg/dL).  Results from last 7 days   Lab Units 02/02/21  0701 02/01/21  1226   ALBUMIN g/dL 3.40* 3.90   BILIRUBIN mg/dL 0.8 0.8   ALK PHOS U/L 63 77   AST (SGOT) U/L 57* 65*   ALT (SGPT) U/L 34* 27     Results from last 7 days   Lab Units 02/02/21  0701 02/01/21  1226   CALCIUM mg/dL 9.1 9.6   ALBUMIN g/dL 3.40* 3.90     Results from last 7 days   Lab Units 02/01/21  1454   LACTATE mmol/L 1.4     COVID19   Date Value Ref Range Status   02/01/2021 Not Detected Not Detected - Ref. Range Final     No results found for: HGBA1C, POCGLU    CT Abdomen Pelvis With Contrast  Narrative: CT ABDOMEN AND PELVIS WITH IV CONTRAST     HISTORY: Motor quadrant pain     TECHNIQUE: Radiation dose reduction techniques were utilized, including  automated exposure control and exposure modulation based on body size.   3 mm images were obtained through the abdomen and pelvis after the  administration of IV contrast.      COMPARISON: 10/16/2019     FINDINGS:      ABDOMEN:  Stable mild fibrotic changes in the lung bases. Stable large cyst in the  liver. Dilated common bile duct again noted, slightly increased up to  1.2 cm, previously about 0.9 cm. Gallbladder is mildly distended,  similar to prior but no evidence of surrounding inflammatory change.  Stable mild intrahepatic ductal dilatation. The spleen is unremarkable.  Small cysts in the left kidney. The right kidney is unremarkable. The  adrenal glands and pancreas are unremarkable.      PELVIS:  Stable bladder diverticulum. Colonic diverticulosis, greatest in the  sigmoid. There is some minimal fluid  adjacent to the lower sigmoid on  axial image 102 which may indicate early diverticulitis. Correlate  clinically. No abscess. Bone windows show no acute osseous abnormality.     Impression: 1. Chronically dilated common bile duct again noted, slightly larger  than on the previous study now measuring 1.2 cm, previously about 0.9 cm  2. Gallbladder mildly distended, similar to the previous study but no  evidence of wall thickening or surrounding inflammation  3. Stable large cyst within the liver  4. Colonic diverticulosis. Minimal fluid adjacent to the lower sigmoid  may indicate early diverticulitis. Correlate clinically. No evidence for  abscess     Radiation dose reduction techniques were utilized, including automated  exposure control and exposure modulation based on body size.     This report was finalized on 2/1/2021 2:19 PM by Dr. uOsmane Milligan M.D.     XR Chest 1 View  X-RAY CHEST ONE VIEW     HISTORY: 89-year-old female with right upper quadrant pain and cough.     FINDINGS: There are airspace opacities at both lung bases suspicious for  pneumonia. There is blunting of the left costophrenic angle and a tiny  effusion cannot be excluded. There is no evidence for CHF.     This report was finalized on 2/1/2021 12:50 PM by Dr. Sheyla Robles M.D.       Scheduled Medications   Infusions   Diet  Diet Regular; Low Fat       Assessment/Plan     Active Hospital Problems    Diagnosis  POA   • **Right upper quadrant abdominal pain [R10.11]  Yes   • Abnormal liver function tests [R94.5]  Yes   • DNR (do not resuscitate) [Z66]  Yes   • Thrombocytopenia (CMS/HCC) [D69.6]  Yes   • Osteoarthritis [M19.90]  Yes   • Hypothyroidism [E03.9]  Yes   • Hypertension [I10]  Yes   • Gastroesophageal reflux disease [K21.9]  Yes   • Dyslipidemia [E78.5]  Yes   • Asthma [J45.909]  Yes      Resolved Hospital Problems   No resolved problems to display.     abdominal pain resolved and diet is being advanced. trend LFTs. CT abdomen noted  and demonstrates a slightly larger dilated common bile duct from previous study. possible early diverticulitis, got one dose of zosyn in ED, will observe off of abx for time being. white count normalized. blood cultures NGTD.  thrombocytopenia below baseline, monitor.  HTN: now with hypotension and has not gotten any meds. monitor closely.    SCDs for DVT prophylaxis.  DNR.  Discussed with patient and nursing staff.  Anticipate discharge maybe tomorrow       GRIFFIN Hutton  [facility]  02/02/21  16:37 EST    I wore protective equipment throughout this patient encounter including a face mask, gloves and protective eyewear.  Hand hygiene was performed before donning protective equipment and after removal when leaving the room.

## 2021-02-02 NOTE — PROGRESS NOTES
Chief Complaint:    Abdominal pain    Subjective:    The patient feels well this morning with no further abdominal pain.  She is tolerating a clear liquid diet.    Objective:    Temp:  [97 °F (36.1 °C)-98.7 °F (37.1 °C)] 97 °F (36.1 °C)  Heart Rate:  [61-96] 78  Resp:  [16-18] 16  BP: ()/() 92/53    Physical Exam  Constitutional:       Appearance: She is not ill-appearing or toxic-appearing.   Pulmonary:      Effort: Pulmonary effort is normal. No respiratory distress.   Abdominal:      Palpations: Abdomen is soft.      Tenderness: There is no abdominal tenderness.   Neurological:      Mental Status: She is alert.   Psychiatric:         Behavior: Behavior is cooperative.         Results:    Labs are pending this morning.    Assessment/Plan:    The patient had right upper quadrant abdominal pain that has clinically resolved.  Her diet will be advanced.    We will await labs from this morning to follow her liver function test to see if the CBD dilatation needs to be addressed.    Alec Chinchilla Jr., M.D.

## 2021-02-02 NOTE — PLAN OF CARE
Goal Outcome Evaluation:  Plan of Care Reviewed With: patient, daughter  Progress: improving  Outcome Summary: no c/o pain or nausea.  advanced from clears to fulls.  possible advance to low fat soft for dinner.  bed alarm in use.  daughter visited.  sat in chair and walked in gonzalez.  possible discharge in am if tolerates food.

## 2021-02-02 NOTE — PLAN OF CARE
Goal Outcome Evaluation:  Plan of Care Reviewed With: patient  Progress: no change  Outcome Summary: VSS. afebrile. no c/o pain or nausea. tolerating clear liquids. up with assist x1 to bathroom. fall precautions continued. bed alarm on.

## 2021-02-02 NOTE — H&P
HISTORY AND PHYSICAL   [facility]        Patient Identification:  Name: Maria Fernanda Gaviria  Age: 89 y.o.  Sex: female  :  1923  MRN: [id number]                     Primary Care Physician: Henok Salcido MD    Chief Complaint:  89 year old female who presented to the emergency room with abdominal pain which started this morning; it radiated to the back; she feels better now; denies fever or chills; no nausea or vomiting; she has had prior episodes;     History of Present Illness:   As above    Past Medical History:  Past Medical History:   Diagnosis Date   • Arthritis    • Cancer (CMS/HCC)     skin   • Disease of thyroid gland    • GERD (gastroesophageal reflux disease)    • Hyperlipidemia    • Hypertension    • Osteoporosis      Past Surgical History:  Past Surgical History:   Procedure Laterality Date   • CATARACT EXTRACTION     • COLONOSCOPY     • HYSTERECTOMY     • REPLACEMENT TOTAL KNEE Right    • THYROID SURGERY     • TUBAL ABDOMINAL LIGATION        Home Meds:  Medications Prior to Admission   Medication Sig Dispense Refill Last Dose   • acetaminophen (TYLENOL) 650 MG 8 hr tablet Take  by mouth daily.   2021 at Unknown time   • atorvastatin (LIPITOR) 20 MG tablet TAKE 1 TABLET DAILY 90 tablet 3 2021 at Unknown time   • Cholecalciferol (VITAMIN D3) 2000 UNITS tablet Take  by mouth daily.   2021 at Unknown time   • fexofenadine (ALLEGRA) 180 MG tablet Take 180 mg by mouth Daily.   2021 at Unknown time   • lansoprazole (PREVACID) 30 MG capsule TAKE 1 CAPSULE EVERY MORNING BEFORE BREAKFAST 90 capsule 3 2021 at Unknown time   • levothyroxine (SYNTHROID, LEVOTHROID) 50 MCG tablet TAKE 1 TABLET EVERY MORNING 90 tablet 3 2021 at Unknown time   • losartan (COZAAR) 50 MG tablet TAKE 1 TABLET DAILY 90 tablet 3 2021 at Unknown time   • Misc Natural Products (OSTEO BI-FLEX JOINT SHIELD PO) Take  by mouth.   2021 at Unknown time   • montelukast (SINGULAIR) 10 MG  tablet Take 10 mg by mouth daily.   1/31/2021 at Unknown time   • triamcinolone (KENALOG) 0.1 % cream    Past Month at Unknown time   • Triamcinolone Acetonide (NASACORT) 55 MCG/ACT nasal inhaler 2 sprays into each nostril Daily.   1/31/2021 at Unknown time   • cetirizine (zyrTEC) 10 MG tablet Take 10 mg by mouth Daily.          Allergies:  Allergies   Allergen Reactions   • Aspirin Nausea And Vomiting and Nausea Only   • Buffered Aspirin Nausea And Vomiting     Immunizations:  Immunization History   Administered Date(s) Administered   • FLUAD TRI 65YR+ 10/01/2015   • Fluzone High Dose =>65 Years ([employer] ONLY) 09/10/2012, 10/10/2017, 09/28/2018, 09/20/2019   • Hepatitis A 04/17/2018   • Influenza TIV (IM) 10/01/2015   • Pneumococcal Conjugate 13-Valent (PCV13) 06/15/2015   • Pneumococcal Polysaccharide (PPSV23) 11/05/2013   • Shingrix 04/10/2018, 08/16/2018   • Tdap 09/18/2018   • Zostavax 12/11/2012     Social History:   Social History     Social History Narrative   • Not on file     Social History     Socioeconomic History   • Marital status:      Spouse name: Not on file   • Number of children: Not on file   • Years of education: Not on file   • Highest education level: Not on file   Tobacco Use   • Smoking status: Former Smoker   • Smokeless tobacco: Never Used   Substance and Sexual Activity   • Alcohol use: Yes     Comment: wine at night   • Drug use: No   • Sexual activity: Defer       Family History:  Family History   Problem Relation Age of Onset   • Hypertension Mother    • Heart disease Mother    • Thyroid disease Mother    • Hypertension Father    • Cancer Father    • Heart disease Brother    • Hypertension Daughter    • Thyroid disease Daughter    • Lung disease Daughter    • Depression Daughter    • Migraines Daughter    • Arthritis Daughter    • Heart disease Paternal Grandmother    • Stroke Paternal Grandfather    • Kidney disease Paternal Grandfather         Review of Systems  See history of  "present illness and past medical history.  Patient denies headache, dizziness, syncope, falls, trauma, change in vision, change in hearing, change in taste, changes in weight, changes in appetite, focal weakness, numbness, or paresthesia.  Patient denies chest pain, palpitations, dyspnea, orthopnea, PND, cough, sinus pressure, rhinorrhea, epistaxis, hemoptysis, nausea, vomiting,hematemesis, diarrhea, constipation or hematchezia.  Denies cold or heat intolerance, polydipsia, polyuria, polyphagia. Denies hematuria, pyuria, dysuria, hesitancy, frequency or urgency. Denies consumption of raw and under cooked meats foods or change in water source.  Denies fever, chills, sweats, night sweats.  Denies missing any routine medications. Remainder of ROS is negative.    Objective:  T Max 24 hrs: Temp (24hrs), Av °F (36.7 °C), Min:97.6 °F (36.4 °C), Max:98.3 °F (36.8 °C)    Vitals Ranges:   Temp:  [97.6 °F (36.4 °C)-98.3 °F (36.8 °C)] 98.3 °F (36.8 °C)  Heart Rate:  [61-96] 91  Resp:  [18] 18  BP: (135-175)/() 135/78      Exam:  /78 (BP Location: Right arm, Patient Position: Lying)   Pulse 91   Temp 98.3 °F (36.8 °C) (Oral)   Resp 18   Ht 149.9 cm (59\")   Wt 49 kg (108 lb 0.4 oz)   SpO2 96%   BMI 21.82 kg/m²     General Appearance:    Alert, cooperative, no distress, appears stated age   Head:    Normocephalic, without obvious abnormality, atraumatic   Eyes:    PERRL, conjunctivae/corneas clear, EOM's intact, both eyes   Ears:    Normal external ear canals, both ears   Nose:   Nares normal, septum midline, mucosa normal, no drainage    or sinus tenderness   Throat:   Lips, mucosa, and tongue normal   Neck:   Supple, symmetrical, trachea midline, no adenopathy;     thyroid:  no enlargement/tenderness/nodules; no carotid    bruit or JVD   Back:     Symmetric, no curvature, ROM normal, no CVA tenderness   Lungs:     Decreased breath sounds bilaterally, respirations unlabored   Chest Wall:    No tenderness or " deformity    Heart:    Regular rate and rhythm, S1 and S2 normal, no murmur, rub   or gallop   Abdomen:     Soft, nontender, bowel sounds active all four quadrants,     no masses, no hepatomegaly, no splenomegaly   Extremities:   Extremities normal, atraumatic, no cyanosis or edema   Pulses:   2+ and symmetric all extremities   Skin:   Skin color, texture, turgor normal, no rashes or lesions   Lymph nodes:   Cervical, supraclavicular, and axillary nodes normal   Neurologic:   CNII-XII intact, normal strength, sensation intact throughout      .    Data Review:  Labs in chart were reviewed.  WBC   Date Value Ref Range Status   02/01/2021 14.58 (H) 3.40 - 10.80 10*3/mm3 Final     Hemoglobin   Date Value Ref Range Status   02/01/2021 16.2 (H) 12.0 - 15.9 g/dL Final     Hematocrit   Date Value Ref Range Status   02/01/2021 47.2 (H) 34.0 - 46.6 % Final     Platelets   Date Value Ref Range Status   02/01/2021 155 140 - 450 10*3/mm3 Final     Sodium   Date Value Ref Range Status   02/01/2021 141 136 - 145 mmol/L Final     Potassium   Date Value Ref Range Status   02/01/2021 4.4 3.5 - 5.2 mmol/L Final     Chloride   Date Value Ref Range Status   02/01/2021 103 98 - 107 mmol/L Final     CO2   Date Value Ref Range Status   02/01/2021 28.3 22.0 - 29.0 mmol/L Final     BUN   Date Value Ref Range Status   02/01/2021 14 8 - 23 mg/dL Final     Creatinine   Date Value Ref Range Status   02/01/2021 0.82 0.57 - 1.00 mg/dL Final     Glucose   Date Value Ref Range Status   02/01/2021 125 (H) 65 - 99 mg/dL Final     Calcium   Date Value Ref Range Status   02/01/2021 9.6 8.2 - 9.6 mg/dL Final     AST (SGOT)   Date Value Ref Range Status   02/01/2021 65 (H) 1 - 32 U/L Final     ALT (SGPT)   Date Value Ref Range Status   02/01/2021 27 1 - 33 U/L Final     Alkaline Phosphatase   Date Value Ref Range Status   02/01/2021 77 39 - 117 U/L Final     No results found for: APTT, INR             Imaging Results (All)     Procedure Component Value  Units Date/Time    CT Abdomen Pelvis With Contrast [[id number]] Collected: 02/01/21 1407     Updated: 02/01/21 1422    Narrative:      CT ABDOMEN AND PELVIS WITH IV CONTRAST     HISTORY: Motor quadrant pain     TECHNIQUE: Radiation dose reduction techniques were utilized, including  automated exposure control and exposure modulation based on body size.   3 mm images were obtained through the abdomen and pelvis after the  administration of IV contrast.      COMPARISON: 10/16/2019     FINDINGS:      ABDOMEN:  Stable mild fibrotic changes in the lung bases. Stable large cyst in the  liver. Dilated common bile duct again noted, slightly increased up to  1.2 cm, previously about 0.9 cm. Gallbladder is mildly distended,  similar to prior but no evidence of surrounding inflammatory change.  Stable mild intrahepatic ductal dilatation. The spleen is unremarkable.  Small cysts in the left kidney. The right kidney is unremarkable. The  adrenal glands and pancreas are unremarkable.      PELVIS:  Stable bladder diverticulum. Colonic diverticulosis, greatest in the  sigmoid. There is some minimal fluid adjacent to the lower sigmoid on  axial image 102 which may indicate early diverticulitis. Correlate  clinically. No abscess. Bone windows show no acute osseous abnormality.       Impression:      1. Chronically dilated common bile duct again noted, slightly larger  than on the previous study now measuring 1.2 cm, previously about 0.9 cm  2. Gallbladder mildly distended, similar to the previous study but no  evidence of wall thickening or surrounding inflammation  3. Stable large cyst within the liver  4. Colonic diverticulosis. Minimal fluid adjacent to the lower sigmoid  may indicate early diverticulitis. Correlate clinically. No evidence for  abscess     Radiation dose reduction techniques were utilized, including automated  exposure control and exposure modulation based on body size.     This report was finalized on 2/1/2021  2:19 PM by Dr. Ousmane Milligan M.D.       XR Chest 1 View [[id number]] Collected: 02/01/21 1227     Updated: 02/01/21 1253    Narrative:      X-RAY CHEST ONE VIEW     HISTORY: 89-year-old female with right upper quadrant pain and cough.     FINDINGS: There are airspace opacities at both lung bases suspicious for  pneumonia. There is blunting of the left costophrenic angle and a tiny  effusion cannot be excluded. There is no evidence for CHF.     This report was finalized on 2/1/2021 12:50 PM by Dr. Sheyla Robles M.D.           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   • Osteoarthritis M19.90   • Sciatic leg pain M54.30   • Asthma J45.909   • Stenosis of carotid artery I65.29   • Dyslipidemia E78.5   • Gastroesophageal reflux disease K21.9   • Hypertension I10   • Hypothyroidism E03.9   • Osteopenia M85.80   • Restless legs syndrome G25.81   • Thrombocytopenia (CMS/HCC) D69.6   • Altered mental status R41.82   • DNR (do not resuscitate) Z66   • Non-seasonal allergic rhinitis J30.89   • Abnormal liver function tests R94.5   • Right upper quadrant abdominal pain R10.11       Assessment:    Right upper quadrant abdominal pain  hypertension  Hypothyroidism  gerd  Asthma  Leukocytosis  hyperglycemia    Plan:  Will monitor for now  Fluids, antiemetics  Appreciate input from surgery  D.w patient and family   Conservative management  Continue antibiotics      Veronica Tobin MD  2/1/2021  20:28 EST

## 2021-02-03 ENCOUNTER — READMISSION MANAGEMENT (OUTPATIENT)
Dept: CALL CENTER | Facility: HOSPITAL | Age: 86
End: 2021-02-03

## 2021-02-03 VITALS
BODY MASS INDEX: 21.78 KG/M2 | HEART RATE: 77 BPM | HEIGHT: 59 IN | OXYGEN SATURATION: 96 % | TEMPERATURE: 97.8 F | WEIGHT: 108.03 LBS | RESPIRATION RATE: 16 BRPM | DIASTOLIC BLOOD PRESSURE: 83 MMHG | SYSTOLIC BLOOD PRESSURE: 151 MMHG

## 2021-02-03 LAB
ALBUMIN SERPL-MCNC: 3.4 G/DL (ref 3.5–5.2)
ALBUMIN/GLOB SERPL: 1.4 G/DL
ALP SERPL-CCNC: 91 U/L (ref 39–117)
ALT SERPL W P-5'-P-CCNC: 33 U/L (ref 1–33)
ANION GAP SERPL CALCULATED.3IONS-SCNC: 8.1 MMOL/L (ref 5–15)
AST SERPL-CCNC: 53 U/L (ref 1–32)
BILIRUB SERPL-MCNC: 0.6 MG/DL (ref 0–1.2)
BUN SERPL-MCNC: 14 MG/DL (ref 8–23)
BUN/CREAT SERPL: 21.5 (ref 7–25)
CALCIUM SPEC-SCNC: 8.9 MG/DL (ref 8.2–9.6)
CHLORIDE SERPL-SCNC: 104 MMOL/L (ref 98–107)
CO2 SERPL-SCNC: 28.9 MMOL/L (ref 22–29)
CREAT SERPL-MCNC: 0.65 MG/DL (ref 0.57–1)
DEPRECATED RDW RBC AUTO: 43.2 FL (ref 37–54)
EOSINOPHIL # BLD MANUAL: 0.22 10*3/MM3 (ref 0–0.4)
EOSINOPHIL NFR BLD MANUAL: 3 % (ref 0.3–6.2)
ERYTHROCYTE [DISTWIDTH] IN BLOOD BY AUTOMATED COUNT: 12.8 % (ref 12.3–15.4)
GFR SERPL CREATININE-BSD FRML MDRD: 84 ML/MIN/1.73
GLOBULIN UR ELPH-MCNC: 2.5 GM/DL
GLUCOSE SERPL-MCNC: 86 MG/DL (ref 65–99)
HCT VFR BLD AUTO: 41 % (ref 34–46.6)
HGB BLD-MCNC: 14.1 G/DL (ref 12–15.9)
LYMPHOCYTES # BLD MANUAL: 0.51 10*3/MM3 (ref 0.7–3.1)
LYMPHOCYTES NFR BLD MANUAL: 3 % (ref 5–12)
LYMPHOCYTES NFR BLD MANUAL: 7.1 % (ref 19.6–45.3)
MCH RBC QN AUTO: 31.9 PG (ref 26.6–33)
MCHC RBC AUTO-ENTMCNC: 34.4 G/DL (ref 31.5–35.7)
MCV RBC AUTO: 92.8 FL (ref 79–97)
MONOCYTES # BLD AUTO: 0.22 10*3/MM3 (ref 0.1–0.9)
NEUTROPHILS # BLD AUTO: 6.24 10*3/MM3 (ref 1.7–7)
NEUTROPHILS NFR BLD MANUAL: 86.9 % (ref 42.7–76)
PLAT MORPH BLD: NORMAL
PLATELET # BLD AUTO: 134 10*3/MM3 (ref 140–450)
PMV BLD AUTO: 10 FL (ref 6–12)
POTASSIUM SERPL-SCNC: 4.2 MMOL/L (ref 3.5–5.2)
PROT SERPL-MCNC: 5.9 G/DL (ref 6–8.5)
RBC # BLD AUTO: 4.42 10*6/MM3 (ref 3.77–5.28)
RBC MORPH BLD: NORMAL
SODIUM SERPL-SCNC: 141 MMOL/L (ref 136–145)
WBC # BLD AUTO: 7.18 10*3/MM3 (ref 3.4–10.8)
WBC MORPH BLD: NORMAL

## 2021-02-03 PROCEDURE — 85007 BL SMEAR W/DIFF WBC COUNT: CPT | Performed by: NURSE PRACTITIONER

## 2021-02-03 PROCEDURE — 85025 COMPLETE CBC W/AUTO DIFF WBC: CPT | Performed by: NURSE PRACTITIONER

## 2021-02-03 PROCEDURE — 80053 COMPREHEN METABOLIC PANEL: CPT | Performed by: NURSE PRACTITIONER

## 2021-02-03 PROCEDURE — G0378 HOSPITAL OBSERVATION PER HR: HCPCS

## 2021-02-03 PROCEDURE — 99212 OFFICE O/P EST SF 10 MIN: CPT | Performed by: SURGERY

## 2021-02-03 RX ADMIN — FLUTICASONE PROPIONATE 2 SPRAY: 50 SPRAY, METERED NASAL at 06:09

## 2021-02-03 RX ADMIN — ACETAMINOPHEN 325 MG: 325 TABLET, FILM COATED ORAL at 08:17

## 2021-02-03 RX ADMIN — MONTELUKAST SODIUM 10 MG: 10 TABLET, FILM COATED ORAL at 06:09

## 2021-02-03 RX ADMIN — LOSARTAN POTASSIUM 50 MG: 50 TABLET, FILM COATED ORAL at 06:09

## 2021-02-03 RX ADMIN — LEVOTHYROXINE SODIUM 50 MCG: 0.05 TABLET ORAL at 06:09

## 2021-02-03 RX ADMIN — PANTOPRAZOLE SODIUM 40 MG: 40 TABLET, DELAYED RELEASE ORAL at 06:09

## 2021-02-03 NOTE — OUTREACH NOTE
Prep Survey      Responses   [facility] facility patient discharged from?  [facility]   Is LACE score < 7 ?  Yes   Emergency Room discharge w/ pulse ox?  No   Eligibility  [facility]   Date of Admission  02/01/21   Date of Discharge  02/03/21   Discharge Disposition  Home or Self Care   Discharge diagnosis  Right upper quadrant abd pain   Does the patient have one of the following disease processes/diagnoses(primary or secondary)?  Other   Does the patient have Home health ordered?  No   Is there a DME ordered?  No   Prep survey completed?  Yes          Katherine Horton RN

## 2021-02-03 NOTE — PLAN OF CARE
Goal Outcome Evaluation:  Plan of Care Reviewed With: patient  Progress: improving  Outcome Summary: VSS. afebrile. no c/o pain or nausea. tolerating diet. up with assist to bathroom. ambulated in [facility]. bed alarm on. possibly home today

## 2021-02-03 NOTE — PROGRESS NOTES
Case Management Discharge Note      Final Note: Home; self-care. [place] CSW         Selected Continued Care - Discharged on 2/3/2021 Admission date: 2/1/2021 - Discharge disposition: Home or Self Care    Destination    No services have been selected for the patient.              Durable Medical Equipment    No services have been selected for the patient.              Dialysis/Infusion    No services have been selected for the patient.              Home Medical Care    No services have been selected for the patient.              Therapy    No services have been selected for the patient.              Community Resources    No services have been selected for the patient.                       Final Discharge Disposition Code: 01 - home or self-care

## 2021-02-03 NOTE — DISCHARGE SUMMARY
Patient Name: Maria Fernanda Gaviria  : 1923  MRN: [id number]    Date of Admission: 2021  Date of Discharge:  2/3/2021  Primary Care Physician: Henok Salcido MD      Chief Complaint:   Abdominal Pain      Discharge Diagnoses     Active Hospital Problems    Diagnosis  POA   • **Right upper quadrant abdominal pain [R10.11]  Yes   • Abnormal liver function tests [R94.5]  Yes   • DNR (do not resuscitate) [Z66]  Yes   • Thrombocytopenia (CMS/HCC) [D69.6]  Yes   • Osteoarthritis [M19.90]  Yes   • Hypothyroidism [E03.9]  Yes   • Hypertension [I10]  Yes   • Gastroesophageal reflux disease [K21.9]  Yes   • Dyslipidemia [E78.5]  Yes   • Asthma [J45.909]  Yes      Resolved Hospital Problems   No resolved problems to display.        Hospital Course     Ms. Gaviria is a 89 y.o. female with a history of hypothyroidism, HTN and GERD who presented to [facility] initially complaining of abdominal pain.  Please see the admitting history and physical for further details.  She was found to have a dilated common bile duct without signs of cholecystitis per CT abdomen. She was admitted to the hospital for further evaluation and treatment. General surgery saw in consultation who did not believe the patient required any surgical intervention. Her abdominal pain actually subsided not long after she was admitted. Her diet was advanced and she tolerated. She has been instructed to follow a low fat diet to avoid gallbladder stimulation. She will be discharged home today in stable condition and should follow up with her PCP in 1-2 weeks.       Day of Discharge     Subjective:  no new complaints or events overnight. she is more than ready to go home.    Review of Systems   Constitutional: Negative for chills and fever.   HENT: Negative for congestion and sore throat.    Eyes: Negative for discharge and itching.   Respiratory: Negative for cough and shortness of breath.    Gastrointestinal: Negative for abdominal pain,  diarrhea, nausea and vomiting.   Endocrine: Negative for cold intolerance and heat intolerance.   Genitourinary: Negative for difficulty urinating and dysuria.   Musculoskeletal: Negative for back pain and gait problem.   Skin: Negative for color change and pallor.   Allergic/Immunologic: Negative for environmental allergies and food allergies.   Neurological: Negative for dizziness and headaches.   Hematological: Negative for adenopathy. Does not bruise/bleed easily.   Psychiatric/Behavioral: Negative for behavioral problems and confusion.       Physical Exam:  Temp:  [97.1 °F (36.2 °C)-98.9 °F (37.2 °C)] 97.8 °F (36.6 °C)  Heart Rate:  [77-82] 77  Resp:  [16] 16  BP: (131-151)/(71-83) 151/83  Body mass index is 21.82 kg/m².  Physical Exam  Vitals signs and nursing note reviewed.   Constitutional:       Appearance: Normal appearance.      Comments: appears younger than stated age   HENT:      Head: Normocephalic and atraumatic.   Eyes:      Extraocular Movements: Extraocular movements intact.      Conjunctiva/sclera: Conjunctivae normal.   Neck:      Musculoskeletal: Normal range of motion and neck supple.   Cardiovascular:      Rate and Rhythm: Normal rate and regular rhythm.   Pulmonary:      Effort: Pulmonary effort is normal. No respiratory distress.      Breath sounds: Normal breath sounds.   Abdominal:      General: Bowel sounds are normal. There is no distension.      Palpations: Abdomen is soft.      Tenderness: There is no abdominal tenderness.   Musculoskeletal: Normal range of motion.         General: No swelling.   Skin:     General: Skin is warm and dry.   Neurological:      Mental Status: She is alert and oriented to person, place, and time. Mental status is at baseline.   Psychiatric:         Mood and Affect: Mood normal.         Behavior: Behavior normal.         Consultants     Consult Orders (all) (From admission, onward)     Start     Ordered    02/01/21 0068  Inpatient Palliative Care Nurse  Consult  Once,   Status:  Canceled     Provider:  (Not yet assigned)    02/01/21 1516    02/01/21 1442  [facility] (on-call MD unless specified) Details  Once     Specialty:  Hospitalist  Provider:  (Not yet assigned)    02/01/21 1441    02/01/21 1442  Surgery (on-call MD unless specified)  Once     Specialty:  General Surgery  Provider:  (Not yet assigned)    02/01/21 1441              Procedures     Imaging Results (All)     Procedure Component Value Units Date/Time    CT Abdomen Pelvis With Contrast [[id number]] Collected: 02/01/21 1407     Updated: 02/01/21 1422    Narrative:      CT ABDOMEN AND PELVIS WITH IV CONTRAST     HISTORY: Motor quadrant pain     TECHNIQUE: Radiation dose reduction techniques were utilized, including  automated exposure control and exposure modulation based on body size.   3 mm images were obtained through the abdomen and pelvis after the  administration of IV contrast.      COMPARISON: 10/16/2019     FINDINGS:      ABDOMEN:  Stable mild fibrotic changes in the lung bases. Stable large cyst in the  liver. Dilated common bile duct again noted, slightly increased up to  1.2 cm, previously about 0.9 cm. Gallbladder is mildly distended,  similar to prior but no evidence of surrounding inflammatory change.  Stable mild intrahepatic ductal dilatation. The spleen is unremarkable.  Small cysts in the left kidney. The right kidney is unremarkable. The  adrenal glands and pancreas are unremarkable.      PELVIS:  Stable bladder diverticulum. Colonic diverticulosis, greatest in the  sigmoid. There is some minimal fluid adjacent to the lower sigmoid on  axial image 102 which may indicate early diverticulitis. Correlate  clinically. No abscess. Bone windows show no acute osseous abnormality.       Impression:      1. Chronically dilated common bile duct again noted, slightly larger  than on the previous study now measuring 1.2 cm, previously about 0.9 cm  2. Gallbladder mildly distended, similar to the  previous study but no  evidence of wall thickening or surrounding inflammation  3. Stable large cyst within the liver  4. Colonic diverticulosis. Minimal fluid adjacent to the lower sigmoid  may indicate early diverticulitis. Correlate clinically. No evidence for  abscess     Radiation dose reduction techniques were utilized, including automated  exposure control and exposure modulation based on body size.     This report was finalized on 2/1/2021 2:19 PM by Dr. Ousmane Milligan M.D.       XR Chest 1 View [[id number]] Collected: 02/01/21 1227     Updated: 02/01/21 1253    Narrative:      X-RAY CHEST ONE VIEW     HISTORY: 89-year-old female with right upper quadrant pain and cough.     FINDINGS: There are airspace opacities at both lung bases suspicious for  pneumonia. There is blunting of the left costophrenic angle and a tiny  effusion cannot be excluded. There is no evidence for CHF.     This report was finalized on 2/1/2021 12:50 PM by Dr. Sheyla Robles M.D.             Pertinent Labs     Results from last 7 days   Lab Units 02/03/21  0629 02/02/21  0701 02/01/21  1125   WBC 10*3/mm3 7.18 10.78 14.58*   HEMOGLOBIN g/dL 14.1 13.7 16.2*   PLATELETS 10*3/mm3 134* 128* 155     Results from last 7 days   Lab Units 02/03/21  0629 02/02/21  0701 02/01/21  1226   SODIUM mmol/L 141 140 141   POTASSIUM mmol/L 4.2 3.7 4.4   CHLORIDE mmol/L 104 103 103   CO2 mmol/L 28.9 27.6 28.3   BUN mg/dL 14 18 14   CREATININE mg/dL 0.65 0.97 0.82   GLUCOSE mg/dL 86 85 125*   Estimated Creatinine Clearance: 30.4 mL/min (by C-G formula based on SCr of 0.65 mg/dL).  Results from last 7 days   Lab Units 02/03/21  0629 02/02/21  0701 02/01/21  1226   ALBUMIN g/dL 3.40* 3.40* 3.90   BILIRUBIN mg/dL 0.6 0.8 0.8   ALK PHOS U/L 91 63 77   AST (SGOT) U/L 53* 57* 65*   ALT (SGPT) U/L 33 34* 27     Results from last 7 days   Lab Units 02/03/21  0629 02/02/21  0701 02/01/21  1226   CALCIUM mg/dL 8.9 9.1 9.6   ALBUMIN g/dL 3.40* 3.40* 3.90     Results  from last 7 days   Lab Units 02/01/21  1125   LIPASE U/L 21             Invalid input(s): LDLCALC  Results from last 7 days   Lab Units 02/01/21  1505 02/01/21  1454   BLOODCX  No growth at 24 hours No growth at 24 hours       Test Results Pending at Discharge     Pending Labs     Order Current Status    Blood Culture - Blood, Arm, Left Preliminary result    Blood Culture - Blood, Arm, Right Preliminary result          Discharge Details        Discharge Medications      Continue These Medications      Instructions Start Date   acetaminophen 650 MG 8 hr tablet  Commonly known as: TYLENOL   Oral, Daily      atorvastatin 20 MG tablet  Commonly known as: LIPITOR   TAKE 1 TABLET DAILY      cetirizine 10 MG tablet  Commonly known as: zyrTEC   10 mg, Oral, Daily      fexofenadine 180 MG tablet  Commonly known as: ALLEGRA   180 mg, Oral, Daily      lansoprazole 30 MG capsule  Commonly known as: PREVACID   TAKE 1 CAPSULE EVERY MORNING BEFORE BREAKFAST      levothyroxine 50 MCG tablet  Commonly known as: SYNTHROID, LEVOTHROID   TAKE 1 TABLET EVERY MORNING      losartan 50 MG tablet  Commonly known as: COZAAR   TAKE 1 TABLET DAILY      montelukast 10 MG tablet  Commonly known as: SINGULAIR   10 mg, Oral, Daily      OSTEO BI-FLEX JOINT SHIELD PO   Oral      triamcinolone 0.1 % cream  Commonly known as: KENALOG   No dose, route, or frequency recorded.      Triamcinolone Acetonide 55 MCG/ACT nasal inhaler  Commonly known as: NASACORT   2 sprays, Nasal, Daily      Vitamin D3 50 MCG (2000 UT) tablet   Oral, Daily             Allergies   Allergen Reactions   • Aspirin Nausea And Vomiting and Nausea Only   • Buffered Aspirin Nausea And Vomiting         Discharge Disposition:  Home or Self Care    Discharge Diet:  Diet Order   Procedures   • Diet Regular; Low Fat       Discharge Activity:   Activity Instructions     Activity as Tolerated            CODE STATUS:    Code Status and Medical Interventions:   Ordered at: 02/01/21 0821      Limited Support to NOT Include:    Intubation    Cardioversion/Defibrillation     Code Status:    No CPR     Medical Interventions (Level of Support Prior to Arrest):    Limited       Future Appointments   Date Time Provider Department Center   2/15/2021  2:00 PM Henok Salcido MD [facility]     Additional Instructions for the Follow-ups that You Need to Schedule     Discharge Follow-up with PCP   As directed       Currently Documented PCP:    Henok Salcido MD    PCP Phone Number:    [phone]     Follow Up Details: 1-2 weeks           Follow-up Information     Henok Salcido MD .    Specialty: Internal Medicine  Why: 1-2 weeks  Contact information:  [address] [address]  [phone]                   Additional Instructions for the Follow-ups that You Need to Schedule     Discharge Follow-up with PCP   As directed       Currently Documented PCP:    Henok Salcido MD    PCP Phone Number:    [phone]     Follow Up Details: 1-2 weeks           Time Spent on Discharge:  Greater than 30 minutes      GRIFFIN Hutton  [facility]  02/03/21  13:13 EST

## 2021-02-03 NOTE — PROGRESS NOTES
Chief Complaint:    Abdominal pain    Subjective:    The patient is feeling well with no abdominal pain.  She is tolerating a diet.    Objective:    Temp:  [97.1 °F (36.2 °C)-98.9 °F (37.2 °C)] 97.4 °F (36.3 °C)  Heart Rate:  [78-82] 78  Resp:  [16] 16  BP: (131-151)/(71-78) 146/77    Physical Exam  Constitutional:       Appearance: She is not ill-appearing or toxic-appearing.   Abdominal:      Palpations: Abdomen is soft.      Tenderness: There is no abdominal tenderness.   Neurological:      Mental Status: She is alert.   Psychiatric:         Behavior: Behavior is cooperative.         Results:    WBC is 7.18.  H/H is 14.1/41.0.    Assessment/Plan:    The patient is recovering well with no abdominal pain.  She is ready for discharge home from a surgical standpoint.  A low-fat diet to avoid gallbladder stimulation was discussed with the patient and her daughter.  I will sign off but remain available if needed.    Alec Chinchilla Jr., M.D.

## 2021-02-03 NOTE — PROGRESS NOTES
Continued Stay Note  [facility]     Patient Name: Maria Fernanda Gaivria  MRN: [id number]  Today's Date: 2/3/2021    Admit Date: 2/1/2021    Discharge Plan     Row Name 02/03/21 1055       Plan    Plan  Home; denies needs    Plan Comments  CCP met with patient at bedside. Discussed discharge planning. Patient plans to return home. Patient has a cane she uses for mobility. Patient reports her daughter provides transportation to her medical appointments and goes to the store for her. Patient does not feel like HH/DME is needed. Patient denies any needs and states her daughter will provide transportation this afternoon. Shelli CHASE        Discharge Codes    No documentation.       Expected Discharge Date and Time     Expected Discharge Date Expected Discharge Time    Feb 3, 2021             RENA Henry

## 2021-02-04 ENCOUNTER — TRANSITIONAL CARE MANAGEMENT TELEPHONE ENCOUNTER (OUTPATIENT)
Dept: CALL CENTER | Facility: HOSPITAL | Age: 86
End: 2021-02-04

## 2021-02-04 NOTE — OUTREACH NOTE
Call Center TCM Note      Responses   [facility] patient discharged from?  [place]   Does the patient have one of the following disease processes/diagnoses(primary or secondary)?  Other   TCM attempt successful?  Yes   Discharge diagnosis  Right upper quadrant abd pain   Meds reviewed with patient/caregiver?  Yes   Does the patient have all medications ordered at discharge?  N/A   Prescription comments  No changes made to pt regular meds   Does the patient have a primary care provider?   Yes   Does the patient have an appointment with their PCP within 7 days of discharge?  Greater than 7 days   Comments regarding PCP  Pt has appt with PCP Dr Salcido on 02/15/2021   Has the patient kept scheduled appointments due by today?  Yes   Has home health visited the patient within 72 hours of discharge?  N/A   Psychosocial issues?  No   Did the patient receive a copy of their discharge instructions?  Yes   Nursing interventions  Reviewed instructions with patient   What is the patient's perception of their health status since discharge?  Improving   Is the patient/caregiver able to teach back signs and symptoms related to disease process for when to call PCP?  Yes   Is the patient/caregiver able to teach back signs and symptoms related to disease process for when to call 911?  Yes   Is the patient/caregiver able to teach back the hierarchy of who to call/visit for symptoms/problems? PCP, Specialist, Home health nurse, Urgent Care, ED, 911  Yes   TCM call completed?  Yes   Wrap up additional comments  Very nice lady, states she is doing quite well, abdomonal pain has resolved. No med changes made. Pt will see PCP on 02/15/2021          Krystle Vick MA    2/4/2021, 12:37 EST

## 2021-02-06 LAB
BACTERIA SPEC AEROBE CULT: NORMAL
BACTERIA SPEC AEROBE CULT: NORMAL

## 2021-03-02 ENCOUNTER — OFFICE VISIT (OUTPATIENT)
Dept: INTERNAL MEDICINE | Age: 86
End: 2021-03-02

## 2021-03-02 VITALS
BODY MASS INDEX: 20.96 KG/M2 | DIASTOLIC BLOOD PRESSURE: 70 MMHG | TEMPERATURE: 98 F | HEIGHT: 59 IN | OXYGEN SATURATION: 98 % | HEART RATE: 85 BPM | SYSTOLIC BLOOD PRESSURE: 120 MMHG | WEIGHT: 104 LBS

## 2021-03-02 DIAGNOSIS — Z23 IMMUNIZATION DUE: ICD-10-CM

## 2021-03-02 DIAGNOSIS — K21.9 GASTROESOPHAGEAL REFLUX DISEASE, UNSPECIFIED WHETHER ESOPHAGITIS PRESENT: Chronic | ICD-10-CM

## 2021-03-02 DIAGNOSIS — R10.10 RECURRENT UPPER ABDOMINAL PAIN: Primary | ICD-10-CM

## 2021-03-02 PROBLEM — K82.9 GALLBLADDER PAIN: Status: ACTIVE | Noted: 2021-03-02

## 2021-03-02 PROCEDURE — 99214 OFFICE O/P EST MOD 30 MIN: CPT | Performed by: INTERNAL MEDICINE

## 2021-03-02 NOTE — ASSESSMENT & PLAN NOTE
Possible gallbladder pain vs gastritis. Continue lansoprazole 30 mg qd. May use liquid antacid PRN. Surgery was not recommended in hospital.

## 2021-03-02 NOTE — PROGRESS NOTES
"    I N T E R N A L  M E D I C I N E  J U N O H  K I M,  M D      ENCOUNTER DATE:  03/02/2021    Maria Fernanda Gaviria / 89 y.o. / female      CHIEF COMPLAINT / REASON FOR OFFICE VISIT     hospital f/u Right upper quadrant abdominal pain (2/1/2021 - 2/3/2021 )      ASSESSMENT & PLAN     Problem List Items Addressed This Visit     Recurrent upper abdominal pain - Primary    Current Assessment & Plan     Possible gallbladder pain vs gastritis. Continue lansoprazole 30 mg qd. May use liquid antacid PRN. Surgery was not recommended in hospital.          Gastroesophageal reflux disease (Chronic)    Overview     TICS/KEMM (CSCOPE 11/05)/ EGD MILD GASTRITIS (12/06) BETZY         Current Assessment & Plan     Continue lansoprazole 30 mg qd.          Relevant Medications    lansoprazole (PREVACID) 30 MG capsule        No orders of the defined types were placed in this encounter.    No orders of the defined types were placed in this encounter.      SUMMARY/DISCUSSION  •       Next Appointment with me: Visit date not found    Return in about 3 months (around 6/2/2021) for Reassess chronic medical problems.      VITAL SIGNS     Visit Vitals  /70   Pulse 85   Temp 98 °F (36.7 °C)   Ht 149.9 cm (59\")   Wt 47.2 kg (104 lb)   SpO2 98%   BMI 21.01 kg/m²       BP Readings from Last 3 Encounters:   03/02/21 120/70   02/03/21 151/83   10/20/20 136/74     Wt Readings from Last 3 Encounters:   03/02/21 47.2 kg (104 lb)   02/01/21 49 kg (108 lb 0.4 oz)   10/20/20 48.9 kg (107 lb 12.8 oz)     Body mass index is 21.01 kg/m².      MEDICATIONS AT THE TIME OF OFFICE VISIT     Current Outpatient Medications on File Prior to Visit   Medication Sig   • atorvastatin (LIPITOR) 20 MG tablet TAKE 1 TABLET DAILY   • Cholecalciferol (VITAMIN D3) 2000 UNITS tablet Take  by mouth daily.   • fexofenadine (ALLEGRA) 180 MG tablet Take 180 mg by mouth Daily.   • lansoprazole (PREVACID) 30 MG capsule TAKE 1 CAPSULE EVERY MORNING BEFORE BREAKFAST   • " levothyroxine (SYNTHROID, LEVOTHROID) 50 MCG tablet TAKE 1 TABLET EVERY MORNING   • losartan (COZAAR) 50 MG tablet TAKE 1 TABLET DAILY   • Misc Natural Products (OSTEO BI-FLEX JOINT SHIELD PO) Take  by mouth.   • montelukast (SINGULAIR) 10 MG tablet Take 10 mg by mouth daily.   • Triamcinolone Acetonide (NASACORT) 55 MCG/ACT nasal inhaler 2 sprays into each nostril Daily.   • acetaminophen (TYLENOL) 650 MG 8 hr tablet Take  by mouth daily.   • [DISCONTINUED] cetirizine (zyrTEC) 10 MG tablet Take 10 mg by mouth Daily.   • [DISCONTINUED] losartan (COZAAR) 50 MG tablet TAKE 1 TABLET DAILY   • [DISCONTINUED] triamcinolone (KENALOG) 0.1 % cream      No current facility-administered medications on file prior to visit.           HISTORY OF PRESENT ILLNESS     Was in the hospital for recurrent upper abdominal pain. CT showed chronic CBC dilatation with mildly distended gallbladder without evidence of gallstone or cholecystitis. Surgery evaluated and did not recommend surgery. Takes lansoprazole 30 mg for GERD. Prior EGD showed gastritis. Denies melena or bleeding. No ASA or NSAIDS. Currently better without pain.         REVIEW OF SYSTEMS     No angina or ANNE   No nausea/vomiting or diarrhea    neg       PHYSICAL EXAMINATION     Physical Exam  No acute distress   Cardiovascular: Normal rate, regular rhythm.   Abdomen: Soft and nontender. No palpable mass.       REVIEWED DATA     Labs:     Lab Results   Component Value Date     02/03/2021    K 4.2 02/03/2021    AST 53 (H) 02/03/2021    ALT 33 02/03/2021    BUN 14 02/03/2021    CREATININE 0.65 02/03/2021    CREATININE 0.97 02/02/2021    CREATININE 0.82 02/01/2021    EGFRIFNONA 84 02/03/2021    EGFRIFAFRI 84 03/02/2020       No results found for: HGBA1C    Lab Results   Component Value Date     (H) 03/02/2020     (H) 04/09/2019    HDL 57 03/02/2020    TRIG 97 03/02/2020       Lab Results   Component Value Date    TSH 0.988 03/02/2020    FREET4 1.33  03/02/2020       Lab Results   Component Value Date    WBC 7.18 02/03/2021    HGB 14.1 02/03/2021     (L) 02/03/2021     Lab Results   Component Value Date    LIPASE 21 02/01/2021        Imaging:     Ct Abdomen Pelvis With Contrast    Result Date: 2/1/2021  Narrative: CT ABDOMEN AND PELVIS WITH IV CONTRAST  HISTORY: Motor quadrant pain  TECHNIQUE: Radiation dose reduction techniques were utilized, including automated exposure control and exposure modulation based on body size. 3 mm images were obtained through the abdomen and pelvis after the administration of IV contrast.  COMPARISON: 10/16/2019  FINDINGS:  ABDOMEN: Stable mild fibrotic changes in the lung bases. Stable large cyst in the liver. Dilated common bile duct again noted, slightly increased up to 1.2 cm, previously about 0.9 cm. Gallbladder is mildly distended, similar to prior but no evidence of surrounding inflammatory change. Stable mild intrahepatic ductal dilatation. The spleen is unremarkable. Small cysts in the left kidney. The right kidney is unremarkable. The adrenal glands and pancreas are unremarkable.  PELVIS: Stable bladder diverticulum. Colonic diverticulosis, greatest in the sigmoid. There is some minimal fluid adjacent to the lower sigmoid on axial image 102 which may indicate early diverticulitis. Correlate clinically. No abscess. Bone windows show no acute osseous abnormality.      Impression: 1. Chronically dilated common bile duct again noted, slightly larger than on the previous study now measuring 1.2 cm, previously about 0.9 cm 2. Gallbladder mildly distended, similar to the previous study but no evidence of wall thickening or surrounding inflammation 3. Stable large cyst within the liver 4. Colonic diverticulosis. Minimal fluid adjacent to the lower sigmoid may indicate early diverticulitis. Correlate clinically. No evidence for abscess  Radiation dose reduction techniques were utilized, including automated exposure control  and exposure modulation based on body size.  This report was finalized on 2/1/2021 2:19 PM by Dr. Ousmane Milligan M.D.      Xr Chest 1 View    Result Date: 2/1/2021  Narrative: X-RAY CHEST ONE VIEW  HISTORY: 89-year-old female with right upper quadrant pain and cough.  FINDINGS: There are airspace opacities at both lung bases suspicious for pneumonia. There is blunting of the left costophrenic angle and a tiny effusion cannot be excluded. There is no evidence for CHF.  This report was finalized on 2/1/2021 12:50 PM by Dr. Sheyla Robles M.D.           Medical Tests:           Summary of old records / correspondence / consultant report:           Request outside records:             *Examiner was wearing KN95 mask, face shield and exam gloves during the entire duration of the visit. Patient was masked the entire time.   Minimum social distance of 6 ft maintained entire visit except if physical contact was necessary as documented.     **Dragon Disclaimer:   Much of this encounter note is an electronic transcription/translation of spoken language to printed text. The electronic translation of spoken language may permit erroneous, or at times, nonsensical words or phrases to be inadvertently transcribed. Although I have reviewed the note for such errors, some may still exist.     Template created by Clayton Salcido MD

## 2021-06-03 ENCOUNTER — APPOINTMENT (OUTPATIENT)
Dept: CT IMAGING | Facility: HOSPITAL | Age: 86
End: 2021-06-03

## 2021-06-03 ENCOUNTER — HOSPITAL ENCOUNTER (OUTPATIENT)
Facility: HOSPITAL | Age: 86
Discharge: HOME | End: 2021-06-07
Attending: EMERGENCY MEDICINE | Admitting: SURGERY

## 2021-06-03 ENCOUNTER — APPOINTMENT (OUTPATIENT)
Dept: GENERAL RADIOLOGY | Facility: HOSPITAL | Age: 86
End: 2021-06-03

## 2021-06-03 DIAGNOSIS — R74.8 ELEVATED LIVER ENZYMES: ICD-10-CM

## 2021-06-03 DIAGNOSIS — K81.9 CHOLECYSTITIS: ICD-10-CM

## 2021-06-03 DIAGNOSIS — K80.50 CHOLEDOCHOLITHIASIS: Primary | ICD-10-CM

## 2021-06-03 DIAGNOSIS — E78.5 DYSLIPIDEMIA: ICD-10-CM

## 2021-06-03 LAB
ALBUMIN SERPL-MCNC: 4.2 G/DL (ref 3.5–5.2)
ALBUMIN/GLOB SERPL: 1.8 G/DL
ALP SERPL-CCNC: 130 U/L (ref 39–117)
ALT SERPL W P-5'-P-CCNC: 39 U/L (ref 1–33)
ANION GAP SERPL CALCULATED.3IONS-SCNC: 7.4 MMOL/L (ref 5–15)
AST SERPL-CCNC: 120 U/L (ref 1–32)
BASOPHILS # BLD AUTO: 0.02 10*3/MM3 (ref 0–0.2)
BASOPHILS NFR BLD AUTO: 0.3 % (ref 0–1.5)
BILIRUB SERPL-MCNC: 1.3 MG/DL (ref 0–1.2)
BUN SERPL-MCNC: 15 MG/DL (ref 8–23)
BUN/CREAT SERPL: 20.3 (ref 7–25)
CALCIUM SPEC-SCNC: 9.4 MG/DL (ref 8.2–9.6)
CHLORIDE SERPL-SCNC: 102 MMOL/L (ref 98–107)
CO2 SERPL-SCNC: 31.6 MMOL/L (ref 22–29)
CREAT SERPL-MCNC: 0.74 MG/DL (ref 0.57–1)
DEPRECATED RDW RBC AUTO: 43.3 FL (ref 37–54)
EOSINOPHIL # BLD AUTO: 0.07 10*3/MM3 (ref 0–0.4)
EOSINOPHIL NFR BLD AUTO: 1.1 % (ref 0.3–6.2)
ERYTHROCYTE [DISTWIDTH] IN BLOOD BY AUTOMATED COUNT: 12.9 % (ref 12.3–15.4)
GFR SERPL CREATININE-BSD FRML MDRD: 72 ML/MIN/1.73
GLOBULIN UR ELPH-MCNC: 2.4 GM/DL
GLUCOSE SERPL-MCNC: 109 MG/DL (ref 65–99)
HCT VFR BLD AUTO: 44.8 % (ref 34–46.6)
HGB BLD-MCNC: 15.4 G/DL (ref 12–15.9)
LIPASE SERPL-CCNC: 15 U/L (ref 13–60)
LYMPHOCYTES # BLD AUTO: 0.79 10*3/MM3 (ref 0.7–3.1)
LYMPHOCYTES NFR BLD AUTO: 12.3 % (ref 19.6–45.3)
MCH RBC QN AUTO: 31.6 PG (ref 26.6–33)
MCHC RBC AUTO-ENTMCNC: 34.4 G/DL (ref 31.5–35.7)
MCV RBC AUTO: 91.8 FL (ref 79–97)
MONOCYTES # BLD AUTO: 0.43 10*3/MM3 (ref 0.1–0.9)
MONOCYTES NFR BLD AUTO: 6.7 % (ref 5–12)
NEUTROPHILS NFR BLD AUTO: 5.09 10*3/MM3 (ref 1.7–7)
NEUTROPHILS NFR BLD AUTO: 79.1 % (ref 42.7–76)
PLATELET # BLD AUTO: 142 10*3/MM3 (ref 140–450)
PMV BLD AUTO: 9.7 FL (ref 6–12)
POTASSIUM SERPL-SCNC: 3.7 MMOL/L (ref 3.5–5.2)
PROT SERPL-MCNC: 6.6 G/DL (ref 6–8.5)
RBC # BLD AUTO: 4.88 10*6/MM3 (ref 3.77–5.28)
SODIUM SERPL-SCNC: 141 MMOL/L (ref 136–145)
TROPONIN T SERPL-MCNC: <0.01 NG/ML (ref 0–0.03)
WBC # BLD AUTO: 6.43 10*3/MM3 (ref 3.4–10.8)

## 2021-06-03 PROCEDURE — G0378 HOSPITAL OBSERVATION PER HR: HCPCS

## 2021-06-03 PROCEDURE — 84484 ASSAY OF TROPONIN QUANT: CPT | Performed by: NURSE PRACTITIONER

## 2021-06-03 PROCEDURE — 85025 COMPLETE CBC W/AUTO DIFF WBC: CPT | Performed by: NURSE PRACTITIONER

## 2021-06-03 PROCEDURE — 25010000002 ONDANSETRON PER 1 MG: Performed by: NURSE PRACTITIONER

## 2021-06-03 PROCEDURE — 74177 CT ABD & PELVIS W/CONTRAST: CPT

## 2021-06-03 PROCEDURE — 93005 ELECTROCARDIOGRAM TRACING: CPT | Performed by: EMERGENCY MEDICINE

## 2021-06-03 PROCEDURE — 99284 EMERGENCY DEPT VISIT MOD MDM: CPT

## 2021-06-03 PROCEDURE — 96374 THER/PROPH/DIAG INJ IV PUSH: CPT

## 2021-06-03 PROCEDURE — 93010 ELECTROCARDIOGRAM REPORT: CPT | Performed by: INTERNAL MEDICINE

## 2021-06-03 PROCEDURE — 71045 X-RAY EXAM CHEST 1 VIEW: CPT

## 2021-06-03 PROCEDURE — 25010000002 IOPAMIDOL 61 % SOLUTION: Performed by: EMERGENCY MEDICINE

## 2021-06-03 PROCEDURE — 80053 COMPREHEN METABOLIC PANEL: CPT | Performed by: NURSE PRACTITIONER

## 2021-06-03 PROCEDURE — 83690 ASSAY OF LIPASE: CPT | Performed by: NURSE PRACTITIONER

## 2021-06-03 RX ORDER — SODIUM CHLORIDE 0.9 % (FLUSH) 0.9 %
10 SYRINGE (ML) INJECTION AS NEEDED
Status: DISCONTINUED | OUTPATIENT
Start: 2021-06-03 | End: 2021-06-07 | Stop reason: HOSPADM

## 2021-06-03 RX ORDER — ONDANSETRON 2 MG/ML
4 INJECTION INTRAMUSCULAR; INTRAVENOUS ONCE
Status: COMPLETED | OUTPATIENT
Start: 2021-06-03 | End: 2021-06-03

## 2021-06-03 RX ADMIN — SODIUM CHLORIDE 1000 ML: 9 INJECTION, SOLUTION INTRAVENOUS at 20:32

## 2021-06-03 RX ADMIN — IOPAMIDOL 85 ML: 612 INJECTION, SOLUTION INTRAVENOUS at 21:35

## 2021-06-03 RX ADMIN — ONDANSETRON 4 MG: 2 INJECTION INTRAMUSCULAR; INTRAVENOUS at 20:39

## 2021-06-04 ENCOUNTER — APPOINTMENT (OUTPATIENT)
Dept: ULTRASOUND IMAGING | Facility: HOSPITAL | Age: 86
End: 2021-06-04

## 2021-06-04 LAB
ALBUMIN SERPL-MCNC: 3.5 G/DL (ref 3.5–5.2)
ALP SERPL-CCNC: 150 U/L (ref 39–117)
ALT SERPL W P-5'-P-CCNC: 101 U/L (ref 1–33)
ANION GAP SERPL CALCULATED.3IONS-SCNC: 6.9 MMOL/L (ref 5–15)
AST SERPL-CCNC: 191 U/L (ref 1–32)
BILIRUB CONJ SERPL-MCNC: 0.3 MG/DL (ref 0–0.3)
BILIRUB INDIRECT SERPL-MCNC: 0.5 MG/DL
BILIRUB SERPL-MCNC: 0.8 MG/DL (ref 0–1.2)
BUN SERPL-MCNC: 14 MG/DL (ref 8–23)
BUN/CREAT SERPL: 20.6 (ref 7–25)
CALCIUM SPEC-SCNC: 8.6 MG/DL (ref 8.2–9.6)
CHLORIDE SERPL-SCNC: 105 MMOL/L (ref 98–107)
CO2 SERPL-SCNC: 28.1 MMOL/L (ref 22–29)
CREAT SERPL-MCNC: 0.68 MG/DL (ref 0.57–1)
DEPRECATED RDW RBC AUTO: 41.9 FL (ref 37–54)
ERYTHROCYTE [DISTWIDTH] IN BLOOD BY AUTOMATED COUNT: 12.8 % (ref 12.3–15.4)
GFR SERPL CREATININE-BSD FRML MDRD: 80 ML/MIN/1.73
GLUCOSE SERPL-MCNC: 75 MG/DL (ref 65–99)
HCT VFR BLD AUTO: 39.4 % (ref 34–46.6)
HGB BLD-MCNC: 13.5 G/DL (ref 12–15.9)
MCH RBC QN AUTO: 31.1 PG (ref 26.6–33)
MCHC RBC AUTO-ENTMCNC: 34.3 G/DL (ref 31.5–35.7)
MCV RBC AUTO: 90.8 FL (ref 79–97)
PLATELET # BLD AUTO: 131 10*3/MM3 (ref 140–450)
PMV BLD AUTO: 10.2 FL (ref 6–12)
POTASSIUM SERPL-SCNC: 3.7 MMOL/L (ref 3.5–5.2)
PROT SERPL-MCNC: 6.1 G/DL (ref 6–8.5)
QT INTERVAL: 432 MS
RBC # BLD AUTO: 4.34 10*6/MM3 (ref 3.77–5.28)
SARS-COV-2 ORF1AB RESP QL NAA+PROBE: NOT DETECTED
SODIUM SERPL-SCNC: 140 MMOL/L (ref 136–145)
WBC # BLD AUTO: 7.27 10*3/MM3 (ref 3.4–10.8)

## 2021-06-04 PROCEDURE — 96361 HYDRATE IV INFUSION ADD-ON: CPT

## 2021-06-04 PROCEDURE — 85027 COMPLETE CBC AUTOMATED: CPT | Performed by: NURSE PRACTITIONER

## 2021-06-04 PROCEDURE — G0378 HOSPITAL OBSERVATION PER HR: HCPCS

## 2021-06-04 PROCEDURE — 80076 HEPATIC FUNCTION PANEL: CPT | Performed by: INTERNAL MEDICINE

## 2021-06-04 PROCEDURE — U0004 COV-19 TEST NON-CDC HGH THRU: HCPCS | Performed by: EMERGENCY MEDICINE

## 2021-06-04 PROCEDURE — 96375 TX/PRO/DX INJ NEW DRUG ADDON: CPT

## 2021-06-04 PROCEDURE — 76705 ECHO EXAM OF ABDOMEN: CPT

## 2021-06-04 PROCEDURE — 80048 BASIC METABOLIC PNL TOTAL CA: CPT | Performed by: NURSE PRACTITIONER

## 2021-06-04 RX ORDER — ACETAMINOPHEN 160 MG/5ML
650 SOLUTION ORAL EVERY 4 HOURS PRN
Status: DISCONTINUED | OUTPATIENT
Start: 2021-06-04 | End: 2021-06-06 | Stop reason: HOSPADM

## 2021-06-04 RX ORDER — ONDANSETRON 2 MG/ML
4 INJECTION INTRAMUSCULAR; INTRAVENOUS EVERY 6 HOURS PRN
Status: DISCONTINUED | OUTPATIENT
Start: 2021-06-04 | End: 2021-06-06 | Stop reason: HOSPADM

## 2021-06-04 RX ORDER — LOSARTAN POTASSIUM 50 MG/1
50 TABLET ORAL DAILY
Status: DISCONTINUED | OUTPATIENT
Start: 2021-06-04 | End: 2021-06-07 | Stop reason: HOSPADM

## 2021-06-04 RX ORDER — SODIUM CHLORIDE 0.9 % (FLUSH) 0.9 %
10 SYRINGE (ML) INJECTION EVERY 12 HOURS SCHEDULED
Status: DISCONTINUED | OUTPATIENT
Start: 2021-06-04 | End: 2021-06-06 | Stop reason: HOSPADM

## 2021-06-04 RX ORDER — ONDANSETRON 4 MG/1
4 TABLET, FILM COATED ORAL EVERY 6 HOURS PRN
Status: DISCONTINUED | OUTPATIENT
Start: 2021-06-04 | End: 2021-06-06 | Stop reason: HOSPADM

## 2021-06-04 RX ORDER — MORPHINE SULFATE 2 MG/ML
2 INJECTION, SOLUTION INTRAMUSCULAR; INTRAVENOUS
Status: DISCONTINUED | OUTPATIENT
Start: 2021-06-04 | End: 2021-06-06 | Stop reason: HOSPADM

## 2021-06-04 RX ORDER — SODIUM CHLORIDE 0.9 % (FLUSH) 0.9 %
10 SYRINGE (ML) INJECTION AS NEEDED
Status: DISCONTINUED | OUTPATIENT
Start: 2021-06-04 | End: 2021-06-06 | Stop reason: HOSPADM

## 2021-06-04 RX ORDER — ACETAMINOPHEN 325 MG/1
650 TABLET ORAL EVERY 4 HOURS PRN
Status: DISCONTINUED | OUTPATIENT
Start: 2021-06-04 | End: 2021-06-07 | Stop reason: HOSPADM

## 2021-06-04 RX ORDER — SODIUM CHLORIDE 9 MG/ML
75 INJECTION, SOLUTION INTRAVENOUS CONTINUOUS
Status: DISCONTINUED | OUTPATIENT
Start: 2021-06-04 | End: 2021-06-05

## 2021-06-04 RX ORDER — FAMOTIDINE 10 MG/ML
20 INJECTION, SOLUTION INTRAVENOUS EVERY 12 HOURS SCHEDULED
Status: DISCONTINUED | OUTPATIENT
Start: 2021-06-04 | End: 2021-06-04

## 2021-06-04 RX ORDER — LEVOTHYROXINE SODIUM 0.05 MG/1
50 TABLET ORAL EVERY MORNING
Status: DISCONTINUED | OUTPATIENT
Start: 2021-06-04 | End: 2021-06-07 | Stop reason: HOSPADM

## 2021-06-04 RX ORDER — ACETAMINOPHEN 650 MG/1
650 SUPPOSITORY RECTAL EVERY 4 HOURS PRN
Status: DISCONTINUED | OUTPATIENT
Start: 2021-06-04 | End: 2021-06-06 | Stop reason: HOSPADM

## 2021-06-04 RX ORDER — FAMOTIDINE 10 MG/ML
20 INJECTION, SOLUTION INTRAVENOUS DAILY
Status: DISCONTINUED | OUTPATIENT
Start: 2021-06-05 | End: 2021-06-06 | Stop reason: HOSPADM

## 2021-06-04 RX ORDER — CALCIUM CARBONATE 200(500)MG
2 TABLET,CHEWABLE ORAL 2 TIMES DAILY PRN
Status: DISCONTINUED | OUTPATIENT
Start: 2021-06-04 | End: 2021-06-06 | Stop reason: HOSPADM

## 2021-06-04 RX ADMIN — LEVOTHYROXINE SODIUM 50 MCG: 0.05 TABLET ORAL at 13:06

## 2021-06-04 RX ADMIN — FAMOTIDINE 20 MG: 10 INJECTION INTRAVENOUS at 08:46

## 2021-06-04 RX ADMIN — SODIUM CHLORIDE 75 ML/HR: 9 INJECTION, SOLUTION INTRAVENOUS at 01:52

## 2021-06-04 RX ADMIN — LOSARTAN POTASSIUM 50 MG: 50 TABLET, FILM COATED ORAL at 13:06

## 2021-06-04 RX ADMIN — SODIUM CHLORIDE 75 ML/HR: 9 INJECTION, SOLUTION INTRAVENOUS at 17:58

## 2021-06-04 RX ADMIN — SODIUM CHLORIDE, PRESERVATIVE FREE 10 ML: 5 INJECTION INTRAVENOUS at 08:46

## 2021-06-04 RX ADMIN — SODIUM CHLORIDE, PRESERVATIVE FREE 10 ML: 5 INJECTION INTRAVENOUS at 01:52

## 2021-06-05 LAB
ALBUMIN SERPL-MCNC: 3 G/DL (ref 3.5–5.2)
ALBUMIN/GLOB SERPL: 1.3 G/DL
ALP SERPL-CCNC: 123 U/L (ref 39–117)
ALT SERPL W P-5'-P-CCNC: 57 U/L (ref 1–33)
ANION GAP SERPL CALCULATED.3IONS-SCNC: 9.8 MMOL/L (ref 5–15)
AST SERPL-CCNC: 75 U/L (ref 1–32)
BILIRUB SERPL-MCNC: 0.9 MG/DL (ref 0–1.2)
BUN SERPL-MCNC: 20 MG/DL (ref 8–23)
BUN/CREAT SERPL: 33.3 (ref 7–25)
CALCIUM SPEC-SCNC: 8.2 MG/DL (ref 8.2–9.6)
CHLORIDE SERPL-SCNC: 109 MMOL/L (ref 98–107)
CO2 SERPL-SCNC: 22.2 MMOL/L (ref 22–29)
CREAT SERPL-MCNC: 0.6 MG/DL (ref 0.57–1)
DEPRECATED RDW RBC AUTO: 42.9 FL (ref 37–54)
ERYTHROCYTE [DISTWIDTH] IN BLOOD BY AUTOMATED COUNT: 12.9 % (ref 12.3–15.4)
GFR SERPL CREATININE-BSD FRML MDRD: 92 ML/MIN/1.73
GLOBULIN UR ELPH-MCNC: 2.3 GM/DL
GLUCOSE BLDC GLUCOMTR-MCNC: 51 MG/DL (ref 70–130)
GLUCOSE BLDC GLUCOMTR-MCNC: 88 MG/DL (ref 70–130)
GLUCOSE SERPL-MCNC: 51 MG/DL (ref 65–99)
HCT VFR BLD AUTO: 37.6 % (ref 34–46.6)
HGB BLD-MCNC: 12.9 G/DL (ref 12–15.9)
INR PPP: 1.15 (ref 0.9–1.1)
MCH RBC QN AUTO: 31.4 PG (ref 26.6–33)
MCHC RBC AUTO-ENTMCNC: 34.3 G/DL (ref 31.5–35.7)
MCV RBC AUTO: 91.5 FL (ref 79–97)
PLATELET # BLD AUTO: 112 10*3/MM3 (ref 140–450)
PMV BLD AUTO: 10.2 FL (ref 6–12)
POTASSIUM SERPL-SCNC: 3.6 MMOL/L (ref 3.5–5.2)
PROT SERPL-MCNC: 5.3 G/DL (ref 6–8.5)
PROTHROMBIN TIME: 14.5 SECONDS (ref 11.7–14.2)
RBC # BLD AUTO: 4.11 10*6/MM3 (ref 3.77–5.28)
SODIUM SERPL-SCNC: 141 MMOL/L (ref 136–145)
WBC # BLD AUTO: 5.2 10*3/MM3 (ref 3.4–10.8)

## 2021-06-05 PROCEDURE — 96361 HYDRATE IV INFUSION ADD-ON: CPT

## 2021-06-05 PROCEDURE — 82962 GLUCOSE BLOOD TEST: CPT

## 2021-06-05 PROCEDURE — G0378 HOSPITAL OBSERVATION PER HR: HCPCS

## 2021-06-05 PROCEDURE — 85610 PROTHROMBIN TIME: CPT | Performed by: INTERNAL MEDICINE

## 2021-06-05 PROCEDURE — 99222 1ST HOSP IP/OBS MODERATE 55: CPT | Performed by: SURGERY

## 2021-06-05 PROCEDURE — 96376 TX/PRO/DX INJ SAME DRUG ADON: CPT

## 2021-06-05 PROCEDURE — 85027 COMPLETE CBC AUTOMATED: CPT | Performed by: INTERNAL MEDICINE

## 2021-06-05 PROCEDURE — 80053 COMPREHEN METABOLIC PANEL: CPT | Performed by: INTERNAL MEDICINE

## 2021-06-05 RX ORDER — DEXTROSE AND SODIUM CHLORIDE 5; .9 G/100ML; G/100ML
75 INJECTION, SOLUTION INTRAVENOUS CONTINUOUS
Status: DISCONTINUED | OUTPATIENT
Start: 2021-06-05 | End: 2021-06-05

## 2021-06-05 RX ADMIN — DEXTROSE AND SODIUM CHLORIDE 75 ML/HR: 5; 900 INJECTION, SOLUTION INTRAVENOUS at 08:30

## 2021-06-05 RX ADMIN — LEVOTHYROXINE SODIUM 50 MCG: 0.05 TABLET ORAL at 06:57

## 2021-06-05 RX ADMIN — LOSARTAN POTASSIUM 50 MG: 50 TABLET, FILM COATED ORAL at 08:30

## 2021-06-05 RX ADMIN — SODIUM CHLORIDE 75 ML/HR: 9 INJECTION, SOLUTION INTRAVENOUS at 06:57

## 2021-06-05 RX ADMIN — FAMOTIDINE 20 MG: 10 INJECTION INTRAVENOUS at 08:30

## 2021-06-05 RX ADMIN — SODIUM CHLORIDE, PRESERVATIVE FREE 10 ML: 5 INJECTION INTRAVENOUS at 20:23

## 2021-06-06 ENCOUNTER — ANESTHESIA (OUTPATIENT)
Dept: PERIOP | Facility: HOSPITAL | Age: 86
End: 2021-06-06

## 2021-06-06 ENCOUNTER — APPOINTMENT (OUTPATIENT)
Dept: GENERAL RADIOLOGY | Facility: HOSPITAL | Age: 86
End: 2021-06-06

## 2021-06-06 ENCOUNTER — ANESTHESIA EVENT (OUTPATIENT)
Dept: PERIOP | Facility: HOSPITAL | Age: 86
End: 2021-06-06

## 2021-06-06 LAB
ALBUMIN SERPL-MCNC: 3.3 G/DL (ref 3.5–5.2)
ALBUMIN/GLOB SERPL: 1.3 G/DL
ALP SERPL-CCNC: 129 U/L (ref 39–117)
ALT SERPL W P-5'-P-CCNC: 45 U/L (ref 1–33)
ANION GAP SERPL CALCULATED.3IONS-SCNC: 7.4 MMOL/L (ref 5–15)
AST SERPL-CCNC: 47 U/L (ref 1–32)
BILIRUB SERPL-MCNC: 0.6 MG/DL (ref 0–1.2)
BUN SERPL-MCNC: 19 MG/DL (ref 8–23)
BUN/CREAT SERPL: 31.1 (ref 7–25)
CALCIUM SPEC-SCNC: 8.5 MG/DL (ref 8.2–9.6)
CHLORIDE SERPL-SCNC: 108 MMOL/L (ref 98–107)
CO2 SERPL-SCNC: 25.6 MMOL/L (ref 22–29)
CREAT SERPL-MCNC: 0.61 MG/DL (ref 0.57–1)
DEPRECATED RDW RBC AUTO: 43 FL (ref 37–54)
ERYTHROCYTE [DISTWIDTH] IN BLOOD BY AUTOMATED COUNT: 13 % (ref 12.3–15.4)
GFR SERPL CREATININE-BSD FRML MDRD: 91 ML/MIN/1.73
GLOBULIN UR ELPH-MCNC: 2.5 GM/DL
GLUCOSE SERPL-MCNC: 83 MG/DL (ref 65–99)
HCT VFR BLD AUTO: 40.6 % (ref 34–46.6)
HGB BLD-MCNC: 13.7 G/DL (ref 12–15.9)
MCH RBC QN AUTO: 30.6 PG (ref 26.6–33)
MCHC RBC AUTO-ENTMCNC: 33.7 G/DL (ref 31.5–35.7)
MCV RBC AUTO: 90.8 FL (ref 79–97)
PLATELET # BLD AUTO: 126 10*3/MM3 (ref 140–450)
PMV BLD AUTO: 10.8 FL (ref 6–12)
POTASSIUM SERPL-SCNC: 3.6 MMOL/L (ref 3.5–5.2)
PROT SERPL-MCNC: 5.8 G/DL (ref 6–8.5)
RBC # BLD AUTO: 4.47 10*6/MM3 (ref 3.77–5.28)
SODIUM SERPL-SCNC: 141 MMOL/L (ref 136–145)
WBC # BLD AUTO: 5.57 10*3/MM3 (ref 3.4–10.8)

## 2021-06-06 PROCEDURE — C1889 IMPLANT/INSERT DEVICE, NOC: HCPCS | Performed by: SURGERY

## 2021-06-06 PROCEDURE — 80053 COMPREHEN METABOLIC PANEL: CPT | Performed by: NURSE PRACTITIONER

## 2021-06-06 PROCEDURE — 25010000002 PROPOFOL 10 MG/ML EMULSION: Performed by: NURSE ANESTHETIST, CERTIFIED REGISTERED

## 2021-06-06 PROCEDURE — 63710000001 LOSARTAN 50 MG TABLET: Performed by: SURGERY

## 2021-06-06 PROCEDURE — 63710000001 ACETAMINOPHEN 325 MG TABLET: Performed by: SURGERY

## 2021-06-06 PROCEDURE — 47564 LAPARO CHOLECYSTECTOMY/EXPLR: CPT | Performed by: SURGERY

## 2021-06-06 PROCEDURE — 25010000002 GLUCAGON (HUMAN RECOMBINANT) 1 MG RECONSTITUTED SOLUTION: Performed by: NURSE ANESTHETIST, CERTIFIED REGISTERED

## 2021-06-06 PROCEDURE — 74300 X-RAY BILE DUCTS/PANCREAS: CPT

## 2021-06-06 PROCEDURE — 25010000003 CEFAZOLIN IN DEXTROSE 2-4 GM/100ML-% SOLUTION: Performed by: SURGERY

## 2021-06-06 PROCEDURE — 25010000002 ONDANSETRON PER 1 MG: Performed by: NURSE ANESTHETIST, CERTIFIED REGISTERED

## 2021-06-06 PROCEDURE — 25010000002 HYDRALAZINE PER 20 MG: Performed by: NURSE ANESTHETIST, CERTIFIED REGISTERED

## 2021-06-06 PROCEDURE — 25010000002 FENTANYL CITRATE (PF) 50 MCG/ML SOLUTION: Performed by: NURSE ANESTHETIST, CERTIFIED REGISTERED

## 2021-06-06 PROCEDURE — G0378 HOSPITAL OBSERVATION PER HR: HCPCS

## 2021-06-06 PROCEDURE — 47550 BILE DUCT ENDOSCOPY ADD-ON: CPT | Performed by: SPECIALIST/TECHNOLOGIST, OTHER

## 2021-06-06 PROCEDURE — 88304 TISSUE EXAM BY PATHOLOGIST: CPT | Performed by: SURGERY

## 2021-06-06 PROCEDURE — 0 IOTHALAMATE 60 % SOLUTION: Performed by: SURGERY

## 2021-06-06 PROCEDURE — 63710000001 FAMOTIDINE 20 MG TABLET: Performed by: SURGERY

## 2021-06-06 PROCEDURE — 25010000003 CEFAZOLIN IN DEXTROSE 2-4 GM/100ML-% SOLUTION: Performed by: NURSE ANESTHETIST, CERTIFIED REGISTERED

## 2021-06-06 PROCEDURE — 47564 LAPARO CHOLECYSTECTOMY/EXPLR: CPT | Performed by: SPECIALIST/TECHNOLOGIST, OTHER

## 2021-06-06 PROCEDURE — 25010000002 ONDANSETRON PER 1 MG: Performed by: ANESTHESIOLOGY

## 2021-06-06 PROCEDURE — A9270 NON-COVERED ITEM OR SERVICE: HCPCS | Performed by: SURGERY

## 2021-06-06 PROCEDURE — 85027 COMPLETE CBC AUTOMATED: CPT | Performed by: NURSE PRACTITIONER

## 2021-06-06 PROCEDURE — 25010000002 FENTANYL CITRATE (PF) 50 MCG/ML SOLUTION: Performed by: ANESTHESIOLOGY

## 2021-06-06 PROCEDURE — 25010000002 DROPERIDOL PER 5 MG: Performed by: ANESTHESIOLOGY

## 2021-06-06 PROCEDURE — 47550 BILE DUCT ENDOSCOPY ADD-ON: CPT | Performed by: SURGERY

## 2021-06-06 PROCEDURE — 25010000002 NEOSTIGMINE 5 MG/10ML SOLUTION: Performed by: NURSE ANESTHETIST, CERTIFIED REGISTERED

## 2021-06-06 DEVICE — CLIP LIGAT VASC HORIZON TI MD/LG GRN 6CT: Type: IMPLANTABLE DEVICE | Site: ABDOMEN | Status: FUNCTIONAL

## 2021-06-06 RX ORDER — NEOSTIGMINE METHYLSULFATE 0.5 MG/ML
INJECTION, SOLUTION INTRAVENOUS AS NEEDED
Status: DISCONTINUED | OUTPATIENT
Start: 2021-06-06 | End: 2021-06-06 | Stop reason: SURG

## 2021-06-06 RX ORDER — SODIUM CHLORIDE, SODIUM LACTATE, POTASSIUM CHLORIDE, CALCIUM CHLORIDE 600; 310; 30; 20 MG/100ML; MG/100ML; MG/100ML; MG/100ML
INJECTION, SOLUTION INTRAVENOUS CONTINUOUS PRN
Status: DISCONTINUED | OUTPATIENT
Start: 2021-06-06 | End: 2021-06-06 | Stop reason: SURG

## 2021-06-06 RX ORDER — CEFAZOLIN SODIUM 2 G/100ML
INJECTION, SOLUTION INTRAVENOUS AS NEEDED
Status: DISCONTINUED | OUTPATIENT
Start: 2021-06-06 | End: 2021-06-06 | Stop reason: SURG

## 2021-06-06 RX ORDER — PROMETHAZINE HYDROCHLORIDE 25 MG/1
25 TABLET ORAL ONCE AS NEEDED
Status: DISCONTINUED | OUTPATIENT
Start: 2021-06-06 | End: 2021-06-06 | Stop reason: HOSPADM

## 2021-06-06 RX ORDER — HYDRALAZINE HYDROCHLORIDE 20 MG/ML
INJECTION INTRAMUSCULAR; INTRAVENOUS AS NEEDED
Status: DISCONTINUED | OUTPATIENT
Start: 2021-06-06 | End: 2021-06-06 | Stop reason: SURG

## 2021-06-06 RX ORDER — PROPOFOL 10 MG/ML
VIAL (ML) INTRAVENOUS AS NEEDED
Status: DISCONTINUED | OUTPATIENT
Start: 2021-06-06 | End: 2021-06-06 | Stop reason: SURG

## 2021-06-06 RX ORDER — HYDROCODONE BITARTRATE AND ACETAMINOPHEN 5; 325 MG/1; MG/1
2 TABLET ORAL EVERY 4 HOURS PRN
Status: DISCONTINUED | OUTPATIENT
Start: 2021-06-06 | End: 2021-06-07 | Stop reason: HOSPADM

## 2021-06-06 RX ORDER — NALBUPHINE HCL 10 MG/ML
10 AMPUL (ML) INJECTION EVERY 4 HOURS PRN
Status: DISCONTINUED | OUTPATIENT
Start: 2021-06-06 | End: 2021-06-06 | Stop reason: HOSPADM

## 2021-06-06 RX ORDER — HYDROMORPHONE HYDROCHLORIDE 1 MG/ML
0.25 INJECTION, SOLUTION INTRAMUSCULAR; INTRAVENOUS; SUBCUTANEOUS
Status: DISCONTINUED | OUTPATIENT
Start: 2021-06-06 | End: 2021-06-06 | Stop reason: HOSPADM

## 2021-06-06 RX ORDER — SODIUM CHLORIDE 9 MG/ML
100 INJECTION, SOLUTION INTRAVENOUS CONTINUOUS
Status: ACTIVE | OUTPATIENT
Start: 2021-06-06 | End: 2021-06-06

## 2021-06-06 RX ORDER — ROCURONIUM BROMIDE 10 MG/ML
INJECTION, SOLUTION INTRAVENOUS AS NEEDED
Status: DISCONTINUED | OUTPATIENT
Start: 2021-06-06 | End: 2021-06-06 | Stop reason: SURG

## 2021-06-06 RX ORDER — BUPIVACAINE HYDROCHLORIDE AND EPINEPHRINE 5; 5 MG/ML; UG/ML
INJECTION, SOLUTION PERINEURAL AS NEEDED
Status: DISCONTINUED | OUTPATIENT
Start: 2021-06-06 | End: 2021-06-06 | Stop reason: HOSPADM

## 2021-06-06 RX ORDER — HYDRALAZINE HYDROCHLORIDE 20 MG/ML
5 INJECTION INTRAMUSCULAR; INTRAVENOUS
Status: DISCONTINUED | OUTPATIENT
Start: 2021-06-06 | End: 2021-06-06 | Stop reason: HOSPADM

## 2021-06-06 RX ORDER — DROPERIDOL 2.5 MG/ML
0.62 INJECTION, SOLUTION INTRAMUSCULAR; INTRAVENOUS ONCE
Status: COMPLETED | OUTPATIENT
Start: 2021-06-06 | End: 2021-06-06

## 2021-06-06 RX ORDER — LIDOCAINE HYDROCHLORIDE 20 MG/ML
INJECTION, SOLUTION INFILTRATION; PERINEURAL AS NEEDED
Status: DISCONTINUED | OUTPATIENT
Start: 2021-06-06 | End: 2021-06-06 | Stop reason: SURG

## 2021-06-06 RX ORDER — ONDANSETRON 2 MG/ML
INJECTION INTRAMUSCULAR; INTRAVENOUS AS NEEDED
Status: DISCONTINUED | OUTPATIENT
Start: 2021-06-06 | End: 2021-06-06 | Stop reason: SURG

## 2021-06-06 RX ORDER — NALOXONE HCL 0.4 MG/ML
0.4 VIAL (ML) INJECTION AS NEEDED
Status: DISCONTINUED | OUTPATIENT
Start: 2021-06-06 | End: 2021-06-06 | Stop reason: HOSPADM

## 2021-06-06 RX ORDER — NALOXONE HCL 0.4 MG/ML
0.1 VIAL (ML) INJECTION
Status: DISCONTINUED | OUTPATIENT
Start: 2021-06-06 | End: 2021-06-07 | Stop reason: HOSPADM

## 2021-06-06 RX ORDER — NALBUPHINE HCL 10 MG/ML
2 AMPUL (ML) INJECTION EVERY 4 HOURS PRN
Status: DISCONTINUED | OUTPATIENT
Start: 2021-06-06 | End: 2021-06-06 | Stop reason: HOSPADM

## 2021-06-06 RX ORDER — MAGNESIUM HYDROXIDE 1200 MG/15ML
LIQUID ORAL AS NEEDED
Status: DISCONTINUED | OUTPATIENT
Start: 2021-06-06 | End: 2021-06-06 | Stop reason: HOSPADM

## 2021-06-06 RX ORDER — FAMOTIDINE 20 MG/1
20 TABLET, FILM COATED ORAL
Status: DISCONTINUED | OUTPATIENT
Start: 2021-06-06 | End: 2021-06-07 | Stop reason: HOSPADM

## 2021-06-06 RX ORDER — ONDANSETRON 2 MG/ML
4 INJECTION INTRAMUSCULAR; INTRAVENOUS ONCE AS NEEDED
Status: COMPLETED | OUTPATIENT
Start: 2021-06-06 | End: 2021-06-06

## 2021-06-06 RX ORDER — FENTANYL CITRATE 50 UG/ML
INJECTION, SOLUTION INTRAMUSCULAR; INTRAVENOUS AS NEEDED
Status: DISCONTINUED | OUTPATIENT
Start: 2021-06-06 | End: 2021-06-06 | Stop reason: SURG

## 2021-06-06 RX ORDER — ONDANSETRON 2 MG/ML
4 INJECTION INTRAMUSCULAR; INTRAVENOUS EVERY 6 HOURS PRN
Status: DISCONTINUED | OUTPATIENT
Start: 2021-06-06 | End: 2021-06-07 | Stop reason: HOSPADM

## 2021-06-06 RX ORDER — FENTANYL CITRATE 50 UG/ML
50 INJECTION, SOLUTION INTRAMUSCULAR; INTRAVENOUS
Status: DISCONTINUED | OUTPATIENT
Start: 2021-06-06 | End: 2021-06-06 | Stop reason: HOSPADM

## 2021-06-06 RX ORDER — SODIUM CHLORIDE 9 MG/ML
INJECTION, SOLUTION INTRAVENOUS AS NEEDED
Status: DISCONTINUED | OUTPATIENT
Start: 2021-06-06 | End: 2021-06-06 | Stop reason: HOSPADM

## 2021-06-06 RX ORDER — CEFAZOLIN SODIUM 2 G/100ML
2 INJECTION, SOLUTION INTRAVENOUS EVERY 8 HOURS
Status: COMPLETED | OUTPATIENT
Start: 2021-06-06 | End: 2021-06-07

## 2021-06-06 RX ORDER — HYDROMORPHONE HYDROCHLORIDE 1 MG/ML
0.5 INJECTION, SOLUTION INTRAMUSCULAR; INTRAVENOUS; SUBCUTANEOUS
Status: DISCONTINUED | OUTPATIENT
Start: 2021-06-06 | End: 2021-06-07 | Stop reason: HOSPADM

## 2021-06-06 RX ORDER — FENTANYL CITRATE 50 UG/ML
25 INJECTION, SOLUTION INTRAMUSCULAR; INTRAVENOUS
Status: DISCONTINUED | OUTPATIENT
Start: 2021-06-06 | End: 2021-06-06 | Stop reason: HOSPADM

## 2021-06-06 RX ORDER — GLYCOPYRROLATE 0.2 MG/ML
INJECTION INTRAMUSCULAR; INTRAVENOUS AS NEEDED
Status: DISCONTINUED | OUTPATIENT
Start: 2021-06-06 | End: 2021-06-06 | Stop reason: SURG

## 2021-06-06 RX ORDER — ESMOLOL HYDROCHLORIDE 10 MG/ML
INJECTION INTRAVENOUS AS NEEDED
Status: DISCONTINUED | OUTPATIENT
Start: 2021-06-06 | End: 2021-06-06 | Stop reason: SURG

## 2021-06-06 RX ORDER — DIPHENHYDRAMINE HYDROCHLORIDE 50 MG/ML
12.5 INJECTION INTRAMUSCULAR; INTRAVENOUS
Status: DISCONTINUED | OUTPATIENT
Start: 2021-06-06 | End: 2021-06-06 | Stop reason: HOSPADM

## 2021-06-06 RX ADMIN — FAMOTIDINE 20 MG: 20 TABLET, FILM COATED ORAL at 14:20

## 2021-06-06 RX ADMIN — DROPERIDOL 0.62 MG: 2.5 INJECTION, SOLUTION INTRAMUSCULAR; INTRAVENOUS at 09:40

## 2021-06-06 RX ADMIN — FENTANYL CITRATE 25 MCG: 50 INJECTION INTRAMUSCULAR; INTRAVENOUS at 09:22

## 2021-06-06 RX ADMIN — FENTANYL CITRATE 25 MCG: 50 INJECTION INTRAMUSCULAR; INTRAVENOUS at 08:54

## 2021-06-06 RX ADMIN — NEOSTIGMINE METHYLSULFATE 2 MG: 0.5 INJECTION INTRAVENOUS at 09:10

## 2021-06-06 RX ADMIN — ESMOLOL HYDROCHLORIDE 10 MG: 100 INJECTION, SOLUTION INTRAVENOUS at 08:24

## 2021-06-06 RX ADMIN — LIDOCAINE HYDROCHLORIDE 60 MG: 20 INJECTION, SOLUTION INFILTRATION; PERINEURAL at 07:58

## 2021-06-06 RX ADMIN — CEFAZOLIN SODIUM 2 G: 2 INJECTION, SOLUTION INTRAVENOUS at 15:20

## 2021-06-06 RX ADMIN — CEFAZOLIN SODIUM 2 G: 2 INJECTION, SOLUTION INTRAVENOUS at 08:28

## 2021-06-06 RX ADMIN — PROPOFOL 50 MG: 10 INJECTION, EMULSION INTRAVENOUS at 08:21

## 2021-06-06 RX ADMIN — GLYCOPYRROLATE 0.4 MG: 0.2 INJECTION INTRAMUSCULAR; INTRAVENOUS at 09:10

## 2021-06-06 RX ADMIN — PROPOFOL 100 MG: 10 INJECTION, EMULSION INTRAVENOUS at 07:58

## 2021-06-06 RX ADMIN — GLUCAGON HYDROCHLORIDE 1 MG: 1 INJECTION, POWDER, FOR SOLUTION INTRAMUSCULAR; INTRAVENOUS; SUBCUTANEOUS at 08:19

## 2021-06-06 RX ADMIN — ROCURONIUM BROMIDE 30 MG: 50 INJECTION INTRAVENOUS at 07:58

## 2021-06-06 RX ADMIN — FENTANYL CITRATE 50 MCG: 50 INJECTION, SOLUTION INTRAMUSCULAR; INTRAVENOUS at 09:45

## 2021-06-06 RX ADMIN — SODIUM CHLORIDE 100 ML/HR: 9 INJECTION, SOLUTION INTRAVENOUS at 10:38

## 2021-06-06 RX ADMIN — ACETAMINOPHEN 650 MG: 325 TABLET, FILM COATED ORAL at 14:20

## 2021-06-06 RX ADMIN — HYDRALAZINE HYDROCHLORIDE 5 MG: 20 INJECTION, SOLUTION INTRAMUSCULAR; INTRAVENOUS at 09:11

## 2021-06-06 RX ADMIN — LOSARTAN POTASSIUM 50 MG: 50 TABLET, FILM COATED ORAL at 14:20

## 2021-06-06 RX ADMIN — ONDANSETRON 4 MG: 2 INJECTION INTRAMUSCULAR; INTRAVENOUS at 09:10

## 2021-06-06 RX ADMIN — SODIUM CHLORIDE, POTASSIUM CHLORIDE, SODIUM LACTATE AND CALCIUM CHLORIDE: 600; 310; 30; 20 INJECTION, SOLUTION INTRAVENOUS at 07:51

## 2021-06-06 RX ADMIN — FENTANYL CITRATE 25 MCG: 50 INJECTION, SOLUTION INTRAMUSCULAR; INTRAVENOUS at 07:35

## 2021-06-06 RX ADMIN — FENTANYL CITRATE 50 MCG: 50 INJECTION INTRAMUSCULAR; INTRAVENOUS at 08:20

## 2021-06-06 RX ADMIN — ONDANSETRON 4 MG: 2 INJECTION INTRAMUSCULAR; INTRAVENOUS at 09:35

## 2021-06-06 NOTE — ANESTHESIA PROCEDURE NOTES
Airway  Urgency: elective    Date/Time: 6/6/2021 8:02 AM  Airway not difficult    General Information and Staff    Patient location during procedure: OR  Anesthesiologist: Braden Pickens MD  CRNA: Federico Zapata CRNA    Indications and Patient Condition  Indications for airway management: airway protection    Preoxygenated: yes  MILS maintained throughout  Mask difficulty assessment: 1 - vent by mask    Final Airway Details  Final airway type: endotracheal airway      Successful airway: ETT  Cuffed: yes   Successful intubation technique: direct laryngoscopy  Facilitating devices/methods: intubating stylet  Endotracheal tube insertion site: oral  Blade: Fontenot  Blade size: 2  Cormack-Lehane Classification: grade I - full view of glottis  Placement verified by: chest auscultation   Cuff volume (mL): 6  Measured from: lips  ETT/EBT  to lips (cm): 20  Number of attempts at approach: 1  Assessment: lips, teeth, and gum same as pre-op and atraumatic intubation

## 2021-06-06 NOTE — ANESTHESIA POSTPROCEDURE EVALUATION
"Patient: Maria Fernanda Gaviria    Procedure Summary     Date: 06/06/21 Room / Location: [facility] OR [address] [place] [address] MAIN OR    Anesthesia Start: 0751 Anesthesia Stop: 0926    Procedure: CHOLECYSTECTOMY LAPAROSCOPIC INTRAOPERATIVE CHOLANGIOGRAM common bile duct exploration (N/A Abdomen) Diagnosis:     Surgeons: Huey Wilkinson MD Provider: Celio, Braden Rosenberg MD    Anesthesia Type: general ASA Status: 3          Anesthesia Type: general    Vitals  Vitals Value Taken Time   /75 06/06/21 1000   Temp 36.4 °C (97.5 °F) 06/06/21 1000   Pulse 99 06/06/21 1006   Resp 14 06/06/21 1000   SpO2 93 % 06/06/21 1008   Vitals shown include unvalidated device data.        Post Anesthesia Care and Evaluation    Patient location during evaluation: bedside  Patient participation: complete - patient participated  Level of consciousness: awake and alert  Pain management: adequate  Airway patency: patent  Anesthetic complications: No anesthetic complications    Cardiovascular status: acceptable  Respiratory status: acceptable  Hydration status: acceptable    Comments: /67 (BP Location: Left arm, Patient Position: Lying)   Pulse 109   Temp 36.4 °C (97.5 °F) (Oral)   Resp 16   Ht 149.9 cm (59\")   Wt 47.5 kg (104 lb 11.2 oz)   SpO2 93%   BMI 21.15 kg/m²       "

## 2021-06-06 NOTE — ANESTHESIA PREPROCEDURE EVALUATION
Anesthesia Evaluation     NPO Solid Status: > 8 hours             Airway   Mallampati: II  TM distance: >3 FB  Neck ROM: full  No difficulty expected  Dental - normal exam     Pulmonary - normal exam   (+) a smoker Former, asthma,  Cardiovascular - normal exam    ECG reviewed    (+) hypertension,       Neuro/Psych  GI/Hepatic/Renal/Endo    (+)  GERD,      Musculoskeletal     Abdominal    Substance History      OB/GYN          Other                        Anesthesia Plan    ASA 3     general   (  D/W R&B of GA including but not limited to: heart, lung, liver, kidney, neurologic problems, positioning injuries, dental damage, corneal abrasion and TMJ.  .)  intravenous induction     Anesthetic plan, all risks, benefits, and alternatives have been provided, discussed and informed consent has been obtained with: patient.

## 2021-06-07 ENCOUNTER — READMISSION MANAGEMENT (OUTPATIENT)
Dept: CALL CENTER | Facility: HOSPITAL | Age: 86
End: 2021-06-07

## 2021-06-07 VITALS
RESPIRATION RATE: 16 BRPM | HEIGHT: 59 IN | HEART RATE: 65 BPM | DIASTOLIC BLOOD PRESSURE: 76 MMHG | TEMPERATURE: 96.9 F | OXYGEN SATURATION: 94 % | WEIGHT: 104.7 LBS | BODY MASS INDEX: 21.11 KG/M2 | SYSTOLIC BLOOD PRESSURE: 125 MMHG

## 2021-06-07 LAB
ALBUMIN SERPL-MCNC: 3.2 G/DL (ref 3.5–5.2)
ALBUMIN/GLOB SERPL: 1.3 G/DL
ALP SERPL-CCNC: 195 U/L (ref 39–117)
ALT SERPL W P-5'-P-CCNC: 198 U/L (ref 1–33)
ANION GAP SERPL CALCULATED.3IONS-SCNC: 11 MMOL/L (ref 5–15)
AST SERPL-CCNC: 459 U/L (ref 1–32)
BILIRUB SERPL-MCNC: 3.5 MG/DL (ref 0–1.2)
BUN SERPL-MCNC: 14 MG/DL (ref 8–23)
BUN/CREAT SERPL: 16.5 (ref 7–25)
CALCIUM SPEC-SCNC: 8.2 MG/DL (ref 8.2–9.6)
CHLORIDE SERPL-SCNC: 101 MMOL/L (ref 98–107)
CO2 SERPL-SCNC: 23 MMOL/L (ref 22–29)
CREAT SERPL-MCNC: 0.85 MG/DL (ref 0.57–1)
DEPRECATED RDW RBC AUTO: 42 FL (ref 37–54)
ERYTHROCYTE [DISTWIDTH] IN BLOOD BY AUTOMATED COUNT: 13 % (ref 12.3–15.4)
GFR SERPL CREATININE-BSD FRML MDRD: 62 ML/MIN/1.73
GLOBULIN UR ELPH-MCNC: 2.4 GM/DL
GLUCOSE SERPL-MCNC: 93 MG/DL (ref 65–99)
HCT VFR BLD AUTO: 38.5 % (ref 34–46.6)
HGB BLD-MCNC: 13.5 G/DL (ref 12–15.9)
MCH RBC QN AUTO: 31 PG (ref 26.6–33)
MCHC RBC AUTO-ENTMCNC: 35.1 G/DL (ref 31.5–35.7)
MCV RBC AUTO: 88.5 FL (ref 79–97)
PLATELET # BLD AUTO: 120 10*3/MM3 (ref 140–450)
PMV BLD AUTO: 10.7 FL (ref 6–12)
POTASSIUM SERPL-SCNC: 3.4 MMOL/L (ref 3.5–5.2)
PROT SERPL-MCNC: 5.6 G/DL (ref 6–8.5)
RBC # BLD AUTO: 4.35 10*6/MM3 (ref 3.77–5.28)
SODIUM SERPL-SCNC: 135 MMOL/L (ref 136–145)
WBC # BLD AUTO: 13.82 10*3/MM3 (ref 3.4–10.8)

## 2021-06-07 PROCEDURE — A9270 NON-COVERED ITEM OR SERVICE: HCPCS | Performed by: SURGERY

## 2021-06-07 PROCEDURE — 63710000001 ACETAMINOPHEN 325 MG TABLET: Performed by: SURGERY

## 2021-06-07 PROCEDURE — 63710000001 LEVOTHYROXINE 50 MCG TABLET: Performed by: SURGERY

## 2021-06-07 PROCEDURE — 80053 COMPREHEN METABOLIC PANEL: CPT | Performed by: SURGERY

## 2021-06-07 PROCEDURE — 63710000001 HYDROCODONE-ACETAMINOPHEN 5-325 MG TABLET: Performed by: SURGERY

## 2021-06-07 PROCEDURE — 63710000001 LOSARTAN 50 MG TABLET: Performed by: SURGERY

## 2021-06-07 PROCEDURE — 25010000003 CEFAZOLIN IN DEXTROSE 2-4 GM/100ML-% SOLUTION: Performed by: SURGERY

## 2021-06-07 PROCEDURE — 63710000001 FAMOTIDINE 20 MG TABLET: Performed by: SURGERY

## 2021-06-07 PROCEDURE — 85027 COMPLETE CBC AUTOMATED: CPT | Performed by: SURGERY

## 2021-06-07 PROCEDURE — 25010000002 ENOXAPARIN PER 10 MG: Performed by: SURGERY

## 2021-06-07 PROCEDURE — G0378 HOSPITAL OBSERVATION PER HR: HCPCS

## 2021-06-07 RX ORDER — HYDROCODONE BITARTRATE AND ACETAMINOPHEN 5; 325 MG/1; MG/1
1-2 TABLET ORAL EVERY 6 HOURS PRN
Qty: 24 TABLET | Refills: 0 | Status: SHIPPED | OUTPATIENT
Start: 2021-06-07 | End: 2021-06-17

## 2021-06-07 RX ORDER — ATORVASTATIN CALCIUM 20 MG/1
20 TABLET, FILM COATED ORAL DAILY
Qty: 90 TABLET | Refills: 3
Start: 2021-06-07 | End: 2022-06-07 | Stop reason: SDUPTHER

## 2021-06-07 RX ORDER — AMOXICILLIN 250 MG
2 CAPSULE ORAL DAILY PRN
Qty: 15 TABLET | Refills: 0 | Status: SHIPPED | OUTPATIENT
Start: 2021-06-07 | End: 2021-07-05

## 2021-06-07 RX ORDER — FAMOTIDINE 20 MG/1
20 TABLET, FILM COATED ORAL 2 TIMES DAILY PRN
Qty: 60 TABLET | Refills: 0 | Status: SHIPPED | OUTPATIENT
Start: 2021-06-07 | End: 2021-07-05

## 2021-06-07 RX ADMIN — FAMOTIDINE 20 MG: 20 TABLET, FILM COATED ORAL at 06:07

## 2021-06-07 RX ADMIN — LEVOTHYROXINE SODIUM 50 MCG: 0.05 TABLET ORAL at 06:07

## 2021-06-07 RX ADMIN — ACETAMINOPHEN 650 MG: 325 TABLET, FILM COATED ORAL at 01:34

## 2021-06-07 RX ADMIN — CEFAZOLIN SODIUM 2 G: 2 INJECTION, SOLUTION INTRAVENOUS at 00:53

## 2021-06-07 RX ADMIN — ENOXAPARIN SODIUM 30 MG: 30 INJECTION SUBCUTANEOUS at 08:21

## 2021-06-07 RX ADMIN — HYDROCODONE BITARTRATE AND ACETAMINOPHEN 2 TABLET: 5; 325 TABLET ORAL at 05:10

## 2021-06-07 RX ADMIN — LOSARTAN POTASSIUM 50 MG: 50 TABLET, FILM COATED ORAL at 08:21

## 2021-06-08 ENCOUNTER — TRANSITIONAL CARE MANAGEMENT TELEPHONE ENCOUNTER (OUTPATIENT)
Dept: CALL CENTER | Facility: HOSPITAL | Age: 86
End: 2021-06-08

## 2021-06-08 LAB
LAB AP CASE REPORT: NORMAL
PATH REPORT.FINAL DX SPEC: NORMAL
PATH REPORT.GROSS SPEC: NORMAL

## 2021-06-08 NOTE — OUTREACH NOTE
Prep Survey      Responses   [facility] facility patient discharged from?  [facility]   Is LACE score < 7 ?  No   Emergency Room discharge w/ pulse ox?  No   Eligibility  [facility]   Date of Admission  06/03/21   Date of Discharge  06/07/21   Discharge Disposition  Home or Self Care   Discharge diagnosis  Lap sheila   Does the patient have one of the following disease processes/diagnoses(primary or secondary)?  General Surgery   Does the patient have Home health ordered?  No   Is there a DME ordered?  No   Prep survey completed?  Yes          Belén Milligan RN

## 2021-06-08 NOTE — OUTREACH NOTE
Call Center TCM Note      Responses   [facility] patient discharged from?  [place]   Does the patient have one of the following disease processes/diagnoses(primary or secondary)?  General Surgery   TCM attempt successful?  Yes   Call start time  0849   Call end time  0901   Discharge diagnosis  Monica sosa   Is patient permission given to speak with other caregiver?  Yes   Person spoke with today (if not patient) and relationship  Daughter and patient   Meds reviewed with patient/caregiver?  Yes   Is the patient having any side effects they believe may be caused by any medication additions or changes?  No   Does the patient have all medications related to this admission filled (includes all antibiotics, pain medications, etc.)  Yes   Is the patient taking all medications as directed (includes completed medication regime)?  Yes   Medication comments  States has not needed pepcid or the pain medication   Does the patient have a follow up appointment scheduled with their surgeon?  Yes   Has the patient kept scheduled appointments due by today?  N/A   Comments  Has appt with Dr. Salcido on 6/14, ortho on 6/15 and surgeon on 6/17   Has home health visited the patient within 72 hours of discharge?  N/A   Psychosocial issues?  No   Did the patient receive a copy of their discharge instructions?  Yes   Nursing interventions  Reviewed instructions with patient   What is the patient's perception of their health status since discharge?  Improving   Nursing interventions  Nurse provided patient education   Is the patient /caregiver able to teach back basic post-op care?  Keep incision areas clean,dry and protected, Take showers only when approved by MD-sponge bathe until then, Lifting as instructed by MD in discharge instructions   Is the patient/caregiver able to teach back signs and symptoms of incisional infection?  Increased drainage or bleeding, Increased redness, swelling or pain at the incisonal site, Incisional  warmth, Pus or odor from incision, Fever   Is the patient/caregiver able to teach back steps to recovery at home?  Set small, achievable goals for return to baseline health, Rest and rebuild strength, gradually increase activity, Eat a well-balance diet, Make a list of questions for surgeon's appointment   If the patient is a current smoker, are they able to teach back resources for cessation?  Not a smoker   Is the patient/caregiver able to teach back the hierarchy of who to call/visit for symptoms/problems? PCP, Specialist, Home health nurse, Urgent Care, ED, 911  Yes   Additional teach back comments  States she is doing well and does not feel like she had surgery and is ready for breakfast.  Daughter states pt got up early and got herself dressed and made her bed.  States there are 4 daughters that help take care of her and take her to appts.     TCM call completed?  Yes   Wrap up additional comments  Denies questions or needs at this time          Carmelita Chan LPN    6/8/2021, 09:04 EDT

## 2021-06-14 ENCOUNTER — OFFICE VISIT (OUTPATIENT)
Dept: INTERNAL MEDICINE | Age: 86
End: 2021-06-14

## 2021-06-14 VITALS
SYSTOLIC BLOOD PRESSURE: 154 MMHG | OXYGEN SATURATION: 98 % | HEIGHT: 59 IN | HEART RATE: 90 BPM | TEMPERATURE: 98 F | DIASTOLIC BLOOD PRESSURE: 76 MMHG | BODY MASS INDEX: 21.17 KG/M2 | WEIGHT: 105 LBS

## 2021-06-14 DIAGNOSIS — I10 ESSENTIAL HYPERTENSION: Chronic | ICD-10-CM

## 2021-06-14 DIAGNOSIS — K80.44 CHOLEDOCHOLITHIASIS WITH CHRONIC CHOLECYSTITIS: Primary | ICD-10-CM

## 2021-06-14 DIAGNOSIS — M54.2 NECK PAIN ON RIGHT SIDE: ICD-10-CM

## 2021-06-14 PROCEDURE — 99496 TRANSJ CARE MGMT HIGH F2F 7D: CPT | Performed by: INTERNAL MEDICINE

## 2021-06-14 PROCEDURE — 1111F DSCHRG MED/CURRENT MED MERGE: CPT | Performed by: INTERNAL MEDICINE

## 2021-06-14 RX ORDER — IBUPROFEN 200 MG
200 TABLET ORAL EVERY 6 HOURS PRN
COMMUNITY
End: 2021-06-14

## 2021-06-14 RX ORDER — ACETAMINOPHEN 500 MG
500 TABLET ORAL EVERY 6 HOURS PRN
COMMUNITY
Start: 2021-06-14

## 2021-06-14 NOTE — PROGRESS NOTES
"    I N T E R N A L  M E D I C I N E  J U N O H  K I M,  M D      ENCOUNTER DATE:  06/14/2021    Maria Fernanda Gaviria / 89 y.o. / female        CC:   (Transitional Care Follow Up Visit)  Transitional CARE- Choledocholithiasis (6/3/2021 - 6/7/2021 )        Within 48 business hours after discharge our office contacted him via telephone to coordinate his care and needs.      I reviewed and discussed the details of that call along with the discharge summary, hospital problems, inpatient lab results, inpatient diagnostic studies, and consultation reports with the patient.     Date of TCM Phone Call 5/28/2021   [facility]   Date of Admission 5/26/2021   Date of Discharge 5/28/2021   Discharge Disposition Home or Self Care     Risk for Readmission (LACE) Score: 5 (5/28/2021  6:01 AM)      VITALS    Visit Vitals  /76 (BP Location: Left arm)   Pulse 90   Temp 98 °F (36.7 °C)   Ht 149.9 cm (59\")   Wt 47.6 kg (105 lb)   SpO2 98%   BMI 21.21 kg/m²       BP Readings from Last 3 Encounters:   06/14/21 154/76   06/07/21 125/76   03/02/21 120/70     Wt Readings from Last 3 Encounters:   06/14/21 47.6 kg (105 lb)   06/03/21 47.5 kg (104 lb 11.2 oz)   03/02/21 47.2 kg (104 lb)      Body mass index is 21.21 kg/m².    HPI:     Date of admission/discharge: As noted above in CC  Hospital: [facility]  Principle Dx: Chronic cholecystitis with choledocholithiasis   Secondary Dx:   History prior to hospitalization: Presented with abdominal pain  Evaluation/Treatment: US and CT abdomen confirmed choledocholithiasis. She underwent laparoscopic cholecystectomy with cholangiogram, choledochoscopy, laparoscopic common bile duct exploration with stone extraction on 6/6/21.   Course: Denies fever, worsening abdominal pain, appetite is getting better, denies postprandial nausea/vomiting, pain or severe diarrhea. Does complains of right side neck pain without radiculopathy. Denies angina or ANNE.     Patient Care " Team:  Henok Salcido MD as PCP - General  Christiano Dewitt MD as Consulting Physician (Obstetrics and Gynecology)  Rodolfo Shah MD as Consulting Physician (Allergy)  Michael Mendoza MD as Consulting Physician (Dermatology)  Jluis Bain MD as Consulting Physician (Otolaryngology)  Lucas Rueda OD (Optometry)  Ousmane Rhodes MD (Inactive) as Consulting Physician (Gastroenterology)  ____________________________________________________________________    ASSESSMENT & PLAN:    1. Choledocholithiasis with chronic cholecystitis    2. Neck pain on right side    3. Essential hypertension      No orders of the defined types were placed in this encounter.      Summary/Discussion:  • Routine postop course.   • Advised to avoid oily/fatty food for now.   • Has follow-up to see general surgery.   • Heat/tylenol/neck stretching for neck pain/spasm. Educational handout given on neck exercise.   • Avoid ibuprofen. Take Tylenol PRN if needed.   • Blood pressure remains stable. Continue losartan 50 mg qd.     Return in about 3 months (around 9/14/2021) for Reassess chronic medical problems.    ____________________________________________________________________    REVIEW OF SYSTEMS    Review of Systems   Constitutional: Negative for fever.   Respiratory: Negative for cough and shortness of breath.    Cardiovascular: Negative for chest pain, palpitations and leg swelling.   Gastrointestinal: Negative for abdominal distention, blood in stool, diarrhea, nausea and vomiting. Abdominal pain: no worsening    Genitourinary: Negative for difficulty urinating, dysuria, flank pain and frequency.   Musculoskeletal: Positive for neck pain.   Skin: Negative for color change.         PHYSICAL EXAMINATION    Physical Exam  Constitutional:       General: She is not in acute distress.     Appearance: She is not ill-appearing.   Eyes:      General: No scleral icterus.  Cardiovascular:      Rate and Rhythm: Normal rate and regular  rhythm.   Pulmonary:      Effort: Pulmonary effort is normal.      Breath sounds: Normal breath sounds. No wheezing or rales.   Abdominal:      Palpations: Abdomen is soft.      Tenderness: There is no guarding.      Comments: Surgical sites appear intact/clean   Skin:     Coloration: Skin is not jaundiced.   Neurological:      Mental Status: She is alert.           REVIEWED DATA:    Labs:     1.  Gallbladder, Cholecystectomy:  Benign gallbladder with               A.  Chronic cholecystitis.               B.  Cholelithiasis.        Lab Results   Component Value Date     (L) 06/07/2021    K 3.4 (L) 06/07/2021    CALCIUM 8.2 06/07/2021     (H) 06/07/2021     (H) 06/07/2021    BUN 14 06/07/2021    CREATININE 0.85 06/07/2021    CREATININE 0.61 06/06/2021    CREATININE 0.60 06/05/2021    EGFRIFNONA 62 06/07/2021    EGFRIFAFRI 84 03/02/2020       Lab Results   Component Value Date    WBC 13.82 (H) 06/07/2021    HGB 13.5 06/07/2021    HGB 13.7 06/06/2021    HGB 12.9 06/05/2021     (L) 06/07/2021       Lab Results   Component Value Date    PROTEIN Negative 04/27/2018    GLUCOSEU Negative 02/01/2021    BLOODU Negative 02/01/2021    NITRITEU Negative 02/01/2021    LEUKOCYTESUR Negative 02/01/2021       Imaging:   CT Abdomen Pelvis With Contrast    Result Date: 6/3/2021  Narrative: CT OF THE ABDOMEN AND PELVIS WITH CONTRAST  HISTORY: Epigastric and upper abdominal pain  COMPARISON: 02/01/2021  TECHNIQUE: Axial CT imaging was obtained through the abdomen and pelvis. IV contrast was administered.  FINDINGS: Images through the lung bases demonstrate some chronic scarring. There are coronary artery calcifications. The patient's distal esophagus appears somewhat thick-walled and edematous. Some of the appearance may be related to incomplete distention, but esophagitis is not excluded. Gallbladder appears distended, although this is a stable finding. There is a large cyst identified within the liver. This  measures 6.2 x 6.5 cm, and is unchanged. There is intra and extrahepatic biliary dilatation. The degree of dilatation does not appear significantly changed compared to prior study. Common bile duct measures approximately 8 to 9 mm. There is an apparent filling defect noted within the distal common bile duct, measuring up to 6 mm in size. Choledocholithiasis is not excluded. The pancreas is atrophic. The stomach and duodenum appear normal. The kidneys enhance symmetrically. There is no hydronephrosis. Spleen is normal. There are simple appearing left renal cysts. No additional follow-up is necessary. Patient does have some pelvic floor laxity. There is bladder and rectal prolapse. Urinary bladder does appear mildly distended, correlation with any evidence of urinary retention is suggested. There is colonic diverticulosis. Uterus appears normal. There is no bowel obstruction. Appendix is not clearly identified, but I don't see any evidence of appendicitis. There is lumbar scoliosis, with convexity to the left. No acute osseous abnormalities are seen. There is multilevel discogenic degenerative disease of the spine. There are extensive vascular calcifications.      Impression:  1. Somewhat thick-walled appearance to the distal esophagus may reflect some incomplete distention, but correlation with any evidence of esophagitis is recommended. 2. Stable intra and extrahepatic biliary dilatation. Patient does have a filling defect identified within the distal common bile duct, suggesting choledocholithiasis.  Radiation dose reduction techniques were utilized, including automated exposure control and exposure modulation based on body size.  This report was finalized on 6/3/2021 10:11 PM by Dr. Ricarda Cespedes M.D.      US Gallbladder    Result Date: 6/4/2021  Narrative: PROCEDURE: US GALLBLADDER-  HISTORY: Choledocholithiasis.  TECHNIQUE: Grayscale and color Doppler ultrasound of the gallbladder was performed.  COMPARISON:  CT abdomen and pelvis with contrast 06/03/2021. Gallbladder ultrasound 11/01/2019.  FINDINGS:  MEASUREMENTS: Liver:  12.3 cm Common bile duct diameter:  1.1 cm Right kidney: 8.6 x 3.3 x 4.0 cm  LIVER: The liver is normal in size and echogenicity. The echotexture is smooth. There is a 7.2 x 6.2 x 6.8 cm cyst with some internal debris but no internal vascularity in the central aspect of the liver, which corresponds to a large cyst on recent CT and previously measured 5.7 x 5.9 x 6.0 cm in 2019. There is intrahepatic biliary ductal dilatation. There is hepatopetal flow in the main portal vein.  GALLBLADDER: Low level echogenic material is seen in the gallbladder near the fundus, consistent with sludge. There are no shadowing stones. There is no gallbladder wall thickening or pericholecystic fluid. No sonographic Bain's sign was reported. The common bile duct is dilated. There is a 0.4 cm echogenic filling defect without posterior shadowing in the distal common bile duct, which corresponds to a filling defect on recent CT.  PANCREAS: The visualized portions of the pancreas are within normal limits.  RIGHT KIDNEY: The right kidney is small and slightly echogenic relative to the adjacent hepatic parenchyma, which can be seen with medical renal disease. There is no hydronephrosis. No shadowing renal stone is visualized.       Impression:  1.  Biliary sludge with common bile duct dilatation and a 0.4 cm echogenic filling defect in the distal common bile duct, which corresponds to a filling defect within the common bile duct on recent CT. This may represent a nonshadowing stone or sludge ball, however, an underlying lesion is not entirely excluded. Further evaluation with ERCP or MRCP should be considered versus short-term follow-up imaging. 2.  Small slightly echogenic right kidney, which can be seen with medical renal disease. Correlate with serum creatinine and GFR.  This report was finalized on 6/4/2021 1:53 PM by   Amber Livingston M.D.      XR Chest 1 View    Result Date: 6/3/2021  Narrative: XR CHEST 1 VW-  HISTORY: Female who is 89 years-old,  epigastric pain  TECHNIQUE: Frontal view of the chest  COMPARISON: 02/01/2021  FINDINGS: The heart size is normal. Aorta is calcified. Pulmonary vasculature is unremarkable. No focal pulmonary consolidation, pleural effusion, or pneumothorax. No acute osseous process.      Impression: No evidence for acute pulmonary process. Follow-up as clinical indications persist.  This report was finalized on 6/3/2021 8:41 PM by Dr. Amor Sherwood M.D.      FL Cholangiogram Operative    Result Date: 6/6/2021  Narrative: INTRAOPERATIVE PORTABLE VIEW CHOLANGIOGRAM  CLINICAL INFORMATION: Post cholecystectomy  FINDINGS: Upon injection of the cystic duct with contrast, the proximal intrahepatic, common hepatic and common bile ducts are opacified. The common bile duct is dilated. There is at least one mobile ovoid filling defect within the common bile duct, suggestive of bile duct stones. No contrast extends into the duodenum. Repeat images obtained after intervention demonstrate no residual filling defects and prompt opacification of the duodenum..  Fluoroscopy time 29 s, 294 images  This report was finalized on 6/6/2021 10:39 AM by Dr. Amber Livingston M.D.         Medical Tests:        Summary of old records / correspondence / consultant report:   DC summary re: issues addressed on HPI    Request outside records:         MEDICATIONS   Current Outpatient Medications   Medication Sig Dispense Refill   • atorvastatin (LIPITOR) 20 MG tablet Take 1 tablet by mouth Daily. Hold until instructed to resume by primary care or surgeon. 90 tablet 3   • Cholecalciferol (VITAMIN D3) 2000 UNITS tablet Take  by mouth daily.     • famotidine (PEPCID) 20 MG tablet Take 1 tablet by mouth 2 (Two) Times a Day As Needed for Indigestion or Heartburn. 60 tablet 0   • fexofenadine (ALLEGRA) 180 MG tablet Take 180 mg by mouth  Daily.     • HYDROcodone-acetaminophen (NORCO) 5-325 MG per tablet Take 1-2 tablets by mouth Every 6 (Six) Hours As Needed for Moderate Pain  or Severe Pain . 24 tablet 0   • levothyroxine (SYNTHROID, LEVOTHROID) 50 MCG tablet TAKE 1 TABLET EVERY MORNING 90 tablet 3   • losartan (COZAAR) 50 MG tablet TAKE 1 TABLET DAILY 90 tablet 3   • Misc Natural Products (OSTEO BI-FLEX JOINT SHIELD PO) Take  by mouth.     • montelukast (SINGULAIR) 10 MG tablet Take 10 mg by mouth daily.     • sennosides-docusate (senna-docusate sodium) 8.6-50 MG per tablet Take 2 tablets by mouth Daily As Needed for Constipation. 15 tablet 0   • Triamcinolone Acetonide (NASACORT) 55 MCG/ACT nasal inhaler 2 sprays into the nostril(s) as directed by provider Daily As Needed.     • acetaminophen (TYLENOL) 500 MG tablet Take 1 tablet by mouth Every 6 (Six) Hours As Needed for Mild Pain  or Moderate Pain .       No current facility-administered medications for this visit.       Current outpatient and discharge medications have been reconciled for the patient.  Reviewed by: Henok Salcido MD         Examiner was wearing KN95 mask and exam gloves during the entire duration of the visit. Patient was masked the entire time.   Minimum social distance of 6 ft maintained entire visit except if physical contact was necessary as documented.     **Dragon Disclaimer:   Much of this encounter note is an electronic transcription/translation of spoken language to printed text. The electronic translation of spoken language may permit erroneous, or at times, nonsensical words or phrases to be inadvertently transcribed. Although I have reviewed the note for such errors, some may still exist.

## 2021-06-15 ENCOUNTER — OFFICE VISIT (OUTPATIENT)
Dept: ORTHOPEDIC SURGERY | Facility: CLINIC | Age: 86
End: 2021-06-15

## 2021-06-15 VITALS — BODY MASS INDEX: 20.96 KG/M2 | WEIGHT: 104 LBS | HEIGHT: 59 IN | TEMPERATURE: 96.6 F

## 2021-06-15 DIAGNOSIS — M17.11 PRIMARY OSTEOARTHRITIS OF RIGHT KNEE: ICD-10-CM

## 2021-06-15 DIAGNOSIS — R52 PAIN: Primary | ICD-10-CM

## 2021-06-15 PROCEDURE — 99203 OFFICE O/P NEW LOW 30 MIN: CPT | Performed by: ORTHOPAEDIC SURGERY

## 2021-06-15 PROCEDURE — 73562 X-RAY EXAM OF KNEE 3: CPT | Performed by: ORTHOPAEDIC SURGERY

## 2021-06-15 NOTE — PROGRESS NOTES
Chief Complaints:   Chief Complaint   Patient presents with   • Right Knee - Establish Care, Pain       History of Present Illness:Maria Fernanda Gaviria is a 89 y.o. female who presents with right medial knee pain- moderate ache medial worse over the past month.    Allergies:   Allergies   Allergen Reactions   • Aspirin Nausea And Vomiting and Nausea Only   • Buffered Aspirin Nausea And Vomiting   • Latex Rash     Redness on skin       Medications:   Current Outpatient Medications   Medication Sig Dispense Refill   • acetaminophen (TYLENOL) 500 MG tablet Take 1 tablet by mouth Every 6 (Six) Hours As Needed for Mild Pain  or Moderate Pain .     • levothyroxine (SYNTHROID, LEVOTHROID) 50 MCG tablet TAKE 1 TABLET EVERY MORNING 90 tablet 3   • losartan (COZAAR) 50 MG tablet TAKE 1 TABLET DAILY 90 tablet 3   • montelukast (SINGULAIR) 10 MG tablet Take 10 mg by mouth daily.     • Triamcinolone Acetonide (NASACORT) 55 MCG/ACT nasal inhaler 2 sprays into the nostril(s) as directed by provider Daily As Needed.     • atorvastatin (LIPITOR) 20 MG tablet Take 1 tablet by mouth Daily. Hold until instructed to resume by primary care or surgeon. 90 tablet 3   • Cholecalciferol (VITAMIN D3) 2000 UNITS tablet Take  by mouth daily.     • famotidine (PEPCID) 20 MG tablet Take 1 tablet by mouth 2 (Two) Times a Day As Needed for Indigestion or Heartburn. 60 tablet 0   • fexofenadine (ALLEGRA) 180 MG tablet Take 180 mg by mouth Daily.     • HYDROcodone-acetaminophen (NORCO) 5-325 MG per tablet Take 1-2 tablets by mouth Every 6 (Six) Hours As Needed for Moderate Pain  or Severe Pain . 24 tablet 0   • Misc Natural Products (OSTEO BI-FLEX JOINT SHIELD PO) Take  by mouth.     • sennosides-docusate (senna-docusate sodium) 8.6-50 MG per tablet Take 2 tablets by mouth Daily As Needed for Constipation. 15 tablet 0     No current facility-administered medications for this visit.         The following portions of the patient's history were reviewed  "and updated as appropriate: allergies, current medications, past family history, past medical history, past social history, past surgical history and problem list.    Review of Systems:   A 14 point review of systems was performed. All systems negative except pertinent positives/negative listed in HPI above    Physical Exam:   Vitals:    06/15/21 1330   Temp: 96.6 °F (35.9 °C)   Weight: 47.2 kg (104 lb)  Comment: per patient   Height: 149.9 cm (59\")       General: A and O x 3, ASA, NAD    SCLERA:    Normal    DENTITION:   Normal  Knee:  right    ALIGNMENT:     Varus  ,   Patella  tracks  midline    GAIT:    Antalgic    SKIN:    No abnormality    RANGE OF MOTION:   0  -  125   DEG    STRENGTH:   4  / 5    LIGAMENTS:    No varus / valgus instability.   Negative  Lachman.    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 There are no previous films for comparision.    Assessment/Plan: Right medial knee pain moderate in nature I will send her to physical therapy discussed injection she wants to hold off on that.  If she has worsening pain she can return for injection.      Brian Eduardo MD  6/15/2021  "

## 2021-06-17 ENCOUNTER — LAB (OUTPATIENT)
Dept: LAB | Facility: HOSPITAL | Age: 86
End: 2021-06-17

## 2021-06-17 ENCOUNTER — OFFICE VISIT (OUTPATIENT)
Dept: SURGERY | Facility: CLINIC | Age: 86
End: 2021-06-17

## 2021-06-17 DIAGNOSIS — Z09 FOLLOW UP: Primary | ICD-10-CM

## 2021-06-17 DIAGNOSIS — R79.89 LFTS ABNORMAL: ICD-10-CM

## 2021-06-17 LAB
ALBUMIN SERPL-MCNC: 3.6 G/DL (ref 3.5–5.2)
ALBUMIN/GLOB SERPL: 1.3 G/DL
ALP SERPL-CCNC: 149 U/L (ref 39–117)
ALT SERPL W P-5'-P-CCNC: 17 U/L (ref 1–33)
ANION GAP SERPL CALCULATED.3IONS-SCNC: 10.3 MMOL/L (ref 5–15)
AST SERPL-CCNC: 25 U/L (ref 1–32)
BILIRUB SERPL-MCNC: 0.6 MG/DL (ref 0–1.2)
BUN SERPL-MCNC: 11 MG/DL (ref 8–23)
BUN/CREAT SERPL: 17.5 (ref 7–25)
CALCIUM SPEC-SCNC: 8.5 MG/DL (ref 8.2–9.6)
CHLORIDE SERPL-SCNC: 102 MMOL/L (ref 98–107)
CO2 SERPL-SCNC: 27.7 MMOL/L (ref 22–29)
CREAT SERPL-MCNC: 0.63 MG/DL (ref 0.57–1)
GFR SERPL CREATININE-BSD FRML MDRD: 87 ML/MIN/1.73
GLOBULIN UR ELPH-MCNC: 2.7 GM/DL
GLUCOSE SERPL-MCNC: 80 MG/DL (ref 65–99)
POTASSIUM SERPL-SCNC: 4.3 MMOL/L (ref 3.5–5.2)
PROT SERPL-MCNC: 6.3 G/DL (ref 6–8.5)
SODIUM SERPL-SCNC: 140 MMOL/L (ref 136–145)

## 2021-06-17 PROCEDURE — 36415 COLL VENOUS BLD VENIPUNCTURE: CPT

## 2021-06-17 PROCEDURE — 80053 COMPREHEN METABOLIC PANEL: CPT

## 2021-06-17 PROCEDURE — 99024 POSTOP FOLLOW-UP VISIT: CPT | Performed by: SURGERY

## 2021-06-17 NOTE — PROGRESS NOTES
Postop visit    Laparoscopic cholecystectomy cholangiogram, choledocho scopic, and common bile duct exploration with stone extraction 6/6/2021    Pathology: Cholelithiasis and chronic cholecystitis    Office visit: Incisions are healing well and she is doing well.  She has had no further symptoms since her surgery other than some mild persistent right shoulder pain.  Her abdomen is soft and her incisions are healing well.  Skin does not appear jaundiced and sclera are nonicteric.  As her liver enzymes went up slightly after the surgery I have recommended that she go over to the hospital today to have her CMP redrawn and if her liver enzymes remain persistently elevated then she will need ERCP.    ADDENDUM: CMP demonstrated normal liver enzymes (alkaline phosphatase was only slightly elevated).  No further follow-up needed.

## 2021-06-29 ENCOUNTER — LAB (OUTPATIENT)
Dept: LAB | Facility: HOSPITAL | Age: 86
End: 2021-06-29

## 2021-06-29 ENCOUNTER — TELEPHONE (OUTPATIENT)
Dept: SURGERY | Facility: CLINIC | Age: 86
End: 2021-06-29

## 2021-06-29 DIAGNOSIS — R79.89 LFTS ABNORMAL: ICD-10-CM

## 2021-06-29 DIAGNOSIS — R79.89 LFTS ABNORMAL: Primary | ICD-10-CM

## 2021-06-29 LAB
ALBUMIN SERPL-MCNC: 3.7 G/DL (ref 3.5–5.2)
ALBUMIN/GLOB SERPL: 1.3 G/DL
ALP SERPL-CCNC: 108 U/L (ref 39–117)
ALT SERPL W P-5'-P-CCNC: 11 U/L (ref 1–33)
ANION GAP SERPL CALCULATED.3IONS-SCNC: 6.9 MMOL/L (ref 5–15)
AST SERPL-CCNC: 20 U/L (ref 1–32)
BILIRUB SERPL-MCNC: 0.5 MG/DL (ref 0–1.2)
BUN SERPL-MCNC: 13 MG/DL (ref 8–23)
BUN/CREAT SERPL: 17.1 (ref 7–25)
CALCIUM SPEC-SCNC: 9.3 MG/DL (ref 8.2–9.6)
CHLORIDE SERPL-SCNC: 104 MMOL/L (ref 98–107)
CO2 SERPL-SCNC: 28.1 MMOL/L (ref 22–29)
CREAT SERPL-MCNC: 0.76 MG/DL (ref 0.57–1)
DEPRECATED RDW RBC AUTO: 43.9 FL (ref 37–54)
ERYTHROCYTE [DISTWIDTH] IN BLOOD BY AUTOMATED COUNT: 13 % (ref 12.3–15.4)
GFR SERPL CREATININE-BSD FRML MDRD: 70 ML/MIN/1.73
GLOBULIN UR ELPH-MCNC: 2.8 GM/DL
GLUCOSE SERPL-MCNC: 90 MG/DL (ref 65–99)
HCT VFR BLD AUTO: 45.2 % (ref 34–46.6)
HGB BLD-MCNC: 14.9 G/DL (ref 12–15.9)
MCH RBC QN AUTO: 30.4 PG (ref 26.6–33)
MCHC RBC AUTO-ENTMCNC: 33 G/DL (ref 31.5–35.7)
MCV RBC AUTO: 92.2 FL (ref 79–97)
PLATELET # BLD AUTO: 242 10*3/MM3 (ref 140–450)
PMV BLD AUTO: 9.6 FL (ref 6–12)
POTASSIUM SERPL-SCNC: 4.3 MMOL/L (ref 3.5–5.2)
PROT SERPL-MCNC: 6.5 G/DL (ref 6–8.5)
RBC # BLD AUTO: 4.9 10*6/MM3 (ref 3.77–5.28)
SODIUM SERPL-SCNC: 139 MMOL/L (ref 136–145)
WBC # BLD AUTO: 6.02 10*3/MM3 (ref 3.4–10.8)

## 2021-06-29 PROCEDURE — 36415 COLL VENOUS BLD VENIPUNCTURE: CPT

## 2021-06-29 PROCEDURE — 80053 COMPREHEN METABOLIC PANEL: CPT

## 2021-06-29 PROCEDURE — 85027 COMPLETE CBC AUTOMATED: CPT

## 2021-06-29 NOTE — TELEPHONE ENCOUNTER
The patients daughter called to let you know that Ms. Gaviria has had 3 episodes of epigastric pain but was not sure of Nausea/Vomiting. Two were pretty intense to where she did not want to get out of bed or eat. She also states the patient is having BM's & resumed her Lipitor. She wanted to know if you thought she needed to see Dr. Aden for an ERCP for retained gallstone(s).   I first ordered repeat CMP & CBC to evaluate for elevated LFT's and elevated WBC. She voiced understanding and will take her to have labs and wait for a callback regarding if Ms. Gaviria needs a referral to Dr. Aden.

## 2021-06-30 DIAGNOSIS — R79.89 LFTS ABNORMAL: Primary | ICD-10-CM

## 2021-06-30 NOTE — TELEPHONE ENCOUNTER
I spoke with the patients daughter and informed her of the normal lab results and that Dr. Wilkinson has placed an order for an MRCP. She voiced understanding and will call central scheduling to have this scheduled.

## 2021-07-05 ENCOUNTER — APPOINTMENT (OUTPATIENT)
Dept: MRI IMAGING | Facility: HOSPITAL | Age: 86
End: 2021-07-05

## 2021-07-05 ENCOUNTER — HOSPITAL ENCOUNTER (OUTPATIENT)
Facility: HOSPITAL | Age: 86
Discharge: HOME | End: 2021-07-08
Attending: EMERGENCY MEDICINE | Admitting: INTERNAL MEDICINE

## 2021-07-05 ENCOUNTER — APPOINTMENT (OUTPATIENT)
Dept: CT IMAGING | Facility: HOSPITAL | Age: 86
End: 2021-07-05

## 2021-07-05 DIAGNOSIS — K83.1 BILIARY STRICTURE: ICD-10-CM

## 2021-07-05 DIAGNOSIS — R10.10 PAIN OF UPPER ABDOMEN: Primary | ICD-10-CM

## 2021-07-05 LAB
ALBUMIN SERPL-MCNC: 4.4 G/DL (ref 3.5–5.2)
ALBUMIN/GLOB SERPL: 1.4 G/DL
ALP SERPL-CCNC: 131 U/L (ref 39–117)
ALT SERPL W P-5'-P-CCNC: 27 U/L (ref 1–33)
ANION GAP SERPL CALCULATED.3IONS-SCNC: 10 MMOL/L (ref 5–15)
AST SERPL-CCNC: 71 U/L (ref 1–32)
BASOPHILS # BLD AUTO: 0.04 10*3/MM3 (ref 0–0.2)
BASOPHILS NFR BLD AUTO: 0.3 % (ref 0–1.5)
BILIRUB SERPL-MCNC: 0.8 MG/DL (ref 0–1.2)
BILIRUB UR QL STRIP: NEGATIVE
BUN SERPL-MCNC: 12 MG/DL (ref 8–23)
BUN/CREAT SERPL: 14.3 (ref 7–25)
CALCIUM SPEC-SCNC: 9.8 MG/DL (ref 8.2–9.6)
CHLORIDE SERPL-SCNC: 101 MMOL/L (ref 98–107)
CLARITY UR: CLEAR
CO2 SERPL-SCNC: 29 MMOL/L (ref 22–29)
COLOR UR: YELLOW
CREAT SERPL-MCNC: 0.84 MG/DL (ref 0.57–1)
DEPRECATED RDW RBC AUTO: 43.9 FL (ref 37–54)
EOSINOPHIL # BLD AUTO: 0.01 10*3/MM3 (ref 0–0.4)
EOSINOPHIL NFR BLD AUTO: 0.1 % (ref 0.3–6.2)
ERYTHROCYTE [DISTWIDTH] IN BLOOD BY AUTOMATED COUNT: 13.2 % (ref 12.3–15.4)
GFR SERPL CREATININE-BSD FRML MDRD: 63 ML/MIN/1.73
GLOBULIN UR ELPH-MCNC: 3.2 GM/DL
GLUCOSE SERPL-MCNC: 108 MG/DL (ref 65–99)
GLUCOSE UR STRIP-MCNC: NEGATIVE MG/DL
HCT VFR BLD AUTO: 48.7 % (ref 34–46.6)
HGB BLD-MCNC: 16.3 G/DL (ref 12–15.9)
HGB UR QL STRIP.AUTO: NEGATIVE
IMM GRANULOCYTES # BLD AUTO: 0.06 10*3/MM3 (ref 0–0.05)
IMM GRANULOCYTES NFR BLD AUTO: 0.5 % (ref 0–0.5)
KETONES UR QL STRIP: NEGATIVE
LEUKOCYTE ESTERASE UR QL STRIP.AUTO: NEGATIVE
LIPASE SERPL-CCNC: 26 U/L (ref 13–60)
LYMPHOCYTES # BLD AUTO: 0.54 10*3/MM3 (ref 0.7–3.1)
LYMPHOCYTES NFR BLD AUTO: 4.4 % (ref 19.6–45.3)
MCH RBC QN AUTO: 30.4 PG (ref 26.6–33)
MCHC RBC AUTO-ENTMCNC: 33.5 G/DL (ref 31.5–35.7)
MCV RBC AUTO: 90.9 FL (ref 79–97)
MONOCYTES # BLD AUTO: 0.61 10*3/MM3 (ref 0.1–0.9)
MONOCYTES NFR BLD AUTO: 5 % (ref 5–12)
NEUTROPHILS NFR BLD AUTO: 11.03 10*3/MM3 (ref 1.7–7)
NEUTROPHILS NFR BLD AUTO: 89.7 % (ref 42.7–76)
NITRITE UR QL STRIP: NEGATIVE
NRBC BLD AUTO-RTO: 0 /100 WBC (ref 0–0.2)
PH UR STRIP.AUTO: 8 [PH] (ref 5–8)
PLATELET # BLD AUTO: 190 10*3/MM3 (ref 140–450)
PMV BLD AUTO: 9.9 FL (ref 6–12)
POTASSIUM SERPL-SCNC: 4.4 MMOL/L (ref 3.5–5.2)
PROT SERPL-MCNC: 7.6 G/DL (ref 6–8.5)
PROT UR QL STRIP: NEGATIVE
QT INTERVAL: 425 MS
RBC # BLD AUTO: 5.36 10*6/MM3 (ref 3.77–5.28)
SARS-COV-2 RNA RESP QL NAA+PROBE: NOT DETECTED
SODIUM SERPL-SCNC: 140 MMOL/L (ref 136–145)
SP GR UR STRIP: 1.01 (ref 1–1.03)
TROPONIN T SERPL-MCNC: <0.01 NG/ML (ref 0–0.03)
UROBILINOGEN UR QL STRIP: NORMAL
WBC # BLD AUTO: 12.29 10*3/MM3 (ref 3.4–10.8)

## 2021-07-05 PROCEDURE — 25010000002 ONDANSETRON PER 1 MG: Performed by: NURSE PRACTITIONER

## 2021-07-05 PROCEDURE — U0003 INFECTIOUS AGENT DETECTION BY NUCLEIC ACID (DNA OR RNA); SEVERE ACUTE RESPIRATORY SYNDROME CORONAVIRUS 2 (SARS-COV-2) (CORONAVIRUS DISEASE [COVID-19]), AMPLIFIED PROBE TECHNIQUE, MAKING USE OF HIGH THROUGHPUT TECHNOLOGIES AS DESCRIBED BY CMS-2020-01-R: HCPCS | Performed by: NURSE PRACTITIONER

## 2021-07-05 PROCEDURE — 25010000002 IOPAMIDOL 61 % SOLUTION: Performed by: EMERGENCY MEDICINE

## 2021-07-05 PROCEDURE — 93005 ELECTROCARDIOGRAM TRACING: CPT | Performed by: NURSE PRACTITIONER

## 2021-07-05 PROCEDURE — G0378 HOSPITAL OBSERVATION PER HR: HCPCS

## 2021-07-05 PROCEDURE — A9577 INJ MULTIHANCE: HCPCS | Performed by: INTERNAL MEDICINE

## 2021-07-05 PROCEDURE — 83690 ASSAY OF LIPASE: CPT | Performed by: NURSE PRACTITIONER

## 2021-07-05 PROCEDURE — C9803 HOPD COVID-19 SPEC COLLECT: HCPCS

## 2021-07-05 PROCEDURE — 96372 THER/PROPH/DIAG INJ SC/IM: CPT

## 2021-07-05 PROCEDURE — 25010000002 ENOXAPARIN PER 10 MG: Performed by: INTERNAL MEDICINE

## 2021-07-05 PROCEDURE — 74183 MRI ABD W/O CNTR FLWD CNTR: CPT

## 2021-07-05 PROCEDURE — 99284 EMERGENCY DEPT VISIT MOD MDM: CPT

## 2021-07-05 PROCEDURE — 84484 ASSAY OF TROPONIN QUANT: CPT | Performed by: NURSE PRACTITIONER

## 2021-07-05 PROCEDURE — 74177 CT ABD & PELVIS W/CONTRAST: CPT

## 2021-07-05 PROCEDURE — 25010000002 MORPHINE PER 10 MG: Performed by: NURSE PRACTITIONER

## 2021-07-05 PROCEDURE — 81003 URINALYSIS AUTO W/O SCOPE: CPT | Performed by: NURSE PRACTITIONER

## 2021-07-05 PROCEDURE — 0 GADOBENATE DIMEGLUMINE 529 MG/ML SOLUTION: Performed by: INTERNAL MEDICINE

## 2021-07-05 PROCEDURE — 85025 COMPLETE CBC W/AUTO DIFF WBC: CPT | Performed by: NURSE PRACTITIONER

## 2021-07-05 PROCEDURE — 93010 ELECTROCARDIOGRAM REPORT: CPT | Performed by: INTERNAL MEDICINE

## 2021-07-05 PROCEDURE — 80053 COMPREHEN METABOLIC PANEL: CPT | Performed by: NURSE PRACTITIONER

## 2021-07-05 PROCEDURE — 96375 TX/PRO/DX INJ NEW DRUG ADDON: CPT

## 2021-07-05 PROCEDURE — 96374 THER/PROPH/DIAG INJ IV PUSH: CPT

## 2021-07-05 RX ORDER — LEVOTHYROXINE SODIUM 0.05 MG/1
50 TABLET ORAL
Status: DISCONTINUED | OUTPATIENT
Start: 2021-07-06 | End: 2021-07-08 | Stop reason: HOSPADM

## 2021-07-05 RX ORDER — CETIRIZINE HYDROCHLORIDE 10 MG/1
5 TABLET ORAL DAILY
Status: DISCONTINUED | OUTPATIENT
Start: 2021-07-05 | End: 2021-07-08 | Stop reason: HOSPADM

## 2021-07-05 RX ORDER — ATORVASTATIN CALCIUM 20 MG/1
20 TABLET, FILM COATED ORAL DAILY
Status: DISCONTINUED | OUTPATIENT
Start: 2021-07-05 | End: 2021-07-08 | Stop reason: HOSPADM

## 2021-07-05 RX ORDER — MONTELUKAST SODIUM 10 MG/1
10 TABLET ORAL DAILY
Status: DISCONTINUED | OUTPATIENT
Start: 2021-07-05 | End: 2021-07-08 | Stop reason: HOSPADM

## 2021-07-05 RX ORDER — MELATONIN
1000 DAILY
Status: DISCONTINUED | OUTPATIENT
Start: 2021-07-05 | End: 2021-07-08 | Stop reason: HOSPADM

## 2021-07-05 RX ORDER — ONDANSETRON 4 MG/1
4 TABLET, FILM COATED ORAL EVERY 6 HOURS PRN
Status: DISCONTINUED | OUTPATIENT
Start: 2021-07-05 | End: 2021-07-08 | Stop reason: HOSPADM

## 2021-07-05 RX ORDER — ONDANSETRON 2 MG/ML
4 INJECTION INTRAMUSCULAR; INTRAVENOUS EVERY 6 HOURS PRN
Status: DISCONTINUED | OUTPATIENT
Start: 2021-07-05 | End: 2021-07-08 | Stop reason: HOSPADM

## 2021-07-05 RX ORDER — UREA 10 %
3 LOTION (ML) TOPICAL NIGHTLY PRN
Status: DISCONTINUED | OUTPATIENT
Start: 2021-07-05 | End: 2021-07-08 | Stop reason: HOSPADM

## 2021-07-05 RX ORDER — NITROGLYCERIN 0.4 MG/1
0.4 TABLET SUBLINGUAL
Status: DISCONTINUED | OUTPATIENT
Start: 2021-07-05 | End: 2021-07-08 | Stop reason: HOSPADM

## 2021-07-05 RX ORDER — LOSARTAN POTASSIUM 50 MG/1
50 TABLET ORAL DAILY
Status: DISCONTINUED | OUTPATIENT
Start: 2021-07-05 | End: 2021-07-08 | Stop reason: HOSPADM

## 2021-07-05 RX ORDER — MORPHINE SULFATE 2 MG/ML
4 INJECTION, SOLUTION INTRAMUSCULAR; INTRAVENOUS ONCE
Status: COMPLETED | OUTPATIENT
Start: 2021-07-05 | End: 2021-07-05

## 2021-07-05 RX ORDER — ACETAMINOPHEN 325 MG/1
650 TABLET ORAL EVERY 4 HOURS PRN
Status: DISCONTINUED | OUTPATIENT
Start: 2021-07-05 | End: 2021-07-08 | Stop reason: HOSPADM

## 2021-07-05 RX ORDER — ONDANSETRON 2 MG/ML
4 INJECTION INTRAMUSCULAR; INTRAVENOUS ONCE
Status: COMPLETED | OUTPATIENT
Start: 2021-07-05 | End: 2021-07-05

## 2021-07-05 RX ORDER — SODIUM CHLORIDE 0.9 % (FLUSH) 0.9 %
10 SYRINGE (ML) INJECTION AS NEEDED
Status: DISCONTINUED | OUTPATIENT
Start: 2021-07-05 | End: 2021-07-08 | Stop reason: HOSPADM

## 2021-07-05 RX ORDER — FLUTICASONE PROPIONATE 50 MCG
1 SPRAY, SUSPENSION (ML) NASAL DAILY
Status: DISCONTINUED | OUTPATIENT
Start: 2021-07-05 | End: 2021-07-08 | Stop reason: HOSPADM

## 2021-07-05 RX ADMIN — CETIRIZINE HYDROCHLORIDE 5 MG: 10 TABLET ORAL at 20:45

## 2021-07-05 RX ADMIN — Medication 1000 UNITS: at 20:45

## 2021-07-05 RX ADMIN — SODIUM CHLORIDE 1000 ML: 9 INJECTION, SOLUTION INTRAVENOUS at 10:34

## 2021-07-05 RX ADMIN — SODIUM CHLORIDE, PRESERVATIVE FREE 10 ML: 5 INJECTION INTRAVENOUS at 20:45

## 2021-07-05 RX ADMIN — ONDANSETRON 4 MG: 2 INJECTION INTRAMUSCULAR; INTRAVENOUS at 10:38

## 2021-07-05 RX ADMIN — MONTELUKAST SODIUM 10 MG: 10 TABLET, FILM COATED ORAL at 20:45

## 2021-07-05 RX ADMIN — ENOXAPARIN SODIUM 30 MG: 30 INJECTION SUBCUTANEOUS at 20:45

## 2021-07-05 RX ADMIN — GADOBENATE DIMEGLUMINE 9 ML: 529 INJECTION, SOLUTION INTRAVENOUS at 18:44

## 2021-07-05 RX ADMIN — FLUTICASONE PROPIONATE 1 SPRAY: 50 SPRAY, METERED NASAL at 20:46

## 2021-07-05 RX ADMIN — IOPAMIDOL 85 ML: 612 INJECTION, SOLUTION INTRAVENOUS at 11:20

## 2021-07-05 RX ADMIN — ATORVASTATIN CALCIUM 20 MG: 20 TABLET, FILM COATED ORAL at 20:45

## 2021-07-05 RX ADMIN — MORPHINE SULFATE 4 MG: 2 INJECTION, SOLUTION INTRAMUSCULAR; INTRAVENOUS at 10:39

## 2021-07-05 NOTE — PLAN OF CARE
Problem: Adult Inpatient Plan of Care  Goal: Plan of Care Review  Outcome: Ongoing, Progressing  Flowsheets (Taken 7/5/2021 1932)  Progress: improving  Plan of Care Reviewed With: patient  Outcome Summary: Pt vitals stable. Pain improved after morphine dose in er. MRI done this evening. Will continue to monitor   Goal Outcome Evaluation:  Plan of Care Reviewed With: patient        Progress: improving  Outcome Summary: Pt vitals stable. Pain improved after morphine dose in er. MRI done this evening. Will continue to monitor

## 2021-07-05 NOTE — PROGRESS NOTES
Clinical Pharmacy Services: Medication History    Maria Fernanda Gaviria is a 89 y.o. female presenting to [facility] for   Chief Complaint   Patient presents with   • Abdominal Pain   • Vomiting       She  has a past medical history of Arthritis, Cancer (CMS/HCC), Disease of thyroid gland, GERD (gastroesophageal reflux disease), Hyperlipidemia, Hypertension, and Osteoporosis.    Allergies as of 07/05/2021 - Reviewed 07/05/2021   Allergen Reaction Noted   • Aspirin Nausea And Vomiting and Nausea Only 03/24/2016   • Buffered aspirin Nausea And Vomiting 02/06/2012   • Latex Rash 06/06/2021       Medication information was obtained from: patient and family member  Pharmacy and Phone Number:     Prior to Admission Medications     Prescriptions Last Dose Informant Patient Reported? Taking?    acetaminophen (TYLENOL) 500 MG tablet  Family Member No Yes    Take 1 tablet by mouth Every 6 (Six) Hours As Needed for Mild Pain  or Moderate Pain .    atorvastatin (LIPITOR) 20 MG tablet  Family Member No Yes    Take 1 tablet by mouth Daily. Hold until instructed to resume by primary care or surgeon.    Cholecalciferol (VITAMIN D3) 2000 UNITS tablet  Family Member Yes Yes    Take 1 tablet by mouth Daily.    fexofenadine (ALLEGRA) 180 MG tablet  Family Member Yes Yes    Take 180 mg by mouth Daily.    levothyroxine (SYNTHROID, LEVOTHROID) 50 MCG tablet  Family Member No Yes    TAKE 1 TABLET EVERY MORNING    Patient taking differently:  Take 50 mcg by mouth Daily.    losartan (COZAAR) 50 MG tablet  Family Member No Yes    TAKE 1 TABLET DAILY    Patient taking differently:  Take 50 mg by mouth Daily.    Misc Natural Products (Osteo Bi-Flex Joint Shield) tablet  Family Member Yes Yes    Take 1 tablet by mouth Daily.    montelukast (SINGULAIR) 10 MG tablet  Family Member Yes Yes    Take 10 mg by mouth daily.    Triamcinolone Acetonide (NASACORT) 55 MCG/ACT nasal inhaler  Family Member Yes Yes    2 sprays into the nostril(s) as  directed by provider Daily As Needed.            Medication notes:     This medication list is complete to the best of my knowledge as of 7/5/2021    Please call if questions.    Moraima [facility]  Medication History Technician  [phone]    7/5/2021 15:14 EDT
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
## 2021-07-05 NOTE — ED NOTES
MRI screening form filled out with family and pt at bedside.  MRI called at this time for review.     Tri Márquez RN  07/05/21 1600

## 2021-07-05 NOTE — ED TRIAGE NOTES
Pt has upper left abd pain and vomiting.  This is a recurring problem. Had gall bladder removed June 6.  She has and mri scheduled next week.      Patient was placed in face mask during first look triage.  Patient was wearing a face mask throughout encounter.  I wore personal protective equipment throughout the encounter.  Hand hygiene was performed before and after patient encounter.

## 2021-07-05 NOTE — PROGRESS NOTES
Pharmacy Consult - Multifonds    Maria Fernanda Gvairia has been consulted for pharmacy to dose enoxaparin for VTE prophylaxis per Dr. Tobin's request.         Relevant clinical data and objective history reviewed:  89 y.o. female        Home Anticoagulation: none    Past Medical History:   Diagnosis Date    Arthritis     Cancer (CMS/HCC)     skin    Disease of thyroid gland     GERD (gastroesophageal reflux disease)     Hyperlipidemia     Hypertension     Osteoporosis      is allergic to aspirin, buffered aspirin, and latex.    Lab Results   Component Value Date    WBC 12.29 (H) 07/05/2021    HGB 16.3 (H) 07/05/2021    HCT 48.7 (H) 07/05/2021    MCV 90.9 07/05/2021     07/05/2021     Lab Results   Component Value Date    GLUCOSE 108 (H) 07/05/2021    CALCIUM 9.8 (H) 07/05/2021     07/05/2021    K 4.4 07/05/2021    CO2 29.0 07/05/2021     07/05/2021    BUN 12 07/05/2021    CREATININE 0.84 07/05/2021    EGFRIFAFRI 84 03/02/2020    EGFRIFNONA 63 07/05/2021    BCR 14.3 07/05/2021    ANIONGAP 10.0 07/05/2021       Estimated Creatinine Clearance: 27.9 mL/min (by C-G formula based on SCr of 0.84 mg/dL).    Active Inpatient Anticoagulation Orders: none    Assessment/Plan    Will start patient on 30 mg subcutaneous every 24 hours, adjusted for renal function. Pharmacy will continue to follow but consult will be discontinued at this time.     Karne Gardner, [facility]

## 2021-07-05 NOTE — CONSULTS
General Surgery consult    Summary:    Mrs. Maria Fernanda Gaviria is a 89 y.o. year old lady with right upper quadrant abdominal pain and nausea with continued biliary dilatation.  Will admit for symptomatic control as well as for MRCP.    Chief Complaint:    Abdominal pain    History of Present Illness:    Mrs. Maria Fernanda Gaviria is a 89 y.o. year old lady who underwent laparoscopic cholecystectomy with common bile duct exploration by Dr. Wilkinson on June 6, 2021.  She is an intermittent abdominal pain since then.  Her liver enzymes returned to normal but she continues to have right upper quadrant pain, nausea, and vomiting.  She had another episode this morning that was the worst it has been.  She has an MRCP scheduled as an outpatient for 7/14/2021.    Past Medical History:   • GERD  • Hypertension  • Hyperlipidemia    Past Surgical History:    • Lap sheila with IOC and common bile duct exploration 6/7/2021  • Hysterectomy  • Right total knee replacement  • Thyroidectomy  • Tubal ligation    Family History:    • No family history of colon cancer    Social History:    Social History     Socioeconomic History   • Marital status:      Spouse name: Not on file   • Number of children: Not on file   • Years of education: Not on file   • Highest education level: Not on file   Tobacco Use   • Smoking status: Former Smoker   • Smokeless tobacco: Never Used   Substance and Sexual Activity   • Alcohol use: Yes     Comment: wine at night   • Drug use: No   • Sexual activity: Defer     Allergies:   Allergies   Allergen Reactions   • Aspirin Nausea And Vomiting and Nausea Only   • Buffered Aspirin Nausea And Vomiting   • Latex Rash     Redness on skin       Medications:     Current Facility-Administered Medications:   •  Insert peripheral IV, , , Once **AND** sodium chloride 0.9 % flush 10 mL, 10 mL, Intravenous, PRN, Naomi Siddiqui APRN    Current Outpatient Medications:   •  acetaminophen (TYLENOL) 500 MG tablet,  Take 1 tablet by mouth Every 6 (Six) Hours As Needed for Mild Pain  or Moderate Pain ., Disp: , Rfl:   •  atorvastatin (LIPITOR) 20 MG tablet, Take 1 tablet by mouth Daily. Hold until instructed to resume by primary care or surgeon., Disp: 90 tablet, Rfl: 3  •  Cholecalciferol (VITAMIN D3) 2000 UNITS tablet, Take 1 tablet by mouth Daily., Disp: , Rfl:   •  fexofenadine (ALLEGRA) 180 MG tablet, Take 180 mg by mouth Daily., Disp: , Rfl:   •  levothyroxine (SYNTHROID, LEVOTHROID) 50 MCG tablet, TAKE 1 TABLET EVERY MORNING (Patient taking differently: Take 50 mcg by mouth Daily.), Disp: 90 tablet, Rfl: 3  •  losartan (COZAAR) 50 MG tablet, TAKE 1 TABLET DAILY (Patient taking differently: Take 50 mg by mouth Daily.), Disp: 90 tablet, Rfl: 3  •  Misc Natural Products (Osteo Bi-Flex Joint Shield) tablet, Take 1 tablet by mouth Daily., Disp: , Rfl:   •  montelukast (SINGULAIR) 10 MG tablet, Take 10 mg by mouth daily., Disp: , Rfl:   •  Triamcinolone Acetonide (NASACORT) 55 MCG/ACT nasal inhaler, 2 sprays into the nostril(s) as directed by provider Daily As Needed., Disp: , Rfl:     Radiology/Endoscopy:    • CT abdomen pelvis reviewed; no fluid collection in the gallbladder fossa, intra and extrahepatic biliary ductal dilatation slightly worse    Labs:    • White blood cell count 12.2 hemoglobin 16 platelets 190 creatinine 0.8 AST 71 ALT 27 alk phos 130 1T bili 0.8    Review of Systems:   Influenza-like illness: no fever, no  cough, no  sore throat, no  body aches, no loss of sense of taste or smell, no known exposure to person with Covid-19.  Constitutional: Negative for fevers or chills  HENT: Negative for hearing loss or runny nose  Eyes: Negative for vision changes or scleral icterus  Respiratory: Negative for cough or shortness of breath  Cardiovascular: Negative for chest pain or heart palpitations  Gastrointestinal: Ulcerative for abdominal pain, nausea; negative for vomiting, constipation, melena, or  hematochezia  Genitourinary: Negative for hematuria or dysuria  Musculoskeletal: Negative for joint swelling or gait instability  Neurologic: Negative for tremors or seizures  Psychiatric: Negative for suicidal ideations or depression  All other systems reviewed and negative    Physical Exam:   • Constitutional: Well-developed, well-nourished, no acute distress  • Vital signs: T 98.1 heart rate 99 respiratory rate 16 blood pressure 211/99  • Eyes: Conjunctiva normal, sclera nonicteric  • ENMT: Hearing grossly normal, oral mucosa moist  • Neck: Supple, trachea midline  • Respiratory: On room air, no increased work of breathing, normal inspiratory effort  • Cardiovascular: Regular rate, no peripheral edema, no jugular venous distention  • Gastrointestinal: Soft, mildly tender in right upper quadrant, incisions clean and dry  • Skin:  Warm, dry, no rash on visualized skin surfaces  • Musculoskeletal: Symmetric strength, normal gait  • Psychiatric: Alert and oriented ×3, normal affect     MYRIAM EATON M.D.  General and Endoscopic Surgery  [facility]    [address], [address]  [place], [place], [facility]  P: [phone]  F: [phone]

## 2021-07-05 NOTE — CASE MANAGEMENT/SOCIAL WORK
Discharge Planning Assessment  [facility]     Patient Name: Maria Fernanda Gaviria  MRN: [id number]  Today's Date: 7/5/2021    Admit Date: 7/5/2021    Discharge Needs Assessment     Row Name 07/05/21 1446       Living Environment    Lives With  alone    Current Living Arrangements  home/apartment/condo    Primary Care Provided by  self;child(dejon)    Provides Primary Care For  no one    Family Caregiver if Needed  child(dejon), adult    Family Caregiver Names  Jillian (daughter), as well as 2 other daughters who live nearby and are involved/supportive.    Quality of Family Relationships  involved;helpful;supportive    Able to Return to Prior Arrangements  yes    Living Arrangement Comments  Patient resides in 1 story condo with no steps to enter.       Resource/Environmental Concerns    Resource/Environmental Concerns  none    Transportation Concerns  other (see comments) Family provides transportation.       Transition Planning    Patient/Family Anticipates Transition to  home    Patient/Family Anticipated Services at Transition  none    Transportation Anticipated  family or friend will provide       Discharge Needs Assessment    Readmission Within the Last 30 Days  no previous admission in last 30 days    Equipment Currently Used at Home  cane, quad;walker, rolling    Concerns to be Addressed  no discharge needs identified;denies needs/concerns at this time    Anticipated Changes Related to Illness  none    Equipment Needed After Discharge  none    Provided Post Acute Provider List?  N/A    N/A Provider List Comment  Patient plans to return home. Denies need for HHC at this time. Family involved/supportive. Patient is largely independent at home.    Current Discharge Risk  lives alone        Discharge Plan     Row Name 07/05/21 8900       Plan    Plan  Patient plans to return home, where she lives alone in 1 level condo with no steps to enter. Family involved/supportive. Denies DC needs at this time. Uses cane, walker in  community. Denies financial/medication concerns. No recent falls.    Provided Post Acute Provider List?  N/A    N/A Provider List Comment  Patient denies need for HHC at this time.    Patient/Family in Agreement with Plan  yes    Final Note  Facesheet verified. Role of CCP explained. Appropriate PPE worn at all times.        Continued Care and Services - Admitted Since 7/5/2021    Coordination has not been started for this encounter.         Demographic Summary     Row Name 07/05/21 1441       General Information    Admission Type  observation    Arrived From  home    Referral Source  emergency department;admission list    Reason for Consult  discharge planning    Preferred Language  English     Used During This Interaction  no       Contact Information    Permission Granted to Share Info With  family/designee    Contact Information Obtained for  lay caregiver       Lay Caregiver Information    Name, Lay Caregiver  Jillian (daughter)    Phone, Lay Caregiver  [phone]        Functional Status     Row Name 07/05/21 1443       Functional Status    Usual Activity Tolerance  excellent    Current Activity Tolerance  good       Functional Status, IADL    Medications  independent    Meal Preparation  independent    Housekeeping  assistive person    Laundry  assistive person    Shopping  assistive person       Mental Status    General Appearance WDL  WDL       Mental Status Summary    Recent Changes in Mental Status/Cognitive Functioning  no changes       Employment/    Employment Status  retired    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        Psychosocial     Row Name 07/05/21 1444       Values/Beliefs    Spiritual, Cultural Beliefs, Denominational Practices, Values that Affect Care  no       Behavior WDL    Behavior WDL  WDL       Emotion Mood WDL    Emotion/Mood/Affect WDL  WDL       Speech WDL    Speech WDL  WDL       Perceptual State WDL    Perceptual State WDL  WDL        Thought Process WDL    Thought Process WDL  WDL       Intellectual Performance WDL    Intellectual Performance WDL  WDL    Level of Consciousness  Alert       Coping/Stress    Major Change/Loss/Stressor  none    Patient Personal Strengths  expressive of emotions;expressive of needs;strong support system;self-reliant    Sources of Support  adult child(dejon)    Understanding of Condition and Treatment  partial understanding of medical condition;partial understanding of treatment       Developmental Stage (Eriksson's)    Developmental Stage  Stage 8 (65 years-death/Late Adulthood) Integrity vs. Despair       Violence Risk    Feels Like Hurting Others  no    Previous Attempt to Harm Others  no        Abuse/Neglect     Row Name 07/05/21 1445       Personal Safety    Feels Unsafe at Home or Work/School  no    Feels Threatened by Someone  no    Does Anyone Try to Keep You From Having Contact with Others or Doing Things Outside Your Home?  no    Physical Signs of Abuse Present  no        Legal     Row Name 07/05/21 1441       Financial/Legal    Source of Income  pension/alf;social security       Legal    Criminal Activity/Legal Involvement  none        Substance Abuse    No documentation.       Patient Forms    No documentation.           Jaron Jung RN

## 2021-07-06 PROBLEM — R11.2 NAUSEA AND VOMITING: Status: ACTIVE | Noted: 2021-07-06

## 2021-07-06 PROBLEM — K83.1 BILIARY STRICTURE: Status: ACTIVE | Noted: 2021-07-06

## 2021-07-06 PROBLEM — R79.89 ELEVATED LIVER FUNCTION TESTS: Status: ACTIVE | Noted: 2021-07-06

## 2021-07-06 PROBLEM — N17.9 ACUTE KIDNEY FAILURE (HCC): Status: ACTIVE | Noted: 2021-07-06

## 2021-07-06 PROBLEM — K83.1 BILIARY OBSTRUCTION: Status: ACTIVE | Noted: 2021-07-06

## 2021-07-06 LAB
ANION GAP SERPL CALCULATED.3IONS-SCNC: 9.1 MMOL/L (ref 5–15)
BUN SERPL-MCNC: 18 MG/DL (ref 8–23)
BUN/CREAT SERPL: 17.8 (ref 7–25)
CALCIUM SPEC-SCNC: 8.7 MG/DL (ref 8.2–9.6)
CHLORIDE SERPL-SCNC: 102 MMOL/L (ref 98–107)
CO2 SERPL-SCNC: 26.9 MMOL/L (ref 22–29)
CREAT SERPL-MCNC: 1.01 MG/DL (ref 0.57–1)
DEPRECATED RDW RBC AUTO: 43.7 FL (ref 37–54)
ERYTHROCYTE [DISTWIDTH] IN BLOOD BY AUTOMATED COUNT: 13.2 % (ref 12.3–15.4)
GFR SERPL CREATININE-BSD FRML MDRD: 51 ML/MIN/1.73
GLUCOSE SERPL-MCNC: 89 MG/DL (ref 65–99)
HCT VFR BLD AUTO: 38.1 % (ref 34–46.6)
HGB BLD-MCNC: 13 G/DL (ref 12–15.9)
MCH RBC QN AUTO: 31 PG (ref 26.6–33)
MCHC RBC AUTO-ENTMCNC: 34.1 G/DL (ref 31.5–35.7)
MCV RBC AUTO: 90.7 FL (ref 79–97)
PLATELET # BLD AUTO: 170 10*3/MM3 (ref 140–450)
PMV BLD AUTO: 10.3 FL (ref 6–12)
POTASSIUM SERPL-SCNC: 3.7 MMOL/L (ref 3.5–5.2)
RBC # BLD AUTO: 4.2 10*6/MM3 (ref 3.77–5.28)
SODIUM SERPL-SCNC: 138 MMOL/L (ref 136–145)
TSH SERPL DL<=0.05 MIU/L-ACNC: 1.72 UIU/ML (ref 0.27–4.2)
WBC # BLD AUTO: 12.04 10*3/MM3 (ref 3.4–10.8)

## 2021-07-06 PROCEDURE — 25010000002 ENOXAPARIN PER 10 MG: Performed by: INTERNAL MEDICINE

## 2021-07-06 PROCEDURE — 84443 ASSAY THYROID STIM HORMONE: CPT | Performed by: INTERNAL MEDICINE

## 2021-07-06 PROCEDURE — 96372 THER/PROPH/DIAG INJ SC/IM: CPT

## 2021-07-06 PROCEDURE — 80048 BASIC METABOLIC PNL TOTAL CA: CPT | Performed by: INTERNAL MEDICINE

## 2021-07-06 PROCEDURE — 99024 POSTOP FOLLOW-UP VISIT: CPT | Performed by: SURGERY

## 2021-07-06 PROCEDURE — G0378 HOSPITAL OBSERVATION PER HR: HCPCS

## 2021-07-06 PROCEDURE — 85027 COMPLETE CBC AUTOMATED: CPT | Performed by: INTERNAL MEDICINE

## 2021-07-06 RX ORDER — SODIUM CHLORIDE 9 MG/ML
75 INJECTION, SOLUTION INTRAVENOUS CONTINUOUS
Status: DISCONTINUED | OUTPATIENT
Start: 2021-07-06 | End: 2021-07-08 | Stop reason: HOSPADM

## 2021-07-06 RX ADMIN — LEVOTHYROXINE SODIUM 50 MCG: 0.05 TABLET ORAL at 05:53

## 2021-07-06 RX ADMIN — ENOXAPARIN SODIUM 30 MG: 30 INJECTION SUBCUTANEOUS at 17:56

## 2021-07-06 RX ADMIN — FLUTICASONE PROPIONATE 1 SPRAY: 50 SPRAY, METERED NASAL at 09:05

## 2021-07-06 RX ADMIN — ATORVASTATIN CALCIUM 20 MG: 20 TABLET, FILM COATED ORAL at 09:01

## 2021-07-06 RX ADMIN — SODIUM CHLORIDE 75 ML/HR: 9 INJECTION, SOLUTION INTRAVENOUS at 12:53

## 2021-07-06 RX ADMIN — MONTELUKAST SODIUM 10 MG: 10 TABLET, FILM COATED ORAL at 09:01

## 2021-07-06 RX ADMIN — Medication 1000 UNITS: at 09:01

## 2021-07-06 RX ADMIN — LOSARTAN POTASSIUM 50 MG: 50 TABLET, FILM COATED ORAL at 12:59

## 2021-07-06 RX ADMIN — CETIRIZINE HYDROCHLORIDE 5 MG: 10 TABLET ORAL at 09:01

## 2021-07-06 NOTE — PROGRESS NOTES
89-year-old lady status post laparoscopic cholecystectomy with cholangiogram and laparoscopic common bile duct exploration with stone extraction who presents with recurrent intermittent right upper quadrant abdominal pain.  MRCP demonstrates no evidence of choledocholithiasis but there is biliary dilatation with abrupt narrowing at the ampulla.  Given this finding and slightly elevated liver enzymes, I have requested gastroenterology consultation for possible ERCP.

## 2021-07-06 NOTE — PROGRESS NOTES
Name: Maria Fernanda Gaviria ADMIT: 2021   : 1923  PCP: Henok Salcido MD    MRN: [id number] LOS: 0 days   AGE/SEX: 89 y.o. female  ROOM: E4/     Subjective   Subjective   Decreased upper abdominal pain.  No more nausea or vomiting.  Tolerating diet well.  No bowel movement since admission.  No heartburn.  No dysphagia or odynophagia.  No fever or chills.    Review of Systems  Vascular/respiratory.  No chest pain.  No palpitation.  No shortness of breath.  Positive occasional dry cough without hemoptysis.  .  No dysuria or hematuria.     Objective   Objective   Vital Signs  Temp:  [97 °F (36.1 °C)-97.7 °F (36.5 °C)] 97.7 °F (36.5 °C)  Heart Rate:  [62-92] 77  Resp:  [16-18] 16  BP: ()/(60-91) 108/61  SpO2:  [92 %-95 %] 92 %  on   ;   Device (Oxygen Therapy): room air    Intake/Output Summary (Last 24 hours) at 2021 1123  Last data filed at 2021 1530  Gross per 24 hour   Intake 1000 ml   Output --   Net 1000 ml     Body mass index is 20.71 kg/m².      21  1727 21  0601   Weight: 46.3 kg (102 lb) 46.5 kg (102 lb 8.2 oz)     Physical Exam  General.  Elderly female.  Alert and oriented x3.  No apparent pain distress or diaphoresis.  Normal mood and affect.  Eyes.  Pupils equal round and reactive.  Intact extraocular musculature.  No pallor or jaundice.  Normal conjunctivae and lids.    Oral cavity.  Moist mucous membrane.    Neck.  Supple.  No JVD.  No lymphadenopathy or thyromegaly.  Cardiovascular.  Regular rate and rhythm.  No murmurs.  Chest.  Clear to auscultation bilaterally with no added sounds.  Abdomen.  Soft lax.  No tenderness.  No organomegaly.  No guarding or rebound.  Extremities.  No clubbing cyanosis or edema.  CNS.  No acute focal neurological deficits.      Results Review:      Results from last 7 days   Lab Units 21  0631 21  1030 21  1536   SODIUM mmol/L 138 140 139   POTASSIUM mmol/L 3.7 4.4 4.3   CHLORIDE mmol/L 102 101 104   CO2 mmol/L 26.9  29.0 28.1   BUN mg/dL 18 12 13   CREATININE mg/dL 1.01* 0.84 0.76   GLUCOSE mg/dL 89 108* 90   CALCIUM mg/dL 8.7 9.8* 9.3   AST (SGOT) U/L  --  71* 20   ALT (SGPT) U/L  --  27 11     Estimated Creatinine Clearance: 22.8 mL/min (A) (by C-G formula based on SCr of 1.01 mg/dL (H)).          Results from last 7 days   Lab Units 07/05/21  1030   TROPONIN T ng/mL <0.010                   Invalid input(s):  PHOS        Invalid input(s): LDLCALC  Results from last 7 days   Lab Units 07/06/21  0631 07/05/21  1030 06/29/21  1536   WBC 10*3/mm3 12.04* 12.29* 6.02   HEMOGLOBIN g/dL 13.0 16.3* 14.9   HEMATOCRIT % 38.1 48.7* 45.2   PLATELETS 10*3/mm3 170 190 242   MCV fL 90.7 90.9 92.2   MCH pg 31.0 30.4 30.4   MCHC g/dL 34.1 33.5 33.0   RDW % 13.2 13.2 13.0   RDW-SD fl 43.7 43.9 43.9   MPV fL 10.3 9.9 9.6   NEUTROPHIL % %  --  89.7*  --    LYMPHOCYTE % %  --  4.4*  --    MONOCYTES % %  --  5.0  --    EOSINOPHIL % %  --  0.1*  --    BASOPHIL % %  --  0.3  --    IMM GRAN % %  --  0.5  --    NEUTROS ABS 10*3/mm3  --  11.03*  --    LYMPHS ABS 10*3/mm3  --  0.54*  --    MONOS ABS 10*3/mm3  --  0.61  --    EOS ABS 10*3/mm3  --  0.01  --    BASOS ABS 10*3/mm3  --  0.04  --    IMMATURE GRANS (ABS) 10*3/mm3  --  0.06*  --    NRBC /100 WBC  --  0.0  --                      Results from last 7 days   Lab Units 07/05/21  1030   LIPASE U/L 26             Results from last 7 days   Lab Units 07/05/21  1115   NITRITE UA  Negative           Imaging:  Imaging Results (Last 24 Hours)     Procedure Component Value Units Date/Time    MRI abdomen w wo contrast mrcp [[id number]] Collected: 07/06/21 0831     Updated: 07/06/21 0831    Narrative:      MRI OF THE LIVER WITH AND WITHOUT CONTRAST, MRCP     HISTORY: Status post recent cholecystectomy with left-sided abdominal  pain and prior choledocholithiasis.     TECHNIQUE: MRI of the abdomen was performed utilizing a dedicated liver  protocol. Axial 2-D FIESTA, in and out of phase FSPGR,  axial  fat-saturated T2 imaging was acquired. Axial thin section precontrast  and dynamic enhanced postcontrast T1 weighted LAVA imaging was acquired.  Thick slab coronal MRCP imaging was acquired in multiple obliquities  through the biliary tree. Coronal thin section fat saturated T2 and 3-D  MRCP imaging was acquired.      COMPARISON: CT abdomen and pelvis from 07/05/2021     FINDINGS: The study is somewhat limited due to motion artifact.     There is a large central liver cyst measuring approximately 6.3 x 5.4  cm. There is an additional ovoid T2 hyperintense cyst seen within the  left hepatic lobe measuring 2.9 x 1.4 cm unchanged from prior imaging.     The liver demonstrates normal signal intensity. There is arterial  hyperperfusion involving the left lobe of the liver which becomes  isointense on the venous and delayed phases. There are smaller  nonenhancing small foci within the liver too small to characterize,  however, favored to represent cysts. No pathological abdominal  lymphadenopathy. The pancreas demonstrates normal signal intensity.     There is bilobar mild intrahepatic biliary dilatation with patent  confluence. The common bile duct is dilated measuring 12 mm without any  intraluminal filling defect. There is an abrupt narrowing of the lumen  at the level of the ampulla. The main pancreatic duct measures 3 mm in  its course.     Colonic diverticulosis is present. No ascites or intra-abdominal fluid  collection is identified.       Impression:      Status post cholecystectomy. No choledocholithiasis is  identified. There is mild bilobar intrahepatic biliary dilatation and  moderate common bile duct dilatation with an abrupt narrowing at the  ampulla. Correlation with liver enzymes especially alkaline phosphatase  and serum bilirubin with differential to include ampullary  stenosis/distal common bile duct stricture.                I reviewed the patient's new clinical results / labs / tests /  procedures      Assessment/Plan     Active Hospital Problems    Diagnosis  POA   • **Pain of upper abdomen [R10.10]  Yes   • Nausea and vomiting [R11.2]  Yes   • Biliary obstruction [K83.1]  Yes   • Elevated liver function tests [R79.89]  Yes   • Acute kidney failure (CMS/HCC) [N17.9]  Yes   • Hypothyroidism (acquired) [E03.9]  Yes   • Essential hypertension [I10]  Yes   • Hyperlipidemia [E78.5]  Yes      Resolved Hospital Problems   No resolved problems to display.           Upper abdominal pain/nausea and vomiting in a patient with a history of GERD/status post cholecystectomy with common bile duct exploration on 6/2021.  Normal lipase.  Elevated liver function test.  UA is negative.  MRCP suggestive of distal LAD biliary tract dilatation.  Surgical consultation is noted and appreciated.  Most likely the patient will benefit from ERCP.  GI consulted.  Will keep n.p.o. after midnight.  Will monitor liver function test.  Acute kidney failure.  The plan IV fluid and monitor mostly prerenal azotemia because of hypovolemia.  Hypertension.  Good control.  No evidence of angina or congestive heart failure.  Continue losartan.  Consider stopping losartan if renal function worsens.  Dyslipidemia.  Currently on Lipitor.  We will stop Lipitor transaminases more than 3 times than normal.  Hypothyroidism.  Clinically euthyroid on replacement.  Will check TSH.  Mild leukocytosis.  No fever.  Will monitor.  Clinical evidence of infection  VTE prophylaxis.  Patient is on Lovenox.    Discussed with patient/daughter/nurse      Samer TRUDI Zurita MD  [facility]  07/06/21  11:23 EDT

## 2021-07-07 ENCOUNTER — ANESTHESIA EVENT (OUTPATIENT)
Dept: GASTROENTEROLOGY | Facility: HOSPITAL | Age: 86
End: 2021-07-07

## 2021-07-07 ENCOUNTER — ANESTHESIA (OUTPATIENT)
Dept: GASTROENTEROLOGY | Facility: HOSPITAL | Age: 86
End: 2021-07-07

## 2021-07-07 ENCOUNTER — APPOINTMENT (OUTPATIENT)
Dept: GENERAL RADIOLOGY | Facility: HOSPITAL | Age: 86
End: 2021-07-07

## 2021-07-07 PROBLEM — K80.50 CHOLEDOCHOLITHIASIS: Status: ACTIVE | Noted: 2021-07-07

## 2021-07-07 LAB
ALBUMIN SERPL-MCNC: 3 G/DL (ref 3.5–5.2)
ALBUMIN/GLOB SERPL: 1.1 G/DL
ALP SERPL-CCNC: 107 U/L (ref 39–117)
ALT SERPL W P-5'-P-CCNC: 22 U/L (ref 1–33)
ANION GAP SERPL CALCULATED.3IONS-SCNC: 11.1 MMOL/L (ref 5–15)
AST SERPL-CCNC: 35 U/L (ref 1–32)
BILIRUB SERPL-MCNC: 0.8 MG/DL (ref 0–1.2)
BUN SERPL-MCNC: 13 MG/DL (ref 8–23)
BUN/CREAT SERPL: 19.7 (ref 7–25)
CALCIUM SPEC-SCNC: 8.3 MG/DL (ref 8.2–9.6)
CHLORIDE SERPL-SCNC: 107 MMOL/L (ref 98–107)
CO2 SERPL-SCNC: 22.9 MMOL/L (ref 22–29)
CREAT SERPL-MCNC: 0.66 MG/DL (ref 0.57–1)
DEPRECATED RDW RBC AUTO: 45 FL (ref 37–54)
ERYTHROCYTE [DISTWIDTH] IN BLOOD BY AUTOMATED COUNT: 13.2 % (ref 12.3–15.4)
GFR SERPL CREATININE-BSD FRML MDRD: 83 ML/MIN/1.73
GLOBULIN UR ELPH-MCNC: 2.8 GM/DL
GLUCOSE SERPL-MCNC: 87 MG/DL (ref 65–99)
HCT VFR BLD AUTO: 42.8 % (ref 34–46.6)
HGB BLD-MCNC: 14.4 G/DL (ref 12–15.9)
MCH RBC QN AUTO: 31 PG (ref 26.6–33)
MCHC RBC AUTO-ENTMCNC: 33.6 G/DL (ref 31.5–35.7)
MCV RBC AUTO: 92.2 FL (ref 79–97)
PLATELET # BLD AUTO: 145 10*3/MM3 (ref 140–450)
PMV BLD AUTO: 9.8 FL (ref 6–12)
POTASSIUM SERPL-SCNC: 3.7 MMOL/L (ref 3.5–5.2)
PROT SERPL-MCNC: 5.8 G/DL (ref 6–8.5)
RBC # BLD AUTO: 4.64 10*6/MM3 (ref 3.77–5.28)
SODIUM SERPL-SCNC: 141 MMOL/L (ref 136–145)
WBC # BLD AUTO: 6.9 10*3/MM3 (ref 3.4–10.8)

## 2021-07-07 PROCEDURE — 85027 COMPLETE CBC AUTOMATED: CPT | Performed by: INTERNAL MEDICINE

## 2021-07-07 PROCEDURE — 25010000002 ENOXAPARIN PER 10 MG: Performed by: INTERNAL MEDICINE

## 2021-07-07 PROCEDURE — G0378 HOSPITAL OBSERVATION PER HR: HCPCS

## 2021-07-07 PROCEDURE — C1769 GUIDE WIRE: HCPCS | Performed by: INTERNAL MEDICINE

## 2021-07-07 PROCEDURE — 74328 X-RAY BILE DUCT ENDOSCOPY: CPT

## 2021-07-07 PROCEDURE — 0 IOPAMIDOL PER 1 ML: Performed by: INTERNAL MEDICINE

## 2021-07-07 PROCEDURE — 25010000002 PROPOFOL 10 MG/ML EMULSION: Performed by: ANESTHESIOLOGY

## 2021-07-07 PROCEDURE — 99214 OFFICE O/P EST MOD 30 MIN: CPT | Performed by: INTERNAL MEDICINE

## 2021-07-07 PROCEDURE — 43264 ERCP REMOVE DUCT CALCULI: CPT | Performed by: INTERNAL MEDICINE

## 2021-07-07 PROCEDURE — 97161 PT EVAL LOW COMPLEX 20 MIN: CPT

## 2021-07-07 PROCEDURE — 97110 THERAPEUTIC EXERCISES: CPT

## 2021-07-07 PROCEDURE — 43262 ENDO CHOLANGIOPANCREATOGRAPH: CPT | Performed by: INTERNAL MEDICINE

## 2021-07-07 PROCEDURE — 25010000002 ONDANSETRON PER 1 MG: Performed by: INTERNAL MEDICINE

## 2021-07-07 PROCEDURE — A9270 NON-COVERED ITEM OR SERVICE: HCPCS | Performed by: INTERNAL MEDICINE

## 2021-07-07 PROCEDURE — 80053 COMPREHEN METABOLIC PANEL: CPT | Performed by: INTERNAL MEDICINE

## 2021-07-07 PROCEDURE — 63710000001 DOCUSATE SODIUM 100 MG CAPSULE: Performed by: INTERNAL MEDICINE

## 2021-07-07 RX ORDER — EPHEDRINE SULFATE 50 MG/ML
5 INJECTION, SOLUTION INTRAVENOUS ONCE AS NEEDED
Status: DISCONTINUED | OUTPATIENT
Start: 2021-07-07 | End: 2021-07-07 | Stop reason: HOSPADM

## 2021-07-07 RX ORDER — LABETALOL HYDROCHLORIDE 5 MG/ML
5 INJECTION, SOLUTION INTRAVENOUS
Status: DISCONTINUED | OUTPATIENT
Start: 2021-07-07 | End: 2021-07-07 | Stop reason: HOSPADM

## 2021-07-07 RX ORDER — PROPOFOL 10 MG/ML
VIAL (ML) INTRAVENOUS AS NEEDED
Status: DISCONTINUED | OUTPATIENT
Start: 2021-07-07 | End: 2021-07-07 | Stop reason: SURG

## 2021-07-07 RX ORDER — DOCUSATE SODIUM 100 MG/1
100 CAPSULE, LIQUID FILLED ORAL 2 TIMES DAILY
Status: DISCONTINUED | OUTPATIENT
Start: 2021-07-07 | End: 2021-07-08 | Stop reason: HOSPADM

## 2021-07-07 RX ORDER — NALOXONE HCL 0.4 MG/ML
0.4 VIAL (ML) INJECTION AS NEEDED
Status: DISCONTINUED | OUTPATIENT
Start: 2021-07-07 | End: 2021-07-07 | Stop reason: HOSPADM

## 2021-07-07 RX ORDER — ONDANSETRON 2 MG/ML
4 INJECTION INTRAMUSCULAR; INTRAVENOUS ONCE AS NEEDED
Status: DISCONTINUED | OUTPATIENT
Start: 2021-07-07 | End: 2021-07-07 | Stop reason: HOSPADM

## 2021-07-07 RX ORDER — FENTANYL CITRATE 50 UG/ML
25 INJECTION, SOLUTION INTRAMUSCULAR; INTRAVENOUS
Status: DISCONTINUED | OUTPATIENT
Start: 2021-07-07 | End: 2021-07-07 | Stop reason: HOSPADM

## 2021-07-07 RX ORDER — HYDRALAZINE HYDROCHLORIDE 20 MG/ML
10 INJECTION INTRAMUSCULAR; INTRAVENOUS
Status: DISCONTINUED | OUTPATIENT
Start: 2021-07-07 | End: 2021-07-07 | Stop reason: HOSPADM

## 2021-07-07 RX ORDER — LIDOCAINE HYDROCHLORIDE 20 MG/ML
INJECTION, SOLUTION INFILTRATION; PERINEURAL AS NEEDED
Status: DISCONTINUED | OUTPATIENT
Start: 2021-07-07 | End: 2021-07-07 | Stop reason: SURG

## 2021-07-07 RX ORDER — SODIUM CHLORIDE 9 MG/ML
30 INJECTION, SOLUTION INTRAVENOUS CONTINUOUS PRN
Status: DISCONTINUED | OUTPATIENT
Start: 2021-07-07 | End: 2021-07-08 | Stop reason: HOSPADM

## 2021-07-07 RX ADMIN — LOSARTAN POTASSIUM 50 MG: 50 TABLET, FILM COATED ORAL at 09:12

## 2021-07-07 RX ADMIN — MONTELUKAST SODIUM 10 MG: 10 TABLET, FILM COATED ORAL at 09:12

## 2021-07-07 RX ADMIN — ATORVASTATIN CALCIUM 20 MG: 20 TABLET, FILM COATED ORAL at 09:12

## 2021-07-07 RX ADMIN — PROPOFOL 140 MCG/KG/MIN: 10 INJECTION, EMULSION INTRAVENOUS at 13:19

## 2021-07-07 RX ADMIN — LIDOCAINE HYDROCHLORIDE 60 MG: 20 INJECTION, SOLUTION INFILTRATION; PERINEURAL at 13:19

## 2021-07-07 RX ADMIN — CETIRIZINE HYDROCHLORIDE 5 MG: 10 TABLET ORAL at 09:12

## 2021-07-07 RX ADMIN — Medication 1000 UNITS: at 09:12

## 2021-07-07 RX ADMIN — ENOXAPARIN SODIUM 30 MG: 30 INJECTION SUBCUTANEOUS at 17:00

## 2021-07-07 RX ADMIN — FLUTICASONE PROPIONATE 1 SPRAY: 50 SPRAY, METERED NASAL at 09:12

## 2021-07-07 RX ADMIN — SODIUM CHLORIDE 30 ML/HR: 9 INJECTION, SOLUTION INTRAVENOUS at 12:07

## 2021-07-07 RX ADMIN — ONDANSETRON 4 MG: 2 INJECTION INTRAMUSCULAR; INTRAVENOUS at 17:00

## 2021-07-07 RX ADMIN — SODIUM CHLORIDE 75 ML/HR: 9 INJECTION, SOLUTION INTRAVENOUS at 20:28

## 2021-07-07 RX ADMIN — LEVOTHYROXINE SODIUM 50 MCG: 0.05 TABLET ORAL at 06:20

## 2021-07-07 RX ADMIN — DOCUSATE SODIUM 100 MG: 100 CAPSULE, LIQUID FILLED ORAL at 20:31

## 2021-07-07 RX ADMIN — PROPOFOL 70 MG: 10 INJECTION, EMULSION INTRAVENOUS at 13:19

## 2021-07-07 NOTE — BRIEF OP NOTE
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]     * Bile duct calculus [K80.50]    Procedure/CPT® Codes:        Procedure(s):  ENDOSCOPIC RETROGRADE CHOLANGIOPANCREATOGRAPHY WITH SPHINCTEROTOMY, BALLOON SWEEP AND STONE (2) EXTRACTION    Surgeon(s):  Justo Aden MD    Anesthesia: Monitored Anesthesia Care    Staff:   Radiology Technologist: Cindy Escalante  Endo Technician: Dionisio Peñaloza PCT  Endo Nurse: Roseanne Gutierrez RN         Estimated Blood Loss: minimal    Urine Voided: * No values recorded between 7/7/2021  1:11 PM and 7/7/2021  1:33 PM *    Specimens:                None          Drains:   [REMOVED] Closed/Suction Drain 1 RLQ Bulb 19 Fr. (Removed)   Site Description Unable to view 06/06/21 2030   Dressing Status Clean;Dry;Intact 06/06/21 2030   Drainage Appearance Serosanguineous 06/06/21 2030   Status To bulb suction 06/06/21 2030   Output (mL) 25 mL 06/07/21 0520       Findings: ERCP with sphincterotomy balloon stone extraction performed revealing 2 distal common duct stones 2 to 3 mm each.  Bile duct otherwise unremarkable with no evidence of extravasation.  Patient tolerated well and sent to recovery.    Complications: None          Justo Aden MD     Date: 7/7/2021  Time: 13:36 EDT

## 2021-07-07 NOTE — CONSULTS
[facility]  Initial Inpatient Consult Note    Referring Provider: Dr. Huey Wilkinson    Reason for Consultation: Bile duct obstruction    Subjective     History of present illness:    89 y.o. female with history of hypothyroid, hyperlipidemia and hypertension who presented with recurrent abdominal pain in the right upper quadrant.  Patient status post laparoscopic cholecystectomy on June 6.  Patient was found with several common duct stones removed during laparoscopic procedure.  Patient was noted during the procedure of several stones impacted in the distal duct but at the end of the procedure stones were retrieved and patient drained well into the duodenum.  Patient now presented with recurrent abdominal pain right upper quadrant.  Labs were noted with mildly elevated alkaline phosphatase which had normalized postop.  Patient today feeling some improvement but apparently pain is still been intermittent.  Patient with no fever chills no jaundice no change in urine or stool color.    Past Medical History:  Past Medical History:   Diagnosis Date   • Arthritis    • Cancer (CMS/HCC)     skin   • Disease of thyroid gland    • GERD (gastroesophageal reflux disease)    • Hyperlipidemia    • Hypertension    • Osteoporosis      Past Surgical History:  Past Surgical History:   Procedure Laterality Date   • CATARACT EXTRACTION     • CHOLECYSTECTOMY WITH INTRAOPERATIVE CHOLANGIOGRAM N/A 6/6/2021    Procedure: CHOLECYSTECTOMY LAPAROSCOPIC INTRAOPERATIVE CHOLANGIOGRAM common bile duct exploration;  Surgeon: Huey Wilkinson MD;  Location: [facility];  Service: General;  Laterality: N/A;   • COLONOSCOPY     • HYSTERECTOMY     • REPLACEMENT TOTAL KNEE Right    • THYROID SURGERY     • TUBAL ABDOMINAL LIGATION        Social History:   Social History     Tobacco Use   • Smoking status: Former Smoker   • Smokeless tobacco: Never Used   Substance Use Topics   • Alcohol use: Yes     Comment: wine at night       Family History:  Family History   Problem Relation Age of Onset   • Hypertension Mother    • Heart disease Mother    • Thyroid disease Mother    • Hypertension Father    • Cancer Father    • Heart disease Brother    • Hypertension Daughter    • Thyroid disease Daughter    • Lung disease Daughter    • Depression Daughter    • Migraines Daughter    • Arthritis Daughter    • Heart disease Paternal Grandmother    • Stroke Paternal Grandfather    • Kidney disease Paternal Grandfather        Home Meds:  Medications Prior to Admission   Medication Sig Dispense Refill Last Dose   • acetaminophen (TYLENOL) 500 MG tablet Take 1 tablet by mouth Every 6 (Six) Hours As Needed for Mild Pain  or Moderate Pain .   Past Month at Unknown time   • atorvastatin (LIPITOR) 20 MG tablet Take 1 tablet by mouth Daily. Hold until instructed to resume by primary care or surgeon. 90 tablet 3 7/4/2021 at Unknown time   • Cholecalciferol (VITAMIN D3) 2000 UNITS tablet Take 1 tablet by mouth Daily.   7/4/2021 at Unknown time   • fexofenadine (ALLEGRA) 180 MG tablet Take 180 mg by mouth Daily.   7/4/2021 at Unknown time   • levothyroxine (SYNTHROID, LEVOTHROID) 50 MCG tablet TAKE 1 TABLET EVERY MORNING (Patient taking differently: Take 50 mcg by mouth Daily.) 90 tablet 3 7/4/2021 at Unknown time   • losartan (COZAAR) 50 MG tablet TAKE 1 TABLET DAILY (Patient taking differently: Take 50 mg by mouth Daily.) 90 tablet 3 7/4/2021 at Unknown time   • Misc Natural Products (Osteo Bi-Flex Joint Shield) tablet Take 1 tablet by mouth Daily.   7/4/2021 at Unknown time   • montelukast (SINGULAIR) 10 MG tablet Take 10 mg by mouth daily.   7/4/2021 at Unknown time   • Triamcinolone Acetonide (NASACORT) 55 MCG/ACT nasal inhaler 2 sprays into the nostril(s) as directed by provider Daily As Needed.   7/4/2021 at Unknown time     Current Meds:   atorvastatin, 20 mg, Oral, Daily  cetirizine, 5 mg, Oral, Daily  cholecalciferol, 1,000 Units, Oral,  Daily  enoxaparin, 30 mg, Subcutaneous, Q24H  fluticasone, 1 spray, Each Nare, Daily  levothyroxine, 50 mcg, Oral, Q AM  losartan, 50 mg, Oral, Daily  montelukast, 10 mg, Oral, Daily      Allergies:  Allergies   Allergen Reactions   • Aspirin Nausea And Vomiting and Nausea Only   • Buffered Aspirin Nausea And Vomiting   • Latex Rash     Redness on skin     Review of Systems  Pertinent items are noted in HPI, all other systems reviewed and negative     Objective     Vital Signs  Temp:  [97.4 °F (36.3 °C)-98.2 °F (36.8 °C)] 98.2 °F (36.8 °C)  Heart Rate:  [74-83] 81  Resp:  [16-18] 16  BP: (108-173)/(61-98) 173/98  Physical Exam:  General Appearance:    Alert, cooperative, in no acute distress   Head:    Normocephalic, without obvious abnormality, atraumatic   Eyes:          conjunctivae and sclerae normal, no   icterus   Throat:   no thrush, oral mucosa moist   Neck:   Supple, no adenopathy   Lungs:     Clear to auscultation bilaterally    Heart:    Regular rhythm and normal rate    Chest Wall:    No abnormalities observed   Abdomen:     Soft, nondistended, nontender; normal bowel sounds   Extremities:   no edema, no redness   Skin:   No bruising or rash   Psychiatric:  normal mood and insight     Results Review:   I reviewed the patient's new clinical results.  I reviewed the patient's new imaging results and agree with the interpretation.    Results from last 7 days   Lab Units 07/07/21  0711 07/06/21  0631 07/05/21  1030   WBC 10*3/mm3 6.90 12.04* 12.29*   HEMOGLOBIN g/dL 14.4 13.0 16.3*   HEMATOCRIT % 42.8 38.1 48.7*   PLATELETS 10*3/mm3 145 170 190     Results from last 7 days   Lab Units 07/06/21  0631 07/05/21  1030   SODIUM mmol/L 138 140   POTASSIUM mmol/L 3.7 4.4   CHLORIDE mmol/L 102 101   CO2 mmol/L 26.9 29.0   BUN mg/dL 18 12   CREATININE mg/dL 1.01* 0.84   CALCIUM mg/dL 8.7 9.8*   BILIRUBIN mg/dL  --  0.8   ALK PHOS U/L  --  131*   ALT (SGPT) U/L  --  27   AST (SGOT) U/L  --  71*   GLUCOSE mg/dL 89 108*          Lab Results   Lab Value Date/Time    LIPASE 26 07/05/2021 1030    LIPASE 15 06/03/2021 2031    LIPASE 21 02/01/2021 1125    LIPASE 15 10/16/2019 2133    LIPASE 30 07/06/2019 1049       Radiology:  MRI abdomen w wo contrast mrcp   Preliminary Result   Status post cholecystectomy. No choledocholithiasis is   identified. There is mild bilobar intrahepatic biliary dilatation and   moderate common bile duct dilatation with an abrupt narrowing at the   ampulla. Correlation with liver enzymes especially alkaline phosphatase   and serum bilirubin with differential to include ampullary   stenosis/distal common bile duct stricture.          CT Abdomen Pelvis With Contrast   Final Result       1.  New postoperative changes from cholecystectomy with trace free fluid   in the gallbladder fossa, within normal limits for postoperative state.   Intrahepatic and extrahepatic biliary ductal dilatation are again   demonstrated. Common bile duct dilatation has slightly progressed and   may be postoperative.   2.  Small hiatal hernia. No bowel obstruction. Colonic diverticulosis.       Discussed with GRIFFIN Rai at 11:47 AM.       This report was finalized on 7/5/2021 11:48 AM by Dr. Amber Livingston M.D.              Assessment/Plan   Patient Active Problem List   Diagnosis   • Osteoarthritis of lumbar spine   • Osteoarthritis   • Sciatic leg pain   • Asthma   • Stenosis of carotid artery   • Hyperlipidemia   • Gastroesophageal reflux disease   • Essential hypertension   • Hypothyroidism (acquired)   • Osteopenia   • Restless legs syndrome   • Thrombocytopenia (CMS/HCC)   • Altered mental status   • DNR (do not resuscitate)   • Non-seasonal allergic rhinitis   • Abnormal liver function tests   • Right upper quadrant abdominal pain   • Choledocholithiasis   • Pain of upper abdomen   • Nausea and vomiting   • Biliary obstruction   • Elevated liver function tests   • Acute kidney failure (CMS/HCC)   • Biliary stricture        Assessment:  1. Abnormal MRCP  2. Elevated alkaline phosphatase  3. Recurrent biliary colic  4. Hypertension  5. Hyperlipidemia    Plan:  · Difficult to assess MRCP is significant motion was noted interfering with clear visualization.  Still possible a small stone may be present as obstruction seems less likely that it occurred just in the few weeks since surgery.  Agree with proceeding with ERCP, risks and benefits discussed with patient but she reports she would still like to talk to her daughter who will be arriving later this morning.  We will schedule for this afternoon pending final approval from family.      I discussed the patients findings and my recommendations with patient.    Justo Aden MD

## 2021-07-07 NOTE — THERAPY EVALUATION
Patient Name: Maria Fernanda Gaviria  : 1923    MRN: [id number]                              Today's Date: 2021       Admit Date: 2021    Visit Dx:     ICD-10-CM ICD-9-CM   1. Pain of upper abdomen  R10.10 789.09   2. Biliary stricture  K83.1 576.2     Patient Active Problem List   Diagnosis   • Osteoarthritis of lumbar spine   • Osteoarthritis   • Sciatic leg pain   • Asthma   • Stenosis of carotid artery   • Hyperlipidemia   • Gastroesophageal reflux disease   • Essential hypertension   • Hypothyroidism (acquired)   • Osteopenia   • Restless legs syndrome   • Thrombocytopenia (CMS/HCC)   • Altered mental status   • DNR (do not resuscitate)   • Non-seasonal allergic rhinitis   • Abnormal liver function tests   • Right upper quadrant abdominal pain   • Choledocholithiasis   • Pain of upper abdomen   • Nausea and vomiting   • Biliary obstruction   • Elevated liver function tests   • Acute kidney failure (CMS/HCC)   • Biliary stricture     Past Medical History:   Diagnosis Date   • Arthritis    • Cancer (CMS/HCC)     skin   • Disease of thyroid gland    • GERD (gastroesophageal reflux disease)    • Hyperlipidemia    • Hypertension    • Osteoporosis      Past Surgical History:   Procedure Laterality Date   • CATARACT EXTRACTION     • CHOLECYSTECTOMY WITH INTRAOPERATIVE CHOLANGIOGRAM N/A 2021    Procedure: CHOLECYSTECTOMY LAPAROSCOPIC INTRAOPERATIVE CHOLANGIOGRAM common bile duct exploration;  Surgeon: Huey Wilkinson MD;  Location: [facility];  Service: General;  Laterality: N/A;   • COLONOSCOPY     • HYSTERECTOMY     • REPLACEMENT TOTAL KNEE Right    • THYROID SURGERY     • TUBAL ABDOMINAL LIGATION       General Information     Row Name 21 1211          Physical Therapy Time and Intention    Document Type  evaluation  -DJ     Mode of Treatment  individual therapy;physical therapy  -DJ     Row Name 21 1211          General Information    Patient Profile Reviewed  yes  -DJ     Prior Level  of Function  independent:;community mobility;ADL's  -DJ     Existing Precautions/Restrictions  fall  -DJ     Barriers to Rehab  none identified  -DJ     Row Name 07/07/21 1211          Living Environment    Lives With  alone  -DJ     Row Name 07/07/21 1211          Home Main Entrance    Number of Stairs, Main Entrance  two  -DJ     Row Name 07/07/21 1211          Stairs Within Home, Primary    Number of Stairs, Within Home, Primary  none  -DJ     Row Name 07/07/21 1211          Cognition    Orientation Status (Cognition)  oriented x 4 pleasant and cooperative  -DJ     Row Name 07/07/21 1211          Safety Issues, Functional Mobility    Impairments Affecting Function (Mobility)  endurance/activity tolerance;balance  -DJ     Comment, Safety Issues/Impairments (Mobility)  gt belt, grippy socks  -DJ       User Key  (r) = Recorded By, (t) = Taken By, (c) = Cosigned By    Initials Name Provider Type    Pat Medina, PT Physical Therapist        Mobility     Row Name 07/07/21 1212          Bed Mobility    Bed Mobility  supine-sit;sit-supine  -DJ     Supine-Sit [place] (Bed Mobility)  standby assist;verbal cues  -DJ     Sit-Supine [place] (Bed Mobility)  standby assist;verbal cues  -DJ     Assistive Device (Bed Mobility)  bed rails;head of bed elevated  -DJ     Row Name 07/07/21 1212          Transfers    Comment (Transfers)  sit/stand from EOB  -DJ     Row Name 07/07/21 1212          Bed-Chair Transfer    Bed-Chair [place] (Transfers)  not tested  -DJ     Row Name 07/07/21 1212          Sit-Stand Transfer    Sit-Stand [place] (Transfers)  contact guard;verbal cues  -DJ     Assistive Device (Sit-Stand Transfers)  cane, straight  -DJ     Row Name 07/07/21 1212          Gait/Stairs (Locomotion)    [place] Level (Gait)  contact guard;verbal cues  -DJ     Assistive Device (Gait)  cane, straight  -DJ     Distance in Feet (Gait)  140'  -DJ     Deviations/Abnormal Patterns (Gait)  jack  decreased;gait speed decreased;stride length decreased;antalgic  -DJ     [place] Level (Stairs)  not tested  -DJ     Comment (Gait/Stairs)  140' with cane and SBA, slight R LE limp and holds R knee straight/stiff due to pain  -DJ       User Key  (r) = Recorded By, (t) = Taken By, (c) = Cosigned By    Initials Name Provider Type    Pat Medina, TERRA Physical Therapist        Obj/Interventions     Row Name 07/07/21 1213          Range of Motion Comprehensive    Comment, General Range of Motion  grossly WFL  -DJ     Row Name 07/07/21 1213          Strength Comprehensive (MMT)    Comment, General Manual Muscle Testing (MMT) Assessment  good extremity strength  -DJ     Row Name 07/07/21 1213          Balance    Balance Assessment  sitting static balance;standing static balance  -DJ     Static Sitting Balance  WFL  -DJ     Static Standing Balance  WFL  -DJ     Balance Interventions  standing;sit to stand;supported;weight shifting activity  -DJ     Row Name 07/07/21 1213          Sensory Assessment (Somatosensory)    Sensory Assessment (Somatosensory)  not tested  -DJ       User Key  (r) = Recorded By, (t) = Taken By, (c) = Cosigned By    Initials Name Provider Type    Pat Medina, PT Physical Therapist        Goals/Plan     Row Name 07/07/21 1220          Bed Mobility Goal 1 (PT)    Activity/Assistive Device (Bed Mobility Goal 1, PT)  sit to supine;supine to sit  -DJ     [place] Level/Cues Needed (Bed Mobility Goal 1, PT)  independent  -DJ     Time Frame (Bed Mobility Goal 1, PT)  1 week  -DJ     Row Name 07/07/21 1220          Transfer Goal 1 (PT)    Activity/Assistive Device (Transfer Goal 1, PT)  sit-to-stand/stand-to-sit;toilet  -DJ     [place] Level/Cues Needed (Transfer Goal 1, PT)  modified independence  -DJ     Time Frame (Transfer Goal 1, PT)  1 week  -DJ     Row Name 07/07/21 1220          Gait Training Goal 1 (PT)    Activity/Assistive Device (Gait Training Goal 1, PT)  gait (walking  locomotion);assistive device use;improve balance and speed;increase endurance/gait distance;increase energy conservation;cane, straight  -DJ     [place] Level (Gait Training Goal 1, PT)  modified independence  -DJ     Distance (Gait Training Goal 1, PT)  >350'  -DJ     Time Frame (Gait Training Goal 1, PT)  1 week  -DJ     Row Name 07/07/21 1220          Stairs Goal 1 (PT)    Activity/Assistive Device (Stairs Goal 1, PT)  ascending stairs;descending stairs  -DJ     [place] Level/Cues Needed (Stairs Goal 1, PT)  standby assist  -DJ     Number of Stairs (Stairs Goal 1, PT)  2  -DJ     Time Frame (Stairs Goal 1, PT)  1 week  -DJ     Row Name 07/07/21 1220          Patient Education Goal (PT)    Activity (Patient Education Goal, PT)  HEP  -DJ     [place]/Cues/Accuracy (Memory Goal 2, PT)  demonstrates adequately;verbalizes understanding  -DJ     Time Frame (Patient Education Goal, PT)  3 days;short term goal (STG)  -DJ       User Key  (r) = Recorded By, (t) = Taken By, (c) = Cosigned By    Initials Name Provider Type    Pat Medina, PT Physical Therapist        Clinical Impression     Row Name 07/07/21 1214          Pain    Additional Documentation  Pain Scale: Numbers Pre/Post-Treatment (Group)  -DJ     Row Name 07/07/21 1214          Pain Scale: Numbers Pre/Post-Treatment    Pre/Posttreatment Pain Comment  c/o R knee pain - was scheduled for outpt PT eval today  -DJ     Pain Intervention(s)  Repositioned;Rest  -DJ     Row Name 07/07/21 1214          Plan of Care Review    Plan of Care Reviewed With  patient;daughter  -DJ     Outcome Summary  89yo white female admitted 7/5/21 due to R UQ abd pain. PMH includes high cholesterol, hbp, hypothyroid, N/V, acute kidney failure, lumbar arthritis with sciatica, acid reflux, restless legs, AMS. PLOF: Pt lives at home alone with 2 JULIA and uses a cane for mobilitty. Today, she req SBA for bed mobility and CGA for sit/stand from recliner. Pt amb 140' with cane  and CGA, R LE limp and holds R knee stiff. She would benfit from skilled PT for ther ex, gt/transfer training, bed mobility, balance, and endurance as appropriate. Pt was scheduled to start outpt PT today for R knee pain. REcommend home health or outpt PT upon d/c.  -DJ     Row Name 07/07/21 1214          Therapy Assessment/Plan (PT)    Patient/Family Therapy Goals Statement (PT)  return home asap  -DJ     Rehab Potential (PT)  good, to achieve stated therapy goals  -DJ     Criteria for Skilled Interventions Met (PT)  yes;meets criteria;skilled treatment is necessary  -DJ     Row Name 07/07/21 1214          Vital Signs    O2 Delivery Pre Treatment  room air  -DJ     O2 Delivery Intra Treatment  room air  -DJ     O2 Delivery Post Treatment  room air  -DJ     Pre Patient Position  Supine  -DJ     Intra Patient Position  Standing  -DJ     Post Patient Position  Supine  -DJ     Row Name 07/07/21 1214          Positioning and Restraints    Pre-Treatment Position  in bed  -DJ     Post Treatment Position  bed  -DJ     In Bed  notified nsg;supine;call light within reach;encouraged to call for assist;exit alarm on;with family/caregiver  -DJ       User Key  (r) = Recorded By, (t) = Taken By, (c) = Cosigned By    Initials Name Provider Type    Pat Medina, PT Physical Therapist        Outcome Measures     Row Name 07/07/21 1221          How much help from another person do you currently need...    Turning from your back to your side while in flat bed without using bedrails?  4  -DJ     Moving from lying on back to sitting on the side of a flat bed without bedrails?  3  -DJ     Moving to and from a bed to a chair (including a wheelchair)?  3  -DJ     Standing up from a chair using your arms (e.g., wheelchair, bedside chair)?  3  -DJ     Climbing 3-5 steps with a railing?  3  -DJ     To walk in hospital room?  3  -DJ     AM-PAC 6 Clicks Score (PT)  19  -DJ     Row Name 07/07/21 1221          Functional Assessment    Outcome  Measure Options  AM-PAC 6 Clicks Basic Mobility (PT)  -DJ       User Key  (r) = Recorded By, (t) = Taken By, (c) = Cosigned By    Initials Name Provider Type    Pat Medina PT Physical Therapist        Physical Therapy Education                 Title: PT OT SLP Therapies (In Progress)     Topic: Physical Therapy (In Progress)     Point: Mobility training (Done)     Learning Progress Summary           Patient Acceptance, E, VU by DJ at 7/7/2021 1222   Family Acceptance, E, VU by DJ at 7/7/2021 1222                   Point: Home exercise program (Not Started)     Learner Progress:  Not documented in this visit.          Point: Body mechanics (Done)     Learning Progress Summary           Patient Acceptance, E, VU by DJ at 7/7/2021 1222   Family Acceptance, E, VU by KEAGAN at 7/7/2021 1222                   Point: Precautions (Done)     Learning Progress Summary           Patient Acceptance, E, VU by DJ at 7/7/2021 1222   Family Acceptance, E, VU by DJ at 7/7/2021 1222                               User Key     Initials Effective Dates Name Provider Type Discipline    KEAGAN 10/25/19 -  Pat Garcia, PT Physical Therapist PT              PT Recommendation and Plan  Planned Therapy Interventions (PT): balance training, bed mobility training, gait training, home exercise program, strengthening, stair training, postural re-education, patient/family education, transfer training  Plan of Care Reviewed With: patient, daughter  Outcome Summary: 89yo white female admitted 7/5/21 due to R UQ abd pain. PMH includes high cholesterol, hbp, hypothyroid, N/V, acute kidney failure, lumbar arthritis with sciatica, acid reflux, restless legs, AMS. PLOF: Pt lives at home alone with 2 JULIA and uses a cane for mobilitty. Today, she req SBA for bed mobility and CGA for sit/stand from recliner. Pt amb 140' with cane and CGA, R LE limp and holds R knee stiff. She would benfit from skilled PT for ther ex, gt/transfer training, bed mobility,  balance, and endurance as appropriate. Pt was scheduled to start outpt PT today for R knee pain. REcommend home health or outpt PT upon d/c.     Time Calculation:   PT Charges     Row Name 07/07/21 1223             Time Calculation    Start Time  0925  -DJ      Stop Time  0946  -DJ      Time Calculation (min)  21 min  -DJ      PT Non-Billable Time (min)  15 min  -DJ      PT Received On  07/07/21  -DJ      PT - Next Appointment  07/08/21  -DJ      PT Goal Re-Cert Due Date  07/14/21  -DJ        User Key  (r) = Recorded By, (t) = Taken By, (c) = Cosigned By    Initials Name Provider Type    Pat Medina, TERRA Physical Therapist        Therapy Charges for Today     Code Description Service Date Service Provider Modifiers Qty    93210806216 HC PT EVAL LOW COMPLEXITY 2 7/7/2021 Pat Garcia, PT GP 1    50135117429 HC PT THER PROC EA 15 MIN 7/7/2021 Pat Garcia, PT GP 1          PT G-Codes  Outcome Measure Options: AM-PAC 6 Clicks Basic Mobility (PT)  AM-PAC 6 Clicks Score (PT): 19    Pat Garcia PT  7/7/2021

## 2021-07-07 NOTE — ANESTHESIA POSTPROCEDURE EVALUATION
"Patient: Maria Fernanda Gaviria    Procedure Summary     Date: 07/07/21 Room / Location: [facility] ENDOSCOPY 8 /  [facility]    Anesthesia Start: 1310 Anesthesia Stop: 1344    Procedure: ENDOSCOPIC RETROGRADE CHOLANGIOPANCREATOGRAPHY WITH SPHINCTEROTOMY, BALLOON SWEEP AND STONE (2) EXTRACTION (N/A ) Diagnosis:       Biliary stricture      Bile duct calculus      (Biliary stricture [K83.1])    Surgeons: Justo Aden MD Provider: Ousmane Apodaca MD    Anesthesia Type: MAC ASA Status: 3          Anesthesia Type: MAC    Vitals  Vitals Value Taken Time   /81 07/07/21 1343   Temp     Pulse 91 07/07/21 1343   Resp 16 07/07/21 1343   SpO2 97 % 07/07/21 1343           Post Anesthesia Care and Evaluation    Patient location during evaluation: bedside  Patient participation: complete - patient participated  Level of consciousness: sleepy but conscious  Pain management: adequate  Airway patency: patent  Anesthetic complications: No anesthetic complications    Cardiovascular status: acceptable  Respiratory status: acceptable  Hydration status: acceptable    Comments: */81 (BP Location: Left arm, Patient Position: Lying)   Pulse 91   Temp 36.7 °C (98 °F) (Oral)   Resp 16   Ht 149.9 cm (59\")   Wt 46.5 kg (102 lb 8.2 oz)   LMP  (LMP Unknown)   SpO2 97%   BMI 20.71 kg/m²         "

## 2021-07-07 NOTE — PLAN OF CARE
Goal Outcome Evaluation:  Plan of Care Reviewed With: patient, daughter           Outcome Summary: 89yo white female admitted 7/5/21 due to R UQ abd pain. PMH includes high cholesterol, hbp, hypothyroid, N/V, acute kidney failure, lumbar arthritis with sciatica, acid reflux, restless legs, AMS. PLOF: Pt lives at home alone with 2 JULIA and uses a cane for mobilitty. Today, she req SBA for bed mobility and CGA for sit/stand from recliner. Pt amb 140' with cane and CGA, R LE limp and holds R knee stiff. She would benfit from skilled PT for ther ex, gt/transfer training, bed mobility, balance, and endurance as appropriate. Pt was scheduled to start outpt PT today for R knee pain. REcommend home health or outpt PT upon d/c.  Patient was intermittently wearing a face mask during this therapy encounter. Therapist used appropriate personal protective equipment including eye protection, mask, and gloves.  Mask used was standard procedure mask. Appropriate PPE was worn during the entire therapy session. Hand hygiene was completed before and after therapy session. Patient is not in enhanced droplet precautions.

## 2021-07-07 NOTE — ANESTHESIA PREPROCEDURE EVALUATION
Anesthesia Evaluation     NPO Solid Status: > 8 hours  NPO Liquid Status: > 2 hours           Airway   Mallampati: II  TM distance: >3 FB  Neck ROM: full  No difficulty expected  Dental - normal exam     Pulmonary - normal exam   (+) a smoker Former, asthma,  Cardiovascular - normal exam    ECG reviewed    (+) hypertension, hyperlipidemia,  carotid artery disease      Neuro/Psych  GI/Hepatic/Renal/Endo    (+)  GERD,  liver disease history of elevated LFT, renal disease (JACKELYN), thyroid problem hypothyroidism    Musculoskeletal     (+) arthralgias,   Abdominal    Substance History      OB/GYN          Other   arthritis, blood dyscrasia thrombocytopenia,   history of cancer (skin cancer)                      Anesthesia Plan    ASA 3     MAC       Anesthetic plan, all risks, benefits, and alternatives have been provided, discussed and informed consent has been obtained with: patient.

## 2021-07-07 NOTE — PROGRESS NOTES
Name: Maria Fernanda Gaviria ADMIT: 2021   : 1923  PCP: Henok Salcido MD    MRN: [id number] LOS: 1 days   AGE/SEX: 89 y.o. female  ROOM: [facility]     Subjective   Subjective   S/p ERCP.  Resolved upper abdominal pain.  Positive nausea but no vomiting. .  Tolerating diet well.  Normal bowel movement without constipation/diarrhea/bleeding per rectum or melena..  No heartburn.  No dysphagia or odynophagia.  No fever or chills.    Review of Systems    Vascular/respiratory.  No chest pain.  No palpitation.  No shortness of breath.  Positive occasional dry cough without hemoptysis.  .  No dysuria or hematuria.     Objective   Objective   Vital Signs  Temp:  [97.2 °F (36.2 °C)-98.2 °F (36.8 °C)] 97.2 °F (36.2 °C)  Heart Rate:  [80-91] 81  Resp:  [16-18] 16  BP: (129-173)/(75-98) 129/84  SpO2:  [97 %-99 %] 99 %  on  Flow (L/min):  [2-5] 2;   Device (Oxygen Therapy): room air    Intake/Output Summary (Last 24 hours) at 2021 1503  Last data filed at 2021 1423  Gross per 24 hour   Intake 1623.75 ml   Output --   Net 1623.75 ml     Body mass index is 20.71 kg/m².      21  1727 21  0601   Weight: 46.3 kg (102 lb) 46.5 kg (102 lb 8.2 oz)     Physical Exam    General.  Elderly female.  Alert and oriented x3.  No apparent pain distress or diaphoresis.  Normal mood and affect.  Eyes.  Pupils equal round and reactive.  Intact extraocular musculature.  No pallor or jaundice.  Normal conjunctivae and lids.    Oral cavity.  Moist mucous membrane.    Neck.  Supple.  No JVD.  No lymphadenopathy or thyromegaly.  Cardiovascular.  Regular rate and rhythm.  No murmurs.  Chest.  Clear to auscultation bilaterally with no added sounds.  Abdomen.  Soft lax.  No tenderness.  No organomegaly.  No guarding or rebound.  Extremities.  No clubbing cyanosis or edema.  CNS.  No acute focal neurological deficits.      Results Review:      Results from last 7 days   Lab Units 21  0711 21  0631 21  103    SODIUM mmol/L 141 138 140   POTASSIUM mmol/L 3.7 3.7 4.4   CHLORIDE mmol/L 107 102 101   CO2 mmol/L 22.9 26.9 29.0   BUN mg/dL 13 18 12   CREATININE mg/dL 0.66 1.01* 0.84   GLUCOSE mg/dL 87 89 108*   CALCIUM mg/dL 8.3 8.7 9.8*   AST (SGOT) U/L 35*  --  71*   ALT (SGPT) U/L 22  --  27     Estimated Creatinine Clearance: 28.8 mL/min (by C-G formula based on SCr of 0.66 mg/dL).          Results from last 7 days   Lab Units 07/05/21  1030   TROPONIN T ng/mL <0.010         Results from last 7 days   Lab Units 07/06/21  1209   TSH uIU/mL 1.720           Invalid input(s):  PHOS        Invalid input(s): LDLCALC  Results from last 7 days   Lab Units 07/07/21  0711 07/06/21  0631 07/05/21  1030   WBC 10*3/mm3 6.90 12.04* 12.29*   HEMOGLOBIN g/dL 14.4 13.0 16.3*   HEMATOCRIT % 42.8 38.1 48.7*   PLATELETS 10*3/mm3 145 170 190   MCV fL 92.2 90.7 90.9   MCH pg 31.0 31.0 30.4   MCHC g/dL 33.6 34.1 33.5   RDW % 13.2 13.2 13.2   RDW-SD fl 45.0 43.7 43.9   MPV fL 9.8 10.3 9.9   NEUTROPHIL % %  --   --  89.7*   LYMPHOCYTE % %  --   --  4.4*   MONOCYTES % %  --   --  5.0   EOSINOPHIL % %  --   --  0.1*   BASOPHIL % %  --   --  0.3   IMM GRAN % %  --   --  0.5   NEUTROS ABS 10*3/mm3  --   --  11.03*   LYMPHS ABS 10*3/mm3  --   --  0.54*   MONOS ABS 10*3/mm3  --   --  0.61   EOS ABS 10*3/mm3  --   --  0.01   BASOS ABS 10*3/mm3  --   --  0.04   IMMATURE GRANS (ABS) 10*3/mm3  --   --  0.06*   NRBC /100 WBC  --   --  0.0                     Results from last 7 days   Lab Units 07/05/21  1030   LIPASE U/L 26             Results from last 7 days   Lab Units 07/05/21  1115   NITRITE UA  Negative           Imaging:  Imaging Results (Last 24 Hours)     Procedure Component Value Units Date/Time    FL ercp biliary duct only [[id number]] Collected: 07/07/21 1417     Updated: 07/07/21 1417    Narrative:      ERCP WITH SPOT FILMS OF BILIARY DUCT SYSTEM     HISTORY: Biliary obstruction. Possible choledocholithiasis.     FINDINGS: Imaging at the time  of ERCP demonstrates contrast partially  filling a dilated biliary duct system including the common bile duct and  subsequent views show instrumentation of the duct and passage of  balloontipped catheter for stone extraction. Please also see the  operative report. 6 images were obtained and the fluoroscopy time  measures 2 minutes and 59 seconds.       MRI abdomen w wo contrast mrcp [[id number]] Collected: 07/06/21 0831     Updated: 07/07/21 1157    Narrative:      MRI OF THE LIVER WITH AND WITHOUT CONTRAST, MRCP     HISTORY: Status post recent cholecystectomy with left-sided abdominal  pain and prior choledocholithiasis.     TECHNIQUE: MRI of the abdomen was performed utilizing a dedicated liver  protocol. Axial 2-D FIESTA, in and out of phase FSPGR, axial  fat-saturated T2 imaging was acquired. Axial thin section precontrast  and dynamic enhanced postcontrast T1 weighted LAVA imaging was acquired.  Thick slab coronal MRCP imaging was acquired in multiple obliquities  through the biliary tree. Coronal thin section fat saturated T2 and 3-D  MRCP imaging was acquired.      COMPARISON: CT abdomen and pelvis from 07/05/2021     FINDINGS: The study is somewhat limited due to motion artifact.     There is a large central liver cyst measuring approximately 6.3 x 5.4  cm. There is an additional ovoid T2 hyperintense cyst seen within the  left hepatic lobe measuring 2.9 x 1.4 cm unchanged from prior imaging.     The liver demonstrates normal signal intensity. There is arterial  hyperperfusion involving the left lobe of the liver which becomes  isointense on the venous and delayed phases. There are smaller  nonenhancing small foci within the liver too small to characterize,  however, favored to represent cysts. No pathological abdominal  lymphadenopathy. The pancreas demonstrates normal signal intensity.     There is bilobar mild intrahepatic biliary dilatation with patent  confluence. The common bile duct is dilated  measuring 12 mm without any  intraluminal filling defect. There is an abrupt narrowing of the lumen  at the level of the ampulla. The main pancreatic duct measures 3 mm in  its course.     Colonic diverticulosis is present. No ascites or intra-abdominal fluid  collection is identified.       Impression:      Status post cholecystectomy. No definite choledocholithiasis  is identified although evaluation is limited secondary to motion. There  is mild bilobar intrahepatic biliary dilatation and moderate common bile  duct dilatation with an abrupt narrowing at the ampulla. Correlation  with liver enzymes especially alkaline phosphatase and serum bilirubin  with differential to include ampullary stenosis/distal common bile duct  stricture.     This report was finalized on 7/7/2021 11:54 AM by Dr. Annika Saavedra M.D.                I reviewed the patient's new clinical results / labs / tests / procedures      Assessment/Plan     Active Hospital Problems    Diagnosis  POA   • **Pain of upper abdomen [R10.10]  Yes   • Choledocholithiasis [K80.50]  Yes   • Nausea and vomiting [R11.2]  Yes   • Biliary obstruction [K83.1]  Yes   • Elevated liver function tests [R79.89]  Yes   • Acute kidney failure (CMS/HCC) [N17.9]  Yes   • Biliary stricture [K83.1]  Yes   • Hypothyroidism (acquired) [E03.9]  Yes   • Essential hypertension [I10]  Yes   • Hyperlipidemia [E78.5]  Yes      Resolved Hospital Problems   No resolved problems to display.           Upper abdominal pain/nausea and vomiting in a patient with a history of GERD/status post cholecystectomy with common bile duct exploration on 6/2021.  Normal lipase.  Elevated liver function test but improving.  UA is negative.  MRCP suggestive of distal LAD biliary tract dilatation.  Surgical consultation is noted and appreciated.  ERCP by Dr. Aden today revealed 2 small stones in the distal common bile duct status post extraction and ampulla of Vatter sphincterectomy.  Tolerating  regular diet.  Will monitor overnight and check CBC/CMP/lipase tomorrow.  A  cute kidney failure.  Mostly secondary to prerenal azotemia because of hypovolemia.  Resolved with IV fluid.  Hypertension.  Good control.  No evidence of angina or congestive heart failure.  Continue losartan.  Consider stopping losartan if renal function worsens.  Dyslipidemia.  Currently on Lipitor.  Lipitor is on hold secondary to elevated liver function test.  Will resume at discharge.    Hypothyroidism.  Clinically euthyroid on replacement.  TSH is normal.  Mild leukocytosis.  No fever.  Will monitor.  Clinical evidence of infection  VTE prophylaxis.  Patient is on Lovenox.    Discussed with patient/daughter/nurse      Samer TRUDI Zurita MD  [facility]  07/07/21  15:03 EDT

## 2021-07-07 NOTE — PLAN OF CARE
Goal Outcome Evaluation:  Plan of Care Reviewed With: patient        Progress: improving  Outcome Summary: Pt c/o mild abd pain this morning. ERCP done today, 2 small stones removed. Pt c/o constipation, attempting to get order for stool softener from MD. Daughter at bedside. Possible discharge tomorrow.

## 2021-07-08 ENCOUNTER — READMISSION MANAGEMENT (OUTPATIENT)
Dept: CALL CENTER | Facility: HOSPITAL | Age: 86
End: 2021-07-08

## 2021-07-08 ENCOUNTER — TELEPHONE (OUTPATIENT)
Dept: INTERNAL MEDICINE | Age: 86
End: 2021-07-08

## 2021-07-08 VITALS
HEART RATE: 68 BPM | HEIGHT: 59 IN | SYSTOLIC BLOOD PRESSURE: 144 MMHG | BODY MASS INDEX: 20.16 KG/M2 | DIASTOLIC BLOOD PRESSURE: 79 MMHG | TEMPERATURE: 98.9 F | RESPIRATION RATE: 16 BRPM | WEIGHT: 100 LBS | OXYGEN SATURATION: 95 %

## 2021-07-08 PROBLEM — R79.89 ELEVATED LIVER FUNCTION TESTS: Status: RESOLVED | Noted: 2021-07-06 | Resolved: 2021-07-08

## 2021-07-08 PROBLEM — R10.10 PAIN OF UPPER ABDOMEN: Status: RESOLVED | Noted: 2021-07-05 | Resolved: 2021-07-08

## 2021-07-08 PROBLEM — K83.1 BILIARY OBSTRUCTION: Status: RESOLVED | Noted: 2021-07-06 | Resolved: 2021-07-08

## 2021-07-08 PROBLEM — E87.6 HYPOKALEMIA: Status: ACTIVE | Noted: 2021-07-08

## 2021-07-08 PROBLEM — R11.2 NAUSEA AND VOMITING: Status: RESOLVED | Noted: 2021-07-06 | Resolved: 2021-07-08

## 2021-07-08 PROBLEM — N17.9 ACUTE KIDNEY FAILURE (HCC): Status: RESOLVED | Noted: 2021-07-06 | Resolved: 2021-07-08

## 2021-07-08 PROBLEM — K83.1 BILIARY STRICTURE: Status: RESOLVED | Noted: 2021-07-06 | Resolved: 2021-07-08

## 2021-07-08 PROBLEM — K80.50 CHOLEDOCHOLITHIASIS: Status: RESOLVED | Noted: 2021-07-07 | Resolved: 2021-07-08

## 2021-07-08 LAB
ALBUMIN SERPL-MCNC: 2.4 G/DL (ref 3.5–5.2)
ALBUMIN/GLOB SERPL: 1.3 G/DL
ALP SERPL-CCNC: 93 U/L (ref 39–117)
ALT SERPL W P-5'-P-CCNC: 17 U/L (ref 1–33)
ANION GAP SERPL CALCULATED.3IONS-SCNC: 6.2 MMOL/L (ref 5–15)
AST SERPL-CCNC: 21 U/L (ref 1–32)
BASOPHILS # BLD AUTO: 0.04 10*3/MM3 (ref 0–0.2)
BASOPHILS NFR BLD AUTO: 0.6 % (ref 0–1.5)
BILIRUB SERPL-MCNC: 0.5 MG/DL (ref 0–1.2)
BUN SERPL-MCNC: 9 MG/DL (ref 8–23)
BUN/CREAT SERPL: 19.1 (ref 7–25)
CALCIUM SPEC-SCNC: 6.5 MG/DL (ref 8.2–9.6)
CHLORIDE SERPL-SCNC: 113 MMOL/L (ref 98–107)
CO2 SERPL-SCNC: 21.8 MMOL/L (ref 22–29)
CREAT SERPL-MCNC: 0.47 MG/DL (ref 0.57–1)
DEPRECATED RDW RBC AUTO: 44.6 FL (ref 37–54)
EOSINOPHIL # BLD AUTO: 0.22 10*3/MM3 (ref 0–0.4)
EOSINOPHIL NFR BLD AUTO: 3.1 % (ref 0.3–6.2)
ERYTHROCYTE [DISTWIDTH] IN BLOOD BY AUTOMATED COUNT: 13.3 % (ref 12.3–15.4)
GFR SERPL CREATININE-BSD FRML MDRD: 122 ML/MIN/1.73
GLOBULIN UR ELPH-MCNC: 1.9 GM/DL
GLUCOSE SERPL-MCNC: 75 MG/DL (ref 65–99)
HCT VFR BLD AUTO: 35.2 % (ref 34–46.6)
HGB BLD-MCNC: 12 G/DL (ref 12–15.9)
IMM GRANULOCYTES # BLD AUTO: 0.02 10*3/MM3 (ref 0–0.05)
IMM GRANULOCYTES NFR BLD AUTO: 0.3 % (ref 0–0.5)
LIPASE SERPL-CCNC: 16 U/L (ref 13–60)
LYMPHOCYTES # BLD AUTO: 1.08 10*3/MM3 (ref 0.7–3.1)
LYMPHOCYTES NFR BLD AUTO: 15.4 % (ref 19.6–45.3)
MCH RBC QN AUTO: 31 PG (ref 26.6–33)
MCHC RBC AUTO-ENTMCNC: 34.1 G/DL (ref 31.5–35.7)
MCV RBC AUTO: 91 FL (ref 79–97)
MONOCYTES # BLD AUTO: 0.59 10*3/MM3 (ref 0.1–0.9)
MONOCYTES NFR BLD AUTO: 8.4 % (ref 5–12)
NEUTROPHILS NFR BLD AUTO: 5.07 10*3/MM3 (ref 1.7–7)
NEUTROPHILS NFR BLD AUTO: 72.2 % (ref 42.7–76)
NRBC BLD AUTO-RTO: 0 /100 WBC (ref 0–0.2)
PLATELET # BLD AUTO: 145 10*3/MM3 (ref 140–450)
PMV BLD AUTO: 9.8 FL (ref 6–12)
POTASSIUM SERPL-SCNC: 3.1 MMOL/L (ref 3.5–5.2)
PROT SERPL-MCNC: 4.3 G/DL (ref 6–8.5)
RBC # BLD AUTO: 3.87 10*6/MM3 (ref 3.77–5.28)
SODIUM SERPL-SCNC: 141 MMOL/L (ref 136–145)
WBC # BLD AUTO: 7.02 10*3/MM3 (ref 3.4–10.8)

## 2021-07-08 PROCEDURE — 63710000001 LEVOTHYROXINE 50 MCG TABLET: Performed by: INTERNAL MEDICINE

## 2021-07-08 PROCEDURE — 63710000001 CETIRIZINE 10 MG TABLET: Performed by: INTERNAL MEDICINE

## 2021-07-08 PROCEDURE — A9270 NON-COVERED ITEM OR SERVICE: HCPCS | Performed by: INTERNAL MEDICINE

## 2021-07-08 PROCEDURE — 63710000001 ATORVASTATIN 20 MG TABLET: Performed by: INTERNAL MEDICINE

## 2021-07-08 PROCEDURE — 97530 THERAPEUTIC ACTIVITIES: CPT

## 2021-07-08 PROCEDURE — 83690 ASSAY OF LIPASE: CPT | Performed by: INTERNAL MEDICINE

## 2021-07-08 PROCEDURE — 63710000001 POTASSIUM CHLORIDE 10 MEQ TABLET CONTROLLED-RELEASE: Performed by: INTERNAL MEDICINE

## 2021-07-08 PROCEDURE — 85025 COMPLETE CBC W/AUTO DIFF WBC: CPT | Performed by: INTERNAL MEDICINE

## 2021-07-08 PROCEDURE — 63710000001 CHOLECALCIFEROL 25 MCG (1000 UT) TABLET: Performed by: INTERNAL MEDICINE

## 2021-07-08 PROCEDURE — 80053 COMPREHEN METABOLIC PANEL: CPT | Performed by: INTERNAL MEDICINE

## 2021-07-08 PROCEDURE — 63710000001 MONTELUKAST 10 MG TABLET: Performed by: INTERNAL MEDICINE

## 2021-07-08 PROCEDURE — 63710000001 DOCUSATE SODIUM 100 MG CAPSULE: Performed by: INTERNAL MEDICINE

## 2021-07-08 PROCEDURE — G0378 HOSPITAL OBSERVATION PER HR: HCPCS

## 2021-07-08 PROCEDURE — 63710000001 LOSARTAN 50 MG TABLET: Performed by: INTERNAL MEDICINE

## 2021-07-08 RX ORDER — POTASSIUM CHLORIDE 750 MG/1
40 TABLET, FILM COATED, EXTENDED RELEASE ORAL ONCE
Status: COMPLETED | OUTPATIENT
Start: 2021-07-08 | End: 2021-07-08

## 2021-07-08 RX ADMIN — DOCUSATE SODIUM 100 MG: 100 CAPSULE, LIQUID FILLED ORAL at 08:45

## 2021-07-08 RX ADMIN — POTASSIUM CHLORIDE 40 MEQ: 750 TABLET, EXTENDED RELEASE ORAL at 09:11

## 2021-07-08 RX ADMIN — FLUTICASONE PROPIONATE 1 SPRAY: 50 SPRAY, METERED NASAL at 08:46

## 2021-07-08 RX ADMIN — CETIRIZINE HYDROCHLORIDE 5 MG: 10 TABLET ORAL at 08:45

## 2021-07-08 RX ADMIN — LEVOTHYROXINE SODIUM 50 MCG: 0.05 TABLET ORAL at 08:46

## 2021-07-08 RX ADMIN — LOSARTAN POTASSIUM 50 MG: 50 TABLET, FILM COATED ORAL at 08:46

## 2021-07-08 RX ADMIN — Medication 1000 UNITS: at 08:46

## 2021-07-08 RX ADMIN — MONTELUKAST SODIUM 10 MG: 10 TABLET, FILM COATED ORAL at 08:46

## 2021-07-08 RX ADMIN — ATORVASTATIN CALCIUM 20 MG: 20 TABLET, FILM COATED ORAL at 08:45

## 2021-07-08 NOTE — DISCHARGE SUMMARY
Patient Name: Maria Fernanda Gaviria  : 1923  MRN: [id number]    Date of Admission: 2021  Date of Discharge:  2021  Primary Care Physician: Henok Salcido MD      Discharge Diagnoses     Active Hospital Problems    Diagnosis  POA   • Hypokalemia [E87.6]  No   • Hypothyroidism (acquired) [E03.9]  Yes   • Essential hypertension [I10]  Yes   • Hyperlipidemia [E78.5]  Yes      Resolved Hospital Problems    Diagnosis Date Resolved POA   • **Pain of upper abdomen [R10.10] 2021 Yes   • Choledocholithiasis [K80.50] 2021 Yes   • Nausea and vomiting [R11.2] 2021 Yes   • Biliary obstruction [K83.1] 2021 Yes   • Elevated liver function tests [R79.89] 2021 Yes   • Acute kidney failure (CMS/HCC) [N17.9] 2021 Yes   • Biliary 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  Please refer to the H&P for full details.  A very pleasant 89 years old white female who has had a recent cholecystectomy and common bile duct exploration also has a past history of hypertension/dyslipidemia who presents to the emergency department with upper abdominal pain associated with nausea and vomiting.  No bowel habit changes.  No melena or fresh bright blood per rectum.  No fever or chills.  No chest pain.  No shortness of breath.  Physical examination showed a temperature of 97.7 a pulse of 87 respiratory rate of 16 and a blood pressure of 132/74.  The rest of the examination was remarkable for decreased breath sounds bilaterally otherwise negative.  Hospital course.  In the ER CBC was normal except a white count of 12.2 and hemoglobin of 16.3.  CMP was normal except a blood sugar of 108 calcium 9.8 and AST of 71 alkaline phosphatase 131.  Lipase was negative.  CT scan of the abdomen revealed postoperative changes from cholecystectomy with a trace free fluid in the biliary fossa, intra and extrahepatic biliary ductal dilation and a small hiatal hernia.   The impression on this lady was that of an upper abdominal pain/nausea and vomiting in a patient with a history of GERD and a recent status post cholecystectomy with common bile duct exploration and a CAT scan showing biliary dilation.  Liver function test was elevated.  UA was negative.  MRCP was done and was suggestive of a distal common bile duct tract obstruction.  Surgical consultations was obtained and they recommended GI consultation for ERCP GI performed a ERCP that revealed 2 small stones in the distal common bile duct patient underwent stone extraction and ampulla of Vatter sphincterectomy.  Patient tolerated the procedure well.  Her diet was advanced and she tolerated regular diet by the time of discharge.  Her liver function test has normalized.  Post ERCP lipase was normal.  Her abdominal pain nausea and vomiting has resolved and she had a benign GI examination at the time of discharge.  She was noted to have an acute kidney failure soon after admission this was thought to be secondary to prerenal azotemia because of hypovolemia and she was challenged with IV fluid and this has resolved.  As far as her hypertension this remained under good control with no evidence of angina or congestive heart failure on losartan she has a history of dyslipidemia and we continued her on the Lipitor at the time of discharge.  As far as her hypothyroidism she remained clinically euthyroid TSH was normal during this admission.  Prior to discharge she developed hypokalemia and this was substituted.  She had mild leukocytosis during this admission without fever no antibiotic was administered and this has resolved spontaneously.  At the time of discharge she was hemodynamically stable tolerating p.o. and surgery and GI okayed the discharge.  She will follow-up with her primary care physician and GI as an outpatient.    Consultants     Consult Orders (all) (From admission, onward)     Start     Ordered    07/06/21 5519   Inpatient Gastroenterology Consult  Once     Specialty:  Gastroenterology  Provider:  Justo Aden MD    07/06/21 0834    07/05/21 1337  [facility] (on-call MD unless specified) Details  Once     Specialty:  Hospitalist  Provider:  (Not yet assigned)    07/05/21 1336    07/05/21 1242  Surgery (on-call MD unless specified)  Once     Specialty:  General Surgery  Provider:  Huey Wilkinson MD    07/05/21 1242              Procedures     Imaging Results (All)     Procedure Component Value Units Date/Time    FL ercp biliary duct only [[id number]] Collected: 07/07/21 1417     Updated: 07/07/21 1533    Narrative:      ERCP WITH SPOT FILMS OF BILIARY DUCT SYSTEM     HISTORY: Biliary obstruction. Possible choledocholithiasis.     FINDINGS: Imaging at the time of ERCP demonstrates contrast partially  filling a dilated biliary duct system including the common bile duct and  subsequent views show instrumentation of the duct and passage of  balloontipped catheter for stone extraction. Please also see the  operative report. 6 images were obtained and the fluoroscopy time  measures 2 minutes and 59 seconds.     This report was finalized on 7/7/2021 3:30 PM by Dr. Rush Dennison M.D.       MRI abdomen w wo contrast mrcp [[id number]] Collected: 07/06/21 0831     Updated: 07/07/21 1157    Narrative:      MRI OF THE LIVER WITH AND WITHOUT CONTRAST, MRCP     HISTORY: Status post recent cholecystectomy with left-sided abdominal  pain and prior choledocholithiasis.     TECHNIQUE: MRI of the abdomen was performed utilizing a dedicated liver  protocol. Axial 2-D FIESTA, in and out of phase FSPGR, axial  fat-saturated T2 imaging was acquired. Axial thin section precontrast  and dynamic enhanced postcontrast T1 weighted LAVA imaging was acquired.  Thick slab coronal MRCP imaging was acquired in multiple obliquities  through the biliary tree. Coronal thin section fat saturated T2 and 3-D  MRCP imaging was acquired.      COMPARISON: CT  abdomen and pelvis from 07/05/2021     FINDINGS: The study is somewhat limited due to motion artifact.     There is a large central liver cyst measuring approximately 6.3 x 5.4  cm. There is an additional ovoid T2 hyperintense cyst seen within the  left hepatic lobe measuring 2.9 x 1.4 cm unchanged from prior imaging.     The liver demonstrates normal signal intensity. There is arterial  hyperperfusion involving the left lobe of the liver which becomes  isointense on the venous and delayed phases. There are smaller  nonenhancing small foci within the liver too small to characterize,  however, favored to represent cysts. No pathological abdominal  lymphadenopathy. The pancreas demonstrates normal signal intensity.     There is bilobar mild intrahepatic biliary dilatation with patent  confluence. The common bile duct is dilated measuring 12 mm without any  intraluminal filling defect. There is an abrupt narrowing of the lumen  at the level of the ampulla. The main pancreatic duct measures 3 mm in  its course.     Colonic diverticulosis is present. No ascites or intra-abdominal fluid  collection is identified.       Impression:      Status post cholecystectomy. No definite choledocholithiasis  is identified although evaluation is limited secondary to motion. There  is mild bilobar intrahepatic biliary dilatation and moderate common bile  duct dilatation with an abrupt narrowing at the ampulla. Correlation  with liver enzymes especially alkaline phosphatase and serum bilirubin  with differential to include ampullary stenosis/distal common bile duct  stricture.     This report was finalized on 7/7/2021 11:54 AM by Dr. Annika Saavedra M.D.       CT Abdomen Pelvis With Contrast [[id number]] Collected: 07/05/21 1135     Updated: 07/05/21 1151    Narrative:      CT ABDOMEN PELVIS W CONTRAST-     HISTORY:  Upper abdominal pain, nausea, vomiting, status post  cholecystectomy on June 6.      TECHNIQUE:  CT of the abdomen and  pelvis was performed following the  administration of intravenous contrast.  Radiation dose reduction  techniques were utilized, including automated exposure control and  exposure modulation based on body size.     COMPARISON:  CT abdomen and pelvis with contrast 06/03/2021     FINDINGS: Heart size is normal. There is no pericardial effusion. A tiny  nodule in the lateral right lung base is unchanged from 2017 and likely  benign. Pleural spaces are clear.  The liver is normal in size. There is a 6.7 x 5.9 cm cyst in the central  liver, which is not significantly changed, previously 6.5 x 6.2 cm. A  few hypodense foci in the right hepatic lobe are too small to  characterize but not significantly changed. There is intrahepatic  biliary ductal dilatation, not significantly changed. The common bile  duct is dilated to 1.2 cm, previously 0.9 cm, which is slightly  progressed. There are new post surgical changes from cholecystectomy.  There is trace fluid fluid in the gallbladder fossa, which is within  normal limits for postoperative state. There is no free air. The  pancreas is not significantly changed. Spleen size is normal. Adrenal  glands within normal limits. A few hypodense foci in both kidneys are  too small to accurately characterize. There is no hydronephrosis. There  is extensive calcific aortoiliac and branch vessel atherosclerosis.  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  There is a small hiatal hernia. There is no bowel obstruction. There is  colonic diverticulosis without CT evidence of acute diverticulitis. The  bladder is well distended with normal limits. The uterus is present.  There is no adnexal mass.  There is diffuse osseous demineralization. There is multilevel  degenerative disc disease. There is lumbar levoscoliosis.          Impression:         1.  New postoperative changes from cholecystectomy with trace free fluid  in the gallbladder fossa, within  normal limits for postoperative state.  Intrahepatic and extrahepatic biliary ductal dilatation are again  demonstrated. Common bile duct dilatation has slightly progressed and  may be postoperative.  2.  Small hiatal hernia. No bowel obstruction. Colonic diverticulosis.     Discussed with GRIFFIN Rai at 11:47 AM.     This report was finalized on 7/5/2021 11:48 AM by Dr. Amber Livingston M.D.             Pertinent Labs     Results from last 7 days   Lab Units 07/08/21 0537 07/07/21 0711 07/06/21 0631 07/05/21  1030   WBC 10*3/mm3 7.02 6.90 12.04* 12.29*   HEMOGLOBIN g/dL 12.0 14.4 13.0 16.3*   PLATELETS 10*3/mm3 145 145 170 190     Results from last 7 days   Lab Units 07/08/21 0537 07/07/21 0711 07/06/21 0631 07/05/21  1030   SODIUM mmol/L 141 141 138 140   POTASSIUM mmol/L 3.1* 3.7 3.7 4.4   CHLORIDE mmol/L 113* 107 102 101   CO2 mmol/L 21.8* 22.9 26.9 29.0   BUN mg/dL 9 13 18 12   CREATININE mg/dL 0.47* 0.66 1.01* 0.84   GLUCOSE mg/dL 75 87 89 108*   Estimated Creatinine Clearance: 28.1 mL/min (A) (by C-G formula based on SCr of 0.47 mg/dL (L)).  Results from last 7 days   Lab Units 07/08/21 0537 07/07/21 0711 07/05/21  1030   ALBUMIN g/dL 2.40* 3.00* 4.40   BILIRUBIN mg/dL 0.5 0.8 0.8   ALK PHOS U/L 93 107 131*   AST (SGOT) U/L 21 35* 71*   ALT (SGPT) U/L 17 22 27     Results from last 7 days   Lab Units 07/08/21 0537 07/07/21 0711 07/06/21 0631 07/05/21  1030   CALCIUM mg/dL 6.5* 8.3 8.7 9.8*   ALBUMIN g/dL 2.40* 3.00*  --  4.40     Results from last 7 days   Lab Units 07/08/21 0537 07/05/21  1030   LIPASE U/L 16 26     Results from last 7 days   Lab Units 07/05/21  1030   TROPONIN T ng/mL <0.010           Invalid input(s): LDLCALC      Imaging Results (Last 24 Hours)     Procedure Component Value Units Date/Time    FL ercp biliary duct only [[id number]] Collected: 07/07/21 1417     Updated: 07/07/21 1533    Narrative:      ERCP WITH SPOT FILMS OF BILIARY DUCT SYSTEM     HISTORY: Biliary  obstruction. Possible choledocholithiasis.     FINDINGS: Imaging at the time of ERCP demonstrates contrast partially  filling a dilated biliary duct system including the common bile duct and  subsequent views show instrumentation of the duct and passage of  balloontipped catheter for stone extraction. Please also see the  operative report. 6 images were obtained and the fluoroscopy time  measures 2 minutes and 59 seconds.     This report was finalized on 7/7/2021 3:30 PM by Dr. Rush Dennison M.D.             Test Results Pending at Discharge         Discharge Exam   Physical Exam  Vitals.  Temperature 98.9 a pulse of 68 respiratory rate of 16 blood pressure 144/79 and O2 sats of 95% on room air  General.  Elderly female.  Alert and oriented x3.  No apparent pain distress or diaphoresis.  Normal mood and affect.  Eyes.  Pupils equal round and reactive.  Intact extraocular musculature.  No pallor or jaundice.  Normal conjunctivae and lids.    Oral cavity.  Moist mucous membrane.    Neck.  Supple.  No JVD.  No lymphadenopathy or thyromegaly.  Cardiovascular.  Regular rate and rhythm.  No murmurs.  Chest.  Clear to auscultation bilaterally with no added sounds.  Abdomen.  Soft lax.  No tenderness.  No organomegaly.  No guarding or rebound.  Extremities.  No clubbing cyanosis or edema.  CNS.  No acute focal neurological deficits.     Discharge Details        Discharge Medications      Changes to Medications      Instructions Start Date   levothyroxine 50 MCG tablet  Commonly known as: SYNTHROID, LEVOTHROID  What changed: when to take this   TAKE 1 TABLET EVERY MORNING         Continue These Medications      Instructions Start Date   acetaminophen 500 MG tablet  Commonly known as: TYLENOL   500 mg, Oral, Every 6 Hours PRN      atorvastatin 20 MG tablet  Commonly known as: LIPITOR   20 mg, Oral, Daily, Hold until instructed to resume by primary care or surgeon.      fexofenadine 180 MG tablet  Commonly known as: ALLEGRA    180 mg, Oral, Daily      losartan 50 MG tablet  Commonly known as: COZAAR   TAKE 1 TABLET DAILY      montelukast 10 MG tablet  Commonly known as: SINGULAIR   10 mg, Oral, Daily      Osteo Bi-Flex Joint Shield tablet   1 tablet, Oral, Daily      Triamcinolone Acetonide 55 MCG/ACT nasal inhaler  Commonly known as: NASACORT   2 sprays, Nasal, Daily PRN      Vitamin D3 50 MCG (2000 UT) tablet   1 tablet, Oral, Daily             Allergies   Allergen Reactions   • Aspirin Nausea And Vomiting and Nausea Only   • Buffered Aspirin Nausea And Vomiting   • Latex Rash     Redness on skin         Discharge Disposition:  Condition: Stable    Diet:   Diet Order   Procedures   • Diet Regular; Cardiac       Activity: As tolerated    Counseling :    CODE STATUS:    Code Status and Medical Interventions:   Ordered at: 07/05/21 1708     Limited Support to NOT Include:    Cardioversion/Defibrillation    Intubation     Code Status:    No CPR     Medical Interventions (Level of Support Prior to Arrest):    Limited       Future Appointments   Date Time Provider Department Center   9/22/2021 11:00 AM Henok Salcido MD [facility]     Additional Instructions for the Follow-ups that You Need to Schedule     Call MD With Problems / Concerns   As directed      Instructions: Call MD or return to ER if abdominal pain/nausea and vomiting/bowel habit changes/bleeding per rectum/melena/fever or chills/chest pain/shortness of breath    Order Comments: Instructions: Call MD or return to ER if abdominal pain/nausea and vomiting/bowel habit changes/bleeding per rectum/melena/fever or chills/chest pain/shortness of breath          Discharge Follow-up with PCP   As directed       Currently Documented PCP:    Henok Salcido MD    PCP Phone Number:    [phone]     Follow Up Details: Primary MD in 1 week for hypokalemia/hypertension/biliary obstruction and choledocholithiasis         Discharge Follow-up with Specified Provider: GI; 2 Weeks   As  directed      To: GI    Follow Up: 2 Weeks    Follow Up Details: Choledocholithiasis           Follow-up Information     Henok Salcido MD .    Specialty: Internal Medicine  Why: Primary MD in 1 week for hypokalemia/hypertension/biliary obstruction and choledocholithiasis  Contact information:  [address] [place] [address]  [phone]                     Time Spent on Discharge:  Greater than 30 minutes      Maria Esther Zurita MD  [facility]  07/08/21  08:53 EDT

## 2021-07-08 NOTE — TELEPHONE ENCOUNTER
Caller: FRANKLIN    Relationship to patient: Other    Best call back number: [phone]    New or established patient?  [] New  [x] Established    Date of discharge: 7/8/21    Facility discharged from: [facility]     Diagnosis/Symptoms: ABDOMINAL PAIN    Length of stay (If applicable): 3 DAYS    Specialty Only: Did you see a [facility] provider?    [] Yes  [x] No  If so, who?

## 2021-07-08 NOTE — PLAN OF CARE
Goal Outcome Evaluation:  Plan of Care Reviewed With: patient        Progress: improving  No c/o pain or soa. Ambulated in hallway with PT and stairs. Patient discharge home.

## 2021-07-08 NOTE — OUTREACH NOTE
Prep Survey      Responses   [facility] facility patient discharged from?  [place]   Is LACE score < 7 ?  No   Emergency Room discharge w/ pulse ox?  No   Eligibility  [facility]   Date of Admission  07/05/21   Date of Discharge  07/08/21   Discharge Disposition  Home or Self Care   Discharge diagnosis  SPHINCTEROTOMY, BALLOON SWEEP/STONE EXTRACTION   Does the patient have one of the following disease processes/diagnoses(primary or secondary)?  Other   Does the patient have Home health ordered?  No   Is there a DME ordered?  No   Prep survey completed?  Yes          Katherine Horton RN

## 2021-07-08 NOTE — ED PROVIDER NOTES
MD ATTESTATION NOTE    The OLLIE and I have discussed this patient's history, physical exam, and treatment plan.  I have reviewed the documentation and personally had a face to face interaction with the patient. I affirm the documentation and agree with the treatment and plan.  The attached note describes my personal findings.      Maria Fernanda Gaviria is a 89 y.o. female who presents to the ED c/o abdominal pain.  Pain is moderate, sharp and epigastric in location.      On exam:  Epigastric tenderness without rebound or guarding    Labs  Recent Results (from the past 24 hour(s))   CBC (No Diff)    Collection Time: 07/07/21  7:11 AM    Specimen: Blood   Result Value Ref Range    WBC 6.90 3.40 - 10.80 10*3/mm3    RBC 4.64 3.77 - 5.28 10*6/mm3    Hemoglobin 14.4 12.0 - 15.9 g/dL    Hematocrit 42.8 34.0 - 46.6 %    MCV 92.2 79.0 - 97.0 fL    MCH 31.0 26.6 - 33.0 pg    MCHC 33.6 31.5 - 35.7 g/dL    RDW 13.2 12.3 - 15.4 %    RDW-SD 45.0 37.0 - 54.0 fl    MPV 9.8 6.0 - 12.0 fL    Platelets 145 140 - 450 10*3/mm3   Comprehensive Metabolic Panel    Collection Time: 07/07/21  7:11 AM    Specimen: Blood   Result Value Ref Range    Glucose 87 65 - 99 mg/dL    BUN 13 8 - 23 mg/dL    Creatinine 0.66 0.57 - 1.00 mg/dL    Sodium 141 136 - 145 mmol/L    Potassium 3.7 3.5 - 5.2 mmol/L    Chloride 107 98 - 107 mmol/L    CO2 22.9 22.0 - 29.0 mmol/L    Calcium 8.3 8.2 - 9.6 mg/dL    Total Protein 5.8 (L) 6.0 - 8.5 g/dL    Albumin 3.00 (L) 3.50 - 5.20 g/dL    ALT (SGPT) 22 1 - 33 U/L    AST (SGOT) 35 (H) 1 - 32 U/L    Alkaline Phosphatase 107 39 - 117 U/L    Total Bilirubin 0.8 0.0 - 1.2 mg/dL    eGFR Non African Amer 83 >60 mL/min/1.73    Globulin 2.8 gm/dL    A/G Ratio 1.1 g/dL    BUN/Creatinine Ratio 19.7 7.0 - 25.0    Anion Gap 11.1 5.0 - 15.0 mmol/L       Radiology  FL ercp biliary duct only    Result Date: 7/7/2021  ERCP WITH SPOT FILMS OF BILIARY DUCT SYSTEM  HISTORY: Biliary obstruction. Possible choledocholithiasis.  FINDINGS:  Imaging at the time of ERCP demonstrates contrast partially filling a dilated biliary duct system including the common bile duct and subsequent views show instrumentation of the duct and passage of balloontipped catheter for stone extraction. Please also see the operative report. 6 images were obtained and the fluoroscopy time measures 2 minutes and 59 seconds.  This report was finalized on 7/7/2021 3:30 PM by Dr. Rush Denniosn M.D.        Medical Decision Making:  ED Course as of Jul 08 0008   Mon Jul 05, 2021   0946 Discussed pertinent information from history and physical exam with patient.  Discussed differential diagnosis and plan for ED evaluation/work-up and treatment including labs, CT abdomen and pelvis, IV fluids and pain control.  All questions answered.  Patient is agreeable with this plan.        [MS]   1024 EKG interpreted by myself.  Time 10:08 AM.  Sinus rhythm.  Heart rate 78.  Left bundle branch block.  Negative Sgarbossa criteria.    [TD]   1025 On medical chart review, old EKG obtained from 6/3/2021.  Sinus rhythm.  Heart rate 64.  Narrow QRS complexes.    [TD]   1134 WBC(!): 12.29 [MS]   1134 Hemoglobin(!): 16.3 [MS]   1134 Hematocrit(!): 48.7 [MS]   1134 AST (SGOT)(!): 71 [MS]   1134 Alkaline Phosphatase(!): 131 [MS]   1207 Reviewed pt's history and workup with Dr. Fitch.  After a bedside evaluation, they agree with the plan of care.          [MS]   1330 Discussed with Dr Michael, on-call general surgeon, regarding patient's relevant history, exam, workup and ED findings/concerns.  She agrees to see patient in consult, she feels that secondary to patient's age, recurring pain after her surgery that if patient is agreeable we should have hospitalist admit the patient and get her MRCP while in the hospital.        [MS]   1331 Consult Note    Discussed care with Dr Tobin, [facility]  Reviewed patient's history, exam, results and need for admission secondary to abdominal pain status post  cholecystectomy  Dr. Tobin accepts the patient to be admitted to telemetry observation bed.        [MS]      ED Course User Index  [MS] Naomi Siddiqui APRN  [TD] Geovani Fitch II, MD           PPE: Both the patient and I wore a surgical mask throughout the entire patient encounter. I wore protective goggles.     Diagnosis  Final diagnoses:   Pain of upper abdomen        Geovani Fitch II, MD  07/08/21 0008

## 2021-07-08 NOTE — PLAN OF CARE
Goal Outcome Evaluation:              Outcome Summary: Pt agreeable to work with PT this AM. Pt increased ambulation distance and completed x4 stairs with cga and straight cane. Pt with no further acute PT needs and plans to d/c home today with daughter's assist as needed. PT will sign off at this time. OK to amb with family and nursing staff.    Patient was intermittently wearing a face mask during this therapy encounter. Therapist used appropriate personal protective equipment including mask and gloves.  Mask used was standard procedure mask. Appropriate PPE was worn during the entire therapy session. Hand hygiene was completed before and after therapy session. Patient is not in enhanced droplet precautions.

## 2021-07-08 NOTE — THERAPY DISCHARGE NOTE
Patient Name: Maria Fernanda Gaviria  : 1923    MRN: [id number]                              Today's Date: 2021       Admit Date: 2021    Visit Dx:     ICD-10-CM ICD-9-CM   1. Pain of upper abdomen  R10.10 789.09   2. Biliary stricture  K83.1 576.2     Patient Active Problem List   Diagnosis   • Osteoarthritis of lumbar spine   • Osteoarthritis   • Sciatic leg pain   • Asthma   • Stenosis of carotid artery   • Hyperlipidemia   • Gastroesophageal reflux disease   • Essential hypertension   • Hypothyroidism (acquired)   • Osteopenia   • Restless legs syndrome   • Thrombocytopenia (CMS/HCC)   • Altered mental status   • DNR (do not resuscitate)   • Non-seasonal allergic rhinitis   • Abnormal liver function tests   • Right upper quadrant abdominal pain   • Choledocholithiasis   • Hypokalemia     Past Medical History:   Diagnosis Date   • Arthritis    • Cancer (CMS/HCC)     skin   • Disease of thyroid gland    • GERD (gastroesophageal reflux disease)    • Hyperlipidemia    • Hypertension    • Osteoporosis      Past Surgical History:   Procedure Laterality Date   • CATARACT EXTRACTION     • CHOLECYSTECTOMY WITH INTRAOPERATIVE CHOLANGIOGRAM N/A 2021    Procedure: CHOLECYSTECTOMY LAPAROSCOPIC INTRAOPERATIVE CHOLANGIOGRAM common bile duct exploration;  Surgeon: Huey Wilkinson MD;  Location: [facility] MAIN OR;  Service: General;  Laterality: N/A;   • COLONOSCOPY     • ERCP N/A 2021    Procedure: ENDOSCOPIC RETROGRADE CHOLANGIOPANCREATOGRAPHY WITH SPHINCTEROTOMY, BALLOON SWEEP AND STONE (2) EXTRACTION;  Surgeon: Justo Aden MD;  Location: [facility] ENDOSCOPY;  Service: Gastroenterology;  Laterality: N/A;  PRE- BILE DUCT OBSTRUCTION   POST- SAME     • HYSTERECTOMY     • REPLACEMENT TOTAL KNEE Right    • THYROID SURGERY     • TUBAL ABDOMINAL LIGATION       General Information     Row Name 21 1018          Physical Therapy Time and Intention    Document Type  therapy note (daily note)  -CF     Mode  of Treatment  physical therapy;individual therapy  -CF     Row Name 07/08/21 1018          General Information    Patient Profile Reviewed  yes  -CF     Existing Precautions/Restrictions  fall  -CF     Row Name 07/08/21 1018          Cognition    Orientation Status (Cognition)  oriented x 4  -CF     Row Name 07/08/21 1018          Safety Issues, Functional Mobility    Impairments Affecting Function (Mobility)  endurance/activity tolerance;pain  -CF       User Key  (r) = Recorded By, (t) = Taken By, (c) = Cosigned By    Initials Name Provider Type    CF Marry Vernon PT Physical Therapist        Mobility     Row Name 07/08/21 1019          Bed Mobility    Bed Mobility  supine-sit;sit-supine  -CF     Supine-Sit [place] (Bed Mobility)  modified independence  -CF     Sit-Supine [place] (Bed Mobility)  modified independence  -CF     Assistive Device (Bed Mobility)  bed rails;head of bed elevated  -CF     Row Name 07/08/21 1019          Sit-Stand Transfer    Sit-Stand [place] (Transfers)  standby assist  -CF     Assistive Device (Sit-Stand Transfers)  cane, straight  -CF     Row Name 07/08/21 1019          Gait/Stairs (Locomotion)    [place] Level (Gait)  standby assist;contact guard  -CF     Assistive Device (Gait)  cane, straight  -CF     Distance in Feet (Gait)  180'  -CF     Deviations/Abnormal Patterns (Gait)  jack decreased;gait speed decreased;stride length decreased;antalgic  -CF     Number of Steps (Stairs)  4  -CF     Ascending Technique (Stairs)  step-over-step  -CF     Descending Technique (Stairs)  step-to-step leading down with R LE  -CF     Comment (Gait/Stairs)  Slight limp due to R knee pain, decreased R knee flexion with gait  -CF       User Key  (r) = Recorded By, (t) = Taken By, (c) = Cosigned By    Initials Name Provider Type    Marry Garcia PT Physical Therapist        Obj/Interventions    No documentation.       Goals/Plan    No documentation.       Clinical  Impression     Row Name 07/08/21 1023          Pain    Additional Documentation  Pain Scale: FACES Pre/Post-Treatment (Group)  -CF     Row Name 07/08/21 1023          Pain Scale: Numbers Pre/Post-Treatment    Pain Intervention(s)  Repositioned;Ambulation/increased activity  -CF     Row Name 07/08/21 1023          Pain Scale: FACES Pre/Post-Treatment    Pain: FACES Scale, Pretreatment  0-->no hurt  -CF     Posttreatment Pain Rating  4-->hurts little more  -CF     Pain Location - Side  Right  -CF     Pain Location  knee  -CF     Row Name 07/08/21 1023          Plan of Care Review    Outcome Summary  Pt agreeable to work with PT this AM. Pt increased ambulation distance and completed x4 stairs with cga and straight cane. Pt with no further acute PT needs and plans to d/c home today with daughter's assist as needed. PT will sign off at this time.  -CF     Row Name 07/08/21 1023          Vital Signs    O2 Delivery Pre Treatment  room air  -[facility] Name 07/08/21 1023          Positioning and Restraints    Pre-Treatment Position  in bed  -CF     Post Treatment Position  bed  -CF     In Bed  notified nsg;call light within reach;encouraged to call for assist;exit alarm on;with family/caregiver;fowlers  -CF       User Key  (r) = Recorded By, (t) = Taken By, (c) = Cosigned By    Initials Name Provider Type    CF Marry Vernon, PT Physical Therapist        Outcome Measures     Row Name 07/08/21 1025          How much help from another person do you currently need...    Turning from your back to your side while in flat bed without using bedrails?  4  -CF     Moving from lying on back to sitting on the side of a flat bed without bedrails?  4  -CF     Moving to and from a bed to a chair (including a wheelchair)?  3  -CF     Standing up from a chair using your arms (e.g., wheelchair, bedside chair)?  4  -CF     Climbing 3-5 steps with a railing?  3  -CF     To walk in hospital room?  3  -CF     AM-PAC 6 Clicks Score (PT)  21   -CF     Row Name 07/08/21 1025          Functional Assessment    Outcome Measure Options  AM-PAC 6 Clicks Basic Mobility (PT)  -CF       User Key  (r) = Recorded By, (t) = Taken By, (c) = Cosigned By    Initials Name Provider Type    CF Marry Vernon, PT Physical Therapist        Physical Therapy Education                 Title: PT OT SLP Therapies (In Progress)     Topic: Physical Therapy (In Progress)     Point: Mobility training (Done)     Learning Progress Summary           Patient Acceptance, E, VU by CF at 7/8/2021 1025    Acceptance, E, VU by DJ at 7/7/2021 1222   Family Acceptance, E, VU by DJ at 7/7/2021 1222                   Point: Home exercise program (Not Started)     Learner Progress:  Not documented in this visit.          Point: Body mechanics (Done)     Learning Progress Summary           Patient Acceptance, E, VU by CF at 7/8/2021 1025    Acceptance, E, VU by DJ at 7/7/2021 1222   Family Acceptance, E, VU by DJ at 7/7/2021 1222                   Point: Precautions (Done)     Learning Progress Summary           Patient Acceptance, E, VU by CF at 7/8/2021 1025    Acceptance, E, VU by DJ at 7/7/2021 1222   Family Acceptance, E, VU by DJ at 7/7/2021 1222                               User Key     Initials Effective Dates Name Provider Type Discipline    DJ 10/25/19 -  Pat Garcia, PT Physical Therapist PT     06/16/21 -  Marry Vernon, TERRA Physical Therapist PT              PT Recommendation and Plan     Outcome Summary: Pt agreeable to work with PT this AM. Pt increased ambulation distance and completed x4 stairs with cga and straight cane. Pt with no further acute PT needs and plans to d/c home today with daughter's assist as needed. PT will sign off at this time.     Time Calculation:   PT Charges     Row Name 07/08/21 1018             Time Calculation    Start Time  0955  -CF      Stop Time  1007  -CF      Time Calculation (min)  12 min  -CF      PT Received On  07/08/21  -CF          Timed Charges    17942 - PT Therapeutic Activity Minutes  12  -CF         Total Minutes    Timed Charges Total Minutes  12  -CF       Total Minutes  12  -CF        User Key  (r) = Recorded By, (t) = Taken By, (c) = Cosigned By    Initials Name Provider Type    CF Marry Vernon, PT Physical Therapist        Therapy Charges for Today     Code Description Service Date Service Provider Modifiers Qty    [id number] HC PT THERAPEUTIC ACT EA 15 MIN 7/8/2021 Marry Vernon, TERRA GP 1          PT G-Codes  Outcome Measure Options: AM-PAC 6 Clicks Basic Mobility (PT)  AM-PAC 6 Clicks Score (PT): 21    PT Discharge Summary  Anticipated Discharge Disposition (PT): home with outpatient therapy services    Marry Vernon, TERRA  7/8/2021

## 2021-07-09 ENCOUNTER — TRANSITIONAL CARE MANAGEMENT TELEPHONE ENCOUNTER (OUTPATIENT)
Dept: CALL CENTER | Facility: HOSPITAL | Age: 86
End: 2021-07-09

## 2021-07-09 NOTE — OUTREACH NOTE
Call Center TCM Note      Responses   [facility] patient discharged from?  [place]   Does the patient have one of the following disease processes/diagnoses(primary or secondary)?  Other   TCM attempt successful?  Yes   Call start time  1316   Call end time  1319   Discharge diagnosis  SPHINCTEROTOMY, BALLOON SWEEP/STONE EXTRACTION   Meds reviewed with patient/caregiver?  Yes   Is the patient having any side effects they believe may be caused by any medication additions or changes?  No   Does the patient have all medications ordered at discharge?  N/A   Is the patient taking all medications as directed (includes completed medication regime)?  Yes   Comments regarding appointments  TCM appt. with PCP Dr. Salcido 7/16/21 @11am. Post Op appt. with Dr. Aden 7/30/21 @11:30am.   Does the patient have a primary care provider?   Yes   Does the patient have an appointment with their PCP within 7 days of discharge?  Greater than 7 days   Nursing Interventions  Verified appointment date/time/provider   Has the patient kept scheduled appointments due by today?  N/A   Has home health visited the patient within 72 hours of discharge?  N/A   Psychosocial issues?  No   Did the patient receive a copy of their discharge instructions?  Yes   Nursing interventions  Reviewed instructions with patient   What is the patient's perception of their health status since discharge?  Improving   Is the patient/caregiver able to teach back signs and symptoms related to disease process for when to call PCP?  Yes   Is the patient/caregiver able to teach back signs and symptoms related to disease process for when to call 911?  Yes   Is the patient/caregiver able to teach back the hierarchy of who to call/visit for symptoms/problems? PCP, Specialist, Home health nurse, Urgent Care, ED, 911  Yes   If the patient is a current smoker, are they able to teach back resources for cessation?  Not a smoker   TCM call completed?  Yes          Gayle  FRAN Roach    7/9/2021, 13:22 EDT

## 2021-07-09 NOTE — PROGRESS NOTES
Case Management Discharge Note      Final Note: DC home no needs    Provided Post Acute Provider List?: N/A  N/A Provider List Comment: Patient denies need for HHC at this time.    Selected Continued Care - Discharged on 7/8/2021 Admission date: 7/5/2021 - Discharge disposition: Home or Self Care    Destination    No services have been selected for the patient.              Durable Medical Equipment    No services have been selected for the patient.              Dialysis/Infusion    No services have been selected for the patient.              Home Medical Care    No services have been selected for the patient.              Therapy    No services have been selected for the patient.              Community Resources    No services have been selected for the patient.              Community & DME    No services have been selected for the patient.                       Final Discharge Disposition Code: 01 - home or self-care

## 2021-07-14 ENCOUNTER — HOSPITAL ENCOUNTER (OUTPATIENT)
Dept: MRI IMAGING | Facility: HOSPITAL | Age: 86
End: 2021-07-14

## 2021-07-19 ENCOUNTER — READMISSION MANAGEMENT (OUTPATIENT)
Dept: CALL CENTER | Facility: HOSPITAL | Age: 86
End: 2021-07-19

## 2021-07-19 NOTE — OUTREACH NOTE
Medical Week 2 Survey      Responses   [facility] patient discharged from?  [place]   Does the patient have one of the following disease processes/diagnoses(primary or secondary)?  Other   Week 2 attempt successful?  No   Unsuccessful attempts  Attempt 1          Lana Amdaor RN

## 2021-07-22 ENCOUNTER — READMISSION MANAGEMENT (OUTPATIENT)
Dept: CALL CENTER | Facility: HOSPITAL | Age: 86
End: 2021-07-22

## 2021-07-22 NOTE — OUTREACH NOTE
Medical Week 2 Survey      Responses   [facility] patient discharged from?  [place]   Does the patient have one of the following disease processes/diagnoses(primary or secondary)?  Other   Week 2 attempt successful?  Yes   Call start time  1438   Discharge diagnosis  SPHINCTEROTOMY, BALLOON SWEEP/STONE EXTRACTION   Call end time  1445   Meds reviewed with patient/caregiver?  Yes   Is the patient having any side effects they believe may be caused by any medication additions or changes?  No   Does the patient have all medications ordered at discharge?  N/A   Is the patient taking all medications as directed (includes completed medication regime)?  Yes   Does the patient have a primary care provider?   Yes   Has the patient kept scheduled appointments due by today?  N/A   Has home health visited the patient within 72 hours of discharge?  N/A   Psychosocial issues?  No   Did the patient receive a copy of their discharge instructions?  Yes   Nursing interventions  Reviewed instructions with patient   What is the patient's perception of their health status since discharge?  Improving   Is the patient/caregiver able to teach back signs and symptoms related to disease process for when to call PCP?  Yes   Is the patient/caregiver able to teach back signs and symptoms related to disease process for when to call 911?  Yes   Is the patient/caregiver able to teach back the hierarchy of who to call/visit for symptoms/problems? PCP, Specialist, Home health nurse, Urgent Care, ED, 911  Yes   Additional teach back comments  states feeling well, daughters assist pt, lives alone, walks dog 1 block every day, walks with cane,plans to start outpt PT    Week 2 Call Completed?  Yes          Anna Atkins RN

## 2021-07-30 ENCOUNTER — OFFICE VISIT (OUTPATIENT)
Dept: GASTROENTEROLOGY | Facility: CLINIC | Age: 86
End: 2021-07-30

## 2021-07-30 VITALS — WEIGHT: 107.6 LBS | BODY MASS INDEX: 21.69 KG/M2 | TEMPERATURE: 96.8 F | HEIGHT: 59 IN

## 2021-07-30 DIAGNOSIS — R79.89 ABNORMAL LIVER FUNCTION TESTS: Primary | ICD-10-CM

## 2021-07-30 DIAGNOSIS — K80.50 CHOLEDOCHOLITHIASIS: ICD-10-CM

## 2021-07-30 DIAGNOSIS — R10.11 RIGHT UPPER QUADRANT ABDOMINAL PAIN: ICD-10-CM

## 2021-07-30 DIAGNOSIS — Z98.890 S/P ERCP: ICD-10-CM

## 2021-07-30 PROCEDURE — 99214 OFFICE O/P EST MOD 30 MIN: CPT | Performed by: NURSE PRACTITIONER

## 2021-07-30 NOTE — PROGRESS NOTES
Chief Complaint   Patient presents with   • Follow-up       Maria Fernanda Gaviria is a  89 y.o. female here for a follow up visit for elevated liver enzymes.    HPI  89-year-old female presents today accompanied by her daughter for follow-up visit for elevated liver enzymes. She is a patient of Dr. Singh. She is new to me today. She was last seen by Dr. Aden on 7/7/2021 for an ERCP. She had biliary stricture noted on imaging and ended up having an ERCP where she was found to have 2 bile duct stones that were removed successfully. She had had a cholecystectomy on 6/6/2021. She has a previous history of acute hepatitis. Most recent set of labs show her LFTs have completely normalized. She denies any dysphagia, reflux, abdominal pain, nausea and vomiting, diarrhea, constipation, rectal bleeding or melena. She admits her appetite is good and her weight is stable. She admits sometimes she needs to drink more water and she is exercise but lately her right knee has been bothering her so she has not been walking her dog is much is normal. She had a colonoscopy in the past but she is not sure how long ago that was but it was normal to her memory.  Past Medical History:   Diagnosis Date   • Arthritis    • Cancer (CMS/HCC)     skin   • Disease of thyroid gland    • GERD (gastroesophageal reflux disease)    • Hyperlipidemia    • Hypertension    • Osteoporosis        Past Surgical History:   Procedure Laterality Date   • CATARACT EXTRACTION     • CHOLECYSTECTOMY WITH INTRAOPERATIVE CHOLANGIOGRAM N/A 6/6/2021    Procedure: CHOLECYSTECTOMY LAPAROSCOPIC INTRAOPERATIVE CHOLANGIOGRAM common bile duct exploration;  Surgeon: Huey Wilkinson MD;  Location: [facility] OR;  Service: General;  Laterality: N/A;   • COLONOSCOPY     • ERCP N/A 7/7/2021    Procedure: ENDOSCOPIC RETROGRADE CHOLANGIOPANCREATOGRAPHY WITH SPHINCTEROTOMY, BALLOON SWEEP AND STONE (2) EXTRACTION;  Surgeon: Justo Aden MD;  Location: [facility] ENDOSCOPY;   Service: Gastroenterology;  Laterality: N/A;  PRE- BILE DUCT OBSTRUCTION   POST- SAME     • HYSTERECTOMY     • REPLACEMENT TOTAL KNEE Right    • THYROID SURGERY     • TUBAL ABDOMINAL LIGATION         Scheduled Meds:    Continuous Infusions:No current facility-administered medications for this visit.      PRN Meds:.    Allergies   Allergen Reactions   • Aspirin Nausea And Vomiting and Nausea Only   • Buffered Aspirin Nausea And Vomiting   • Latex Rash     Redness on skin       Social History     Socioeconomic History   • Marital status:      Spouse name: Not on file   • Number of children: Not on file   • Years of education: Not on file   • Highest education level: Not on file   Tobacco Use   • Smoking status: Former Smoker   • Smokeless tobacco: Never Used   Vaping Use   • Vaping Use: Never used   Substance and Sexual Activity   • Alcohol use: Yes     Comment: wine at night   • Drug use: No   • Sexual activity: Defer       Family History   Problem Relation Age of Onset   • Hypertension Mother    • Heart disease Mother    • Thyroid disease Mother    • Hypertension Father    • Cancer Father    • Heart disease Brother    • Hypertension Daughter    • Thyroid disease Daughter    • Lung disease Daughter    • Depression Daughter    • Migraines Daughter    • Arthritis Daughter    • Heart disease Paternal Grandmother    • Stroke Paternal Grandfather    • Kidney disease Paternal Grandfather        Review of Systems   Constitutional: Negative for appetite change, chills, diaphoresis, fatigue, fever and unexpected weight change.   HENT: Negative for nosebleeds, postnasal drip, sore throat, trouble swallowing and voice change.    Respiratory: Negative for cough, choking, chest tightness, shortness of breath and wheezing.    Cardiovascular: Negative for chest pain, palpitations and leg swelling.   Gastrointestinal: Negative for abdominal distention, abdominal pain, anal bleeding, blood in stool, constipation, diarrhea,  nausea, rectal pain and vomiting.   Endocrine: Negative for polydipsia, polyphagia and polyuria.   Musculoskeletal: Negative for gait problem.   Skin: Negative for rash and wound.   Allergic/Immunologic: Negative for food allergies.   Neurological: Negative for dizziness, speech difficulty and light-headedness.   Psychiatric/Behavioral: Negative for confusion, self-injury, sleep disturbance and suicidal ideas.       Vitals:    07/30/21 1143   Temp: 96.8 °F (36 °C)       Physical Exam  Constitutional:       General: She is not in acute distress.     Appearance: She is well-developed. She is not ill-appearing.   HENT:      Head: Normocephalic.   Eyes:      Pupils: Pupils are equal, round, and reactive to light.   Cardiovascular:      Rate and Rhythm: Normal rate and regular rhythm.      Heart sounds: Normal heart sounds.   Pulmonary:      Effort: Pulmonary effort is normal.      Breath sounds: Normal breath sounds.   Abdominal:      General: Bowel sounds are normal. There is no distension.      Palpations: Abdomen is soft. There is no mass.      Tenderness: There is no abdominal tenderness. There is no guarding or rebound.      Hernia: No hernia is present.   Musculoskeletal:         General: Normal range of motion.      Comments: Ambulates with a cane   Skin:     General: Skin is warm and dry.   Neurological:      Mental Status: She is alert and oriented to person, place, and time.   Psychiatric:         Speech: Speech normal.         Behavior: Behavior normal.         Judgment: Judgment normal.         No radiology results for the last 7 days     Assessment and plan     1. Abnormal liver function tests    2. Right upper quadrant abdominal pain    3. Choledocholithiasis    4. S/P ERCP      Reviewed most recent labs and ERCP postop notes with the patient today. Overall she seems to be doing very well. No longer having any right upper quadrant abdominal pain. LFTs are back down to normal. Bowels are moving well. Good  appetite. Patient to call the office with any issues. Patient to follow-up with us as needed. Patient is agreeable to the plan.

## 2021-08-02 ENCOUNTER — READMISSION MANAGEMENT (OUTPATIENT)
Dept: CALL CENTER | Facility: HOSPITAL | Age: 86
End: 2021-08-02

## 2021-08-02 NOTE — OUTREACH NOTE
Medical Week 3 Survey      Responses   [facility] patient discharged from?  [place]   Does the patient have one of the following disease processes/diagnoses(primary or secondary)?  Other   Week 3 attempt successful?  No   Unsuccessful attempts  Attempt 1          Darling Tamez RN

## 2021-08-04 ENCOUNTER — HOSPITAL ENCOUNTER (OUTPATIENT)
Dept: PHYSICAL THERAPY | Facility: HOSPITAL | Age: 86
Setting detail: THERAPIES SERIES
Discharge: HOME | End: 2021-08-04

## 2021-08-04 DIAGNOSIS — M25.662 DECREASED RANGE OF MOTION (ROM) OF BOTH KNEES: ICD-10-CM

## 2021-08-04 DIAGNOSIS — M25.661 DECREASED RANGE OF MOTION (ROM) OF BOTH KNEES: ICD-10-CM

## 2021-08-04 DIAGNOSIS — M25.561 CHRONIC PAIN OF RIGHT KNEE: Primary | ICD-10-CM

## 2021-08-04 DIAGNOSIS — G89.29 CHRONIC PAIN OF RIGHT KNEE: Primary | ICD-10-CM

## 2021-08-04 DIAGNOSIS — R29.898 LEG WEAKNESS, BILATERAL: ICD-10-CM

## 2021-08-04 PROCEDURE — 97161 PT EVAL LOW COMPLEX 20 MIN: CPT

## 2021-08-04 PROCEDURE — 97110 THERAPEUTIC EXERCISES: CPT

## 2021-08-04 NOTE — THERAPY EVALUATION
Outpatient Physical Therapy Ortho Initial Evaluation  [facility]     Patient Name: Maria Fernanda Gaviria  : 1923  MRN: [id number]  Today's Date: 2021      Visit Date: 2021    Patient Active Problem List   Diagnosis   • Osteoarthritis of lumbar spine   • Osteoarthritis   • Sciatic leg pain   • Asthma   • Stenosis of carotid artery   • Hyperlipidemia   • Gastroesophageal reflux disease   • Essential hypertension   • Hypothyroidism (acquired)   • Osteopenia   • Restless legs syndrome   • Thrombocytopenia (CMS/HCC)   • Altered mental status   • DNR (do not resuscitate)   • Non-seasonal allergic rhinitis   • Abnormal liver function tests   • Right upper quadrant abdominal pain   • Choledocholithiasis   • Hypokalemia        Past Medical History:   Diagnosis Date   • Arthritis    • Cancer (CMS/HCC)     skin   • Disease of thyroid gland    • GERD (gastroesophageal reflux disease)    • Hyperlipidemia    • Hypertension    • Osteoporosis         Past Surgical History:   Procedure Laterality Date   • CATARACT EXTRACTION     • CHOLECYSTECTOMY WITH INTRAOPERATIVE CHOLANGIOGRAM N/A 2021    Procedure: CHOLECYSTECTOMY LAPAROSCOPIC INTRAOPERATIVE CHOLANGIOGRAM common bile duct exploration;  Surgeon: Huey Wilkinson MD;  Location: [facility] MAIN OR;  Service: General;  Laterality: N/A;   • COLONOSCOPY     • ERCP N/A 2021    Procedure: ENDOSCOPIC RETROGRADE CHOLANGIOPANCREATOGRAPHY WITH SPHINCTEROTOMY, BALLOON SWEEP AND STONE (2) EXTRACTION;  Surgeon: Justo Aden MD;  Location: [facility] ENDOSCOPY;  Service: Gastroenterology;  Laterality: N/A;  PRE- BILE DUCT OBSTRUCTION   POST- SAME     • HYSTERECTOMY     • REPLACEMENT TOTAL KNEE Right    • THYROID SURGERY     • TUBAL ABDOMINAL LIGATION         Visit Dx:     ICD-10-CM ICD-9-CM   1. Chronic pain of right knee  M25.561 719.46    G89.29 338.29   2. Leg weakness, bilateral  R29.898 729.89   3. Decreased range of motion (ROM) of both knees  M25.661 719.56     M25.662          Patient History     Row Name 08/04/21 1500             History    Chief Complaint  Pain  -MEÑO      Type of Pain  Knee pain  -MEÑO      Date Current Problem(s) Began  08/04/20  -MEÑO      Brief Description of Current Complaint  Maria Fernanda Gaviria is a 89 y.o. female who presents to physical therapy today with primary compliant of R knee pain that began approximately 1 year ago. Maria Fernanda Gaviria reports difficulty/increased pain with kneeling, walking for long periods of time, stairs, squatting, coming to stand from sitting position. Pain relieving factors include resting. PMH includes gallstones, HTN, L TKR in 2007, and osteopenia. Maria Fernanda Gaviria primary goal for attending skilled physical therapy is to reduce risk of requiring surgery, improve function to decrease need of injection, and return to walking dog 2 blocks pain free.  -MEÑO      Patient/Caregiver Goals  Relieve pain;Improve strength;Improve mobility;Know what to do to help the symptoms  -MEÑO      Occupation/sports/leisure activities  reading  -MEÑO      What clinical tests have you had for this problem?  X-ray  -MEÑO      Results of Clinical Tests  see Epic  -MEÑO      Are you or can you be pregnant  No  -MEÑO         Pain     Pain Location  Knee  -MEÑO      Pain at Present  0  -MEÑO      Pain at Best  0  -MEÑO      Pain at Worst  5  -MEÑO      Pain Frequency  Intermittent  -MEÑO      Pain Description  Sharp  -MEÑO      What Performance Factors Make the Current Problem(s) WORSE?  kneeling, walking for long periods of time, stairs, squatting, coming to stand from sitting position  -MEÑO      What Performance Factors Make the Current Problem(s) BETTER?  resting, sitting  -MEÑO      Tolerance Time- Standing  30 mins  -MEÑO      Tolerance Time- Walking  30 mins  -MEÑO      Is your sleep disturbed?  No  -MEÑO         Fall Risk Assessment    Any falls in the past year:  No  -MEÑO         Services    Prior Rehab/Home Health Experiences  No  -MEÑO         Daily Activities     Primary Language  English  -MEÑO      Pt Participated in POC and Goals  Yes  -MEÑO         Safety    Are you being hurt, hit, or frightened by anyone at home or in your life?  No  -MEÑO      Are you being neglected by a caregiver  No  -MEÑO      Have you had any of the following issues with  Depression  -MEÑO        User Key  (r) = Recorded By, (t) = Taken By, (c) = Cosigned By    Initials Name Provider Type    Luci Dotson, PT Physical Therapist          PT Ortho     Row Name 08/04/21 1500       Subjective Pain    Able to rate subjective pain?  yes  -MEÑO    Pre-Treatment Pain Level  0  -MEÑO       Posture/Observations    Posture- WNL  Posture is WNL  -MEÑO       Special Tests/Palpation    Special Tests/Palpation  Knee  -MEÑO       Patellar Accessory Motions    Patellar Accessory Motions Tested?  Yes  -MEÑO    Superior glide  WNL  -MEÑO    Inferior glide  WNL  -MEÑO    Medial glide  WNL  -MEÑO    Lateral glide  WNL  -MEÑO       General ROM    RT Lower Ext  Rt Knee Extension/Flexion  -MEÑO    LT Lower Ext  Lt Knee Extension/Flexion  -MEÑO       Right Lower Ext    Rt Knee Extension/Flexion AROM  0-115  -MEÑO       Left Lower Ext    Lt Knee Extension/Flexion AROM  0-118  -MEÑO       MMT (Manual Muscle Testing)    Rt Lower Ext  Rt Hip Flexion;Rt Hip ABduction;Rt Hip ADduction;Rt Knee Extension;Rt Knee Flexion;Rt Ankle Dorsiflexion  -MEÑO    Lt Lower Ext  Lt Hip Flexion;Lt Hip ABduction;Lt Knee Extension;Lt Knee Flexion;Lt Ankle Dorsiflexion  -MEÑO       MMT Right Lower Ext    Rt Hip Flexion MMT, Gross Movement  (4/5) good  -MEÑO    Rt Hip ABduction MMT, Gross Movement  (4/5) good  -MEÑO    Rt Hip ADduction MMT, Gross Movement  (4/5) good  -MEÑO    Rt Knee Extension MMT, Gross Movement  (4/5) good  -MEÑO    Rt Knee Flexion MMT, Gross Movement  (4-/5) good minus  -MEÑO    Rt Ankle Dorsiflexion MMT, Gross Movement  (4/5) good  -MEÑO       MMT Left Lower Ext    Lt Hip Flexion MMT, Gross Movement  (4/5) good  -MEÑO    Lt Hip ABduction MMT, Gross Movement  (4/5)  good  -MEÑO    Lt Knee Extension MMT, Gross Movement  (4-/5) good minus  -MEÑO    Lt Knee Flexion MMT, Gross Movement  (4-/5) good minus  -MEÑO    Lt Ankle Dorsiflexion MMT, Gross Movement  (4-/5) good minus  -MEÑO       Sensation    Sensation WNL?  WNL  -MEÑO       Flexibility    Flexibility Tested?  Lower Extremity  -MEÑO       Lower Extremity Flexibility    Hamstrings  Bilateral:;Mildly limited  -MEÑO    Gastrocnemius  Bilateral:;Mildly limited  -MEÑO       Balance Skills Training    SLS  BLE 3 sec  -MEÑO    Ankle Strategy Assessment (Balance)  dec ankle strategy  -MEÑO       Gait/Stairs (Locomotion)    Comment (Gait/Stairs)  Patient ambulates with SPC in RUE and ambulates with decreased step length, inc toe sign bilaterally, mild limited knee flexion bilaterally, dec trunk rotation  -MEÑO      User Key  (r) = Recorded By, (t) = Taken By, (c) = Cosigned By    Initials Name Provider Type    Luci Dotson, PT Physical Therapist                      Therapy Education  Education Details: Educated on anatomy/pathology, evaluation findings, POC and HEP  Given: HEP  Program: New  How Provided: Verbal, Demonstration, Written  Provided to: Patient  Level of Understanding: Verbalized, Demonstrated  83590 - PT Self Care/Mgmt Minutes: 5     PT OP Goals     Row Name 08/04/21 1500          PT Short Term Goals    STG Date to Achieve  09/03/21  -     STG 1  Pt will be independent with initial HEP to improve strength/ROM and decrease pain.  -     STG 1 Progress  New  -     STG 2  Pt will improve R knee AROM from 0-115 degrees to 0-120 degrees to improve ability to navigate stairs.  -     STG 2 Progress  New  -     STG 3  Patient will report walking 1 block with dog with R knee pain </= 2/10 pain to improve quality of life.  -     STG 3 Progress  New  -        Long Term Goals    LTG Date to Achieve  10/03/21  -     LTG 1  Pt will be independent with advance HEP to improve strength/ROM and decrease pain.  -     LTG 1  Progress  New  -MEÑO     LTG 2  Pt will be able to ascend/descend stairs in a reciprocal pattern with no increase pain.  -MEÑO     LTG 2 Progress  New  -MEÑO     LTG 3  Patient will report walking 2 blocks with dog with R knee pain </= 2/10 pain to improve quality of life.  -MEÑO     LTG 3 Progress  New  -MEÑO     LTG 4  Pt will improve KOS score to 60% to show improved quality of life.  -MEÑO     LTG 4 Progress  New  -MEÑO        Time Calculation    PT Goal Re-Cert Due Date  11/02/21  -MEÑO       User Key  (r) = Recorded By, (t) = Taken By, (c) = Cosigned By    Initials Name Provider Type    Luci Dotson, PT Physical Therapist          PT Assessment/Plan     Row Name 08/04/21 1600          PT Assessment    Functional Limitations  Impaired gait;Impaired locomotion;Limitations in community activities;Limitations in functional capacity and performance  -MEÑO     Impairments  Balance;Gait;Joint integrity;Joint mobility;Locomotion;Motor function;Muscle strength;Pain;Range of motion  -MEÑO     Assessment Comments  Maria Fernanda Gaviria is a 89 y.o. female referred to physical therapy for R knee pain. She presents with an evolving clinical presentation, along with comorbidities of gallstones, HTN, L TKR and osteopenia and no remarkable personal factors that may impact her progress in the plan of care. Pt presents today with mild decrease in bilateral knee ROM, decreased BLE strength, tightness in bilateral hamstrings/gastrocnemius, R knee joint line tenderness, decreased 30 second STS assessment . Her signs and symptoms are consistent with referring diagnosis. The previous impairments limit her ability to walk/stand longer than 30 minutes, kneel, ascend/descend stairs and perform STS without pain. Pt will benefit from skilled PT to address the previous impairments and return to PLOF.   -MEÑO     Please refer to paper survey for additional self-reported information  Yes  -MEÑO     Rehab Potential  Good  -MEÑO     Patient/caregiver  participated in establishment of treatment plan and goals  Yes  -MEÑO     Patient would benefit from skilled therapy intervention  Yes  -MEÑO        PT Plan    PT Frequency  2x/week  -MEÑO     Predicted Duration of Therapy Intervention (PT)  12-18 visits  -MEÑO     Planned CPT's?  PT EVAL LOW COMPLEXITY: 29548;PT RE-EVAL: 58227;PT THER PROC EA 15 MIN: 83836;PT THER ACT EA 15 MIN: 59039;PT MANUAL THERAPY EA 15 MIN: 29390;PT NEUROMUSC RE-EDUCATION EA 15 MIN: 69018;PT GAIT TRAINING EA 15 MIN: 94657;PT SELF CARE/HOME MGMT/TRAIN EA 15: 75759;PT HOT OR COLD PACK TREAT MCARE  -MEÑO     PT Plan Comments  Reasses response to HEP, progress all TE, add SLR, lateral stepping, stair training and standing on foam  -MEÑO       User Key  (r) = Recorded By, (t) = Taken By, (c) = Cosigned By    Initials Name Provider Type    Luci Dotson, PT Physical Therapist            OP Exercises     Row Name 08/04/21 1500             Subjective Pain    Able to rate subjective pain?  yes  -MEÑO      Pre-Treatment Pain Level  0  -MEÑO         Total Minutes    65638 - PT Therapeutic Exercise Minutes  15  -MEÑO         Exercise 1    Exercise Name 1  Seated HS stretch  -MEÑO      Cueing 1  Verbal;Demo  -MEÑO      Reps 1  3  -MEÑO      Time 1  20 sec  -MEÑO         Exercise 2    Exercise Name 2  Glute bridge  -MEÑO      Cueing 2  Verbal;Tactile  -MEÑO      Sets 2  1  -MEÑO      Reps 2  10  -MEÑO      Time 2  3  -MEÑO         Exercise 3    Exercise Name 3  clam shell  -MEÑO      Cueing 3  Verbal;Tactile  -MEÑO      Sets 3  1  -MEÑO      Reps 3  10  -MEÑO      Additional Comments  bilateral   -MEÑO         Exercise 4    Exercise Name 4  LAQ  -MEÑO      Cueing 4  Verbal;Demo  -MEÑO      Sets 4  1  -MEÑO      Reps 4  10  -MEÑO      Time 4  5  -MEÑO      Additional Comments  bilateral  -MEÑO         Exercise 5    Exercise Name 5  STS   -MEÑO      Cueing 5  Verbal;Demo  -MEÑO      Sets 5  1  -MEÑO      Reps 5  10  -MEÑO        User Key  (r) = Recorded By, (t) = Taken By, (c) = Cosigned By    Initials Name  Provider Type    MEÑO Luci Sandhu, PT Physical Therapist                        Outcome Measure Options: Knee Outcome Score- ADL, 30 Second Chair Stand Test  30 Second Chair Stand Test  30 Second Chair Stand Test: 8  Knee Outcome Score  Knee Outcome Score Comments: 48.75%      Time Calculation:     Start Time: 1525  Stop Time: 1610  Time Calculation (min): 45 min  Timed Charges  45773 - PT Therapeutic Exercise Minutes: 15  60085 - PT Self Care/Mgmt Minutes: 5  Untimed Charges  PT Eval/Re-eval Minutes: 25  Total Minutes  Timed Charges Total Minutes: 20  Untimed Charges Total Minutes: 25   Total Minutes: 45     Therapy Charges for Today     Code Description Service Date Service Provider Modifiers Qty    18356519140 HC PT EVAL LOW COMPLEXITY 2 8/4/2021 Luci Sandhu, PT GP 1    47425602911 HC PT THER PROC EA 15 MIN 8/4/2021 Luci Sandhu, PT GP 1          PT G-Codes  Outcome Measure Options: Knee Outcome Score- ADL, 30 Second Chair Stand Test         Luci Sandhu, PT  8/4/2021

## 2021-08-04 NOTE — ED NOTES
Pt denies hitting head, just pain in chest     Tiffani Grullon, RN  07/14/17 9251     [History reviewed] : History reviewed. [Medications and Allergies reviewed] : Medications and allergies reviewed.

## 2021-08-10 ENCOUNTER — HOSPITAL ENCOUNTER (OUTPATIENT)
Dept: PHYSICAL THERAPY | Facility: HOSPITAL | Age: 86
Setting detail: THERAPIES SERIES
Discharge: HOME | End: 2021-08-10

## 2021-08-10 DIAGNOSIS — G89.29 CHRONIC PAIN OF RIGHT KNEE: Primary | ICD-10-CM

## 2021-08-10 DIAGNOSIS — M25.561 CHRONIC PAIN OF RIGHT KNEE: Primary | ICD-10-CM

## 2021-08-10 DIAGNOSIS — R29.898 LEG WEAKNESS, BILATERAL: ICD-10-CM

## 2021-08-10 DIAGNOSIS — M25.661 DECREASED RANGE OF MOTION (ROM) OF BOTH KNEES: ICD-10-CM

## 2021-08-10 DIAGNOSIS — M25.662 DECREASED RANGE OF MOTION (ROM) OF BOTH KNEES: ICD-10-CM

## 2021-08-10 PROCEDURE — 97110 THERAPEUTIC EXERCISES: CPT | Performed by: PHYSICAL THERAPIST

## 2021-08-10 NOTE — THERAPY TREATMENT NOTE
Outpatient Physical Therapy Ortho Treatment Note  [facility]     Patient Name: Maria Fernanda Gaviria  : 1923  MRN: [id number]  Today's Date: 8/10/2021      Visit Date: 08/10/2021    Visit Dx:    ICD-10-CM ICD-9-CM   1. Chronic pain of right knee  M25.561 719.46    G89.29 338.29   2. Leg weakness, bilateral  R29.898 729.89   3. Decreased range of motion (ROM) of both knees  M25.661 719.56    M25.662        Patient Active Problem List   Diagnosis   • Osteoarthritis of lumbar spine   • Osteoarthritis   • Sciatic leg pain   • Asthma   • Stenosis of carotid artery   • Hyperlipidemia   • Gastroesophageal reflux disease   • Essential hypertension   • Hypothyroidism (acquired)   • Osteopenia   • Restless legs syndrome   • Thrombocytopenia (CMS/HCC)   • Altered mental status   • DNR (do not resuscitate)   • Non-seasonal allergic rhinitis   • Abnormal liver function tests   • Right upper quadrant abdominal pain   • Choledocholithiasis   • Hypokalemia        Past Medical History:   Diagnosis Date   • Arthritis    • Cancer (CMS/HCC)     skin   • Disease of thyroid gland    • GERD (gastroesophageal reflux disease)    • Hyperlipidemia    • Hypertension    • Osteoporosis         Past Surgical History:   Procedure Laterality Date   • CATARACT EXTRACTION     • CHOLECYSTECTOMY WITH INTRAOPERATIVE CHOLANGIOGRAM N/A 2021    Procedure: CHOLECYSTECTOMY LAPAROSCOPIC INTRAOPERATIVE CHOLANGIOGRAM common bile duct exploration;  Surgeon: Huey Wilkinson MD;  Location: [facility] MAIN OR;  Service: General;  Laterality: N/A;   • COLONOSCOPY     • ERCP N/A 2021    Procedure: ENDOSCOPIC RETROGRADE CHOLANGIOPANCREATOGRAPHY WITH SPHINCTEROTOMY, BALLOON SWEEP AND STONE (2) EXTRACTION;  Surgeon: Justo Aden MD;  Location: [facility] ENDOSCOPY;  Service: Gastroenterology;  Laterality: N/A;  PRE- BILE DUCT OBSTRUCTION   POST- SAME     • HYSTERECTOMY     • REPLACEMENT TOTAL KNEE Right    • THYROID SURGERY     • TUBAL ABDOMINAL  LIGATION                         PT Assessment/Plan     Row Name 08/10/21 0700          PT Assessment    Assessment Comments  Pt reports noting that she could tell that she worked it out. She states that she is doing her exercises at home. Mild limitations with TKE at R. Sit to stand improving without HHA. Pt to continue to benefit from BLE strengthening and balance for progress of safety and functional mobility in the home/community. Pt remains approprate for skilld care.  SLR and hook lying abd added on this date.   -GT        PT Plan    PT Plan Comments  continues exercise on BLE secondary to PMH of L TKA, add SLR, hooklying abd with RTB to HEP. step ups in barre, lateral walking  -GT       User Key  (r) = Recorded By, (t) = Taken By, (c) = Cosigned By    Initials Name Provider Type    GT Amor Huber, PT Physical Therapist            OP Exercises     Row Name 08/10/21 0700             Subjective Pain    Able to rate subjective pain?  yes  -GT      Pre-Treatment Pain Level  4  -GT         Total Minutes    26839 - PT Therapeutic Exercise Minutes  40  -GT         Exercise 1    Exercise Name 1  Seated HS stretch  -GT      Cueing 1  Verbal;Demo  -GT      Reps 1  3  -GT      Time 1  20 sec  -GT         Exercise 2    Exercise Name 2  Glute bridge  -GT      Cueing 2  Verbal;Tactile  -GT      Sets 2  1  -GT      Reps 2  10  -GT      Time 2  3  -GT         Exercise 3    Exercise Name 3  clam shell  -GT      Cueing 3  Verbal;Tactile  -GT      Sets 3  2  -GT      Reps 3  10  -GT         Exercise 4    Exercise Name 4  LAQ  -GT      Cueing 4  Verbal;Demo  -GT      Sets 4  2  -GT      Reps 4  10  -GT      Time 4  5 bilateral   -GT      Additional Comments  2#  -GT         Exercise 5    Exercise Name 5  STS on foam   -GT      Cueing 5  Verbal;Demo  -GT      Sets 5  1  -GT      Reps 5  10  -GT      Additional Comments  STB  -GT         Exercise 6    Exercise Name 6  Nu step   -GT      Time 6  5  -GT      Additional Comments  lvl  3  -GT         Exercise 7    Exercise Name 7  4 inch step ups in barres  -GT      Cueing 7  --  -GT      Reps 7  --  -GT         Exercise 8    Exercise Name 8  lateral stepping   -GT      Cueing 8  --  -GT      Reps 8  --  -GT      Additional Comments  --  -GT         Exercise 9    Exercise Name 9  SLR  -GT      Cueing 9  Verbal;Tactile  -GT      Sets 9  2  -GT      Reps 9  10  -GT         Exercise 10    Exercise Name 10  Foam standing NBOS  -GT      Cueing 10  Verbal;Demo  -GT      Reps 10  3  -GT      Time 10  30 sec  -GT         Exercise 11    Exercise Name 11  hook lying ABD  -GT      Cueing 11  Verbal  -GT      Sets 11  2  -GT      Reps 11  10  -GT      Time 11  RTB  -GT        User Key  (r) = Recorded By, (t) = Taken By, (c) = Cosigned By    Initials Name Provider Type    Amor Fine, PT Physical Therapist                       PT OP Goals     Row Name 08/10/21 0700          PT Short Term Goals    STG Date to Achieve  09/03/21  -GT     STG 1  Pt will be independent with initial HEP to improve strength/ROM and decrease pain.  -GT     STG 1 Progress  New  -GT     STG 2  Pt will improve R knee AROM from 0-115 degrees to 0-120 degrees to improve ability to navigate stairs.  -GT     STG 2 Progress  New  -GT     STG 3  Patient will report walking 1 block with dog with R knee pain </= 2/10 pain to improve quality of life.  -GT     STG 3 Progress  New  -GT        Long Term Goals    LTG Date to Achieve  10/03/21  -GT     LTG 1  Pt will be independent with advance HEP to improve strength/ROM and decrease pain.  -GT     LTG 1 Progress  New  -GT     LTG 2  Pt will be able to ascend/descend stairs in a reciprocal pattern with no increase pain.  -GT     LTG 2 Progress  New  -GT     LTG 3  Patient will report walking 2 blocks with dog with R knee pain </= 2/10 pain to improve quality of life.  -GT     LTG 3 Progress  New  -GT     LTG 4  Pt will improve KOS score to 60% to show improved quality of life.  -GT     LTG 4  Progress  New  -GT       User Key  (r) = Recorded By, (t) = Taken By, (c) = Cosigned By    Initials Name Provider Type    GT Amor Huber, PT Physical Therapist          Therapy Education  Education Details: progress HEP, OA response to Exercise, balance training benefits  Given: HEP  Program: New  How Provided: Verbal, Demonstration, Written  Provided to: Patient  Level of Understanding: Verbalized, Demonstrated              Time Calculation:   Start Time: 0745  Stop Time: 0828  Time Calculation (min): 43 min  Timed Charges  48804 - PT Therapeutic Exercise Minutes: 40  Total Minutes  Timed Charges Total Minutes: 40   Total Minutes: 40  Therapy Charges for Today     Code Description Service Date Service Provider Modifiers Qty    88882540238 HC PT THER PROC EA 15 MIN 8/10/2021 Amor Huber, PT GP 3                    Amor Huber PT  8/10/2021

## 2021-08-17 ENCOUNTER — HOSPITAL ENCOUNTER (OUTPATIENT)
Dept: PHYSICAL THERAPY | Facility: HOSPITAL | Age: 86
Setting detail: THERAPIES SERIES
Discharge: HOME | End: 2021-08-17

## 2021-08-17 DIAGNOSIS — R29.898 LEG WEAKNESS, BILATERAL: ICD-10-CM

## 2021-08-17 DIAGNOSIS — M25.561 CHRONIC PAIN OF RIGHT KNEE: Primary | ICD-10-CM

## 2021-08-17 DIAGNOSIS — G89.29 CHRONIC PAIN OF RIGHT KNEE: Primary | ICD-10-CM

## 2021-08-17 DIAGNOSIS — M25.661 DECREASED RANGE OF MOTION (ROM) OF BOTH KNEES: ICD-10-CM

## 2021-08-17 DIAGNOSIS — M25.662 DECREASED RANGE OF MOTION (ROM) OF BOTH KNEES: ICD-10-CM

## 2021-08-17 PROCEDURE — 97110 THERAPEUTIC EXERCISES: CPT

## 2021-08-17 NOTE — THERAPY TREATMENT NOTE
Outpatient Physical Therapy Ortho Treatment Note  [facility]     Patient Name: Maria Fernanda Gaviira  : 1923  MRN: [id number]  Today's Date: 2021      Visit Date: 2021    Visit Dx:    ICD-10-CM ICD-9-CM   1. Chronic pain of right knee  M25.561 719.46    G89.29 338.29   2. Leg weakness, bilateral  R29.898 729.89   3. Decreased range of motion (ROM) of both knees  M25.661 719.56    M25.662        Patient Active Problem List   Diagnosis   • Osteoarthritis of lumbar spine   • Osteoarthritis   • Sciatic leg pain   • Asthma   • Stenosis of carotid artery   • Hyperlipidemia   • Gastroesophageal reflux disease   • Essential hypertension   • Hypothyroidism (acquired)   • Osteopenia   • Restless legs syndrome   • Thrombocytopenia (CMS/HCC)   • Altered mental status   • DNR (do not resuscitate)   • Non-seasonal allergic rhinitis   • Abnormal liver function tests   • Right upper quadrant abdominal pain   • Choledocholithiasis   • Hypokalemia        Past Medical History:   Diagnosis Date   • Arthritis    • Cancer (CMS/HCC)     skin   • Disease of thyroid gland    • GERD (gastroesophageal reflux disease)    • Hyperlipidemia    • Hypertension    • Osteoporosis         Past Surgical History:   Procedure Laterality Date   • CATARACT EXTRACTION     • CHOLECYSTECTOMY WITH INTRAOPERATIVE CHOLANGIOGRAM N/A 2021    Procedure: CHOLECYSTECTOMY LAPAROSCOPIC INTRAOPERATIVE CHOLANGIOGRAM common bile duct exploration;  Surgeon: Huey Wilkinson MD;  Location: [facility] MAIN OR;  Service: General;  Laterality: N/A;   • COLONOSCOPY     • ERCP N/A 2021    Procedure: ENDOSCOPIC RETROGRADE CHOLANGIOPANCREATOGRAPHY WITH SPHINCTEROTOMY, BALLOON SWEEP AND STONE (2) EXTRACTION;  Surgeon: Justo Aden MD;  Location: [facility] ENDOSCOPY;  Service: Gastroenterology;  Laterality: N/A;  PRE- BILE DUCT OBSTRUCTION   POST- SAME     • HYSTERECTOMY     • REPLACEMENT TOTAL KNEE Right    • THYROID SURGERY     • TUBAL ABDOMINAL  "LIGATION                         PT Assessment/Plan     Row Name 08/17/21 1700          PT Assessment    Assessment Comments  Pt reports feeling fatigued after last time but otherwise no significant change in pain. She has been compliant with her HEP daily and reports good tolerance. COntinued with current program and added step ups, mini squats, and side steps to therex (not to HEP) all with good tolerance. Pt denies increased R knee pain during session, reports feeling some soreness L knee however reports low on 0-10 scale overall. She remains appropriate for PT and is motivated to participate.  -RS        PT Plan    PT Plan Comments  Focus on standing activities, balance, update HEP standing  -RS       User Key  (r) = Recorded By, (t) = Taken By, (c) = Cosigned By    Initials Name Provider Type    RS Jackie Berman, PT Physical Therapist            OP Exercises     Row Name 08/17/21 1600             Subjective Comments    Subjective Comments  Pt reports feeling a little better overall  -RS         Subjective Pain    Able to rate subjective pain?  yes  -RS      Pre-Treatment Pain Level  --  -RS      Subjective Pain Comment  \"low, not really pain\"  -RS         Total Minutes    14158 - PT Therapeutic Exercise Minutes  40  -RS         Exercise 1    Exercise Name 1  --  -RS      Cueing 1  --  -RS      Reps 1  --  -RS      Time 1  --  -RS         Exercise 2    Exercise Name 2  Glute bridge  -RS      Cueing 2  Verbal;Tactile  -RS      Sets 2  2  -RS      Reps 2  10  -RS      Time 2  3  -RS         Exercise 3    Exercise Name 3  clam shell  -RS      Cueing 3  Verbal;Tactile  -RS      Sets 3  2  -RS      Reps 3  10  -RS         Exercise 4    Exercise Name 4  LAQ  -RS      Cueing 4  Verbal;Demo  -RS      Sets 4  2  -RS      Reps 4  10  -RS      Time 4  5 bilateral   -RS      Additional Comments  2#  -RS         Exercise 5    Exercise Name 5  STS no UE  -RS      Cueing 5  Verbal;Demo  -RS      Sets 5  1  -RS      Reps 5  " 10  -RS         Exercise 6    Exercise Name 6  Nu step   -RS      Time 6  5  -RS      Additional Comments  lvl 3  -RS         Exercise 7    Exercise Name 7  4 inch step ups in barres  -RS      Cueing 7  Verbal;Demo  -RS      Reps 7  10  -RS         Exercise 8    Exercise Name 8  lateral stepping   -RS      Cueing 8  Verbal;Demo  -RS      Reps 8  3 laps  -RS      Additional Comments  RTB above knees  -RS         Exercise 9    Exercise Name 9  SLR  -RS      Cueing 9  Verbal;Tactile  -RS      Sets 9  2  -RS      Reps 9  10  -RS         Exercise 10    Exercise Name 10  Foam standing NBOS  -RS      Cueing 10  Verbal;Demo  -RS      Reps 10  3  -RS      Time 10  30 sec  -RS         Exercise 11    Exercise Name 11  hook lying ABD  -RS      Cueing 11  Verbal  -RS      Sets 11  2  -RS      Reps 11  10  -RS      Time 11  RTB  -RS         Exercise 12    Exercise Name 12  mini squats  -RS      Cueing 12  Verbal;Demo  -RS      Time 12  15  -RS        User Key  (r) = Recorded By, (t) = Taken By, (c) = Cosigned By    Initials Name Provider Type    RS Jackie Berman, PT Physical Therapist                       PT OP Goals     Row Name 08/17/21 1600          PT Short Term Goals    STG Date to Achieve  09/03/21  -RS     STG 1  Pt will be independent with initial HEP to improve strength/ROM and decrease pain.  -RS     STG 1 Progress  Met  -RS     STG 2  Pt will improve R knee AROM from 0-115 degrees to 0-120 degrees to improve ability to navigate stairs.  -RS     STG 2 Progress  Progressing  -RS     STG 3  Patient will report walking 1 block with dog with R knee pain </= 2/10 pain to improve quality of life.  -RS     STG 3 Progress  Ongoing  -RS        Long Term Goals    LTG Date to Achieve  10/03/21  -RS     LTG 1  Pt will be independent with advance HEP to improve strength/ROM and decrease pain.  -RS     LTG 1 Progress  Ongoing  -RS     LTG 2  Pt will be able to ascend/descend stairs in a reciprocal pattern with no increase pain.   -RS     LTG 2 Progress  Ongoing  -RS     LTG 3  Patient will report walking 2 blocks with dog with R knee pain </= 2/10 pain to improve quality of life.  -RS     LTG 3 Progress  Ongoing  -RS     LTG 4  Pt will improve KOS score to 60% to show improved quality of life.  -RS     LTG 4 Progress  Ongoing  -RS       User Key  (r) = Recorded By, (t) = Taken By, (c) = Cosigned By    Initials Name Provider Type    RS Jackie Berman, PT Physical Therapist          Therapy Education  Education Details: reviewed HEP, did not progress  Given: HEP  Program: Reinforced  How Provided: Verbal, Demonstration  Provided to: Patient  Level of Understanding: Verbalized, Demonstrated              Time Calculation:   Start Time: 1615  Stop Time: 1658  Time Calculation (min): 43 min  Timed Charges  24370 - PT Therapeutic Exercise Minutes: 40  Total Minutes  Timed Charges Total Minutes: 40   Total Minutes: 40  Therapy Charges for Today     Code Description Service Date Service Provider Modifiers Qty    60854284301 HC PT THER PROC EA 15 MIN 8/17/2021 Jackie Berman, PT GP 3                    Jackie Berman PT  8/17/2021

## 2021-08-27 ENCOUNTER — HOSPITAL ENCOUNTER (OUTPATIENT)
Dept: PHYSICAL THERAPY | Facility: HOSPITAL | Age: 86
Setting detail: THERAPIES SERIES
Discharge: HOME | End: 2021-08-27

## 2021-08-27 DIAGNOSIS — R29.898 LEG WEAKNESS, BILATERAL: ICD-10-CM

## 2021-08-27 DIAGNOSIS — G89.29 CHRONIC PAIN OF RIGHT KNEE: Primary | ICD-10-CM

## 2021-08-27 DIAGNOSIS — M25.561 CHRONIC PAIN OF RIGHT KNEE: Primary | ICD-10-CM

## 2021-08-27 DIAGNOSIS — M25.662 DECREASED RANGE OF MOTION (ROM) OF BOTH KNEES: ICD-10-CM

## 2021-08-27 DIAGNOSIS — M25.661 DECREASED RANGE OF MOTION (ROM) OF BOTH KNEES: ICD-10-CM

## 2021-08-27 PROCEDURE — 97110 THERAPEUTIC EXERCISES: CPT

## 2021-08-27 NOTE — THERAPY TREATMENT NOTE
Outpatient Physical Therapy Ortho Treatment Note  [facility]     Patient Name: Maria Fernanda Gaviria  : 1923  MRN: [id number]  Today's Date: 2021      Visit Date: 2021    Visit Dx:    ICD-10-CM ICD-9-CM   1. Chronic pain of right knee  M25.561 719.46    G89.29 338.29   2. Leg weakness, bilateral  R29.898 729.89   3. Decreased range of motion (ROM) of both knees  M25.661 719.56    M25.662        Patient Active Problem List   Diagnosis   • Osteoarthritis of lumbar spine   • Osteoarthritis   • Sciatic leg pain   • Asthma   • Stenosis of carotid artery   • Hyperlipidemia   • Gastroesophageal reflux disease   • Essential hypertension   • Hypothyroidism (acquired)   • Osteopenia   • Restless legs syndrome   • Thrombocytopenia (CMS/HCC)   • Altered mental status   • DNR (do not resuscitate)   • Non-seasonal allergic rhinitis   • Abnormal liver function tests   • Right upper quadrant abdominal pain   • Choledocholithiasis   • Hypokalemia        Past Medical History:   Diagnosis Date   • Arthritis    • Cancer (CMS/HCC)     skin   • Disease of thyroid gland    • GERD (gastroesophageal reflux disease)    • Hyperlipidemia    • Hypertension    • Osteoporosis         Past Surgical History:   Procedure Laterality Date   • CATARACT EXTRACTION     • CHOLECYSTECTOMY WITH INTRAOPERATIVE CHOLANGIOGRAM N/A 2021    Procedure: CHOLECYSTECTOMY LAPAROSCOPIC INTRAOPERATIVE CHOLANGIOGRAM common bile duct exploration;  Surgeon: Huey Wilkinson MD;  Location: [facility] OR;  Service: General;  Laterality: N/A;   • COLONOSCOPY     • ERCP N/A 2021    Procedure: ENDOSCOPIC RETROGRADE CHOLANGIOPANCREATOGRAPHY WITH SPHINCTEROTOMY, BALLOON SWEEP AND STONE (2) EXTRACTION;  Surgeon: Justo Aden MD;  Location: [facility] ENDOSCOPY;  Service: Gastroenterology;  Laterality: N/A;  PRE- BILE DUCT OBSTRUCTION   POST- SAME     • HYSTERECTOMY     • REPLACEMENT TOTAL KNEE Right    • THYROID SURGERY     • TUBAL ABDOMINAL  LIGATION         PT Ortho     Row Name 08/27/21 1000       Right Lower Ext    Rt Knee Extension/Flexion AROM  0-125  -MEÑO      User Key  (r) = Recorded By, (t) = Taken By, (c) = Cosigned By    Initials Name Provider Type    Luci Dotson, PT Physical Therapist                      PT Assessment/Plan     Row Name 08/27/21 1100          PT Assessment    Assessment Comments  Mrs. Gaviria returns to PT with reports her R knee has improved approximately 70% in comparison to initial evaluation and her remaining concern involves her standing static/dynamic balance. She also indicates she is able to walk her dog 1 block without increased R knee pain allowing her to achieve STG. Patient was able to achieve R knee ROM of 0-125, meeting remaining STG. She tolerated progression of step up to 6 inches and walking tandem standing, standing on foam eyes closed and marching on foam to address BLE strength defects and balance impairments.   -MEÑO        PT Plan    PT Plan Comments  update HEP, progress balance interventions  -MEÑO       User Key  (r) = Recorded By, (t) = Taken By, (c) = Cosigned By    Initials Name Provider Type    Luci Dotson, PT Physical Therapist            OP Exercises     Row Name 08/27/21 1000             Subjective Comments    Subjective Comments  I feel like I am at 70% improved and would like to work on my balance more  -MEÑO         Subjective Pain    Able to rate subjective pain?  yes  -MEÑO      Pre-Treatment Pain Level  0  -MEÑO         Total Minutes    06104 - PT Therapeutic Exercise Minutes  42  -MEÑO         Exercise 2    Exercise Name 2  Glute bridge  -MEÑO      Cueing 2  Verbal;Tactile  -MEÑO      Sets 2  2  -MEÑO      Reps 2  10  -MEÑO      Time 2  3  -MEÑO         Exercise 3    Exercise Name 3  clam shell  -MEÑO      Cueing 3  Verbal;Tactile  -MEÑO      Sets 3  2  -MEÑO      Reps 3  10  -MEÑO      Additional Comments  RTB  -MEÑO         Exercise 4    Exercise Name 4  LAQ  -MEÑO      Cueing 4   Verbal;Demo  -MEÑO      Sets 4  2  -MEÑO      Reps 4  10  -MEÑO      Time 4  5 bilateral   -MEÑO      Additional Comments  2#  -MEÑO         Exercise 5    Exercise Name 5  STS no UE  -MEÑO      Cueing 5  Verbal;Demo  -MEÑO      Sets 5  1  -MEÑO      Reps 5  10  -MEÑO         Exercise 6    Exercise Name 6  Nu step   -MEÑO      Time 6  5  -MEÑO         Exercise 7    Exercise Name 7  6 inch step ups in barres  -MEÑO      Cueing 7  Verbal;Demo  -MEÑO      Reps 7  10  -MEÑO         Exercise 8    Exercise Name 8  lateral stepping   -MEÑO      Cueing 8  Verbal;Demo  -MEÑO      Reps 8  3 laps  -MEÑO      Additional Comments  RTB above knees  -MEÑO         Exercise 9    Exercise Name 9  SLR  -MEÑO      Cueing 9  Verbal;Tactile  -MEÑO      Sets 9  2  -MEÑO      Reps 9  10  -MEÑO         Exercise 10    Exercise Name 10  Foam standing NBOS  -MEÑO      Cueing 10  Verbal;Demo  -MEÑO      Reps 10  3  -MEÑO      Time 10  30 sec  -MEÑO      Additional Comments  EC, in // bars, CGA  -MEÑO         Exercise 12    Exercise Name 12  mini squats  -MEÑO      Cueing 12  Verbal;Demo  -MEÑO      Time 12  15  -MEÑO         Exercise 13    Exercise Name 13  tandem walking  -MEÑO      Cueing 13  Verbal;Demo  -MEÑO      Reps 13  3 laps  -MEÑO      Additional Comments  in // bars  -MEÑO         Exercise 14    Exercise Name 14  marching on foam  -MEÑO      Cueing 14  Verbal;Demo  -MEÑO      Sets 14  3  -MEÑO      Reps 14  10  -MEÑO      Additional Comments  CGA  -MEÑO        User Key  (r) = Recorded By, (t) = Taken By, (c) = Cosigned By    Initials Name Provider Type    Luci Dotson, PT Physical Therapist                       PT OP Goals     Row Name 08/27/21 1000          PT Short Term Goals    STG Date to Achieve  09/03/21  -MEÑO     STG 1  Pt will be independent with initial HEP to improve strength/ROM and decrease pain.  -MEÑO     STG 1 Progress  Met  -MEÑO     STG 2  Pt will improve R knee AROM from 0-115 degrees to 0-120 degrees to improve ability to navigate stairs.  -MEÑO     STG 2 Progress  Met  -MEÑO     STG 2  Progress Comments  0-128  -     STG 3  Patient will report walking 1 block with dog with R knee pain </= 2/10 pain to improve quality of life.  -     STG 3 Progress  Met  -        Long Term Goals    LTG Date to Achieve  10/03/21  -     LTG 1  Pt will be independent with advance HEP to improve strength/ROM and decrease pain.  -     LTG 1 Progress  Ongoing  -     LTG 2  Pt will be able to ascend/descend stairs in a reciprocal pattern with no increase pain.  -     LTG 2 Progress  Ongoing  -     LTG 3  Patient will report walking 2 blocks with dog with R knee pain </= 2/10 pain to improve quality of life.  -     LTG 3 Progress  Ongoing  -     LTG 4  Pt will improve KOS score to 60% to show improved quality of life.  -     LTG 4 Progress  Ongoing  -       User Key  (r) = Recorded By, (t) = Taken By, (c) = Cosigned By    Initials Name Provider Type    Luci Dotson, PT Physical Therapist                         Time Calculation:   Start Time: 1045  Stop Time: 1127  Time Calculation (min): 42 min  Timed Charges  05766 - PT Therapeutic Exercise Minutes: 42  Total Minutes  Timed Charges Total Minutes: 42   Total Minutes: 42  Therapy Charges for Today     Code Description Service Date Service Provider Modifiers Qty    42327898312  PT THER PROC EA 15 MIN 8/27/2021 Luci Sandhu, PT GP 3                    Luci Sandhu, PT  8/27/2021

## 2021-08-31 ENCOUNTER — HOSPITAL ENCOUNTER (OUTPATIENT)
Dept: PHYSICAL THERAPY | Facility: HOSPITAL | Age: 86
Setting detail: THERAPIES SERIES
Discharge: HOME | End: 2021-08-31

## 2021-08-31 DIAGNOSIS — G89.29 CHRONIC PAIN OF RIGHT KNEE: Primary | ICD-10-CM

## 2021-08-31 DIAGNOSIS — R29.898 LEG WEAKNESS, BILATERAL: ICD-10-CM

## 2021-08-31 DIAGNOSIS — M25.661 DECREASED RANGE OF MOTION (ROM) OF BOTH KNEES: ICD-10-CM

## 2021-08-31 DIAGNOSIS — M25.561 CHRONIC PAIN OF RIGHT KNEE: Primary | ICD-10-CM

## 2021-08-31 DIAGNOSIS — M25.662 DECREASED RANGE OF MOTION (ROM) OF BOTH KNEES: ICD-10-CM

## 2021-08-31 PROCEDURE — 97110 THERAPEUTIC EXERCISES: CPT

## 2021-08-31 PROCEDURE — 97530 THERAPEUTIC ACTIVITIES: CPT

## 2021-09-03 ENCOUNTER — HOSPITAL ENCOUNTER (OUTPATIENT)
Dept: PHYSICAL THERAPY | Facility: HOSPITAL | Age: 86
Setting detail: THERAPIES SERIES
Discharge: HOME | End: 2021-09-03

## 2021-09-03 DIAGNOSIS — R29.898 LEG WEAKNESS, BILATERAL: ICD-10-CM

## 2021-09-03 DIAGNOSIS — M25.561 CHRONIC PAIN OF RIGHT KNEE: Primary | ICD-10-CM

## 2021-09-03 DIAGNOSIS — M25.661 DECREASED RANGE OF MOTION (ROM) OF BOTH KNEES: ICD-10-CM

## 2021-09-03 DIAGNOSIS — M25.662 DECREASED RANGE OF MOTION (ROM) OF BOTH KNEES: ICD-10-CM

## 2021-09-03 DIAGNOSIS — G89.29 CHRONIC PAIN OF RIGHT KNEE: Primary | ICD-10-CM

## 2021-09-03 PROCEDURE — 97530 THERAPEUTIC ACTIVITIES: CPT

## 2021-09-03 PROCEDURE — 97110 THERAPEUTIC EXERCISES: CPT

## 2021-09-03 NOTE — THERAPY PROGRESS REPORT/RE-CERT
Outpatient Physical Therapy Ortho Progress Note  [facility]     Patient Name: Maria Fernanda Gaviria  : 1923  MRN: [id number]  Today's Date: 9/3/2021      Visit Date: 2021    Visit Dx:    ICD-10-CM ICD-9-CM   1. Chronic pain of right knee  M25.561 719.46    G89.29 338.29   2. Leg weakness, bilateral  R29.898 729.89   3. Decreased range of motion (ROM) of both knees  M25.661 719.56    M25.662        Patient Active Problem List   Diagnosis   • Osteoarthritis of lumbar spine   • Osteoarthritis   • Sciatic leg pain   • Asthma   • Stenosis of carotid artery   • Hyperlipidemia   • Gastroesophageal reflux disease   • Essential hypertension   • Hypothyroidism (acquired)   • Osteopenia   • Restless legs syndrome   • Thrombocytopenia (CMS/HCC)   • Altered mental status   • DNR (do not resuscitate)   • Non-seasonal allergic rhinitis   • Abnormal liver function tests   • Right upper quadrant abdominal pain   • Choledocholithiasis   • Hypokalemia        Past Medical History:   Diagnosis Date   • Arthritis    • Cancer (CMS/HCC)     skin   • Disease of thyroid gland    • GERD (gastroesophageal reflux disease)    • Hyperlipidemia    • Hypertension    • Osteoporosis         Past Surgical History:   Procedure Laterality Date   • CATARACT EXTRACTION     • CHOLECYSTECTOMY WITH INTRAOPERATIVE CHOLANGIOGRAM N/A 2021    Procedure: CHOLECYSTECTOMY LAPAROSCOPIC INTRAOPERATIVE CHOLANGIOGRAM common bile duct exploration;  Surgeon: Huey Wilkinson MD;  Location: [facility] MAIN OR;  Service: General;  Laterality: N/A;   • COLONOSCOPY     • ERCP N/A 2021    Procedure: ENDOSCOPIC RETROGRADE CHOLANGIOPANCREATOGRAPHY WITH SPHINCTEROTOMY, BALLOON SWEEP AND STONE (2) EXTRACTION;  Surgeon: Justo Aden MD;  Location: [facility] ENDOSCOPY;  Service: Gastroenterology;  Laterality: N/A;  PRE- BILE DUCT OBSTRUCTION   POST- SAME     • HYSTERECTOMY     • REPLACEMENT TOTAL KNEE Right    • THYROID SURGERY     • TUBAL ABDOMINAL  LIGATION         PT Ortho     Row Name 09/03/21 1000       MMT Right Lower Ext    Rt Hip Flexion MMT, Gross Movement  (4+/5) good plus  -MEÑO    Rt Hip ABduction MMT, Gross Movement  (4+/5) good plus  -MEÑO    Rt Hip ADduction MMT, Gross Movement  (4+/5) good plus  -MEÑO    Rt Knee Extension MMT, Gross Movement  (5/5) normal  -MEÑO    Rt Knee Flexion MMT, Gross Movement  (4/5) good  -MEOÑ    Rt Ankle Dorsiflexion MMT, Gross Movement  (4+/5) good plus  -MEÑO       MMT Left Lower Ext    Lt Hip Flexion MMT, Gross Movement  (4+/5) good plus  -MEÑO    Lt Hip ABduction MMT, Gross Movement  (4+/5) good plus  -MEÑO    Lt Knee Extension MMT, Gross Movement  (4+/5) good plus  -MEÑO    Lt Knee Flexion MMT, Gross Movement  (4/5) good  -MEÑO    Lt Ankle Dorsiflexion MMT, Gross Movement  (4+/5) good plus  -MEÑO      User Key  (r) = Recorded By, (t) = Taken By, (c) = Cosigned By    Initials Name Provider Type    Luci Dotson, PT Physical Therapist                      PT Assessment/Plan     Row Name 09/03/21 1100          PT Assessment    Assessment Comments  Maria Fernanda Gaviria has been seen for 6 physical therapy sessions for R knee pain.  Treatment has included therapeutic exercise, therapeutic activity and patient education with home exercise program . Progress to physical therapy goals is good. Pt has met 3/3 STG and 0/4 LTG. Patient reports her R knee has improved approximately 75% in comparison to initial evaluation and her remaining concern involves her standing static/dynamic balance. She presents with progression in R knee AROM to 0-128 in comparison to 0-115 degrees on initial evaluation. She also increased 30 second STS from 8 repetitions to 11 upon reassessment. She also demonstrates generalized increased in BLE strength with greatest defects in bilateral hamstrings. Therefore, standing HS curls added to address LE weakness. She will benefit from continued skilled physical therapy to address remaining impairments and functional  limitations.   -MEÑO        PT Plan    PT Plan Comments  continue to progress within and add additional balance exercise  -MEÑO       User Key  (r) = Recorded By, (t) = Taken By, (c) = Cosigned By    Initials Name Provider Type    Luci Dotson, PT Physical Therapist            OP Exercises     Row Name 09/03/21 1100             Total Minutes    39680 - PT Therapeutic Exercise Minutes  31  -MEÑO      09134 - PT Therapeutic Activity Minutes  10  -MEÑO         Exercise 2    Exercise Name 2  Glute bridge  -MEÑO      Cueing 2  Verbal;Tactile  -MEÑO      Sets 2  2  -MEÑO      Reps 2  10  -MEÑO      Time 2  3  -MEÑO         Exercise 3    Exercise Name 3  clam shell  -MEÑO      Cueing 3  Verbal;Tactile  -MEÑO      Sets 3  2  -MEÑO      Reps 3  10  -MEÑO      Additional Comments  RTB  -MEÑO         Exercise 4    Exercise Name 4  LAQ  -MEÑO      Cueing 4  Verbal;Demo  -MEÑO      Sets 4  2  -MEÑO      Reps 4  10  -MEÑO      Time 4  5 bilateral   -MEÑO      Additional Comments  3#  -MEÑO         Exercise 5    Exercise Name 5  STS no UE  -MEÑO      Cueing 5  Verbal;Demo  -MEÑO      Sets 5  2  -MEÑO      Reps 5  10  -MEÑO         Exercise 6    Exercise Name 6  Nu step   -MEÑO      Time 6  5  -MEÑO         Exercise 7    Exercise Name 7  6 inch step ups in barres  -MEÑO      Cueing 7  Verbal;Demo  -MEÑO      Reps 7  10  -MEÑO         Exercise 8    Exercise Name 8  lateral stepping   -MEÑO      Cueing 8  Verbal;Demo  -MEÑO      Reps 8  3 laps  -MEÑO      Additional Comments  RTB above knees  -MEÑO         Exercise 11    Exercise Name 11  heel raise  -MEÑO      Cueing 11  Verbal;Demo  -MEÑO      Reps 11  20  -MEÑO      Additional Comments  3#, bilateral   -MEÑO         Exercise 13    Exercise Name 13  tandem walking  -MEÑO      Cueing 13  Verbal;Demo  -MEÑO      Reps 13  3 laps  -MEÑO      Additional Comments  in // bars  -MEÑO         Exercise 14    Exercise Name 14  march walking  -MEÑO      Cueing 14  Verbal;Demo  -MEÑO      Reps 14  3 laps  -MEÑO      Additional Comments  2#  -MEÑO         Exercise 15     Exercise Name 15  HS curl  -MEÑO      Cueing 15  Verbal;Demo  -MEÑO      Sets 15  2  -MEÑO      Reps 15  10  -MEÑO      Additional Comments  2# at // bar  -        User Key  (r) = Recorded By, (t) = Taken By, (c) = Cosigned By    Initials Name Provider Type    Luci Dotson, TERRA Physical Therapist                       PT OP Goals     Row Name 09/03/21 1000          PT Short Term Goals    STG Date to Achieve  09/03/21  -MEÑO     STG 1  Pt will be independent with initial HEP to improve strength/ROM and decrease pain.  -MEÑO     STG 1 Progress  Met  -MEÑO     STG 2  Pt will improve R knee AROM from 0-115 degrees to 0-120 degrees to improve ability to navigate stairs.  -MEÑO     STG 2 Progress  Met  -MEÑO     STG 3  Patient will report walking 1 block with dog with R knee pain </= 2/10 pain to improve quality of life.  -     STG 3 Progress  Met  -        Long Term Goals    LTG Date to Achieve  10/03/21  -MEÑO     LTG 1  Pt will be independent with advance HEP to improve strength/ROM and decrease pain.  -     LTG 1 Progress  Ongoing  -MEÑO     LTG 2  Pt will be able to ascend/descend stairs in a reciprocal pattern with no increase pain.  -     LTG 2 Progress  Ongoing  -MEÑO     LTG 3  Patient will report walking 2 blocks with dog with R knee pain </= 2/10 pain to improve quality of life.  -     LTG 3 Progress  Partially Met  -MEÑO     LTG 3 Progress Comments  3 blocks with 3/10 pain  -     LTG 4  Pt will improve KOS score to 60% to show improved quality of life.  -     LTG 4 Progress  Ongoing  -     LTG 4 Progress Comments  67.5%  -       User Key  (r) = Recorded By, (t) = Taken By, (c) = Cosigned By    Initials Name Provider Type    Luci Dotson, PT Physical Therapist               Outcome Measure Options: Knee Outcome Score- ADL, 30 Second Chair Stand Test  30 Second Chair Stand Test  30 Second Chair Stand Test: 11  Knee Outcome Score  Knee Outcome Score Comments: 67.5%      Time Calculation:    Start Time: 1047  Stop Time: 1128  Time Calculation (min): 41 min  Timed Charges  86940 - PT Therapeutic Exercise Minutes: 31  66767 - PT Therapeutic Activity Minutes: 10  Total Minutes  Timed Charges Total Minutes: 41   Total Minutes: 41  Therapy Charges for Today     Code Description Service Date Service Provider Modifiers Qty    70894935999  PT THER PROC EA 15 MIN 9/3/2021 Luci Sandhu, PT GP 2    26155498267 HC PT THERAPEUTIC ACT EA 15 MIN 9/3/2021 Luci Sandhu, PT GP 1          PT G-Codes  Outcome Measure Options: Knee Outcome Score- ADL, 30 Second Chair Stand Test         Luci Sandhu, PT  9/3/2021

## 2021-09-05 DIAGNOSIS — K21.9 GASTROESOPHAGEAL REFLUX DISEASE: ICD-10-CM

## 2021-09-05 DIAGNOSIS — E03.9 HYPOTHYROIDISM: ICD-10-CM

## 2021-09-07 RX ORDER — LANSOPRAZOLE 30 MG/1
CAPSULE, DELAYED RELEASE ORAL
Qty: 90 CAPSULE | Refills: 3 | OUTPATIENT
Start: 2021-09-07

## 2021-09-07 RX ORDER — LEVOTHYROXINE SODIUM 0.05 MG/1
TABLET ORAL
Qty: 90 TABLET | Refills: 3 | Status: SHIPPED | OUTPATIENT
Start: 2021-09-07 | End: 2022-06-07 | Stop reason: SDUPTHER

## 2021-09-08 ENCOUNTER — HOSPITAL ENCOUNTER (OUTPATIENT)
Dept: PHYSICAL THERAPY | Facility: HOSPITAL | Age: 86
Setting detail: THERAPIES SERIES
Discharge: HOME | End: 2021-09-08

## 2021-09-08 DIAGNOSIS — R29.898 LEG WEAKNESS, BILATERAL: ICD-10-CM

## 2021-09-08 DIAGNOSIS — G89.29 CHRONIC PAIN OF RIGHT KNEE: Primary | ICD-10-CM

## 2021-09-08 DIAGNOSIS — M25.561 CHRONIC PAIN OF RIGHT KNEE: Primary | ICD-10-CM

## 2021-09-08 DIAGNOSIS — M25.661 DECREASED RANGE OF MOTION (ROM) OF BOTH KNEES: ICD-10-CM

## 2021-09-08 DIAGNOSIS — M25.662 DECREASED RANGE OF MOTION (ROM) OF BOTH KNEES: ICD-10-CM

## 2021-09-08 PROCEDURE — 97110 THERAPEUTIC EXERCISES: CPT

## 2021-09-08 PROCEDURE — 97530 THERAPEUTIC ACTIVITIES: CPT

## 2021-09-08 NOTE — THERAPY TREATMENT NOTE
Outpatient Physical Therapy Ortho Treatment Note  [facility]     Patient Name: Maria Fernanda Gaviria  : 1923  MRN: [id number]  Today's Date: 2021      Visit Date: 2021    Visit Dx:    ICD-10-CM ICD-9-CM   1. Chronic pain of right knee  M25.561 719.46    G89.29 338.29   2. Leg weakness, bilateral  R29.898 729.89   3. Decreased range of motion (ROM) of both knees  M25.661 719.56    M25.662        Patient Active Problem List   Diagnosis   • Osteoarthritis of lumbar spine   • Osteoarthritis   • Sciatic leg pain   • Asthma   • Stenosis of carotid artery   • Hyperlipidemia   • Gastroesophageal reflux disease   • Essential hypertension   • Hypothyroidism (acquired)   • Osteopenia   • Restless legs syndrome   • Thrombocytopenia (CMS/HCC)   • Altered mental status   • DNR (do not resuscitate)   • Non-seasonal allergic rhinitis   • Abnormal liver function tests   • Right upper quadrant abdominal pain   • Choledocholithiasis   • Hypokalemia        Past Medical History:   Diagnosis Date   • Arthritis    • Cancer (CMS/HCC)     skin   • Disease of thyroid gland    • GERD (gastroesophageal reflux disease)    • Hyperlipidemia    • Hypertension    • Osteoporosis         Past Surgical History:   Procedure Laterality Date   • CATARACT EXTRACTION     • CHOLECYSTECTOMY WITH INTRAOPERATIVE CHOLANGIOGRAM N/A 2021    Procedure: CHOLECYSTECTOMY LAPAROSCOPIC INTRAOPERATIVE CHOLANGIOGRAM common bile duct exploration;  Surgeon: Huey Wilkinson MD;  Location: [facility] OR;  Service: General;  Laterality: N/A;   • COLONOSCOPY     • ERCP N/A 2021    Procedure: ENDOSCOPIC RETROGRADE CHOLANGIOPANCREATOGRAPHY WITH SPHINCTEROTOMY, BALLOON SWEEP AND STONE (2) EXTRACTION;  Surgeon: Justo Aden MD;  Location: [facility] ENDOSCOPY;  Service: Gastroenterology;  Laterality: N/A;  PRE- BILE DUCT OBSTRUCTION   POST- SAME     • HYSTERECTOMY     • REPLACEMENT TOTAL KNEE Right    • THYROID SURGERY     • TUBAL ABDOMINAL  LIGATION                         PT Assessment/Plan     Row Name 09/08/21 1100          PT Assessment    Assessment Comments  Mrs. Gaviria returns to PT with reports of improved knee pain but continuing to have lingering dynamic balance defects without reports of falling. She tolerated progressing weights to 3# and step up to 8 inches without requiring additional rest breaks. She also tolerated addition of stepping on various foam pads to address dynamic balance defects and would benefit from progressing balance interventions in upcoming sessions.   -MEÑO        PT Plan    PT Plan Comments  Add obstacle course, and lateral step up  -MEÑO       User Key  (r) = Recorded By, (t) = Taken By, (c) = Cosigned By    Initials Name Provider Type    Luci Dotson, PT Physical Therapist            OP Exercises     Row Name 09/08/21 1000             Subjective Comments    Subjective Comments  I am not having much pain at all  -MEÑO         Subjective Pain    Able to rate subjective pain?  yes  -MEÑO      Pre-Treatment Pain Level  0  -MEÑO         Total Minutes    87310 - PT Therapeutic Exercise Minutes  32  -MEOÑ      45795 - PT Therapeutic Activity Minutes  10  -MEÑO         Exercise 2    Exercise Name 2  Glute bridge  -MEÑO      Cueing 2  Verbal;Tactile  -MEÑO      Sets 2  2  -MEÑO      Reps 2  10  -MEÑO      Time 2  3  -MEÑO      Additional Comments  RTB  -MEÑO         Exercise 3    Exercise Name 3  clam shell  -MEÑO      Cueing 3  Verbal;Tactile  -MEÑO      Sets 3  2  -MEÑO      Reps 3  10  -MEÑO      Additional Comments  GTB  -MEÑO         Exercise 5    Exercise Name 5  STS no UE  -MEÑO      Cueing 5  Verbal;Demo  -MEÑO      Sets 5  2  -MEÑO      Reps 5  10  -MEÑO         Exercise 6    Exercise Name 6  Nu step   -MEÑO      Time 6  5  -MEÑO         Exercise 7    Exercise Name 7  8 inch step ups in barres  -MEÑO      Cueing 7  Verbal;Demo  -MEÑO      Sets 7  2  -MEÑO      Reps 7  10  -MEÑO         Exercise 8    Exercise Name 8  lateral stepping   -MEÑO      Cueing 8   Verbal;Demo  -MEÑO      Reps 8  3 laps  -MEÑO      Additional Comments  GTB above the knee  -MEÑO         Exercise 11    Exercise Name 11  heel raise  -MEÑO      Cueing 11  Verbal;Demo  -MEÑO      Reps 11  20  -MEÑO      Additional Comments  3#   -MEÑO         Exercise 13    Exercise Name 13  tandem walking  -MEÑO      Cueing 13  Verbal;Demo  -MEOÑ      Reps 13  3 laps  -MEÑO      Additional Comments  in // bars  -MEÑO         Exercise 14    Exercise Name 14  march walking  -MEÑO      Cueing 14  Verbal;Demo  -MEÑO      Reps 14  3 laps  -MEÑO      Additional Comments  3#  -MEÑO         Exercise 15    Exercise Name 15  HS curl  -MEÑO      Cueing 15  Verbal;Demo  -MEÑO      Sets 15  2  -MEÑO      Reps 15  10  -MEÑO      Additional Comments  3#   -MEÑO         Exercise 16    Exercise Name 16  stepping on various foam pads  -MEÑO      Cueing 16  Verbal;Demo  -MEÑO      Reps 16  3 laps  -MEÑO      Additional Comments  SF, in // bars  -MEÑO        User Key  (r) = Recorded By, (t) = Taken By, (c) = Cosigned By    Initials Name Provider Type    Luci Dotson, PT Physical Therapist                       PT OP Goals     Row Name 09/08/21 1000          PT Short Term Goals    STG Date to Achieve  09/03/21  -MEÑO     STG 1  Pt will be independent with initial HEP to improve strength/ROM and decrease pain.  -     STG 1 Progress  Met  -     STG 2  Pt will improve R knee AROM from 0-115 degrees to 0-120 degrees to improve ability to navigate stairs.  -     STG 2 Progress  Met  -     STG 3  Patient will report walking 1 block with dog with R knee pain </= 2/10 pain to improve quality of life.  -     STG 3 Progress  Met  -        Long Term Goals    LTG Date to Achieve  10/03/21  -MEÑO     LTG 1  Pt will be independent with advance HEP to improve strength/ROM and decrease pain.  -EMÑO     LTG 1 Progress  Ongoing  -     LTG 2  Pt will be able to ascend/descend stairs in a reciprocal pattern with no increase pain.  -     LTG 2 Progress  Ongoing  -     LTG 3   Patient will report walking 2 blocks with dog with R knee pain </= 2/10 pain to improve quality of life.  -MEÑO     LTG 3 Progress  Partially Met  -MEÑO     LTG 4  Pt will improve KOS score to 60% to show improved quality of life.  -MEÑO     LTG 4 Progress  Ongoing  -MEÑO       User Key  (r) = Recorded By, (t) = Taken By, (c) = Cosigned By    Initials Name Provider Type    Luci Dotson, PT Physical Therapist                         Time Calculation:   Start Time: 1045  Stop Time: 1127  Time Calculation (min): 42 min  Timed Charges  93597 - PT Therapeutic Exercise Minutes: 32  66256 - PT Therapeutic Activity Minutes: 10  Total Minutes  Timed Charges Total Minutes: 42   Total Minutes: 42  Therapy Charges for Today     Code Description Service Date Service Provider Modifiers Qty    84234145031  PT THER PROC EA 15 MIN 9/8/2021 Luci Sandhu, PT GP 2    [id number]  PT THERAPEUTIC ACT EA 15 MIN 9/8/2021 Luci Sandhu, PT GP 1                    Luci Sandhu, PT  9/8/2021

## 2021-09-10 ENCOUNTER — HOSPITAL ENCOUNTER (OUTPATIENT)
Dept: PHYSICAL THERAPY | Facility: HOSPITAL | Age: 86
Setting detail: THERAPIES SERIES
Discharge: HOME | End: 2021-09-10

## 2021-09-10 DIAGNOSIS — M25.662 DECREASED RANGE OF MOTION (ROM) OF BOTH KNEES: ICD-10-CM

## 2021-09-10 DIAGNOSIS — M25.661 DECREASED RANGE OF MOTION (ROM) OF BOTH KNEES: ICD-10-CM

## 2021-09-10 DIAGNOSIS — G89.29 CHRONIC PAIN OF RIGHT KNEE: Primary | ICD-10-CM

## 2021-09-10 DIAGNOSIS — R29.898 LEG WEAKNESS, BILATERAL: ICD-10-CM

## 2021-09-10 DIAGNOSIS — M25.561 CHRONIC PAIN OF RIGHT KNEE: Primary | ICD-10-CM

## 2021-09-10 PROCEDURE — 97110 THERAPEUTIC EXERCISES: CPT

## 2021-09-10 PROCEDURE — 97530 THERAPEUTIC ACTIVITIES: CPT

## 2021-09-10 NOTE — THERAPY TREATMENT NOTE
Outpatient Physical Therapy Ortho Treatment Note  [facility]     Patient Name: Maria Fernanda Gaviria  : 1923  MRN: [id number]  Today's Date: 9/10/2021      Visit Date: 09/10/2021    Visit Dx:    ICD-10-CM ICD-9-CM   1. Chronic pain of right knee  M25.561 719.46    G89.29 338.29   2. Leg weakness, bilateral  R29.898 729.89   3. Decreased range of motion (ROM) of both knees  M25.661 719.56    M25.662        Patient Active Problem List   Diagnosis   • Osteoarthritis of lumbar spine   • Osteoarthritis   • Sciatic leg pain   • Asthma   • Stenosis of carotid artery   • Hyperlipidemia   • Gastroesophageal reflux disease   • Essential hypertension   • Hypothyroidism (acquired)   • Osteopenia   • Restless legs syndrome   • Thrombocytopenia (CMS/HCC)   • Altered mental status   • DNR (do not resuscitate)   • Non-seasonal allergic rhinitis   • Abnormal liver function tests   • Right upper quadrant abdominal pain   • Choledocholithiasis   • Hypokalemia        Past Medical History:   Diagnosis Date   • Arthritis    • Cancer (CMS/HCC)     skin   • Disease of thyroid gland    • GERD (gastroesophageal reflux disease)    • Hyperlipidemia    • Hypertension    • Osteoporosis         Past Surgical History:   Procedure Laterality Date   • CATARACT EXTRACTION     • CHOLECYSTECTOMY WITH INTRAOPERATIVE CHOLANGIOGRAM N/A 2021    Procedure: CHOLECYSTECTOMY LAPAROSCOPIC INTRAOPERATIVE CHOLANGIOGRAM common bile duct exploration;  Surgeon: Huey Wilkinson MD;  Location: [facility] MAIN OR;  Service: General;  Laterality: N/A;   • COLONOSCOPY     • ERCP N/A 2021    Procedure: ENDOSCOPIC RETROGRADE CHOLANGIOPANCREATOGRAPHY WITH SPHINCTEROTOMY, BALLOON SWEEP AND STONE (2) EXTRACTION;  Surgeon: Justo Aden MD;  Location: [facility] ENDOSCOPY;  Service: Gastroenterology;  Laterality: N/A;  PRE- BILE DUCT OBSTRUCTION   POST- SAME     • HYSTERECTOMY     • REPLACEMENT TOTAL KNEE Right    • THYROID SURGERY     • TUBAL ABDOMINAL  LIGATION                         PT Assessment/Plan     Row Name 09/10/21 1000          PT Assessment    Assessment Comments  Maria Fernanda returns to PT with reports of improved balance over uneven surfaces due to ambulating short distance without SPC. She indicates she feels intermittent unsteadiness but has improved in comparison to initial evaluation. She tolerated addition of lateral step up on 8 inch step, modified karaoke  and walking through obstacle course to address ability to navigate through various surfaces. She remains appropriate for skilled PT at this time to address lingering balance defects.  -MEÑO        PT Plan    PT Plan Comments  Add stair taps, and progress obstacle course  -MEÑO       User Key  (r) = Recorded By, (t) = Taken By, (c) = Cosigned By    Initials Name Provider Type    Luci Dotson, PT Physical Therapist            OP Exercises     Row Name 09/10/21 0900             Subjective Comments    Subjective Comments  No changes since last visit  -MEÑO         Subjective Pain    Able to rate subjective pain?  yes  -MEÑO      Pre-Treatment Pain Level  0  -MEÑO         Total Minutes    97698 - PT Therapeutic Exercise Minutes  33  -MEÑO      42886 - PT Therapeutic Activity Minutes  10  -MEÑO         Exercise 2    Exercise Name 2  Glute bridge  -MEÑO      Cueing 2  Verbal;Tactile  -MEÑO      Sets 2  2  -MEÑO      Reps 2  10  -MEÑO      Time 2  3  -MEÑO      Additional Comments  GTB  -MEÑO         Exercise 3    Exercise Name 3  clam shell  -MEÑO      Cueing 3  Verbal;Tactile  -MEÑO      Sets 3  2  -MEÑO      Reps 3  10  -MEÑO      Additional Comments  GTB  -MEÑO         Exercise 5    Exercise Name 5  STS no UE  -MEÑO      Cueing 5  Verbal;Demo  -MEÑO      Sets 5  2  -MEÑO      Reps 5  10  -MEÑO         Exercise 6    Exercise Name 6  Nu step   -MEÑO      Time 6  5  -MEÑO         Exercise 7    Exercise Name 7  8 inch step ups in barres  -MEÑO      Cueing 7  Verbal;Demo  -MEÑO      Sets 7  2  -MEÑO      Reps 7  10  -MEÑO      Additional Comments   UUE support  -MEÑO         Exercise 8    Exercise Name 8  lateral stepping   -MEÑO      Cueing 8  Verbal;Demo  -MEÑO      Reps 8  3 laps  -MEÑO      Additional Comments  GTB above the knee  -MEÑO         Exercise 9    Exercise Name 9  lateral step ups  -MEÑO      Cueing 9  Verbal;Demo  -MEÑO      Sets 9  1  -MEÑO      Reps 9  10  -MEÑO      Additional Comments  6 in, in // bars  -MEÑO         Exercise 10    Exercise Name 10  stair taps  -MEÑO      Cueing 10  Verbal;Demo  -MEÑO      Sets 10  1  -MEÑO      Reps 10  20  -MEÑO      Additional Comments  next visit, 6in in // bars  -MEÑO         Exercise 11    Exercise Name 11  heel raise  -MEÑO      Cueing 11  Verbal;Demo  -MEÑO      Reps 11  20  -MEÑO      Additional Comments  3#   -MEÑO         Exercise 12    Exercise Name 12  Modified karaoke   -MEÑO      Cueing 12  Verbal;Demo  -MEÑO      Reps 12  3 laps  -MEÑO      Time 12  HHA  -MEÑO         Exercise 13    Exercise Name 13  tandem walking  -MEÑO      Cueing 13  Verbal;Demo  -MEÑO      Reps 13  3 laps  -MEÑO      Additional Comments  CGA no UE  -MEÑO         Exercise 14    Exercise Name 14  march walking  -MEÑO      Cueing 14  Verbal;Demo  -MEÑO      Reps 14  3 laps  -MEÑO      Additional Comments  3#   -MEÑO         Exercise 15    Exercise Name 15  HS curl  -MEÑO      Cueing 15  Verbal;Demo  -MEÑO      Sets 15  2  -MEÑO      Reps 15  10  -MEÑO      Additional Comments  3#   -MEÑO         Exercise 16    Exercise Name 16  stepping on various foam pads  -MEÑO      Cueing 16  Verbal;Demo  -MEÑO      Reps 16  3 laps  -MEÑO      Additional Comments  SF, in // bars  -MEÑO         Exercise 17    Exercise Name 17  Obstacle course  -MEÑO      Cueing 17  Verbal;Demo  -MEÑO      Reps 17  3 laps  -MEÑO        User Key  (r) = Recorded By, (t) = Taken By, (c) = Cosigned By    Initials Name Provider Type    Luci Dotson, PT Physical Therapist                       PT OP Goals     Row Name 09/10/21 0900          PT Short Term Goals    STG Date to Achieve  09/03/21  -MEÑO     STG 1  Pt will be  independent with initial HEP to improve strength/ROM and decrease pain.  -     STG 1 Progress  Met  -     STG 2  Pt will improve R knee AROM from 0-115 degrees to 0-120 degrees to improve ability to navigate stairs.  -     STG 2 Progress  Met  -     STG 3  Patient will report walking 1 block with dog with R knee pain </= 2/10 pain to improve quality of life.  -     STG 3 Progress  Met  -        Long Term Goals    LTG Date to Achieve  10/03/21  -     LTG 1  Pt will be independent with advance HEP to improve strength/ROM and decrease pain.  -     LTG 1 Progress  Ongoing  -     LTG 2  Pt will be able to ascend/descend stairs in a reciprocal pattern with no increase pain.  -     LTG 2 Progress  Ongoing  -     LTG 3  Patient will report walking 2 blocks with dog with R knee pain </= 2/10 pain to improve quality of life.  -     LTG 3 Progress  Partially Met  -     LTG 4  Pt will improve KOS score to 60% to show improved quality of life.  -     LTG 4 Progress  Ongoing  -       User Key  (r) = Recorded By, (t) = Taken By, (c) = Cosigned By    Initials Name Provider Type    Luci Dotson, PT Physical Therapist                         Time Calculation:   Start Time: 0915  Stop Time: 0958  Time Calculation (min): 43 min  Timed Charges  80174 - PT Therapeutic Exercise Minutes: 33  04597 - PT Therapeutic Activity Minutes: 10  Total Minutes  Timed Charges Total Minutes: 43   Total Minutes: 43  Therapy Charges for Today     Code Description Service Date Service Provider Modifiers Qty    42257217077  PT THER PROC EA 15 MIN 9/10/2021 Luci Sandhu, PT GP 2    [id number]  PT THERAPEUTIC ACT EA 15 MIN 9/10/2021 Luci Sandhu PT GP 1                    Luci Sandhu, PT  9/10/2021

## 2021-09-17 ENCOUNTER — HOSPITAL ENCOUNTER (OUTPATIENT)
Dept: PHYSICAL THERAPY | Facility: HOSPITAL | Age: 86
Setting detail: THERAPIES SERIES
Discharge: HOME | End: 2021-09-17

## 2021-09-17 DIAGNOSIS — G89.29 CHRONIC PAIN OF RIGHT KNEE: Primary | ICD-10-CM

## 2021-09-17 DIAGNOSIS — M25.561 CHRONIC PAIN OF RIGHT KNEE: Primary | ICD-10-CM

## 2021-09-17 DIAGNOSIS — M25.662 DECREASED RANGE OF MOTION (ROM) OF BOTH KNEES: ICD-10-CM

## 2021-09-17 DIAGNOSIS — M25.661 DECREASED RANGE OF MOTION (ROM) OF BOTH KNEES: ICD-10-CM

## 2021-09-17 DIAGNOSIS — R29.898 LEG WEAKNESS, BILATERAL: ICD-10-CM

## 2021-09-17 PROCEDURE — 97110 THERAPEUTIC EXERCISES: CPT

## 2021-09-17 PROCEDURE — 97530 THERAPEUTIC ACTIVITIES: CPT

## 2021-09-17 NOTE — THERAPY TREATMENT NOTE
Outpatient Physical Therapy Ortho Treatment Note  [facility]     Patient Name: Maria Fernanda Gaviria  : 1923  MRN: [id number]  Today's Date: 2021      Visit Date: 2021    Visit Dx:    ICD-10-CM ICD-9-CM   1. Chronic pain of right knee  M25.561 719.46    G89.29 338.29   2. Leg weakness, bilateral  R29.898 729.89   3. Decreased range of motion (ROM) of both knees  M25.661 719.56    M25.662        Patient Active Problem List   Diagnosis   • Osteoarthritis of lumbar spine   • Osteoarthritis   • Sciatic leg pain   • Asthma   • Stenosis of carotid artery   • Hyperlipidemia   • Gastroesophageal reflux disease   • Essential hypertension   • Hypothyroidism (acquired)   • Osteopenia   • Restless legs syndrome   • Thrombocytopenia (CMS/HCC)   • Altered mental status   • DNR (do not resuscitate)   • Non-seasonal allergic rhinitis   • Abnormal liver function tests   • Right upper quadrant abdominal pain   • Choledocholithiasis   • Hypokalemia        Past Medical History:   Diagnosis Date   • Arthritis    • Cancer (CMS/HCC)     skin   • Disease of thyroid gland    • GERD (gastroesophageal reflux disease)    • Hyperlipidemia    • Hypertension    • Osteoporosis         Past Surgical History:   Procedure Laterality Date   • CATARACT EXTRACTION     • CHOLECYSTECTOMY WITH INTRAOPERATIVE CHOLANGIOGRAM N/A 2021    Procedure: CHOLECYSTECTOMY LAPAROSCOPIC INTRAOPERATIVE CHOLANGIOGRAM common bile duct exploration;  Surgeon: Huey Wilkinson MD;  Location: [facility] MAIN OR;  Service: General;  Laterality: N/A;   • COLONOSCOPY     • ERCP N/A 2021    Procedure: ENDOSCOPIC RETROGRADE CHOLANGIOPANCREATOGRAPHY WITH SPHINCTEROTOMY, BALLOON SWEEP AND STONE (2) EXTRACTION;  Surgeon: Justo Aden MD;  Location: [facility] ENDOSCOPY;  Service: Gastroenterology;  Laterality: N/A;  PRE- BILE DUCT OBSTRUCTION   POST- SAME     • HYSTERECTOMY     • REPLACEMENT TOTAL KNEE Right    • THYROID SURGERY     • TUBAL ABDOMINAL  LIGATION                         PT Assessment/Plan     Row Name 09/17/21 1000          PT Assessment    Assessment Comments  Progressed obstacle course to simulate stepping over objects and on uneven surfaces. Also added patient to bend forward to retrieve various objects off floor to target LE strength and dynamic balance defects. She also tolerated addition of standing stair taps and lateral step up onto 8 inch step without requiring increased rest break or B knee pain. She would benefit from 2 additional PT sessions to address lingering static/dynamic balance impairments to maintain independence within the home and improve community access.    -MEÑO        PT Plan    PT Plan Comments  add SF lunge with reach  -MEÑO       User Key  (r) = Recorded By, (t) = Taken By, (c) = Cosigned By    Initials Name Provider Type    Luci Dotson, PT Physical Therapist            OP Exercises     Row Name 09/17/21 1000             Subjective Comments    Subjective Comments  Still doing good. I was able to do my home program some  -MEÑO         Subjective Pain    Able to rate subjective pain?  yes  -MEÑO      Pre-Treatment Pain Level  0  -MEÑO         Total Minutes    88877 - PT Therapeutic Exercise Minutes  30  -MEÑO      96330 - PT Therapeutic Activity Minutes  10  -MEÑO         Exercise 2    Exercise Name 2  Glute bridge  -MEÑO      Cueing 2  Verbal;Tactile  -MEÑO      Sets 2  2  -MEÑO      Reps 2  10  -MEÑO      Time 2  3  -MEÑO      Additional Comments  GTB  -MEÑO         Exercise 3    Exercise Name 3  clam shell  -MEÑO      Cueing 3  Verbal;Tactile  -MEÑO      Sets 3  2  -MEÑO      Reps 3  10  -MEÑO      Additional Comments  GTB  -MEÑO         Exercise 5    Exercise Name 5  STS no UE  -MEÑO      Cueing 5  Verbal;Demo  -MEÑO      Sets 5  2  -MEÑO      Reps 5  10  -MEÑO         Exercise 6    Exercise Name 6  Nu step   -MEÑO      Time 6  5  -MEÑO         Exercise 7    Exercise Name 7  8 inch step ups in barres  -MEÑO      Cueing 7  Verbal;Demo  -MEÑO      Sets 7  1   -MEÑO      Reps 7  15  -MEÑO      Additional Comments  UUE support  -MEÑO         Exercise 8    Exercise Name 8  lateral stepping   -MEÑO      Cueing 8  Verbal;Demo  -MEÑO      Reps 8  3 laps  -MEÑO      Additional Comments  GTB above the knee  -MEÑO         Exercise 9    Exercise Name 9  lateral step ups  -MEÑO      Cueing 9  Verbal;Demo  -MEÑO      Sets 9  1  -MEÑO      Reps 9  15  -MEÑO      Additional Comments  8in, in // bars  -MEÑO         Exercise 10    Exercise Name 10  stair taps  -MEÑO      Cueing 10  Verbal;Demo  -MEÑO      Sets 10  1  -MEÑO      Reps 10  20  -MEÑO      Additional Comments  8in, in // bars  -MEÑO         Exercise 11    Exercise Name 11  heel raise  -MEÑO      Cueing 11  Verbal;Demo  -MEÑO      Reps 11  20  -MEÑO      Additional Comments  3#   -MEÑO         Exercise 12    Exercise Name 12  Modified karaoke   -MEÑO      Cueing 12  Verbal;Demo  -MEÑO      Reps 12  3 laps  -MEÑO      Time 12  HHA  -MEÑO         Exercise 13    Exercise Name 13  tandem walking  -MEÑO      Cueing 13  Verbal;Demo  -MEÑO      Reps 13  3 laps  -MEÑO      Additional Comments  CGA no UE  -MEÑO         Exercise 14    Exercise Name 14  march walking  -MEÑO      Cueing 14  Verbal;Demo  -MEÑO      Reps 14  3 laps  -MEÑO      Additional Comments  3#   -MÑEO         Exercise 15    Exercise Name 15  HS curl  -MEÑO      Cueing 15  Verbal;Demo  -MEÑO      Sets 15  2  -MEÑO      Reps 15  10  -MEÑO      Additional Comments  3#   -MEÑO         Exercise 16    Exercise Name 16  stepping on various foam pads  -MEÑO      Cueing 16  Verbal;Demo  -MEÑO      Reps 16  3 laps  -MEÑO      Additional Comments  SF, in // bars  -MEÑO         Exercise 17    Exercise Name 17  Obstacle course  -MEÑO      Cueing 17  Verbal;Demo  -MEÑO      Reps 17  3 laps  -MEÑO        User Key  (r) = Recorded By, (t) = Taken By, (c) = Cosigned By    Initials Name Provider Type    Luci Dotson, PT Physical Therapist                       PT OP Goals     Row Name 09/17/21 0900          PT Short Term Goals    STG Date to Achieve   09/03/21  -     STG 1  Pt will be independent with initial HEP to improve strength/ROM and decrease pain.  -     STG 1 Progress  Met  -     STG 2  Pt will improve R knee AROM from 0-115 degrees to 0-120 degrees to improve ability to navigate stairs.  -     STG 2 Progress  Met  -     STG 3  Patient will report walking 1 block with dog with R knee pain </= 2/10 pain to improve quality of life.  -     STG 3 Progress  Met  -        Long Term Goals    LTG Date to Achieve  10/03/21  -     LTG 1  Pt will be independent with advance HEP to improve strength/ROM and decrease pain.  -     LTG 1 Progress  Ongoing  -     LTG 2  Pt will be able to ascend/descend stairs in a reciprocal pattern with no increase pain.  -     LTG 2 Progress  Ongoing  -     LTG 3  Patient will report walking 2 blocks with dog with R knee pain </= 2/10 pain to improve quality of life.  -     LTG 3 Progress  Partially Met  -     LTG 4  Pt will improve KOS score to 60% to show improved quality of life.  -     LTG 4 Progress  Ongoing  -       User Key  (r) = Recorded By, (t) = Taken By, (c) = Cosigned By    Initials Name Provider Type    Luci Dotson, PT Physical Therapist                         Time Calculation:   Start Time: 1000  Stop Time: 1040  Time Calculation (min): 40 min  Timed Charges  29070 - PT Therapeutic Exercise Minutes: 30  70950 - PT Therapeutic Activity Minutes: 10  Total Minutes  Timed Charges Total Minutes: 40   Total Minutes: 40  Therapy Charges for Today     Code Description Service Date Service Provider Modifiers Qty    96016133126  PT THER PROC EA 15 MIN 9/17/2021 Luci Sandhu, PT GP 2    [id number]  PT THERAPEUTIC ACT EA 15 MIN 9/17/2021 Luci Sandhu, PT GP 1                    Luci Sandhu PT  9/17/2021

## 2021-09-22 ENCOUNTER — OFFICE VISIT (OUTPATIENT)
Dept: INTERNAL MEDICINE | Age: 86
End: 2021-09-22

## 2021-09-22 VITALS
DIASTOLIC BLOOD PRESSURE: 82 MMHG | BODY MASS INDEX: 21.57 KG/M2 | WEIGHT: 107 LBS | HEART RATE: 90 BPM | TEMPERATURE: 97.8 F | OXYGEN SATURATION: 96 % | SYSTOLIC BLOOD PRESSURE: 140 MMHG | HEIGHT: 59 IN

## 2021-09-22 DIAGNOSIS — I10 ESSENTIAL HYPERTENSION: Primary | Chronic | ICD-10-CM

## 2021-09-22 DIAGNOSIS — E78.5 HYPERLIPIDEMIA, UNSPECIFIED HYPERLIPIDEMIA TYPE: Chronic | ICD-10-CM

## 2021-09-22 DIAGNOSIS — E03.9 HYPOTHYROIDISM (ACQUIRED): Chronic | ICD-10-CM

## 2021-09-22 DIAGNOSIS — Z23 NEED FOR INFLUENZA VACCINATION: ICD-10-CM

## 2021-09-22 PROBLEM — K80.50 CHOLEDOCHOLITHIASIS: Status: RESOLVED | Noted: 2021-06-03 | Resolved: 2021-09-22

## 2021-09-22 PROBLEM — E87.6 HYPOKALEMIA: Status: RESOLVED | Noted: 2021-07-08 | Resolved: 2021-09-22

## 2021-09-22 PROBLEM — R79.89 ABNORMAL LIVER FUNCTION TESTS: Status: RESOLVED | Noted: 2019-11-05 | Resolved: 2021-09-22

## 2021-09-22 PROBLEM — R41.82 ALTERED MENTAL STATUS: Status: RESOLVED | Noted: 2019-07-06 | Resolved: 2021-09-22

## 2021-09-22 PROBLEM — R10.11 RIGHT UPPER QUADRANT ABDOMINAL PAIN: Status: RESOLVED | Noted: 2021-02-01 | Resolved: 2021-09-22

## 2021-09-22 LAB
ALBUMIN SERPL-MCNC: 4.3 G/DL (ref 3.5–5.2)
ALBUMIN/GLOB SERPL: 2.2 G/DL
ALP SERPL-CCNC: 92 U/L (ref 39–117)
ALT SERPL-CCNC: 16 U/L (ref 1–33)
AST SERPL-CCNC: 29 U/L (ref 1–32)
BILIRUB SERPL-MCNC: 0.6 MG/DL (ref 0–1.2)
BUN SERPL-MCNC: 14 MG/DL (ref 8–23)
BUN/CREAT SERPL: 17.9 (ref 7–25)
CALCIUM SERPL-MCNC: 9.7 MG/DL (ref 8.2–9.6)
CHLORIDE SERPL-SCNC: 102 MMOL/L (ref 98–107)
CHOLEST SERPL-MCNC: 166 MG/DL (ref 0–200)
CHOLEST/HDLC SERPL: 2.86 {RATIO}
CO2 SERPL-SCNC: 29.8 MMOL/L (ref 22–29)
CREAT SERPL-MCNC: 0.78 MG/DL (ref 0.57–1)
GLOBULIN SER CALC-MCNC: 2 GM/DL
GLUCOSE SERPL-MCNC: 101 MG/DL (ref 65–99)
HDLC SERPL-MCNC: 58 MG/DL (ref 40–60)
LDLC SERPL CALC-MCNC: 81 MG/DL (ref 0–100)
POTASSIUM SERPL-SCNC: 3.9 MMOL/L (ref 3.5–5.2)
PROT SERPL-MCNC: 6.3 G/DL (ref 6–8.5)
SODIUM SERPL-SCNC: 140 MMOL/L (ref 136–145)
T4 FREE SERPL-MCNC: 1.22 NG/DL (ref 0.93–1.7)
TRIGL SERPL-MCNC: 155 MG/DL (ref 0–150)
TSH SERPL DL<=0.005 MIU/L-ACNC: 2.76 UIU/ML (ref 0.27–4.2)
VLDLC SERPL CALC-MCNC: 27 MG/DL (ref 5–40)

## 2021-09-22 PROCEDURE — 90662 IIV NO PRSV INCREASED AG IM: CPT | Performed by: INTERNAL MEDICINE

## 2021-09-22 PROCEDURE — 99214 OFFICE O/P EST MOD 30 MIN: CPT | Performed by: INTERNAL MEDICINE

## 2021-09-22 PROCEDURE — G0008 ADMIN INFLUENZA VIRUS VAC: HCPCS | Performed by: INTERNAL MEDICINE

## 2021-09-22 NOTE — PROGRESS NOTES
"    I N T E R N A L  M E D I C I N E  J U N O H  K I M,  M D      ENCOUNTER DATE:  09/22/2021    Maria Fernanda Gaviria / 89 y.o. / female      CHIEF COMPLAINT / REASON FOR OFFICE VISIT     Hypertension, Hyperlipidemia, and Hypothyroidism      ASSESSMENT & PLAN     Problem List Items Addressed This Visit        Medium    Hyperlipidemia (Chronic)    Overview     Continue atorvastatin 20 mg qd.          Relevant Medications    atorvastatin (LIPITOR) 20 MG tablet    Gastroesophageal reflux disease (Chronic)    Overview     TICS/KEMM (CSCOPE 11/05)/ EGD MILD GASTRITIS (12/06) BETZY         Essential hypertension - Primary (Chronic)    Overview     Continue losartan 50 mg qd.          Relevant Medications    losartan (COZAAR) 50 MG tablet    Hypothyroidism (acquired) (Chronic)    Overview     Continue current dose of levothyroxine 50 mcg.          Relevant Medications    levothyroxine (SYNTHROID, LEVOTHROID) 50 MCG tablet        No orders of the defined types were placed in this encounter.    No orders of the defined types were placed in this encounter.      SUMMARY/DISCUSSION  •       Next Appointment with me: Visit date not found    No follow-ups on file.      VITAL SIGNS     Visit Vitals  /82 (BP Location: Left arm)   Pulse 90   Temp 97.8 °F (36.6 °C)   Ht 149.9 cm (59\")   Wt 48.5 kg (107 lb)   LMP  (LMP Unknown)   SpO2 96%   BMI 21.61 kg/m²       BP Readings from Last 3 Encounters:   09/22/21 140/82   07/08/21 144/79   06/14/21 154/76     Wt Readings from Last 3 Encounters:   09/22/21 48.5 kg (107 lb)   07/30/21 48.8 kg (107 lb 9.6 oz)   07/08/21 45.4 kg (100 lb)     Body mass index is 21.61 kg/m².      MEDICATIONS AT THE TIME OF OFFICE VISIT     Current Outpatient Medications on File Prior to Visit   Medication Sig   • atorvastatin (LIPITOR) 20 MG tablet Take 1 tablet by mouth Daily. Hold until instructed to resume by primary care or surgeon.   • Cholecalciferol (VITAMIN D3) 2000 UNITS tablet Take 1 tablet by " mouth Daily.   • fexofenadine (ALLEGRA) 180 MG tablet Take 180 mg by mouth Daily.   • Glycerin-Polysorbate 80 (REFRESH DRY EYE THERAPY OP) Apply  to eye(s) as directed by provider.   • levothyroxine (SYNTHROID, LEVOTHROID) 50 MCG tablet TAKE 1 TABLET EVERY MORNING   • losartan (COZAAR) 50 MG tablet TAKE 1 TABLET DAILY (Patient taking differently: Take 50 mg by mouth Daily.)   • Misc Natural Products (Osteo Bi-Flex Joint Shield) tablet Take 1 tablet by mouth Daily.   • montelukast (SINGULAIR) 10 MG tablet Take 10 mg by mouth daily.   • Triamcinolone Acetonide (NASACORT) 55 MCG/ACT nasal inhaler 2 sprays into the nostril(s) as directed by provider Daily As Needed.   • acetaminophen (TYLENOL) 500 MG tablet Take 1 tablet by mouth Every 6 (Six) Hours As Needed for Mild Pain  or Moderate Pain .   • [DISCONTINUED] losartan (COZAAR) 50 MG tablet TAKE 1 TABLET DAILY     No current facility-administered medications on file prior to visit.          HISTORY OF PRESENT ILLNESS     In July 2021 she went in for ERCP for sphincterotomy with stone extraction.  Denies ongoing abdominal pain symptoms.  Hypertension hyperlipidemia and hypothyroidism remains stable on medications.      Patient Care Team:  Henok Salcido MD as PCP - General  Christiano Dewitt MD as Consulting Physician (Obstetrics and Gynecology)  Rodolfo Shah MD as Consulting Physician (Allergy)  Michael Mendoza MD as Consulting Physician (Dermatology)  Jluis Bain MD as Consulting Physician (Otolaryngology)  Lucas Rueda OD (Optometry)  Ousmane Rhodes MD (Inactive) as Consulting Physician (Gastroenterology)    REVIEW OF SYSTEMS     Review of Systems   Constitutional neg except per HPI   Resp neg  CV neg   GI neg for abdominal pain   urinary frequency without dysuria     PHYSICAL EXAMINATION     Physical Exam  General: No acute distress  Psych: Normal thought and judgment   Cardiovascular Rate: normal. Rhythm: regular. Heart sounds: normal.    Pulm/Chest: Effort normal, breath sounds normal.       REVIEWED DATA     Labs:     Lab Results   Component Value Date     07/08/2021    K 3.1 (L) 07/08/2021    CALCIUM 6.5 (L) 07/08/2021    AST 21 07/08/2021    ALT 17 07/08/2021    BUN 9 07/08/2021    CREATININE 0.47 (L) 07/08/2021    CREATININE 0.66 07/07/2021    CREATININE 1.01 (H) 07/06/2021    EGFRIFNONA 122 07/08/2021    EGFRIFAFRI 84 03/02/2020       No results found for: HGBA1C    Lab Results   Component Value Date     (H) 03/02/2020     (H) 04/09/2019    HDL 57 03/02/2020    TRIG 97 03/02/2020       Lab Results   Component Value Date    TSH 1.720 07/06/2021    TSH 0.988 03/02/2020    TSH 1.240 04/09/2019    FREET4 1.33 03/02/2020    FREET4 1.24 04/09/2019    FREET4 1.31 03/20/2018       Lab Results   Component Value Date    WBC 7.02 07/08/2021    HGB 12.0 07/08/2021     07/08/2021       No results found for: MICROALBUR        Imaging:           Medical Tests:           Summary of old records / correspondence / consultant report:           Request outside records:             *Examiner was wearing KN95 mask and eye protection during the entire duration of the visit. Patient was masked the entire time. Minimum social distance of 6 ft maintained entire visit except if physical contact was necessary as documented.     **Dragon Disclaimer: Much of this encounter note is an electronic transcription/translation of spoken language to printed text. The electronic translation of spoken language may permit erroneous, or at times, nonsensical words or phrases to be inadvertently transcribed. Although I have reviewed the note for such errors, some may still exist.       Template created by Clayton Salcido MD

## 2021-09-22 NOTE — PATIENT INSTRUCTIONS
** IMPORTANT MESSAGE FROM DR. ROBERTSON **    In our office, your satisfaction is VERY important to us.     You may receive a survey from [facility] by mail or E-mail for you to provide feedback about your visit. This information is invaluable for me to know what we can do to improve our services.     I ask that you please take a few minutes to complete the survey and let us know how we are doing in serving your needs. (You may receive the survey more than once for multiple visits)    Thank You !    Dr. Robertson & Staff    _________________________________________________________________________________________________________________________      ** ADDITIONAL INSTRUCTION / REMINDERS FROM DR. ROBERTSON **

## 2021-09-24 ENCOUNTER — HOSPITAL ENCOUNTER (OUTPATIENT)
Dept: PHYSICAL THERAPY | Facility: HOSPITAL | Age: 86
Setting detail: THERAPIES SERIES
Discharge: HOME | End: 2021-09-24

## 2021-09-24 DIAGNOSIS — G89.29 CHRONIC PAIN OF RIGHT KNEE: Primary | ICD-10-CM

## 2021-09-24 DIAGNOSIS — M25.661 DECREASED RANGE OF MOTION (ROM) OF BOTH KNEES: ICD-10-CM

## 2021-09-24 DIAGNOSIS — R29.898 LEG WEAKNESS, BILATERAL: ICD-10-CM

## 2021-09-24 DIAGNOSIS — M25.662 DECREASED RANGE OF MOTION (ROM) OF BOTH KNEES: ICD-10-CM

## 2021-09-24 DIAGNOSIS — M25.561 CHRONIC PAIN OF RIGHT KNEE: Primary | ICD-10-CM

## 2021-09-24 PROCEDURE — 97530 THERAPEUTIC ACTIVITIES: CPT

## 2021-09-24 PROCEDURE — 97110 THERAPEUTIC EXERCISES: CPT

## 2021-09-24 NOTE — THERAPY TREATMENT NOTE
Outpatient Physical Therapy Ortho Treatment Note  [facility]     Patient Name: Maria Fernanda Gaviria  : 1923  MRN: [id number]  Today's Date: 2021      Visit Date: 2021    Visit Dx:    ICD-10-CM ICD-9-CM   1. Chronic pain of right knee  M25.561 719.46    G89.29 338.29   2. Leg weakness, bilateral  R29.898 729.89   3. Decreased range of motion (ROM) of both knees  M25.661 719.56    M25.662        Patient Active Problem List   Diagnosis   • Osteoarthritis of lumbar spine   • Osteoarthritis   • Sciatic leg pain   • Asthma   • Stenosis of carotid artery   • Hyperlipidemia   • Gastroesophageal reflux disease   • Essential hypertension   • Hypothyroidism (acquired)   • Osteopenia   • Restless legs syndrome   • Thrombocytopenia (CMS/HCC)   • DNR (do not resuscitate)   • Non-seasonal allergic rhinitis        Past Medical History:   Diagnosis Date   • Arthritis    • Cancer (CMS/HCC)     skin   • Disease of thyroid gland    • GERD (gastroesophageal reflux disease)    • Hyperlipidemia    • Hypertension    • Osteoporosis         Past Surgical History:   Procedure Laterality Date   • CATARACT EXTRACTION     • CHOLECYSTECTOMY WITH INTRAOPERATIVE CHOLANGIOGRAM N/A 2021    Procedure: CHOLECYSTECTOMY LAPAROSCOPIC INTRAOPERATIVE CHOLANGIOGRAM common bile duct exploration;  Surgeon: Huey Wilkinson MD;  Location: [facility] MAIN OR;  Service: General;  Laterality: N/A;   • COLONOSCOPY     • ERCP N/A 2021    Procedure: ENDOSCOPIC RETROGRADE CHOLANGIOPANCREATOGRAPHY WITH SPHINCTEROTOMY, BALLOON SWEEP AND STONE (2) EXTRACTION;  Surgeon: Justo Aden MD;  Location: [facility] ENDOSCOPY;  Service: Gastroenterology;  Laterality: N/A;  PRE- BILE DUCT OBSTRUCTION   POST- SAME     • HYSTERECTOMY     • REPLACEMENT TOTAL KNEE Right    • THYROID SURGERY     • TUBAL ABDOMINAL LIGATION                         PT Assessment/Plan     Row Name 21 1000          PT Assessment    Assessment Comments  Patient continues  to demonstrate improved static/dynamic balance by tolerating various progressions within current program without extreme difficulty. Initiated SF alfonso with reach to address dynamic movements outside of [place]. Due to patient performing higher level dynamic balance interventions consistently and without reports of falling, she would benefit from one addition visit to finalized advanced independent HEP in preparation to discharge.   -MEÑO        PT Plan    PT Plan Comments  Give final HEP and D/C  -MEÑO       User Key  (r) = Recorded By, (t) = Taken By, (c) = Cosigned By    Initials Name Provider Type    Luci Dotson, PT Physical Therapist            OP Exercises     Row Name 09/24/21 1000             Subjective Comments    Subjective Comments  I feel like things are going well and im ready for discharge next visit  -MEÑO         Subjective Pain    Able to rate subjective pain?  yes  -MEÑO      Pre-Treatment Pain Level  0  -MEÑO         Total Minutes    36102 - PT Therapeutic Exercise Minutes  30  -MEÑO      79710 - PT Therapeutic Activity Minutes  10  -MEÑO         Exercise 5    Exercise Name 5  STS no UE  -MEÑO      Cueing 5  Verbal;Demo  -MEÑO      Sets 5  2  -MEÑO      Reps 5  10  -MEÑO         Exercise 6    Exercise Name 6  Nu step   -MEÑO      Time 6  5  -MEÑO         Exercise 7    Exercise Name 7  8 inch step ups in barres  -MEÑO      Cueing 7  Verbal;Demo  -MEÑO      Sets 7  1  -MEÑO      Reps 7  15  -MEÑO      Additional Comments  UUE support  -MEÑO         Exercise 8    Exercise Name 8  lateral stepping   -MEÑO      Cueing 8  Verbal;Demo  -MEÑO      Reps 8  3 laps  -MEÑO      Additional Comments  GTB above the knee  -MEÑO         Exercise 9    Exercise Name 9  lateral step ups  -MEÑO      Cueing 9  Verbal;Demo  -MEÑO      Sets 9  1  -MEÑO      Reps 9  15  -MEÑO      Additional Comments  8in, in // bars  -MEÑO         Exercise 10    Exercise Name 10  stair taps  -MEÑO      Cueing 10  Verbal;Demo  -MEÑO      Sets 10  1  -MEÑO      Reps 10  20  -MEÑO       Additional Comments  8in, in // bars  -MEÑO         Exercise 11    Exercise Name 11  heel raise  -MEÑO      Cueing 11  Verbal;Demo  -MEÑO      Reps 11  20  -MEÑO      Additional Comments  3#   -MEÑO         Exercise 13    Exercise Name 13  tandem walking  -MEÑO      Cueing 13  Verbal;Demo  -MEÑO      Reps 13  3 laps  -MEÑO      Additional Comments  CGA no UE  -MEÑO         Exercise 14    Exercise Name 14  march walking  -MEÑO      Cueing 14  Verbal;Demo  -MEÑO      Reps 14  3 laps  -MEÑO      Additional Comments  3#   -MEÑO         Exercise 15    Exercise Name 15  HS curl  -MEÑO      Cueing 15  Verbal;Demo  -MEÑO      Sets 15  2  -MEÑO      Reps 15  10  -MEÑO      Additional Comments  3#   -MEÑO         Exercise 16    Exercise Name 16  stepping on various foam pads  -MEÑO      Cueing 16  Verbal;Demo  -MEÑO      Reps 16  3 laps  -MEÑO      Additional Comments  SF, in // bars  -MEÑO        User Key  (r) = Recorded By, (t) = Taken By, (c) = Cosigned By    Initials Name Provider Type    Luci Dotson, PT Physical Therapist                       PT OP Goals     Row Name 09/24/21 1000          PT Short Term Goals    STG Date to Achieve  09/03/21  -     STG 1  Pt will be independent with initial HEP to improve strength/ROM and decrease pain.  -     STG 1 Progress  Met  -     STG 2  Pt will improve R knee AROM from 0-115 degrees to 0-120 degrees to improve ability to navigate stairs.  -     STG 2 Progress  Met  -     STG 3  Patient will report walking 1 block with dog with R knee pain </= 2/10 pain to improve quality of life.  -     STG 3 Progress  Met  -        Long Term Goals    LTG Date to Achieve  10/03/21  -     LTG 1  Pt will be independent with advance HEP to improve strength/ROM and decrease pain.  -     LTG 1 Progress  Ongoing  -     LTG 2  Pt will be able to ascend/descend stairs in a reciprocal pattern with no increase pain.  -     LTG 2 Progress  Ongoing  -     LTG 3  Patient will report walking 2 blocks with dog with  R knee pain </= 2/10 pain to improve quality of life.  -MEÑO     LTG 3 Progress  Partially Met  -MEÑO     LTG 4  Pt will improve KOS score to 60% to show improved quality of life.  -MEÑO     LTG 4 Progress  Ongoing  -MEÑO       User Key  (r) = Recorded By, (t) = Taken By, (c) = Cosigned By    Initials Name Provider Type    Luci Dotson, PT Physical Therapist                         Time Calculation:   Start Time: 1003  Stop Time: 1043  Time Calculation (min): 40 min  Timed Charges  76620 - PT Therapeutic Exercise Minutes: 30  97894 - PT Therapeutic Activity Minutes: 10  Total Minutes  Timed Charges Total Minutes: 40   Total Minutes: 40  Therapy Charges for Today     Code Description Service Date Service Provider Modifiers Qty    25452503457  PT THER PROC EA 15 MIN 9/24/2021 Luci Sandhu, PT GP 2    97018876839  PT THERAPEUTIC ACT EA 15 MIN 9/24/2021 Luci Sandhu, PT GP 1                    Luci Sandhu, PT  9/24/2021

## 2021-10-01 ENCOUNTER — HOSPITAL ENCOUNTER (OUTPATIENT)
Dept: PHYSICAL THERAPY | Facility: HOSPITAL | Age: 86
Setting detail: THERAPIES SERIES
Discharge: HOME | End: 2021-10-01

## 2021-10-01 DIAGNOSIS — M25.561 CHRONIC PAIN OF RIGHT KNEE: Primary | ICD-10-CM

## 2021-10-01 DIAGNOSIS — M25.662 DECREASED RANGE OF MOTION (ROM) OF BOTH KNEES: ICD-10-CM

## 2021-10-01 DIAGNOSIS — R29.898 LEG WEAKNESS, BILATERAL: ICD-10-CM

## 2021-10-01 DIAGNOSIS — M25.661 DECREASED RANGE OF MOTION (ROM) OF BOTH KNEES: ICD-10-CM

## 2021-10-01 DIAGNOSIS — G89.29 CHRONIC PAIN OF RIGHT KNEE: Primary | ICD-10-CM

## 2021-10-01 PROCEDURE — 97110 THERAPEUTIC EXERCISES: CPT

## 2021-10-01 NOTE — THERAPY DISCHARGE NOTE
Outpatient Physical Therapy Ortho Progress Note/Discharge Summary  [facility]     Patient Name: Maria Fernanda Gaviria  : 1923  MRN: [id number]  Today's Date: 10/1/2021      Visit Date: 10/01/2021    Visit Dx:    ICD-10-CM ICD-9-CM   1. Chronic pain of right knee  M25.561 719.46    G89.29 338.29   2. Leg weakness, bilateral  R29.898 729.89   3. Decreased range of motion (ROM) of both knees  M25.661 719.56    M25.662        Patient Active Problem List   Diagnosis   • Osteoarthritis of lumbar spine   • Osteoarthritis   • Sciatic leg pain   • Asthma   • Stenosis of carotid artery   • Hyperlipidemia   • Gastroesophageal reflux disease   • Essential hypertension   • Hypothyroidism (acquired)   • Osteopenia   • Restless legs syndrome   • Thrombocytopenia (HCC)   • DNR (do not resuscitate)   • Non-seasonal allergic rhinitis        Past Medical History:   Diagnosis Date   • Arthritis    • Cancer (CMS/HCC)     skin   • Disease of thyroid gland    • GERD (gastroesophageal reflux disease)    • Hyperlipidemia    • Hypertension    • Osteoporosis         Past Surgical History:   Procedure Laterality Date   • CATARACT EXTRACTION     • CHOLECYSTECTOMY WITH INTRAOPERATIVE CHOLANGIOGRAM N/A 2021    Procedure: CHOLECYSTECTOMY LAPAROSCOPIC INTRAOPERATIVE CHOLANGIOGRAM common bile duct exploration;  Surgeon: Huey Wilkinson MD;  Location: [facility] MAIN OR;  Service: General;  Laterality: N/A;   • COLONOSCOPY     • ERCP N/A 2021    Procedure: ENDOSCOPIC RETROGRADE CHOLANGIOPANCREATOGRAPHY WITH SPHINCTEROTOMY, BALLOON SWEEP AND STONE (2) EXTRACTION;  Surgeon: Justo Aden MD;  Location: [facility] ENDOSCOPY;  Service: Gastroenterology;  Laterality: N/A;  PRE- BILE DUCT OBSTRUCTION   POST- SAME     • HYSTERECTOMY     • REPLACEMENT TOTAL KNEE Right    • THYROID SURGERY     • TUBAL ABDOMINAL LIGATION         PT Ortho     Row Name 10/01/21 1100       MMT Right Lower Ext    Rt Hip Flexion MMT, Gross Movement  (4+/5) good  plus  -MEÑO    Rt Hip ABduction MMT, Gross Movement  (4+/5) good plus  -MEÑO    Rt Hip ADduction MMT, Gross Movement  (5/5) normal  -MEÑO    Rt Knee Extension MMT, Gross Movement  (5/5) normal  -MEÑO    Rt Knee Flexion MMT, Gross Movement  (4/5) good  -MEÑO    Rt Ankle Dorsiflexion MMT, Gross Movement  (5/5) normal  -MEÑO       MMT Left Lower Ext    Lt Hip Flexion MMT, Gross Movement  (4+/5) good plus  -MEÑO    Lt Hip ABduction MMT, Gross Movement  (4+/5) good plus  -MEÑO    Lt Knee Extension MMT, Gross Movement  (5/5) normal  -MEÑO    Lt Knee Flexion MMT, Gross Movement  (4/5) good  -MEÑO    Lt Ankle Dorsiflexion MMT, Gross Movement  (5/5) normal  -MEÑO      User Key  (r) = Recorded By, (t) = Taken By, (c) = Cosigned By    Initials Name Provider Type    Luci Dotson, PT Physical Therapist                      PT Assessment/Plan     Row Name 10/01/21 1100          PT Assessment    Assessment Comments  Maria Fernanda Gaviria has been seen for 11 physical therapy sessions for R knee pain and balance impairements.  Treatment has included therapeutic exercise, therapeutic activity and patient education with home exercise program . Progress to physical therapy goals is good. Pt has met 3/3 STG and 44 LTG. Upon assessment, patient demonstrates mild improvement in BLE and 30 second STS from 8 repetitions on initial evaluation to 12 upon discharge. Time spent reviewing advanced HEP and patient demonstrates ability to complete all interventions independently. Discussed importance of being near counter or having someone next to patient while she performs balance exercises. Provided alternative methods to progress/modify prescribed interventions based on patient's response following discharge. She was discharged to an independent HEP and provided patient education to self-manage condition.   -MEÑO        PT Plan    PT Plan Comments  Patient discharged  -MEÑO       User Key  (r) = Recorded By, (t) = Taken By, (c) = Cosigned By    Initials Name  Provider Type    Luci Dotson, PT Physical Therapist              OP Exercises     Row Name 10/01/21 1100             Subjective Comments    Subjective Comments  I feel good and would like to have a strength program for after PT   -MEÑO         Subjective Pain    Able to rate subjective pain?  yes  -MEÑO      Pre-Treatment Pain Level  0  -MEÑO         Total Minutes    25967 - PT Therapeutic Exercise Minutes  38  -MEÑO      05978 - PT Therapeutic Activity Minutes  5  -MEÑO         Exercise 1    Exercise Name 1  Seated HS stretch  -MEÑO      Cueing 1  Verbal;Demo  -MEÑO      Reps 1  3  -MEÑO      Time 1  20 sec  -MEÑO         Exercise 4    Exercise Name 4  LAQ  -MEÑO      Cueing 4  Verbal;Demo  -MEÑO      Sets 4  2  -MEÑO      Reps 4  10  -MEÑO      Time 4  5 bilateral   -MEÑO         Exercise 5    Exercise Name 5  STS no UE  -MEÑO      Cueing 5  Verbal;Demo  -MEÑO      Sets 5  2  -MEÑO      Reps 5  10  -MEÑO         Exercise 6    Exercise Name 6  Nu step   -MEÑO      Time 6  5  -MEÑO         Exercise 7    Exercise Name 7  8 inch step ups in barres  -MEÑO      Cueing 7  Verbal;Demo  -MEÑO      Sets 7  1  -MEÑO      Reps 7  15  -MEÑO      Additional Comments  UUE support  -MEÑO         Exercise 8    Exercise Name 8  lateral stepping   -MEÑO      Cueing 8  Verbal;Demo  -MEÑO      Reps 8  3 laps  -MEÑO         Exercise 9    Exercise Name 9  lateral step ups  -MEÑO      Cueing 9  Verbal;Demo  -MEÑO      Sets 9  1  -MEÑO      Reps 9  15  -MEÑO      Additional Comments  8in, in // bars  -MEÑO         Exercise 10    Exercise Name 10  stair taps  -MEÑO      Cueing 10  Verbal;Demo  -MEÑO      Sets 10  1  -MEÑO      Reps 10  20  -MEÑO      Additional Comments  8in, in // bars  -MEÑO         Exercise 11    Exercise Name 11  heel raise  -MEÑO      Cueing 11  Verbal;Demo  -MEÑO      Reps 11  20  -MEÑO         Exercise 13    Exercise Name 13  tandem walking  -MEÑO      Cueing 13  Verbal;Demo  -MEÑO      Reps 13  3 laps  -MEÑO      Additional Comments  CGA no UE  -MEÑO         Exercise 14    Exercise Name  14 march walking  -MEÑO      Cueing 14  Verbal;Demo  -MEÑO      Reps 14  3 laps  -MEÑO         Exercise 15    Exercise Name 15  HS curl  -MEÑO      Cueing 15  Verbal;Demo  -MEÑO      Sets 15  2  -MEÑO      Reps 15  10  -MEÑO        User Key  (r) = Recorded By, (t) = Taken By, (c) = Cosigned By    Initials Name Provider Type    Luci Dotson, TERRA Physical Therapist                         PT OP Goals     Row Name 10/01/21 1000          PT Short Term Goals    STG Date to Achieve  09/03/21  -     STG 1  Pt will be independent with initial HEP to improve strength/ROM and decrease pain.  -     STG 1 Progress  Met  -MEÑO     STG 2  Pt will improve R knee AROM from 0-115 degrees to 0-120 degrees to improve ability to navigate stairs.  -     STG 2 Progress  Met  -MEÑO     STG 3  Patient will report walking 1 block with dog with R knee pain </= 2/10 pain to improve quality of life.  -     STG 3 Progress  Met  -        Long Term Goals    LTG Date to Achieve  10/03/21  -     LTG 1  Pt will be independent with advance HEP to improve strength/ROM and decrease pain.  -     LTG 1 Progress  Met  -MEÑO     LTG 2  Pt will be able to ascend/descend stairs in a reciprocal pattern with no increase pain.  -     LTG 2 Progress  Met  -MEÑO     LTG 3  Patient will report walking 2 blocks with dog with R knee pain </= 2/10 pain to improve quality of life.  -     LTG 3 Progress  Met  -MEÑO     LTG 4  Pt will improve KOS score to 60% to show improved quality of life.  -     LTG 4 Progress  Met  -MEÑO       User Key  (r) = Recorded By, (t) = Taken By, (c) = Cosigned By    Initials Name Provider Type    Luci Dotson PT Physical Therapist          Therapy Education  Education Details: provided advanced HEP, discussed progressions/modifications, and to have counter support or external assistance when performing balance interventions.  Given: HEP, Symptoms/condition management, Fall prevention and home safety  Program:  Reinforced  How Provided: Verbal, Demonstration, Written  Provided to: Patient  Level of Understanding: Verbalized, Demonstrated    Outcome Measure Options: Knee Outcome Score- ADL, 30 Second Chair Stand Test  30 Second Chair Stand Test  30 Second Chair Stand Test: 12  Knee Outcome Score  Knee Outcome Score Comments: 87.5%      Time Calculation:   Start Time: 1045  Stop Time: 1128  Time Calculation (min): 43 min  Timed Charges  04649 - PT Therapeutic Exercise Minutes: 38  46172 - PT Therapeutic Activity Minutes: 5  Total Minutes  Timed Charges Total Minutes: 43   Total Minutes: 43  Therapy Charges for Today     Code Description Service Date Service Provider Modifiers Qty    25561595243 HC PT THER PROC EA 15 MIN 10/1/2021 Luci Sandhu, PT GP 3          PT G-Codes  Outcome Measure Options: Knee Outcome Score- ADL, 30 Second Chair Stand Test     OP PT Discharge Summary  Date of Discharge: 10/01/21  Reason for Discharge: All goals achieved  Outcomes Achieved: Able to achieve all goals within established timeline  Discharge Destination: Home with home program      Luci Sandhu, TERRA  10/1/2021

## 2021-12-03 DIAGNOSIS — I10 ESSENTIAL HYPERTENSION: Chronic | ICD-10-CM

## 2021-12-03 RX ORDER — LOSARTAN POTASSIUM 50 MG/1
TABLET ORAL
Qty: 90 TABLET | Refills: 3 | Status: SHIPPED | OUTPATIENT
Start: 2021-12-03 | End: 2022-06-07 | Stop reason: SDUPTHER

## 2021-12-28 ENCOUNTER — OFFICE VISIT (OUTPATIENT)
Dept: INTERNAL MEDICINE | Age: 86
End: 2021-12-28

## 2021-12-28 VITALS
DIASTOLIC BLOOD PRESSURE: 74 MMHG | OXYGEN SATURATION: 97 % | TEMPERATURE: 97.7 F | SYSTOLIC BLOOD PRESSURE: 128 MMHG | BODY MASS INDEX: 22.74 KG/M2 | WEIGHT: 112.8 LBS | HEART RATE: 81 BPM | HEIGHT: 59 IN

## 2021-12-28 DIAGNOSIS — Z00.00 MEDICARE ANNUAL WELLNESS VISIT, SUBSEQUENT: Primary | ICD-10-CM

## 2021-12-28 PROCEDURE — G0439 PPPS, SUBSEQ VISIT: HCPCS | Performed by: NURSE PRACTITIONER

## 2021-12-28 PROCEDURE — 1170F FXNL STATUS ASSESSED: CPT | Performed by: NURSE PRACTITIONER

## 2021-12-28 PROCEDURE — 1160F RVW MEDS BY RX/DR IN RCRD: CPT | Performed by: NURSE PRACTITIONER

## 2021-12-28 PROCEDURE — 1126F AMNT PAIN NOTED NONE PRSNT: CPT | Performed by: NURSE PRACTITIONER

## 2021-12-28 NOTE — PROGRESS NOTES
"    I N T E R N A L  M E D I C I N E  GRIFFIN MIRELES      ENCOUNTER DATE:  12/28/2021    Maria Fernanda Gaviria / 89 y.o. / female        MEDICARE ANNUAL WELLNESS VISIT       Chief Complaint: Medicare Wellness-subsequent       Patient's general assessment of her health since a year ago:     - Compared to one year ago, she feels her physical health is about the same without significant change.    - Compared to one year ago, she feels her mental health is about the same without significant change.      HPI for other active medical problems:   Previously well-controlled cholesterol, hypothyroidism and hypertension.    * The required components of Health Risk Assessment (HRA) that were completed by the patient and/or my staff are contained within this note and in the scanned documents titled \"Health Risk Assessment\" within the media section of the patient's chart in [employer].         HISTORY       Recent Hospitalizations:    Recent hospitalization?: Yes    If YES, location, date, and diagnoses:     · Location: [facility]   · Date: 07/05/2021  · Principle Discharge Dx: Choledocholithiasis   · Secondary Dx:       Patient Care Team:    Patient Care Team:  Henok Salcido MD as PCP - General  Christiano Dewitt MD as Consulting Physician (Obstetrics and Gynecology)  Rodolfo Shah MD as Consulting Physician (Allergy)  Michael Mendoza MD as Consulting Physician (Dermatology)  Jluis Bain MD as Consulting Physician (Otolaryngology)  Lucas Rueda OD (Optometry)  Ousmane Rhodes MD (Inactive) as Consulting Physician (Gastroenterology)      Allergies:  Aspirin, Buffered aspirin, and Latex    Medications:  Current Outpatient Medications on File Prior to Visit   Medication Sig Dispense Refill   • acetaminophen (TYLENOL) 500 MG tablet Take 1 tablet by mouth Every 6 (Six) Hours As Needed for Mild Pain  or Moderate Pain .     • atorvastatin (LIPITOR) 20 MG tablet Take 1 tablet by mouth Daily. Hold until " instructed to resume by primary care or surgeon. 90 tablet 3   • Cholecalciferol (VITAMIN D3) 2000 UNITS tablet Take 1 tablet by mouth Daily.     • fexofenadine (ALLEGRA) 180 MG tablet Take 180 mg by mouth Daily.     • Glycerin-Polysorbate 80 (REFRESH DRY EYE THERAPY OP) Apply  to eye(s) as directed by provider.     • levothyroxine (SYNTHROID, LEVOTHROID) 50 MCG tablet TAKE 1 TABLET EVERY MORNING 90 tablet 3   • losartan (COZAAR) 50 MG tablet TAKE 1 TABLET DAILY 90 tablet 3   • Misc Natural Products (Osteo Bi-Flex Joint Shield) tablet Take 1 tablet by mouth Daily.     • montelukast (SINGULAIR) 10 MG tablet Take 10 mg by mouth daily.     • Triamcinolone Acetonide (NASACORT) 55 MCG/ACT nasal inhaler 2 sprays into the nostril(s) as directed by provider Daily As Needed.       No current facility-administered medications on file prior to visit.        PFSH:     The following portions of the patient's history were reviewed and updated as appropriate: Allergies / Current Medications / Past Medical History / Surgical History / Social History / Family History    Problem List:  Patient Active Problem List   Diagnosis   • Osteoarthritis of lumbar spine   • Osteoarthritis   • Sciatic leg pain   • Asthma   • Carotid stenosis   • Hyperlipidemia   • GERD (gastroesophageal reflux disease)   • Essential hypertension   • Hypothyroidism (acquired)   • Osteopenia   • Restless legs syndrome   • Thrombocytopenia (HCC)   • DNR (do not resuscitate)   • Non-seasonal allergic rhinitis   • Dyslipidemia       Past Medical History:  Past Medical History:   Diagnosis Date   • Arthritis    • Cancer (HCC)     skin   • Disease of thyroid gland    • GERD (gastroesophageal reflux disease)    • Hyperlipidemia    • Hypertension    • Osteoporosis        Past Surgical History:  Past Surgical History:   Procedure Laterality Date   • CATARACT EXTRACTION     • CHOLECYSTECTOMY WITH INTRAOPERATIVE CHOLANGIOGRAM N/A 6/6/2021    Procedure: CHOLECYSTECTOMY  "LAPAROSCOPIC INTRAOPERATIVE CHOLANGIOGRAM common bile duct exploration;  Surgeon: Huey Wilkinson MD;  Location: [facility] MAIN OR;  Service: General;  Laterality: N/A;   • COLONOSCOPY     • ERCP N/A 7/7/2021    Procedure: ENDOSCOPIC RETROGRADE CHOLANGIOPANCREATOGRAPHY WITH SPHINCTEROTOMY, BALLOON SWEEP AND STONE (2) EXTRACTION;  Surgeon: Justo Aden MD;  Location: [facility] ENDOSCOPY;  Service: Gastroenterology;  Laterality: N/A;  PRE- BILE DUCT OBSTRUCTION   POST- SAME     • HYSTERECTOMY     • REPLACEMENT TOTAL KNEE Right    • THYROID SURGERY     • TUBAL ABDOMINAL LIGATION         Social History:  Social History     Socioeconomic History   • Marital status:    Tobacco Use   • Smoking status: Former Smoker   • Smokeless tobacco: Never Used   Vaping Use   • Vaping Use: Never used   Substance and Sexual Activity   • Alcohol use: Yes     Comment: wine at night   • Drug use: No   • Sexual activity: Defer       Family History:  Family History   Problem Relation Age of Onset   • Hypertension Mother    • Heart disease Mother    • Thyroid disease Mother    • Hypertension Father    • Cancer Father    • Heart disease Brother    • Hypertension Daughter    • Thyroid disease Daughter    • Lung disease Daughter    • Depression Daughter    • Migraines Daughter    • Arthritis Daughter    • Heart disease Paternal Grandmother    • Stroke Paternal Grandfather    • Kidney disease Paternal Grandfather          PATIENT ASSESSMENT     Vitals:  /74   Pulse 81   Temp 97.7 °F (36.5 °C) (Temporal)   Ht 149.9 cm (59\")   Wt 51.2 kg (112 lb 12.8 oz)   LMP  (LMP Unknown)   SpO2 97%   BMI 22.78 kg/m²   BP Readings from Last 3 Encounters:   12/28/21 128/74   09/22/21 140/82   07/08/21 144/79     Wt Readings from Last 3 Encounters:   12/28/21 51.2 kg (112 lb 12.8 oz)   09/22/21 48.5 kg (107 lb)   07/30/21 48.8 kg (107 lb 9.6 oz)      Body mass index is 22.78 kg/m².    Pain Score    12/28/21 [facility]   PainSc: 0-No pain "   PainLoc: Knee         Review of Systems:    Review of Systems  Constitutional neg except per HPI   Resp neg  CV neg  Physical Exam:    Physical Exam  Constitutional  No distress  Cardiovascular Rate  normal . Rhythm: regular . Heart sounds:  normal  Pulmonary/Chest  Effort normal. Breath sounds:  normal  Psychiatric  Alert. Judgment and thought content normal. Mood normal   Reviewed Data:    Labs:   Lab Results   Component Value Date     09/22/2021    K 3.9 09/22/2021    CALCIUM 9.7 (H) 09/22/2021    AST 29 09/22/2021    ALT 16 09/22/2021    BUN 14 09/22/2021    CREATININE 0.78 09/22/2021    CREATININE 0.47 (L) 07/08/2021    CREATININE 0.66 07/07/2021    EGFRIFNONA 68 09/22/2021    EGFRIFAFRI 83 09/22/2021       No results found for: GLU, HGBA1C, MICROALBUR    Lab Results   Component Value Date    LDL 81 09/22/2021     (H) 03/02/2020     (H) 04/09/2019    HDL 58 09/22/2021    TRIG 155 (H) 09/22/2021    CHOLHDLRATIO 2.86 09/22/2021       Lab Results   Component Value Date    TSH 2.760 09/22/2021    FREET4 1.22 09/22/2021          Lab Results   Component Value Date    WBC 7.02 07/08/2021    HGB 12.0 07/08/2021    HGB 14.4 07/07/2021    HGB 13.0 07/06/2021     07/08/2021                 No results found for: PSA    Imaging:          Medical Tests:          Screening for Glaucoma:  Previous screening for glaucoma?: Yes      Hearing Loss Screen:  Finger Rub Hearing Test (right ear): passed  Finger Rub Hearing Test (left ear): passed      Urinary Incontinence Screen:  Episodes of urinary incontinence? : Yes and Will need close follow-up.       Depression Screen:  PHQ-2/PHQ-9 Depression Screening 12/28/2021   Little interest or pleasure in doing things 0   Feeling down, depressed, or hopeless 0   Total Score 0        PHQ-2: 0 (Not depressed)    PHQ-9: 0 (Negative screening for depression)       FUNCTIONAL, FALL RISK, & COGNITIVE SCREENING (Components below):    DATA:    Functional & Cognitive  Status 12/28/2021   Do you have difficulty preparing food and eating? No   Do you have difficulty bathing yourself, getting dressed or grooming yourself? No   Do you have difficulty using the toilet? Yes   Do you have difficulty moving around from place to place? No   Do you have trouble with steps or getting out of a bed or a chair? Yes   Current Diet Well Balanced Diet   Dental Exam Up to date   Eye Exam Up to date   Exercise (times per week) 3 times per week   Current Exercises Include Home Exercise Program (TV, Computer, Etc.);Walking   Current Exercise Activities Include -   Do you need help using the phone?  Yes   Are you deaf or do you have serious difficulty hearing?  Yes   Do you need help with transportation? Yes   Do you need help shopping? Yes   Do you need help preparing meals?  No   Do you need help with housework?  No   Do you need help with laundry? No   Do you need help taking your medications? No   Do you need help managing money? No   Do you ever drive or ride in a car without wearing a seat belt? No   Do you have difficulty concentrating, remembering or making decisions? -         A) Assessment of Functional Ability:  (Assessment of ability to perform ADL's (showering/bathing, using toilet, dressing, feeding self, moving self around) and IADL's (use telephone, shop, prepare food, housekeep, do laundry, transport independently, take medications independently, and handle finances)    Degree of functional impairment: MILD (based on assessment noted above)      B) Assessment of Fall Risk:  Fall Risk Assessment was completed, and patient is at low risk for falls.       Need for further evaluation of gait, strength, and balance? : No    Timed Up and Go (TUG):   (>= 12 seconds indicates high risk for falling)    Observable abnormalities included: slow tentative pace       C. Assessment of Cognitive Function:    Mini-Cog Test:     1) Registration (3 objects): Yes   2) Number of objects recalled: 3   3)  Clock Draw: Passed? : Yes       Further evaluation required? : No        COUNSELING       A. Identification of Health Risk Factors:    Risk factors include: cardiovascular risk factors and incontinence      B. Age-Appropriate Screening Schedule:  (Refer to the list below for future screening recommendations based on patient's age, sex and/or medical conditions. Orders for these recommended tests are listed in the plan section. The patient has been provided with a written plan)    Health Maintenance Topics  Health Maintenance   Topic Date Due   • MAMMOGRAM  12/17/2021   • DXA SCAN  12/17/2021   • LIPID PANEL  09/22/2022   • TDAP/TD VACCINES (2 - Td or Tdap) 09/18/2028   • INFLUENZA VACCINE  Completed   • ZOSTER VACCINE  Completed       Health Maintenance Topics Due or Over-Due  Health Maintenance Due   Topic Date Due   • MAMMOGRAM  12/17/2021   • DXA SCAN  12/17/2021         C. Advanced Care Planning:    Advance Care Planning   ACP discussion was held with the patient during this visit. Patient has an advance directive in EMR which is still valid.        D. Patient Self-Management and Personalized Health Advice:    She has been provided with personalized counseling/information (including brochures/handouts) about:     -- optimizing diet/nutrition plans, improving exercise / conditioning and reducing risk for cardiovascular disease (heart, stroke, vascular)      She has been recommended for the following preventative services which has been performed today, will be ordered today or ordered/performed on upcoming follow-up visit:     -- NUTRITION counseling provided, EXERCISE counseling provided, EYE exam for glaucoma screening recommended, CARDIOVASCULAR disease risk reduction counseling performed, FALL RISK assessment / plan of care completed, URINARY incontinence assessment done, OSTEOPOROSIS screening DISCUSSED, BREAST CANCER screening DISCUSSED      E. Miscellaneous Items:    -Aspirin use counseling: Does not need  ASA (and currently is not on it)    -Discussed BMI with her. The BMI is in the acceptable range    -Reviewed use of high risk medication in the elderly: YES    -Reviewed for potential of harmful drug interactions in the elderly: YES        WRAP UP       Assessment & Plan:  1) MEDICARE ANNUAL WELLNESS VISIT    2) OTHER MEDICAL CONDITIONS ADDRESSED TODAY:            Problem List Items Addressed This Visit     None      Visit Diagnoses     Medicare annual wellness visit, subsequent    -  Primary                  No orders of the defined types were placed in this encounter.      Discussion / Summary:  · Patient would like to have mammogram and DEXA scan discussion with OB/GYN she will call to make an appoint with Dr. Pro.   · Up-to-date on immunizations.  · No longer performing colonoscopies.  No longer completing pelvic exams.  · Follow-up in 3 months for chronic medical follow-up with PCP. Come fasting will likely need update labs at that time.    Medications as of TODAY:              Current Outpatient Medications   Medication Sig Dispense Refill   • acetaminophen (TYLENOL) 500 MG tablet Take 1 tablet by mouth Every 6 (Six) Hours As Needed for Mild Pain  or Moderate Pain .     • atorvastatin (LIPITOR) 20 MG tablet Take 1 tablet by mouth Daily. Hold until instructed to resume by primary care or surgeon. 90 tablet 3   • Cholecalciferol (VITAMIN D3) 2000 UNITS tablet Take 1 tablet by mouth Daily.     • fexofenadine (ALLEGRA) 180 MG tablet Take 180 mg by mouth Daily.     • Glycerin-Polysorbate 80 (REFRESH DRY EYE THERAPY OP) Apply  to eye(s) as directed by provider.     • levothyroxine (SYNTHROID, LEVOTHROID) 50 MCG tablet TAKE 1 TABLET EVERY MORNING 90 tablet 3   • losartan (COZAAR) 50 MG tablet TAKE 1 TABLET DAILY 90 tablet 3   • Misc Natural Products (Osteo Bi-Flex Joint Shield) tablet Take 1 tablet by mouth Daily.     • montelukast (SINGULAIR) 10 MG tablet Take 10 mg by mouth daily.     • Triamcinolone Acetonide  (NASACORT) 55 MCG/ACT nasal inhaler 2 sprays into the nostril(s) as directed by provider Daily As Needed.       No current facility-administered medications for this visit.         FOLLOW-UP:            Return in about 3 months (around 3/28/2022) for Next scheduled follow up with Dr. Salcido .                 Future Appointments   Date Time Provider Department Center   5/3/2022  3:45 PM Henok Salcido MD [facility]           After Visit Summary (AVS) including the Personalized Prevention  Plan Services (PPPS) was either printed and given to the patient at check-out today and/or sent to [facility] for review.       *Examiner was wearing medical surgical mask, face shield and exam gloves during the entire duration of the visit. Patient was masked the entire time.   Minimum social distance of 6 ft maintained entire visit except if physical contact was necessary as documented.      **Dragon Disclaimer:   Much of this encounter note is an electronic transcription/translation of spoken language to printed text. The electronic translation of spoken language may permit erroneous, or at times, nonsensical words or phrases to be inadvertently transcribed. Although I have reviewed the note for such errors, some may still exist.

## 2022-01-12 DIAGNOSIS — E78.5 DYSLIPIDEMIA: ICD-10-CM

## 2022-01-12 RX ORDER — ATORVASTATIN CALCIUM 20 MG/1
TABLET, FILM COATED ORAL
Qty: 90 TABLET | Refills: 3 | OUTPATIENT
Start: 2022-01-12

## 2022-03-14 ENCOUNTER — TELEPHONE (OUTPATIENT)
Dept: INTERNAL MEDICINE | Age: 87
End: 2022-03-14

## 2022-03-14 NOTE — TELEPHONE ENCOUNTER
Left voice mail for patient to reschedule appt time. Okay for hub to reschedule   Also, left voice mail with Jillian daughter to reschedule

## 2022-03-15 ENCOUNTER — TELEPHONE (OUTPATIENT)
Dept: INTERNAL MEDICINE | Age: 87
End: 2022-03-15

## 2022-05-03 ENCOUNTER — IMMUNIZATION (OUTPATIENT)
Dept: VACCINE CLINIC | Facility: HOSPITAL | Age: 87
End: 2022-05-03

## 2022-05-03 DIAGNOSIS — Z23 NEED FOR VACCINATION: Primary | ICD-10-CM

## 2022-05-03 PROCEDURE — 91305 HC SARSCOV2 VAC 30 MCG TRS-SUCR PFIZER: CPT | Performed by: INTERNAL MEDICINE

## 2022-05-03 PROCEDURE — 0054A HC ADM SARSCV2 30MCG TRS-SUCR BOOSTER: CPT | Performed by: INTERNAL MEDICINE

## 2022-05-31 ENCOUNTER — OFFICE VISIT (OUTPATIENT)
Dept: INTERNAL MEDICINE | Age: 87
End: 2022-05-31

## 2022-05-31 VITALS
HEIGHT: 59 IN | OXYGEN SATURATION: 97 % | TEMPERATURE: 97.3 F | BODY MASS INDEX: 22.38 KG/M2 | HEART RATE: 73 BPM | SYSTOLIC BLOOD PRESSURE: 124 MMHG | WEIGHT: 111 LBS | DIASTOLIC BLOOD PRESSURE: 82 MMHG

## 2022-05-31 DIAGNOSIS — E03.9 HYPOTHYROIDISM (ACQUIRED): Chronic | ICD-10-CM

## 2022-05-31 DIAGNOSIS — I10 ESSENTIAL HYPERTENSION: Primary | Chronic | ICD-10-CM

## 2022-05-31 DIAGNOSIS — E78.5 HYPERLIPIDEMIA, UNSPECIFIED HYPERLIPIDEMIA TYPE: Chronic | ICD-10-CM

## 2022-05-31 PROCEDURE — 99214 OFFICE O/P EST MOD 30 MIN: CPT | Performed by: INTERNAL MEDICINE

## 2022-05-31 NOTE — PROGRESS NOTES
"    I N T E R N A L  M E D I C I N E  J U N O H  K I M,  M D      ENCOUNTER DATE:  05/31/2022    Maria Fernanda Gaviria / 89 y.o. / female      CHIEF COMPLAINT / REASON FOR OFFICE VISIT     Hypertension, Hyperlipidemia, Hypothyroidism, and Asthma      ASSESSMENT & PLAN     Problem List Items Addressed This Visit        Medium    Hyperlipidemia (Chronic)    Overview     Continue atorvastatin 20 mg qd.            Relevant Medications    atorvastatin (LIPITOR) 20 MG tablet    Other Relevant Orders    Comprehensive Metabolic Panel    Essential hypertension - Primary (Chronic)    Overview     Continue losartan 50 mg qd.            Relevant Medications    losartan (COZAAR) 50 MG tablet    Hypothyroidism (acquired) (Chronic)    Overview     Continue current dose of levothyroxine 50 mcg.            Relevant Medications    levothyroxine (SYNTHROID, LEVOTHROID) 50 MCG tablet    Other Relevant Orders    TSH+Free T4        Orders Placed This Encounter   Procedures   • Comprehensive Metabolic Panel   • TSH+Free T4     No orders of the defined types were placed in this encounter.      SUMMARY/DISCUSSION  •       Next Appointment with me: Visit date not found    Return in about 6 months (around 11/30/2022) for Reassess chronic medical problems.      VITAL SIGNS     Vitals:    05/31/22 1038   BP: 124/82   BP Location: Left arm   Pulse: 73   Temp: 97.3 °F (36.3 °C)   SpO2: 97%   Weight: 50.3 kg (111 lb)   Height: 149.9 cm (59\")       BP Readings from Last 3 Encounters:   05/31/22 124/82   12/28/21 128/74   09/22/21 140/82     Wt Readings from Last 3 Encounters:   05/31/22 50.3 kg (111 lb)   12/28/21 51.2 kg (112 lb 12.8 oz)   09/22/21 48.5 kg (107 lb)     Body mass index is 22.42 kg/m².    Blood pressure readings recorded on patient flowsheet:  No flowsheet data found.       MEDICATIONS AT THE TIME OF OFFICE VISIT     Current Outpatient Medications on File Prior to Visit   Medication Sig   • acetaminophen (TYLENOL) 500 MG tablet Take 1 " tablet by mouth Every 6 (Six) Hours As Needed for Mild Pain  or Moderate Pain .   • atorvastatin (LIPITOR) 20 MG tablet Take 1 tablet by mouth Daily. Hold until instructed to resume by primary care or surgeon.   • Cholecalciferol (VITAMIN D3) 2000 UNITS tablet Take 1 tablet by mouth Daily.   • fexofenadine (ALLEGRA) 180 MG tablet Take 180 mg by mouth Daily.   • Glycerin-Polysorbate 80 (REFRESH DRY EYE THERAPY OP) Apply  to eye(s) as directed by provider.   • Ketotifen Fumarate (REFRESH EYE ITCH RELIEF OP) Apply  to eye(s) as directed by provider.   • levothyroxine (SYNTHROID, LEVOTHROID) 50 MCG tablet TAKE 1 TABLET EVERY MORNING   • losartan (COZAAR) 50 MG tablet TAKE 1 TABLET DAILY   • Misc Natural Products (Osteo Bi-Flex Joint Shield) tablet Take 1 tablet by mouth Daily.   • montelukast (SINGULAIR) 10 MG tablet Take 10 mg by mouth daily.   • Triamcinolone Acetonide (NASACORT) 55 MCG/ACT nasal inhaler 2 sprays into the nostril(s) as directed by provider Daily As Needed.     No current facility-administered medications on file prior to visit.          HISTORY OF PRESENT ILLNESS     Denies any changes to her acute problems.  Compliant with all medications.      Patient Care Team:  Henok Salcido MD as PCP - General  Christiano Dewitt MD as Consulting Physician (Obstetrics and Gynecology)  Rodolfo Shah MD as Consulting Physician (Allergy)  Michael Mendoza MD as Consulting Physician (Dermatology)  Jluis Bain MD as Consulting Physician (Otolaryngology)  Lucas Rueda OD (Optometry)  Ousmane Rhodes MD (Inactive) as Consulting Physician (Gastroenterology)    REVIEW OF SYSTEMS     Review of Systems   Constitutional neg except per HPI   Resp neg  CV neg     PHYSICAL EXAMINATION     Physical Exam  General: No acute distress  Psych: Normal thought and judgment   Cardiovascular Rate: normal. Rhythm: regular. Heart sounds: normal.   Pulm/Chest: Effort normal, breath sounds normal.       REVIEWED DATA      Labs:     Lab Results   Component Value Date     09/22/2021    K 3.9 09/22/2021    CALCIUM 9.7 (H) 09/22/2021    AST 29 09/22/2021    ALT 16 09/22/2021    BUN 14 09/22/2021    CREATININE 0.78 09/22/2021    CREATININE 0.47 (L) 07/08/2021    CREATININE 0.66 07/07/2021    EGFRIFNONA 68 09/22/2021    EGFRIFAFRI 83 09/22/2021        No results found for: HGBA1C    Lab Results   Component Value Date    LDL 81 09/22/2021     (H) 03/02/2020    HDL 58 09/22/2021    TRIG 155 (H) 09/22/2021       Lab Results   Component Value Date    TSH 2.760 09/22/2021    TSH 1.720 07/06/2021    TSH 0.988 03/02/2020    FREET4 1.22 09/22/2021    FREET4 1.33 03/02/2020    FREET4 1.24 04/09/2019       Lab Results   Component Value Date    WBC 7.02 07/08/2021    HGB 12.0 07/08/2021     07/08/2021       No results found for: MICROALBUR        Imaging:           Medical Tests:           Summary of old records / correspondence / consultant report:           Request outside records:             *Examiner was wearing KN95 mask and eye protection during the entire duration of the visit. Patient was masked the entire time. Minimum social distance of 6 ft maintained entire visit except if physical contact was necessary as documented.       Template created by Clayton Salcido MD

## 2022-05-31 NOTE — PATIENT INSTRUCTIONS
** IMPORTANT MESSAGE FROM DR. ROBERTSON **    In our office, your satisfaction is VERY important to us.     You may receive a survey from [employer] [employer] by mail or E-mail for you to provide feedback about your visit. This information is invaluable for me to know what we can do to improve our services.     I ask that you please take a few minutes to complete the survey and let us know how we are doing in serving your needs. (You may receive the survey more than once for multiple visits)    Thank You !    Dr. Robertson    _________________________________________________________________________________________________________________________      ** ADDITIONAL INSTRUCTION / REMINDERS FROM DR. ROBERTSON **

## 2022-06-01 LAB
ALBUMIN SERPL-MCNC: 4.3 G/DL (ref 3.5–4.6)
ALBUMIN/GLOB SERPL: 1.8 {RATIO} (ref 1.2–2.2)
ALP SERPL-CCNC: 80 IU/L (ref 44–121)
ALT SERPL-CCNC: 13 IU/L (ref 0–32)
AST SERPL-CCNC: 25 IU/L (ref 0–40)
BILIRUB SERPL-MCNC: 0.8 MG/DL (ref 0–1.2)
BUN SERPL-MCNC: 13 MG/DL (ref 10–36)
BUN/CREAT SERPL: 16 (ref 12–28)
CALCIUM SERPL-MCNC: 9.6 MG/DL (ref 8.7–10.3)
CHLORIDE SERPL-SCNC: 103 MMOL/L (ref 96–106)
CO2 SERPL-SCNC: 25 MMOL/L (ref 20–29)
CREAT SERPL-MCNC: 0.81 MG/DL (ref 0.57–1)
EGFRCR SERPLBLD CKD-EPI 2021: 65 ML/MIN/1.73
GLOBULIN SER CALC-MCNC: 2.4 G/DL (ref 1.5–4.5)
GLUCOSE SERPL-MCNC: 92 MG/DL (ref 65–99)
POTASSIUM SERPL-SCNC: 4.2 MMOL/L (ref 3.5–5.2)
PROT SERPL-MCNC: 6.7 G/DL (ref 6–8.5)
SODIUM SERPL-SCNC: 142 MMOL/L (ref 134–144)
T4 FREE SERPL-MCNC: 1.31 NG/DL (ref 0.82–1.77)
TSH SERPL DL<=0.005 MIU/L-ACNC: 2.04 UIU/ML (ref 0.45–4.5)

## 2022-06-07 DIAGNOSIS — I10 ESSENTIAL HYPERTENSION: Chronic | ICD-10-CM

## 2022-06-07 DIAGNOSIS — E78.5 DYSLIPIDEMIA: ICD-10-CM

## 2022-06-07 DIAGNOSIS — E03.9 HYPOTHYROIDISM: ICD-10-CM

## 2022-06-07 NOTE — TELEPHONE ENCOUNTER
Caller: Jillian Patel    Relationship: Emergency Contact    Best call back number: [phone]    Requested Prescriptions:   Requested Prescriptions     Pending Prescriptions Disp Refills   • atorvastatin (LIPITOR) 20 MG tablet 90 tablet 3     Sig: Take 1 tablet by mouth Daily. Hold until instructed to resume by primary care or surgeon.   • montelukast (SINGULAIR) 10 MG tablet       Sig: Take 1 tablet by mouth Daily.   • levothyroxine (SYNTHROID, LEVOTHROID) 50 MCG tablet 90 tablet 3     Sig: Take 1 tablet by mouth Every Morning.   • losartan (COZAAR) 50 MG tablet 90 tablet 3     Sig: Take 1 tablet by mouth Daily.        Pharmacy where request should be sent: [facility] - [address] [phone] [facility] [phone]      Additional details provided by patient: PATIENT HAS 3 OF ATORVASTATIN    Does the patient have less than a 3 day supply:  [x] Yes  [] No    Yocasta De León   06/07/22 13:02 EDT

## 2022-06-08 RX ORDER — LEVOTHYROXINE SODIUM 0.05 MG/1
50 TABLET ORAL EVERY MORNING
Qty: 90 TABLET | Refills: 3 | Status: SHIPPED | OUTPATIENT
Start: 2022-06-08

## 2022-06-08 RX ORDER — MONTELUKAST SODIUM 10 MG/1
10 TABLET ORAL DAILY
Qty: 90 TABLET | Refills: 3 | Status: SHIPPED | OUTPATIENT
Start: 2022-06-08

## 2022-06-08 RX ORDER — LOSARTAN POTASSIUM 50 MG/1
50 TABLET ORAL DAILY
Qty: 90 TABLET | Refills: 3 | Status: SHIPPED | OUTPATIENT
Start: 2022-06-08

## 2022-06-08 RX ORDER — ATORVASTATIN CALCIUM 20 MG/1
20 TABLET, FILM COATED ORAL DAILY
Qty: 90 TABLET | Refills: 3
Start: 2022-06-08 | End: 2022-06-22 | Stop reason: SDUPTHER

## 2022-06-20 DIAGNOSIS — E78.5 DYSLIPIDEMIA: ICD-10-CM

## 2022-06-20 RX ORDER — ATORVASTATIN CALCIUM 20 MG/1
20 TABLET, FILM COATED ORAL DAILY
Qty: 90 TABLET | Refills: 3
Start: 2022-06-20

## 2022-06-20 NOTE — TELEPHONE ENCOUNTER
Caller: LANDRY     Relationship: Child    Best call back number:  [phone]    Requested Prescriptions:   Requested Prescriptions     Pending Prescriptions Disp Refills   • atorvastatin (LIPITOR) 20 MG tablet 90 tablet 3     Sig: Take 1 tablet by mouth Daily. Hold until instructed to resume by primary care or surgeon.        Pharmacy where request should be sent: [facility] [address] - [facility] [address] [facility] [address]  - [phone]  - [phone] FX     Additional details provided by patient: PATIENT IS UNABLE TO FIND HER MEDICATION AND HAS LOST IT. PATIENT IS WANTING TO GET A REFILL     PLEASE GIVE PATIENT A CALL     Does the patient have less than a 3 day supply:  [x] Yes  [] No    Yocasta Mcelroy Rep   06/20/22 12:04 EDT

## 2022-06-21 NOTE — TELEPHONE ENCOUNTER
Caller: LANDRY    Relationship: CHILD    Best call back number: [phone]    What was the call regarding: PATIENT'S DAUGHTER IS CALLING TO CHECK ON THE STATUS OF THIS REQUEST. PATIENT'S DAUGHTER STATED THAT PATIENT IS BECOMING IMPATIENT WITH HER ON GETTING HER MEDICATION. PLEASE ADVISE.     Do you require a callback: YES

## 2022-06-22 DIAGNOSIS — E78.5 DYSLIPIDEMIA: ICD-10-CM

## 2022-06-23 RX ORDER — ATORVASTATIN CALCIUM 20 MG/1
20 TABLET, FILM COATED ORAL DAILY
Qty: 90 TABLET | Refills: 3
Start: 2022-06-23 | End: 2022-07-25 | Stop reason: SDUPTHER

## 2022-07-25 DIAGNOSIS — E78.5 DYSLIPIDEMIA: ICD-10-CM

## 2022-07-25 RX ORDER — ATORVASTATIN CALCIUM 20 MG/1
20 TABLET, FILM COATED ORAL DAILY
Qty: 90 TABLET | Refills: 3 | Status: SHIPPED | OUTPATIENT
Start: 2022-07-25

## 2022-09-22 ENCOUNTER — OFFICE VISIT (OUTPATIENT)
Dept: ORTHOPEDIC SURGERY | Facility: CLINIC | Age: 87
End: 2022-09-22

## 2022-09-22 VITALS — BODY MASS INDEX: 22.38 KG/M2 | TEMPERATURE: 96.4 F | HEIGHT: 59 IN | WEIGHT: 111 LBS | RESPIRATION RATE: 16 BRPM

## 2022-09-22 DIAGNOSIS — M17.11 PRIMARY OSTEOARTHRITIS OF RIGHT KNEE: ICD-10-CM

## 2022-09-22 DIAGNOSIS — R52 PAIN: Primary | ICD-10-CM

## 2022-09-22 PROCEDURE — 73562 X-RAY EXAM OF KNEE 3: CPT | Performed by: ORTHOPAEDIC SURGERY

## 2022-09-22 PROCEDURE — 99213 OFFICE O/P EST LOW 20 MIN: CPT | Performed by: ORTHOPAEDIC SURGERY

## 2022-09-22 PROCEDURE — 20610 DRAIN/INJ JOINT/BURSA W/O US: CPT | Performed by: ORTHOPAEDIC SURGERY

## 2022-09-22 RX ORDER — METHYLPREDNISOLONE ACETATE 80 MG/ML
80 INJECTION, SUSPENSION INTRA-ARTICULAR; INTRALESIONAL; INTRAMUSCULAR; SOFT TISSUE
Status: COMPLETED | OUTPATIENT
Start: 2022-09-22 | End: 2022-09-22

## 2022-09-22 RX ADMIN — METHYLPREDNISOLONE ACETATE 80 MG: 80 INJECTION, SUSPENSION INTRA-ARTICULAR; INTRALESIONAL; INTRAMUSCULAR; SOFT TISSUE at 13:08

## 2022-09-22 NOTE — PROGRESS NOTES
Patient: Maria Fernanda Gaviria  YOB: 1923 89 y.o. female  Medical Record Number: [id number]    Chief Complaints:   Chief Complaint   Patient presents with   • Right Knee - Follow-up       History of Present Illness:Maria Fernanda Gaviria is a 89 y.o. female who presents for follow-up of right knee pain its been about a year and a half since I last saw her she has known advanced arthritis.  She did therapy last year and feels as though her legs are stronger and she does home exercises according to her plan.  She uses a cane for balance but has considerable pain about the medial aspect of the knee with even short walks.    Allergies:   Allergies   Allergen Reactions   • Aspirin Nausea And Vomiting and Nausea Only   • Aspirin Buf(Cacarb-Mgcarb-Mgo) Nausea And Vomiting   • Latex Rash     Redness on skin       Medications:   Current Outpatient Medications   Medication Sig Dispense Refill   • acetaminophen (TYLENOL) 500 MG tablet Take 1 tablet by mouth Every 6 (Six) Hours As Needed for Mild Pain  or Moderate Pain .     • atorvastatin (LIPITOR) 20 MG tablet Take 1 tablet by mouth Daily. Hold until instructed to resume by primary care or surgeon. 90 tablet 3   • Cholecalciferol (VITAMIN D3) 2000 UNITS tablet Take 1 tablet by mouth Daily.     • fexofenadine (ALLEGRA) 180 MG tablet Take 180 mg by mouth Daily.     • Glycerin-Polysorbate 80 (REFRESH DRY EYE THERAPY OP) Apply  to eye(s) as directed by provider.     • Ketotifen Fumarate (REFRESH EYE ITCH RELIEF OP) Apply  to eye(s) as directed by provider.     • levothyroxine (SYNTHROID, LEVOTHROID) 50 MCG tablet Take 1 tablet by mouth Every Morning. 90 tablet 3   • losartan (COZAAR) 50 MG tablet Take 1 tablet by mouth Daily. 90 tablet 3   • Misc Natural Products (Osteo Bi-Flex Joint Shield) tablet Take 1 tablet by mouth Daily.     • montelukast (SINGULAIR) 10 MG tablet Take 1 tablet by mouth Daily. 90 tablet 3   • Triamcinolone Acetonide (NASACORT) 55 MCG/ACT nasal inhaler  "2 sprays into the nostril(s) as directed by provider Daily As Needed.       No current facility-administered medications for this visit.         The following portions of the patient's history were reviewed and updated as appropriate: allergies, current medications, past family history, past medical history, past social history, past surgical history and problem list.    Review of Systems:   A 14 point review of systems was performed. All systems negative except pertinent positives/negative listed in HPI above    Physical Exam:   Vitals:    09/22/22 1113   Resp: 16   Temp: 96.4 °F (35.8 °C)   TempSrc: Temporal   Weight: 50.3 kg (111 lb)   Height: 149.9 cm (59.02\")   PainSc: 0-No pain       General: A and O x 3, ASA, NAD    SCLERA:    Normal    DENTITION:   Normal  Knee:  right    ALIGNMENT:     Varus  ,   Patella  tracks  midline    GAIT:    Antalgic    SKIN:    No abnormality    RANGE OF MOTION:   0  -  120   DEG    STRENGTH:   4  / 5    LIGAMENTS:    No varus / valgus instability.   Negative  Lachman.    MENISCUS:     Negative   Fozia       DISTAL PULSES:    Paplable    DISTAL SENSATION :   Intact    LYMPHATICS:     No   lymphadenopathy    OTHER:          - no   effusion      - medial with ROM         Radiology:  Xrays 3views right knee (ap,lateral, sunrise) were ordered and reviewed for evaluation of knee pain demonstratingadvanced varus osteoarthritis with bone on bone articulation, subchondral cysts, and periarticular osteophytes  todays xrays were compared to previous xrays and demonstrate progression of the disease process slight flattening of the medial femoral condyle compared to the x-rays from last year    Assessment/Plan:  Right knee osteoarthritis I injected the knee as below.  She will work on home exercises.  I will see her back as needed.  Large Joint Arthrocentesis: R knee  Date/Time: 9/22/2022 1:08 PM  Consent given by: patient  Site marked: site marked  Timeout: Immediately prior to procedure a " time out was called to verify the correct patient, procedure, equipment, support staff and site/side marked as required   Supporting Documentation  Indications: pain and joint swelling   Procedure Details  Location: knee - R knee  Preparation: Patient was prepped and draped in the usual sterile fashion  Needle size: 22 G  Approach: anterolateral  Medications administered: 80 mg methylPREDNISolone acetate 80 MG/ML; 2 mL lidocaine (cardiac)  Patient tolerance: patient tolerated the procedure well with no immediate complications

## 2022-12-02 ENCOUNTER — OFFICE VISIT (OUTPATIENT)
Dept: INTERNAL MEDICINE | Age: 87
End: 2022-12-02

## 2022-12-02 VITALS
BODY MASS INDEX: 21.57 KG/M2 | OXYGEN SATURATION: 97 % | SYSTOLIC BLOOD PRESSURE: 156 MMHG | TEMPERATURE: 97.2 F | WEIGHT: 107 LBS | HEART RATE: 93 BPM | DIASTOLIC BLOOD PRESSURE: 86 MMHG | HEIGHT: 59 IN

## 2022-12-02 DIAGNOSIS — I10 ESSENTIAL HYPERTENSION: Primary | Chronic | ICD-10-CM

## 2022-12-02 DIAGNOSIS — E78.5 HYPERLIPIDEMIA, UNSPECIFIED HYPERLIPIDEMIA TYPE: Chronic | ICD-10-CM

## 2022-12-02 DIAGNOSIS — E03.9 HYPOTHYROIDISM (ACQUIRED): Chronic | ICD-10-CM

## 2022-12-02 PROCEDURE — 99214 OFFICE O/P EST MOD 30 MIN: CPT | Performed by: INTERNAL MEDICINE

## 2022-12-02 PROCEDURE — 91312 COVID-19 (PFIZER) BIVALENT BOOSTER 12+YRS: CPT | Performed by: INTERNAL MEDICINE

## 2022-12-02 PROCEDURE — 0124A COVID-19 (PFIZER) BIVALENT BOOSTER 12+YRS: CPT | Performed by: INTERNAL MEDICINE

## 2022-12-02 NOTE — PROGRESS NOTES
"    I N T E R N A L  M E D I C I N E    J U N O H  K I M,  M D      ENCOUNTER DATE:  12/02/2022    Maria Fernanda Gaviria / 89 y.o. / female    CHIEF COMPLAINT / REASON FOR OFFICE VISIT     Hypertension, Hyperlipidemia, and Hypothyroidism      ASSESSMENT & PLAN     Problem List Items Addressed This Visit        Medium    Hyperlipidemia (Chronic)    Overview     Continue atorvastatin 20 mg qd.          Relevant Medications    atorvastatin (LIPITOR) 20 MG tablet    Other Relevant Orders    Comprehensive Metabolic Panel    Lipid Panel With / Chol / HDL Ratio    Essential hypertension - Primary (Chronic)    Overview     Continue losartan 50 mg qd.          Current Assessment & Plan     Blood pressure is elevated today higher than before.  I advised her to check her blood pressure twice a day for 2 weeks and send her blood pressure readings in 2 weeks.  For now continue losartan 50 mg daily.    BP Readings from Last 3 Encounters:   12/02/22 156/86   05/31/22 124/82   12/28/21 128/74             Relevant Medications    losartan (COZAAR) 50 MG tablet    Other Relevant Orders    Comprehensive Metabolic Panel    CBC & Differential    Hypothyroidism (acquired) (Chronic)    Overview     Continue current dose of levothyroxine 50 mcg.          Relevant Medications    levothyroxine (SYNTHROID, LEVOTHROID) 50 MCG tablet    Other Relevant Orders    TSH+Free T4     Orders Placed This Encounter   Procedures   • COVID-19 Bivalent Booster (Pf[employer]) 12+yrs   • Comprehensive Metabolic Panel   • Lipid Panel With / Chol / HDL Ratio   • TSH+Free T4   • CBC & Differential     No orders of the defined types were placed in this encounter.      SUMMARY/DISCUSSION  •       Next Appointment with me: Visit date not found    Return in about 4 months (around 4/2/2023).      VITAL SIGNS     Vitals:    12/02/22 1055   BP: 156/86   Pulse: 93   Temp: 97.2 °F (36.2 °C)   SpO2: 97%   Weight: 48.5 kg (107 lb)   Height: 149.9 cm (59.02\")       BP Readings from " Last 3 Encounters:   12/02/22 156/86   05/31/22 124/82   12/28/21 128/74     Wt Readings from Last 3 Encounters:   12/02/22 48.5 kg (107 lb)   09/22/22 50.3 kg (111 lb)   05/31/22 50.3 kg (111 lb)     Body mass index is 21.6 kg/m².    Blood pressure readings recorded on patient flowsheet:  No flowsheet data found.       MEDICATIONS AT THE TIME OF OFFICE VISIT     Current Outpatient Medications on File Prior to Visit   Medication Sig   • acetaminophen (TYLENOL) 500 MG tablet Take 1 tablet by mouth Every 6 (Six) Hours As Needed for Mild Pain  or Moderate Pain .   • atorvastatin (LIPITOR) 20 MG tablet Take 1 tablet by mouth Daily. Hold until instructed to resume by primary care or surgeon.   • Cholecalciferol (VITAMIN D3) 2000 UNITS tablet Take 1 tablet by mouth Daily.   • fexofenadine (ALLEGRA) 180 MG tablet Take 180 mg by mouth Daily.   • levothyroxine (SYNTHROID, LEVOTHROID) 50 MCG tablet Take 1 tablet by mouth Every Morning.   • losartan (COZAAR) 50 MG tablet Take 1 tablet by mouth Daily.   • Misc Natural Products (Osteo Bi-Flex Joint Shield) tablet Take 1 tablet by mouth Daily.   • montelukast (SINGULAIR) 10 MG tablet Take 1 tablet by mouth Daily.   • Triamcinolone Acetonide (NASACORT) 55 MCG/ACT nasal inhaler 2 sprays into the nostril(s) as directed by provider Daily As Needed.   • Glycerin-Polysorbate 80 (REFRESH DRY EYE THERAPY OP) Apply  to eye(s) as directed by provider.   • Ketotifen Fumarate (REFRESH EYE ITCH RELIEF OP) Apply  to eye(s) as directed by provider.     No current facility-administered medications on file prior to visit.          HISTORY OF PRESENT ILLNESS     Maria Fernanda is doing well without any significant problems.  Her 89 birthday is coming up on [birth year].  She is on losartan 50 mg for hypertension.  Blood pressure is little higher than usual but she has not been checking her blood pressure regularly.  Denies any chest pains, headaches or lightheadedness symptoms.  She is on levothyroxine 50  mcg for hypothyroidism.  Her weight remains stable.  She is on atorvastatin 20 mg for hyperlipidemia without any significant problems.  She does complain of chronic urinary incontinence.  She has to wear depends and that does bother her.  She denies any urgency issues or any other urinary tract symptoms.      Patient Care Team:  Henok Salcido MD as PCP - General  Christiano Dewitt MD as Consulting Physician (Obstetrics and Gynecology)  Rodolfo Shah MD as Consulting Physician (Allergy)  Michael Mendoza MD as Consulting Physician (Dermatology)  Jluis Bain MD as Consulting Physician (Otolaryngology)  Lucas Rueda OD (Optometry)  Ousmane Rhodes MD (Inactive) as Consulting Physician (Gastroenterology)    REVIEW OF SYSTEMS     Review of Systems   Constitutional neg except per HPI   Resp neg  CV neg   GI negative   urinary incontinence     PHYSICAL EXAMINATION     Physical Exam  General: No acute distress  Psych: Normal thought and judgment   Cardiovascular Rate: normal. Rhythm: regular. Heart sounds: normal.   Pulm/Chest: Effort normal, breath sounds normal.   Ambulates fairly well with cane       REVIEWED DATA     Labs:     Lab Results   Component Value Date     05/31/2022    K 4.2 05/31/2022    CALCIUM 9.6 05/31/2022    AST 25 05/31/2022    ALT 13 05/31/2022    BUN 13 05/31/2022    CREATININE 0.81 05/31/2022    CREATININE 0.78 09/22/2021    CREATININE 0.47 (L) 07/08/2021    EGFRIFNONA 68 09/22/2021    EGFRIFAFRI 83 09/22/2021       No results found for: HGBA1C    Lab Results   Component Value Date    LDL 81 09/22/2021     (H) 03/02/2020     (H) 04/09/2019    HDL 58 09/22/2021    HDL 57 03/02/2020    TRIG 155 (H) 09/22/2021    TRIG 97 03/02/2020       Lab Results   Component Value Date    TSH 2.040 05/31/2022    TSH 2.760 09/22/2021    TSH 1.720 07/06/2021    FREET4 1.31 05/31/2022    FREET4 1.22 09/22/2021    FREET4 1.33 03/02/2020       Lab Results   Component Value Date     WBC 7.02 07/08/2021    HGB 12.0 07/08/2021     07/08/2021       No results found for: MALBCRERATIO       Imaging:           Medical Tests:           Summary of old records / correspondence / consultant report:           Request outside records:             *Examiner was wearing KN95 mask and eye protection during the entire duration of the visit. Patient was masked the entire time. Minimum social distance of 6 ft maintained entire visit except if physical contact was necessary as documented.       Template created by Clayton Salcido MD

## 2022-12-02 NOTE — ASSESSMENT & PLAN NOTE
Blood pressure is elevated today higher than before.  I advised her to check her blood pressure twice a day for 2 weeks and send her blood pressure readings in 2 weeks.  For now continue losartan 50 mg daily.    BP Readings from Last 3 Encounters:   12/02/22 156/86   05/31/22 124/82   12/28/21 128/74

## 2022-12-03 LAB
ALBUMIN SERPL-MCNC: 4.5 G/DL (ref 3.5–5.2)
ALBUMIN/GLOB SERPL: 2.1 G/DL
ALP SERPL-CCNC: 79 U/L (ref 39–117)
ALT SERPL-CCNC: 12 U/L (ref 1–33)
AST SERPL-CCNC: 24 U/L (ref 1–32)
BASOPHILS # BLD AUTO: 0.03 10*3/MM3 (ref 0–0.2)
BASOPHILS NFR BLD AUTO: 0.5 % (ref 0–1.5)
BILIRUB SERPL-MCNC: 1 MG/DL (ref 0–1.2)
BUN SERPL-MCNC: 11 MG/DL (ref 8–23)
BUN/CREAT SERPL: 13.1 (ref 7–25)
CALCIUM SERPL-MCNC: 9.5 MG/DL (ref 8.2–9.6)
CHLORIDE SERPL-SCNC: 104 MMOL/L (ref 98–107)
CHOLEST SERPL-MCNC: 164 MG/DL (ref 0–200)
CHOLEST/HDLC SERPL: 2.6 {RATIO}
CO2 SERPL-SCNC: 28.9 MMOL/L (ref 22–29)
CREAT SERPL-MCNC: 0.84 MG/DL (ref 0.57–1)
EGFRCR SERPLBLD CKD-EPI 2021: 62.5 ML/MIN/1.73
EOSINOPHIL # BLD AUTO: 0.16 10*3/MM3 (ref 0–0.4)
EOSINOPHIL NFR BLD AUTO: 2.6 % (ref 0.3–6.2)
ERYTHROCYTE [DISTWIDTH] IN BLOOD BY AUTOMATED COUNT: 13.3 % (ref 12.3–15.4)
GLOBULIN SER CALC-MCNC: 2.1 GM/DL
GLUCOSE SERPL-MCNC: 85 MG/DL (ref 65–99)
HCT VFR BLD AUTO: 45.3 % (ref 34–46.6)
HDLC SERPL-MCNC: 63 MG/DL (ref 40–60)
HGB BLD-MCNC: 15.5 G/DL (ref 12–15.9)
IMM GRANULOCYTES # BLD AUTO: 0.03 10*3/MM3 (ref 0–0.05)
IMM GRANULOCYTES NFR BLD AUTO: 0.5 % (ref 0–0.5)
LDLC SERPL CALC-MCNC: 85 MG/DL (ref 0–100)
LYMPHOCYTES # BLD AUTO: 1.42 10*3/MM3 (ref 0.7–3.1)
LYMPHOCYTES NFR BLD AUTO: 23.2 % (ref 19.6–45.3)
MCH RBC QN AUTO: 31.8 PG (ref 26.6–33)
MCHC RBC AUTO-ENTMCNC: 34.2 G/DL (ref 31.5–35.7)
MCV RBC AUTO: 92.8 FL (ref 79–97)
MONOCYTES # BLD AUTO: 0.53 10*3/MM3 (ref 0.1–0.9)
MONOCYTES NFR BLD AUTO: 8.7 % (ref 5–12)
NEUTROPHILS # BLD AUTO: 3.95 10*3/MM3 (ref 1.7–7)
NEUTROPHILS NFR BLD AUTO: 64.5 % (ref 42.7–76)
NRBC BLD AUTO-RTO: 0 /100 WBC (ref 0–0.2)
PLATELET # BLD AUTO: 155 10*3/MM3 (ref 140–450)
POTASSIUM SERPL-SCNC: 4 MMOL/L (ref 3.5–5.2)
PROT SERPL-MCNC: 6.6 G/DL (ref 6–8.5)
RBC # BLD AUTO: 4.88 10*6/MM3 (ref 3.77–5.28)
SODIUM SERPL-SCNC: 142 MMOL/L (ref 136–145)
T4 FREE SERPL-MCNC: 1.3 NG/DL (ref 0.93–1.7)
TRIGL SERPL-MCNC: 87 MG/DL (ref 0–150)
TSH SERPL DL<=0.005 MIU/L-ACNC: 2.16 UIU/ML (ref 0.27–4.2)
VLDLC SERPL CALC-MCNC: 16 MG/DL (ref 5–40)
WBC # BLD AUTO: 6.12 10*3/MM3 (ref 3.4–10.8)

## 2023-02-21 ENCOUNTER — HOSPITAL ENCOUNTER (INPATIENT)
Facility: HOSPITAL | Age: 88
LOS: 6 days | Discharge: NURSING, REHAB OR LONG-TERM CARE | DRG: 242 | End: 2023-02-28
Attending: EMERGENCY MEDICINE
Payer: MEDICARE

## 2023-02-21 ENCOUNTER — APPOINTMENT (OUTPATIENT)
Dept: GENERAL RADIOLOGY | Facility: HOSPITAL | Age: 88
DRG: 242 | End: 2023-02-21
Payer: MEDICARE

## 2023-02-21 ENCOUNTER — APPOINTMENT (OUTPATIENT)
Dept: CT IMAGING | Facility: HOSPITAL | Age: 88
DRG: 242 | End: 2023-02-21
Payer: MEDICARE
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
## 2023-02-21 DIAGNOSIS — I44.30 AV BLOCK: ICD-10-CM
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
## 2023-02-21 DIAGNOSIS — R82.1 MYOGLOBINURIA: ICD-10-CM

## 2023-02-21 DIAGNOSIS — R00.1 BRADYCARDIA: ICD-10-CM

## 2023-02-21 LAB
ALBUMIN SERPL-MCNC: 4 G/DL (ref 3.5–5.2)
ALBUMIN/GLOB SERPL: 1.5 G/DL
ALP SERPL-CCNC: 59 U/L (ref 39–117)
ALT SERPL W P-5'-P-CCNC: 12 U/L (ref 1–33)
ANION GAP SERPL CALCULATED.3IONS-SCNC: 12.6 MMOL/L (ref 5–15)
APTT PPP: 28.1 SECONDS (ref 22.7–35.4)
AST SERPL-CCNC: 31 U/L (ref 1–32)
BACTERIA UR QL AUTO: ABNORMAL /HPF
BASOPHILS # BLD AUTO: 0.03 10*3/MM3 (ref 0–0.2)
BASOPHILS NFR BLD AUTO: 0.4 % (ref 0–1.5)
BILIRUB SERPL-MCNC: 1.2 MG/DL (ref 0–1.2)
BILIRUB UR QL STRIP: NEGATIVE
BUN SERPL-MCNC: 11 MG/DL (ref 8–23)
BUN/CREAT SERPL: 15.9 (ref 7–25)
CALCIUM SPEC-SCNC: 9 MG/DL (ref 8.2–9.6)
CHLORIDE SERPL-SCNC: 100 MMOL/L (ref 98–107)
CK SERPL-CCNC: 360 U/L (ref 20–180)
CLARITY UR: CLEAR
CO2 SERPL-SCNC: 25.4 MMOL/L (ref 22–29)
COLOR UR: YELLOW
CREAT SERPL-MCNC: 0.69 MG/DL (ref 0.57–1)
D-LACTATE SERPL-SCNC: 1.8 MMOL/L (ref 0.5–2)
DEPRECATED RDW RBC AUTO: 42 FL (ref 37–54)
EGFRCR SERPLBLD CKD-EPI 2021: 77.6 ML/MIN/1.73
EOSINOPHIL # BLD AUTO: 0 10*3/MM3 (ref 0–0.4)
EOSINOPHIL NFR BLD AUTO: 0 % (ref 0.3–6.2)
ERYTHROCYTE [DISTWIDTH] IN BLOOD BY AUTOMATED COUNT: 13 % (ref 12.3–15.4)
GEN 5 2HR TROPONIN T REFLEX: 48 NG/L
GLOBULIN UR ELPH-MCNC: 2.6 GM/DL
GLUCOSE SERPL-MCNC: 93 MG/DL (ref 65–99)
GLUCOSE UR STRIP-MCNC: NEGATIVE MG/DL
HCT VFR BLD AUTO: 43.7 % (ref 34–46.6)
HGB BLD-MCNC: 14.9 G/DL (ref 12–15.9)
HGB UR QL STRIP.AUTO: ABNORMAL
HYALINE CASTS UR QL AUTO: ABNORMAL /LPF
INR PPP: 1.04 (ref 0.9–1.1)
KETONES UR QL STRIP: ABNORMAL
LEUKOCYTE ESTERASE UR QL STRIP.AUTO: NEGATIVE
LYMPHOCYTES # BLD AUTO: 0.56 10*3/MM3 (ref 0.7–3.1)
LYMPHOCYTES NFR BLD AUTO: 7.8 % (ref 19.6–45.3)
MAGNESIUM SERPL-MCNC: 1.8 MG/DL (ref 1.7–2.3)
MCH RBC QN AUTO: 30.5 PG (ref 26.6–33)
MCHC RBC AUTO-ENTMCNC: 34.1 G/DL (ref 31.5–35.7)
MCV RBC AUTO: 89.5 FL (ref 79–97)
MONOCYTES # BLD AUTO: 0.81 10*3/MM3 (ref 0.1–0.9)
MONOCYTES NFR BLD AUTO: 11.3 % (ref 5–12)
NEUTROPHILS NFR BLD AUTO: 5.75 10*3/MM3 (ref 1.7–7)
NEUTROPHILS NFR BLD AUTO: 79.9 % (ref 42.7–76)
NITRITE UR QL STRIP: NEGATIVE
NT-PROBNP SERPL-MCNC: 1368 PG/ML (ref 0–1800)
PH UR STRIP.AUTO: 5.5 [PH] (ref 5–8)
PLATELET # BLD AUTO: 146 10*3/MM3 (ref 140–450)
PMV BLD AUTO: 10 FL (ref 6–12)
POTASSIUM SERPL-SCNC: 4.1 MMOL/L (ref 3.5–5.2)
PROCALCITONIN SERPL-MCNC: 0.07 NG/ML (ref 0–0.25)
PROT SERPL-MCNC: 6.6 G/DL (ref 6–8.5)
PROT UR QL STRIP: ABNORMAL
PROTHROMBIN TIME: 13.7 SECONDS (ref 11.7–14.2)
RBC # BLD AUTO: 4.88 10*6/MM3 (ref 3.77–5.28)
RBC # UR STRIP: ABNORMAL /HPF
REF LAB TEST METHOD: ABNORMAL
SODIUM SERPL-SCNC: 138 MMOL/L (ref 136–145)
SP GR UR STRIP: 1.01 (ref 1–1.03)
SQUAMOUS #/AREA URNS HPF: ABNORMAL /HPF
TROPONIN T DELTA: 11 NG/L
TROPONIN T SERPL HS-MCNC: 37 NG/L
URINE MYOGLOBIN, QUALITATIVE: POSITIVE
UROBILINOGEN UR QL STRIP: ABNORMAL
WBC # UR STRIP: ABNORMAL /HPF
WBC NRBC COR # BLD: 7.19 10*3/MM3 (ref 3.4–10.8)

## 2023-02-21 PROCEDURE — 72170 X-RAY EXAM OF PELVIS: CPT

## 2023-02-21 PROCEDURE — 93010 ELECTROCARDIOGRAM REPORT: CPT | Performed by: STUDENT IN AN ORGANIZED HEALTH CARE EDUCATION/TRAINING PROGRAM

## 2023-02-21 PROCEDURE — 83605 ASSAY OF LACTIC ACID: CPT | Performed by: EMERGENCY MEDICINE

## 2023-02-21 PROCEDURE — 87040 BLOOD CULTURE FOR BACTERIA: CPT | Performed by: EMERGENCY MEDICINE

## 2023-02-21 PROCEDURE — 81001 URINALYSIS AUTO W/SCOPE: CPT | Performed by: EMERGENCY MEDICINE

## 2023-02-21 PROCEDURE — 99285 EMERGENCY DEPT VISIT HI MDM: CPT

## 2023-02-21 PROCEDURE — 85730 THROMBOPLASTIN TIME PARTIAL: CPT | Performed by: EMERGENCY MEDICINE

## 2023-02-21 PROCEDURE — 83735 ASSAY OF MAGNESIUM: CPT | Performed by: EMERGENCY MEDICINE

## 2023-02-21 PROCEDURE — 85025 COMPLETE CBC W/AUTO DIFF WBC: CPT | Performed by: EMERGENCY MEDICINE

## 2023-02-21 PROCEDURE — G0378 HOSPITAL OBSERVATION PER HR: HCPCS

## 2023-02-21 PROCEDURE — 84484 ASSAY OF TROPONIN QUANT: CPT | Performed by: EMERGENCY MEDICINE

## 2023-02-21 PROCEDURE — 80053 COMPREHEN METABOLIC PANEL: CPT | Performed by: EMERGENCY MEDICINE

## 2023-02-21 PROCEDURE — 83880 ASSAY OF NATRIURETIC PEPTIDE: CPT | Performed by: EMERGENCY MEDICINE

## 2023-02-21 PROCEDURE — 93005 ELECTROCARDIOGRAM TRACING: CPT | Performed by: EMERGENCY MEDICINE

## 2023-02-21 PROCEDURE — 82550 ASSAY OF CK (CPK): CPT | Performed by: EMERGENCY MEDICINE

## 2023-02-21 PROCEDURE — 70450 CT HEAD/BRAIN W/O DYE: CPT

## 2023-02-21 PROCEDURE — 83874 ASSAY OF MYOGLOBIN: CPT | Performed by: EMERGENCY MEDICINE

## 2023-02-21 PROCEDURE — 84145 PROCALCITONIN (PCT): CPT | Performed by: EMERGENCY MEDICINE

## 2023-02-21 PROCEDURE — 85610 PROTHROMBIN TIME: CPT | Performed by: EMERGENCY MEDICINE

## 2023-02-21 PROCEDURE — 71045 X-RAY EXAM CHEST 1 VIEW: CPT

## 2023-02-21 RX ORDER — ACETAMINOPHEN 325 MG/1
650 TABLET ORAL EVERY 4 HOURS PRN
Status: DISCONTINUED | OUTPATIENT
Start: 2023-02-21 | End: 2023-02-28 | Stop reason: HOSPADM

## 2023-02-21 RX ORDER — SODIUM CHLORIDE 9 MG/ML
40 INJECTION, SOLUTION INTRAVENOUS AS NEEDED
Status: DISCONTINUED | OUTPATIENT
Start: 2023-02-21 | End: 2023-02-28 | Stop reason: HOSPADM

## 2023-02-21 RX ORDER — SODIUM CHLORIDE 9 MG/ML
125 INJECTION, SOLUTION INTRAVENOUS CONTINUOUS
Status: DISCONTINUED | OUTPATIENT
Start: 2023-02-21 | End: 2023-02-21 | Stop reason: SDUPTHER

## 2023-02-21 RX ORDER — ACETAMINOPHEN 160 MG/5ML
650 SOLUTION ORAL EVERY 4 HOURS PRN
Status: DISCONTINUED | OUTPATIENT
Start: 2023-02-21 | End: 2023-02-28 | Stop reason: HOSPADM

## 2023-02-21 RX ORDER — ACETAMINOPHEN 650 MG/1
650 SUPPOSITORY RECTAL EVERY 4 HOURS PRN
Status: DISCONTINUED | OUTPATIENT
Start: 2023-02-21 | End: 2023-02-28 | Stop reason: HOSPADM

## 2023-02-21 RX ORDER — CALCIUM CARBONATE 200(500)MG
2 TABLET,CHEWABLE ORAL 2 TIMES DAILY PRN
Status: DISCONTINUED | OUTPATIENT
Start: 2023-02-21 | End: 2023-02-28 | Stop reason: HOSPADM

## 2023-02-21 RX ORDER — NITROGLYCERIN 0.4 MG/1
0.4 TABLET SUBLINGUAL
Status: DISCONTINUED | OUTPATIENT
Start: 2023-02-21 | End: 2023-02-28 | Stop reason: HOSPADM

## 2023-02-21 RX ORDER — ONDANSETRON 4 MG/1
4 TABLET, FILM COATED ORAL EVERY 6 HOURS PRN
Status: DISCONTINUED | OUTPATIENT
Start: 2023-02-21 | End: 2023-02-28 | Stop reason: HOSPADM

## 2023-02-21 RX ORDER — SODIUM CHLORIDE 0.9 % (FLUSH) 0.9 %
10 SYRINGE (ML) INJECTION AS NEEDED
Status: DISCONTINUED | OUTPATIENT
Start: 2023-02-21 | End: 2023-02-28 | Stop reason: HOSPADM

## 2023-02-21 RX ORDER — SODIUM CHLORIDE 0.9 % (FLUSH) 0.9 %
10 SYRINGE (ML) INJECTION EVERY 12 HOURS SCHEDULED
Status: DISCONTINUED | OUTPATIENT
Start: 2023-02-21 | End: 2023-02-28 | Stop reason: HOSPADM

## 2023-02-21 RX ORDER — SODIUM CHLORIDE 9 MG/ML
75 INJECTION, SOLUTION INTRAVENOUS CONTINUOUS
Status: DISCONTINUED | OUTPATIENT
Start: 2023-02-21 | End: 2023-02-25

## 2023-02-21 RX ORDER — ONDANSETRON 2 MG/ML
4 INJECTION INTRAMUSCULAR; INTRAVENOUS EVERY 6 HOURS PRN
Status: DISCONTINUED | OUTPATIENT
Start: 2023-02-21 | End: 2023-02-28 | Stop reason: HOSPADM

## 2023-02-21 RX ADMIN — SODIUM CHLORIDE 125 ML/HR: 9 INJECTION, SOLUTION INTRAVENOUS at 20:28

## 2023-02-22 PROBLEM — M62.82 RHABDOMYOLYSIS: Status: ACTIVE | Noted: 2023-02-22

## 2023-02-22 PROBLEM — I44.30 AV BLOCK: Status: ACTIVE | Noted: 2023-02-21

## 2023-02-22 LAB
ANION GAP SERPL CALCULATED.3IONS-SCNC: 8.1 MMOL/L (ref 5–15)
BUN SERPL-MCNC: 13 MG/DL (ref 8–23)
BUN/CREAT SERPL: 19.4 (ref 7–25)
CALCIUM SPEC-SCNC: 8.5 MG/DL (ref 8.2–9.6)
CHLORIDE SERPL-SCNC: 102 MMOL/L (ref 98–107)
CK SERPL-CCNC: 346 U/L (ref 20–180)
CO2 SERPL-SCNC: 25.9 MMOL/L (ref 22–29)
CREAT SERPL-MCNC: 0.67 MG/DL (ref 0.57–1)
DEPRECATED RDW RBC AUTO: 43.8 FL (ref 37–54)
EGFRCR SERPLBLD CKD-EPI 2021: 78.1 ML/MIN/1.73
ERYTHROCYTE [DISTWIDTH] IN BLOOD BY AUTOMATED COUNT: 13.2 % (ref 12.3–15.4)
GLUCOSE SERPL-MCNC: 78 MG/DL (ref 65–99)
HCT VFR BLD AUTO: 38.6 % (ref 34–46.6)
HGB BLD-MCNC: 12.6 G/DL (ref 12–15.9)
MCH RBC QN AUTO: 29.7 PG (ref 26.6–33)
MCHC RBC AUTO-ENTMCNC: 32.6 G/DL (ref 31.5–35.7)
MCV RBC AUTO: 91 FL (ref 79–97)
PLATELET # BLD AUTO: 127 10*3/MM3 (ref 140–450)
PMV BLD AUTO: 10.1 FL (ref 6–12)
POTASSIUM SERPL-SCNC: 3.5 MMOL/L (ref 3.5–5.2)
QT INTERVAL: 563 MS
QT INTERVAL: 597 MS
RBC # BLD AUTO: 4.24 10*6/MM3 (ref 3.77–5.28)
SODIUM SERPL-SCNC: 136 MMOL/L (ref 136–145)
TROPONIN T SERPL HS-MCNC: 50 NG/L
TSH SERPL DL<=0.05 MIU/L-ACNC: 2.42 UIU/ML (ref 0.27–4.2)
WBC NRBC COR # BLD: 6.03 10*3/MM3 (ref 3.4–10.8)

## 2023-02-22 PROCEDURE — 25010000002 VANCOMYCIN PER 500 MG: Performed by: INTERNAL MEDICINE

## 2023-02-22 PROCEDURE — 84484 ASSAY OF TROPONIN QUANT: CPT | Performed by: NURSE PRACTITIONER

## 2023-02-22 PROCEDURE — 93005 ELECTROCARDIOGRAM TRACING: CPT | Performed by: STUDENT IN AN ORGANIZED HEALTH CARE EDUCATION/TRAINING PROGRAM

## 2023-02-22 PROCEDURE — 85027 COMPLETE CBC AUTOMATED: CPT | Performed by: NURSE PRACTITIONER

## 2023-02-22 PROCEDURE — 02HK3JZ INSERTION OF PACEMAKER LEAD INTO RIGHT VENTRICLE, PERCUTANEOUS APPROACH: ICD-10-PCS | Performed by: INTERNAL MEDICINE

## 2023-02-22 PROCEDURE — 25010000002 CEFAZOLIN IN DEXTROSE 2-4 GM/100ML-% SOLUTION: Performed by: INTERNAL MEDICINE

## 2023-02-22 PROCEDURE — C1786 PMKR, SINGLE, RATE-RESP: HCPCS | Performed by: INTERNAL MEDICINE

## 2023-02-22 PROCEDURE — 0JH604Z INSERTION OF PACEMAKER, SINGLE CHAMBER INTO CHEST SUBCUTANEOUS TISSUE AND FASCIA, OPEN APPROACH: ICD-10-PCS | Performed by: INTERNAL MEDICINE

## 2023-02-22 PROCEDURE — 80048 BASIC METABOLIC PNL TOTAL CA: CPT | Performed by: NURSE PRACTITIONER

## 2023-02-22 PROCEDURE — 99221 1ST HOSP IP/OBS SF/LOW 40: CPT | Performed by: INTERNAL MEDICINE

## 2023-02-22 PROCEDURE — 84443 ASSAY THYROID STIM HORMONE: CPT | Performed by: NURSE PRACTITIONER

## 2023-02-22 PROCEDURE — 25010000002 ONDANSETRON PER 1 MG: Performed by: INTERNAL MEDICINE

## 2023-02-22 PROCEDURE — 25010000002 CEFAZOLIN 1-4 GM/50ML-% SOLUTION: Performed by: INTERNAL MEDICINE

## 2023-02-22 PROCEDURE — C1894 INTRO/SHEATH, NON-LASER: HCPCS | Performed by: INTERNAL MEDICINE

## 2023-02-22 PROCEDURE — 36415 COLL VENOUS BLD VENIPUNCTURE: CPT | Performed by: NURSE PRACTITIONER

## 2023-02-22 PROCEDURE — 33207 INSERT HEART PM VENTRICULAR: CPT | Performed by: INTERNAL MEDICINE

## 2023-02-22 PROCEDURE — 25010000002 FENTANYL CITRATE (PF) 50 MCG/ML SOLUTION: Performed by: INTERNAL MEDICINE

## 2023-02-22 PROCEDURE — 25010000002 MIDAZOLAM PER 1 MG: Performed by: INTERNAL MEDICINE

## 2023-02-22 PROCEDURE — 0 LIDOCAINE 1 % SOLUTION: Performed by: INTERNAL MEDICINE

## 2023-02-22 PROCEDURE — 82550 ASSAY OF CK (CPK): CPT | Performed by: NURSE PRACTITIONER

## 2023-02-22 PROCEDURE — 93010 ELECTROCARDIOGRAM REPORT: CPT | Performed by: STUDENT IN AN ORGANIZED HEALTH CARE EDUCATION/TRAINING PROGRAM

## 2023-02-22 PROCEDURE — C1898 LEAD, PMKR, OTHER THAN TRANS: HCPCS | Performed by: INTERNAL MEDICINE

## 2023-02-22 DEVICE — IMPLANTABLE DEVICE
Type: IMPLANTABLE DEVICE | Site: CHEST | Status: FUNCTIONAL
Brand: EDORA 8 SR-T

## 2023-02-22 DEVICE — LD PM SOLIA S 53: Type: IMPLANTABLE DEVICE | Site: CHEST | Status: FUNCTIONAL

## 2023-02-22 RX ORDER — ONDANSETRON 2 MG/ML
INJECTION INTRAMUSCULAR; INTRAVENOUS
Status: DISCONTINUED | OUTPATIENT
Start: 2023-02-22 | End: 2023-02-22 | Stop reason: HOSPADM

## 2023-02-22 RX ORDER — VANCOMYCIN HYDROCHLORIDE 1 G/200ML
INJECTION, SOLUTION INTRAVENOUS
Status: COMPLETED | OUTPATIENT
Start: 2023-02-22 | End: 2023-02-22

## 2023-02-22 RX ORDER — CEFAZOLIN SODIUM 1 G/50ML
INJECTION, SOLUTION INTRAVENOUS
Status: COMPLETED | OUTPATIENT
Start: 2023-02-22 | End: 2023-02-22

## 2023-02-22 RX ORDER — SODIUM CHLORIDE 9 MG/ML
INJECTION, SOLUTION INTRAVENOUS
Status: COMPLETED | OUTPATIENT
Start: 2023-02-22 | End: 2023-02-22

## 2023-02-22 RX ORDER — CEFAZOLIN SODIUM 2 G/100ML
2 INJECTION, SOLUTION INTRAVENOUS ONCE
Status: COMPLETED | OUTPATIENT
Start: 2023-02-22 | End: 2023-02-22

## 2023-02-22 RX ORDER — ATORVASTATIN CALCIUM 20 MG/1
20 TABLET, FILM COATED ORAL DAILY
Status: DISCONTINUED | OUTPATIENT
Start: 2023-02-22 | End: 2023-02-28 | Stop reason: HOSPADM

## 2023-02-22 RX ORDER — FENTANYL CITRATE 50 UG/ML
INJECTION, SOLUTION INTRAMUSCULAR; INTRAVENOUS
Status: DISCONTINUED | OUTPATIENT
Start: 2023-02-22 | End: 2023-02-22 | Stop reason: HOSPADM

## 2023-02-22 RX ORDER — MIDAZOLAM HYDROCHLORIDE 1 MG/ML
INJECTION INTRAMUSCULAR; INTRAVENOUS
Status: DISCONTINUED | OUTPATIENT
Start: 2023-02-22 | End: 2023-02-22 | Stop reason: HOSPADM

## 2023-02-22 RX ORDER — MONTELUKAST SODIUM 10 MG/1
10 TABLET ORAL DAILY
Status: DISCONTINUED | OUTPATIENT
Start: 2023-02-22 | End: 2023-02-28 | Stop reason: HOSPADM

## 2023-02-22 RX ORDER — LIDOCAINE HYDROCHLORIDE 20 MG/ML
INJECTION, SOLUTION INFILTRATION; PERINEURAL
Status: DISCONTINUED | OUTPATIENT
Start: 2023-02-22 | End: 2023-02-22 | Stop reason: HOSPADM

## 2023-02-22 RX ORDER — LEVOTHYROXINE SODIUM 0.05 MG/1
50 TABLET ORAL
Status: DISCONTINUED | OUTPATIENT
Start: 2023-02-22 | End: 2023-02-28 | Stop reason: HOSPADM

## 2023-02-22 RX ORDER — SODIUM CHLORIDE 0.9 % (FLUSH) 0.9 %
10 SYRINGE (ML) INJECTION AS NEEDED
Status: DISCONTINUED | OUTPATIENT
Start: 2023-02-22 | End: 2023-02-28 | Stop reason: HOSPADM

## 2023-02-22 RX ORDER — CETIRIZINE HYDROCHLORIDE 10 MG/1
10 TABLET ORAL DAILY
Status: DISCONTINUED | OUTPATIENT
Start: 2023-02-22 | End: 2023-02-28 | Stop reason: HOSPADM

## 2023-02-22 RX ORDER — SODIUM CHLORIDE 9 MG/ML
40 INJECTION, SOLUTION INTRAVENOUS AS NEEDED
Status: DISCONTINUED | OUTPATIENT
Start: 2023-02-22 | End: 2023-02-28 | Stop reason: HOSPADM

## 2023-02-22 RX ORDER — LIDOCAINE HYDROCHLORIDE 10 MG/ML
INJECTION, SOLUTION INFILTRATION; PERINEURAL
Status: DISCONTINUED | OUTPATIENT
Start: 2023-02-22 | End: 2023-02-22 | Stop reason: HOSPADM

## 2023-02-22 RX ORDER — SODIUM CHLORIDE 0.9 % (FLUSH) 0.9 %
10 SYRINGE (ML) INJECTION EVERY 12 HOURS SCHEDULED
Status: DISCONTINUED | OUTPATIENT
Start: 2023-02-22 | End: 2023-02-28 | Stop reason: HOSPADM

## 2023-02-22 RX ADMIN — ATORVASTATIN CALCIUM 20 MG: 20 TABLET, FILM COATED ORAL at 09:13

## 2023-02-22 RX ADMIN — SODIUM CHLORIDE 75 ML/HR: 9 INJECTION, SOLUTION INTRAVENOUS at 20:56

## 2023-02-22 RX ADMIN — SODIUM CHLORIDE 75 ML/HR: 9 INJECTION, SOLUTION INTRAVENOUS at 05:27

## 2023-02-22 RX ADMIN — Medication 10 ML: at 20:57

## 2023-02-22 RX ADMIN — Medication 10 ML: at 13:51

## 2023-02-22 RX ADMIN — LEVOTHYROXINE SODIUM 50 MCG: 0.05 TABLET ORAL at 05:26

## 2023-02-22 RX ADMIN — SODIUM CHLORIDE 75 ML/HR: 9 INJECTION, SOLUTION INTRAVENOUS at 00:33

## 2023-02-22 RX ADMIN — CEFAZOLIN SODIUM 2 G: 2 INJECTION, SOLUTION INTRAVENOUS at 18:20

## 2023-02-22 RX ADMIN — Medication 10 ML: at 00:33

## 2023-02-22 RX ADMIN — MONTELUKAST SODIUM 10 MG: 10 TABLET, FILM COATED ORAL at 09:13

## 2023-02-22 RX ADMIN — CETIRIZINE HYDROCHLORIDE 10 MG: 10 TABLET ORAL at 09:13

## 2023-02-22 RX ADMIN — Medication 10 ML: at 09:13

## 2023-02-22 RX ADMIN — Medication 10 ML: at 20:56

## 2023-02-23 ENCOUNTER — APPOINTMENT (OUTPATIENT)
Dept: CARDIOLOGY | Facility: HOSPITAL | Age: 88
DRG: 242 | End: 2023-02-23
Payer: MEDICARE

## 2023-02-23 ENCOUNTER — APPOINTMENT (OUTPATIENT)
Dept: GENERAL RADIOLOGY | Facility: HOSPITAL | Age: 88
DRG: 242 | End: 2023-02-23
Payer: MEDICARE

## 2023-02-23 LAB
ALBUMIN SERPL-MCNC: 2.3 G/DL (ref 3.5–5.2)
ALBUMIN SERPL-MCNC: 3.2 G/DL (ref 3.5–5.2)
ALBUMIN/GLOB SERPL: 1.4 G/DL
ALP SERPL-CCNC: 38 U/L (ref 39–117)
ALT SERPL W P-5'-P-CCNC: 8 U/L (ref 1–33)
ANION GAP SERPL CALCULATED.3IONS-SCNC: 4.7 MMOL/L (ref 5–15)
ANION GAP SERPL CALCULATED.3IONS-SCNC: 6.5 MMOL/L (ref 5–15)
AST SERPL-CCNC: 28 U/L (ref 1–32)
BASOPHILS # BLD AUTO: 0.02 10*3/MM3 (ref 0–0.2)
BASOPHILS NFR BLD AUTO: 0.4 % (ref 0–1.5)
BILIRUB SERPL-MCNC: 0.4 MG/DL (ref 0–1.2)
BUN SERPL-MCNC: 13 MG/DL (ref 8–23)
BUN SERPL-MCNC: 9 MG/DL (ref 8–23)
BUN/CREAT SERPL: 16.9 (ref 7–25)
BUN/CREAT SERPL: 18.4 (ref 7–25)
CALCIUM SPEC-SCNC: 5.9 MG/DL (ref 8.2–9.6)
CALCIUM SPEC-SCNC: 8.1 MG/DL (ref 8.2–9.6)
CHLORIDE SERPL-SCNC: 105 MMOL/L (ref 98–107)
CHLORIDE SERPL-SCNC: 114 MMOL/L (ref 98–107)
CO2 SERPL-SCNC: 22.3 MMOL/L (ref 22–29)
CO2 SERPL-SCNC: 24.5 MMOL/L (ref 22–29)
CREAT SERPL-MCNC: 0.49 MG/DL (ref 0.57–1)
CREAT SERPL-MCNC: 0.77 MG/DL (ref 0.57–1)
DEPRECATED RDW RBC AUTO: 44.5 FL (ref 37–54)
EGFRCR SERPLBLD CKD-EPI 2021: 69 ML/MIN/1.73
EGFRCR SERPLBLD CKD-EPI 2021: 84.3 ML/MIN/1.73
EOSINOPHIL # BLD AUTO: 0.1 10*3/MM3 (ref 0–0.4)
EOSINOPHIL NFR BLD AUTO: 1.8 % (ref 0.3–6.2)
ERYTHROCYTE [DISTWIDTH] IN BLOOD BY AUTOMATED COUNT: 13.2 % (ref 12.3–15.4)
GLOBULIN UR ELPH-MCNC: 1.7 GM/DL
GLUCOSE SERPL-MCNC: 118 MG/DL (ref 65–99)
GLUCOSE SERPL-MCNC: 65 MG/DL (ref 65–99)
HCT VFR BLD AUTO: 39.2 % (ref 34–46.6)
HGB BLD-MCNC: 13 G/DL (ref 12–15.9)
LYMPHOCYTES # BLD AUTO: 0.91 10*3/MM3 (ref 0.7–3.1)
LYMPHOCYTES NFR BLD AUTO: 16.7 % (ref 19.6–45.3)
MCH RBC QN AUTO: 30.3 PG (ref 26.6–33)
MCHC RBC AUTO-ENTMCNC: 33.2 G/DL (ref 31.5–35.7)
MCV RBC AUTO: 91.4 FL (ref 79–97)
MONOCYTES # BLD AUTO: 0.66 10*3/MM3 (ref 0.1–0.9)
MONOCYTES NFR BLD AUTO: 12.1 % (ref 5–12)
NEUTROPHILS NFR BLD AUTO: 3.73 10*3/MM3 (ref 1.7–7)
NEUTROPHILS NFR BLD AUTO: 68.4 % (ref 42.7–76)
PHOSPHATE SERPL-MCNC: 2.6 MG/DL (ref 2.5–4.5)
PLATELET # BLD AUTO: 106 10*3/MM3 (ref 140–450)
PMV BLD AUTO: 10.2 FL (ref 6–12)
POTASSIUM SERPL-SCNC: 2.4 MMOL/L (ref 3.5–5.2)
POTASSIUM SERPL-SCNC: 4.2 MMOL/L (ref 3.5–5.2)
PROT SERPL-MCNC: 4 G/DL (ref 6–8.5)
PTH-INTACT SERPL-MCNC: 64.1 PG/ML (ref 15–65)
RBC # BLD AUTO: 4.29 10*6/MM3 (ref 3.77–5.28)
SODIUM SERPL-SCNC: 136 MMOL/L (ref 136–145)
SODIUM SERPL-SCNC: 141 MMOL/L (ref 136–145)
WBC NRBC COR # BLD: 5.45 10*3/MM3 (ref 3.4–10.8)

## 2023-02-23 PROCEDURE — 93010 ELECTROCARDIOGRAM REPORT: CPT | Performed by: INTERNAL MEDICINE

## 2023-02-23 PROCEDURE — 84100 ASSAY OF PHOSPHORUS: CPT | Performed by: INTERNAL MEDICINE

## 2023-02-23 PROCEDURE — 25510000001 PERFLUTREN (DEFINITY) 8.476 MG IN SODIUM CHLORIDE (PF) 0.9 % 10 ML INJECTION: Performed by: INTERNAL MEDICINE

## 2023-02-23 PROCEDURE — 97162 PT EVAL MOD COMPLEX 30 MIN: CPT

## 2023-02-23 PROCEDURE — 83970 ASSAY OF PARATHORMONE: CPT | Performed by: HOSPITALIST

## 2023-02-23 PROCEDURE — 99024 POSTOP FOLLOW-UP VISIT: CPT | Performed by: NURSE PRACTITIONER

## 2023-02-23 PROCEDURE — 71046 X-RAY EXAM CHEST 2 VIEWS: CPT

## 2023-02-23 PROCEDURE — 80053 COMPREHEN METABOLIC PANEL: CPT | Performed by: INTERNAL MEDICINE

## 2023-02-23 PROCEDURE — 93306 TTE W/DOPPLER COMPLETE: CPT | Performed by: INTERNAL MEDICINE

## 2023-02-23 PROCEDURE — 97110 THERAPEUTIC EXERCISES: CPT

## 2023-02-23 PROCEDURE — 93306 TTE W/DOPPLER COMPLETE: CPT

## 2023-02-23 PROCEDURE — 93005 ELECTROCARDIOGRAM TRACING: CPT | Performed by: INTERNAL MEDICINE

## 2023-02-23 PROCEDURE — 85025 COMPLETE CBC W/AUTO DIFF WBC: CPT | Performed by: INTERNAL MEDICINE

## 2023-02-23 RX ORDER — SODIUM CHLORIDE 9 MG/ML
50 INJECTION, SOLUTION INTRAVENOUS CONTINUOUS
Status: ACTIVE | OUTPATIENT
Start: 2023-02-23 | End: 2023-02-24

## 2023-02-23 RX ORDER — POTASSIUM CHLORIDE 1.5 G/1.77G
40 POWDER, FOR SOLUTION ORAL AS NEEDED
Status: DISCONTINUED | OUTPATIENT
Start: 2023-02-23 | End: 2023-02-28 | Stop reason: HOSPADM

## 2023-02-23 RX ORDER — POTASSIUM CHLORIDE 750 MG/1
40 TABLET, FILM COATED, EXTENDED RELEASE ORAL AS NEEDED
Status: DISCONTINUED | OUTPATIENT
Start: 2023-02-23 | End: 2023-02-28 | Stop reason: HOSPADM

## 2023-02-23 RX ADMIN — Medication 10 ML: at 20:39

## 2023-02-23 RX ADMIN — Medication 10 ML: at 09:33

## 2023-02-23 RX ADMIN — MONTELUKAST SODIUM 10 MG: 10 TABLET, FILM COATED ORAL at 09:34

## 2023-02-23 RX ADMIN — PERFLUTREN 1 ML: 6.52 INJECTION, SUSPENSION INTRAVENOUS at 17:04

## 2023-02-23 RX ADMIN — ATORVASTATIN CALCIUM 20 MG: 20 TABLET, FILM COATED ORAL at 09:34

## 2023-02-23 RX ADMIN — CETIRIZINE HYDROCHLORIDE 10 MG: 10 TABLET ORAL at 09:34

## 2023-02-23 RX ADMIN — POTASSIUM CHLORIDE 40 MEQ: 750 TABLET, EXTENDED RELEASE ORAL at 15:24

## 2023-02-23 RX ADMIN — LEVOTHYROXINE SODIUM 50 MCG: 0.05 TABLET ORAL at 06:09

## 2023-02-23 RX ADMIN — POTASSIUM CHLORIDE 40 MEQ: 750 TABLET, EXTENDED RELEASE ORAL at 21:11

## 2023-02-23 RX ADMIN — Medication 10 ML: at 09:34

## 2023-02-23 RX ADMIN — POTASSIUM CHLORIDE 40 MEQ: 750 TABLET, EXTENDED RELEASE ORAL at 09:34

## 2023-02-24 LAB
25(OH)D3 SERPL-MCNC: 29.4 NG/ML (ref 30–100)
ALBUMIN SERPL-MCNC: 3.2 G/DL (ref 3.5–5.2)
ALBUMIN/GLOB SERPL: 1.3 G/DL
ALP SERPL-CCNC: 60 U/L (ref 39–117)
ALT SERPL W P-5'-P-CCNC: 11 U/L (ref 1–33)
ANION GAP SERPL CALCULATED.3IONS-SCNC: 6.1 MMOL/L (ref 5–15)
AORTIC DIMENSIONLESS INDEX: 0.7 (DI)
ASCENDING AORTA: 3.3 CM
AST SERPL-CCNC: 37 U/L (ref 1–32)
BASOPHILS # BLD AUTO: 0.03 10*3/MM3 (ref 0–0.2)
BASOPHILS NFR BLD AUTO: 0.5 % (ref 0–1.5)
BH CV ECHO MEAS - ACS: 1.38 CM
BH CV ECHO MEAS - AO MAX PG: 7.6 MMHG
BH CV ECHO MEAS - AO MEAN PG: 3.6 MMHG
BH CV ECHO MEAS - AO ROOT DIAM: 3.2 CM
BH CV ECHO MEAS - AO V2 MAX: 137.8 CM/SEC
BH CV ECHO MEAS - AO V2 VTI: 23.9 CM
BH CV ECHO MEAS - AVA(I,D): 2.02 CM2
BH CV ECHO MEAS - CONTRAST EF 4CH: 56 CM2
BH CV ECHO MEAS - EDV(CUBED): 35.9 ML
BH CV ECHO MEAS - EDV(MOD-SP2): 63 ML
BH CV ECHO MEAS - EDV(MOD-SP4): 75 ML
BH CV ECHO MEAS - EF(MOD-BP): 56.6 %
BH CV ECHO MEAS - EF(MOD-SP2): 55.6 %
BH CV ECHO MEAS - EF(MOD-SP4): 58.7 %
BH CV ECHO MEAS - ESV(CUBED): 14.3 ML
BH CV ECHO MEAS - ESV(MOD-SP2): 28 ML
BH CV ECHO MEAS - ESV(MOD-SP4): 31 ML
BH CV ECHO MEAS - FS: 26.5 %
BH CV ECHO MEAS - IVS/LVPW: 1.08 CM
BH CV ECHO MEAS - IVSD: 1.08 CM
BH CV ECHO MEAS - LAT PEAK E' VEL: 3.3 CM/SEC
BH CV ECHO MEAS - LV DIASTOLIC VOL/BSA (35-75): 53.4 CM2
BH CV ECHO MEAS - LV MASS(C)D: 99.5 GRAMS
BH CV ECHO MEAS - LV MAX PG: 3.6 MMHG
BH CV ECHO MEAS - LV MEAN PG: 1.6 MMHG
BH CV ECHO MEAS - LV SYSTOLIC VOL/BSA (12-30): 22.1 CM2
BH CV ECHO MEAS - LV V1 MAX: 94.3 CM/SEC
BH CV ECHO MEAS - LV V1 VTI: 16.9 CM
BH CV ECHO MEAS - LVIDD: 3.3 CM
BH CV ECHO MEAS - LVIDS: 2.42 CM
BH CV ECHO MEAS - LVOT AREA: 2.9 CM2
BH CV ECHO MEAS - LVOT DIAM: 1.91 CM
BH CV ECHO MEAS - LVPWD: 1 CM
BH CV ECHO MEAS - MV A DUR: 0.11 SEC
BH CV ECHO MEAS - MV A MAX VEL: 85.3 CM/SEC
BH CV ECHO MEAS - MV DEC SLOPE: 353.4 CM/SEC2
BH CV ECHO MEAS - MV DEC TIME: 280 MSEC
BH CV ECHO MEAS - MV E MAX VEL: 75 CM/SEC
BH CV ECHO MEAS - MV E/A: 0.88
BH CV ECHO MEAS - MV MAX PG: 5.4 MMHG
BH CV ECHO MEAS - MV MEAN PG: 2.39 MMHG
BH CV ECHO MEAS - MV P1/2T: 81 MSEC
BH CV ECHO MEAS - MV V2 VTI: 29.9 CM
BH CV ECHO MEAS - MVA(P1/2T): 2.7 CM2
BH CV ECHO MEAS - MVA(VTI): 1.62 CM2
BH CV ECHO MEAS - PA ACC TIME: 0.08 SEC
BH CV ECHO MEAS - PA PR(ACCEL): 43.3 MMHG
BH CV ECHO MEAS - PA V2 MAX: 65.2 CM/SEC
BH CV ECHO MEAS - RAP SYSTOLE: 3 MMHG
BH CV ECHO MEAS - RV MAX PG: 1.63 MMHG
BH CV ECHO MEAS - RV V1 MAX: 63.8 CM/SEC
BH CV ECHO MEAS - RV V1 VTI: 10.4 CM
BH CV ECHO MEAS - RVSP: 34.6 MMHG
BH CV ECHO MEAS - SI(MOD-SP2): 24.9 ML/M2
BH CV ECHO MEAS - SI(MOD-SP4): 31.3 ML/M2
BH CV ECHO MEAS - SV(LVOT): 48.3 ML
BH CV ECHO MEAS - SV(MOD-SP2): 35 ML
BH CV ECHO MEAS - SV(MOD-SP4): 44 ML
BH CV ECHO MEAS - TAPSE (>1.6): 2.4 CM
BH CV ECHO MEAS - TR MAX PG: 31.6 MMHG
BH CV ECHO MEAS - TR MAX VEL: 281 CM/SEC
BH CV VAS BP RIGHT ARM: NORMAL MMHG
BH CV XLRA - TDI S': 16.3 CM/SEC
BILIRUB SERPL-MCNC: 0.5 MG/DL (ref 0–1.2)
BUN SERPL-MCNC: 12 MG/DL (ref 8–23)
BUN/CREAT SERPL: 17.1 (ref 7–25)
CALCIUM SPEC-SCNC: 8.2 MG/DL (ref 8.2–9.6)
CHLORIDE SERPL-SCNC: 108 MMOL/L (ref 98–107)
CO2 SERPL-SCNC: 24.9 MMOL/L (ref 22–29)
CREAT SERPL-MCNC: 0.7 MG/DL (ref 0.57–1)
DEPRECATED RDW RBC AUTO: 44.7 FL (ref 37–54)
EGFRCR SERPLBLD CKD-EPI 2021: 77.3 ML/MIN/1.73
EOSINOPHIL # BLD AUTO: 0.21 10*3/MM3 (ref 0–0.4)
EOSINOPHIL NFR BLD AUTO: 3.6 % (ref 0.3–6.2)
ERYTHROCYTE [DISTWIDTH] IN BLOOD BY AUTOMATED COUNT: 13.3 % (ref 12.3–15.4)
GLOBULIN UR ELPH-MCNC: 2.5 GM/DL
GLUCOSE SERPL-MCNC: 93 MG/DL (ref 65–99)
HCT VFR BLD AUTO: 45.1 % (ref 34–46.6)
HGB BLD-MCNC: 15 G/DL (ref 12–15.9)
LYMPHOCYTES # BLD AUTO: 1.7 10*3/MM3 (ref 0.7–3.1)
LYMPHOCYTES NFR BLD AUTO: 29.3 % (ref 19.6–45.3)
MAXIMAL PREDICTED HEART RATE: 120 BPM
MCH RBC QN AUTO: 30.1 PG (ref 26.6–33)
MCHC RBC AUTO-ENTMCNC: 33.3 G/DL (ref 31.5–35.7)
MCV RBC AUTO: 90.6 FL (ref 79–97)
MONOCYTES # BLD AUTO: 0.75 10*3/MM3 (ref 0.1–0.9)
MONOCYTES NFR BLD AUTO: 12.9 % (ref 5–12)
NEUTROPHILS NFR BLD AUTO: 3.1 10*3/MM3 (ref 1.7–7)
NEUTROPHILS NFR BLD AUTO: 53.4 % (ref 42.7–76)
PHOSPHATE SERPL-MCNC: 2.3 MG/DL (ref 2.5–4.5)
PLATELET # BLD AUTO: 126 10*3/MM3 (ref 140–450)
PMV BLD AUTO: 10.7 FL (ref 6–12)
POTASSIUM SERPL-SCNC: 4.6 MMOL/L (ref 3.5–5.2)
PROT SERPL-MCNC: 5.7 G/DL (ref 6–8.5)
RBC # BLD AUTO: 4.98 10*6/MM3 (ref 3.77–5.28)
SINUS: 3.3 CM
SODIUM SERPL-SCNC: 139 MMOL/L (ref 136–145)
STRESS TARGET HR: 102 BPM
WBC NRBC COR # BLD: 5.81 10*3/MM3 (ref 3.4–10.8)

## 2023-02-24 PROCEDURE — 97116 GAIT TRAINING THERAPY: CPT

## 2023-02-24 PROCEDURE — 97530 THERAPEUTIC ACTIVITIES: CPT

## 2023-02-24 PROCEDURE — 84100 ASSAY OF PHOSPHORUS: CPT | Performed by: HOSPITALIST

## 2023-02-24 PROCEDURE — 99024 POSTOP FOLLOW-UP VISIT: CPT | Performed by: NURSE PRACTITIONER

## 2023-02-24 PROCEDURE — 82306 VITAMIN D 25 HYDROXY: CPT | Performed by: HOSPITALIST

## 2023-02-24 PROCEDURE — 85025 COMPLETE CBC W/AUTO DIFF WBC: CPT | Performed by: INTERNAL MEDICINE

## 2023-02-24 PROCEDURE — 80053 COMPREHEN METABOLIC PANEL: CPT | Performed by: INTERNAL MEDICINE

## 2023-02-24 RX ADMIN — ACETAMINOPHEN 650 MG: 325 TABLET, FILM COATED ORAL at 10:03

## 2023-02-24 RX ADMIN — Medication 10 ML: at 20:10

## 2023-02-24 RX ADMIN — MONTELUKAST SODIUM 10 MG: 10 TABLET, FILM COATED ORAL at 10:03

## 2023-02-24 RX ADMIN — Medication 10 ML: at 20:09

## 2023-02-24 RX ADMIN — Medication 10 ML: at 10:32

## 2023-02-24 RX ADMIN — ATORVASTATIN CALCIUM 20 MG: 20 TABLET, FILM COATED ORAL at 10:03

## 2023-02-24 RX ADMIN — CETIRIZINE HYDROCHLORIDE 10 MG: 10 TABLET ORAL at 10:03

## 2023-02-24 RX ADMIN — LEVOTHYROXINE SODIUM 50 MCG: 0.05 TABLET ORAL at 05:53

## 2023-02-24 RX ADMIN — Medication 10 ML: at 10:31

## 2023-02-25 PROBLEM — E87.6 HYPOKALEMIA: Status: ACTIVE | Noted: 2023-02-25

## 2023-02-25 PROBLEM — U07.1 COVID-19 VIRUS DETECTED: Status: ACTIVE | Noted: 2023-02-25

## 2023-02-25 LAB
ALBUMIN SERPL-MCNC: 3.5 G/DL (ref 3.5–5.2)
ANION GAP SERPL CALCULATED.3IONS-SCNC: 9 MMOL/L (ref 5–15)
BUN SERPL-MCNC: 15 MG/DL (ref 8–23)
BUN/CREAT SERPL: 19.2 (ref 7–25)
CALCIUM SPEC-SCNC: 8.6 MG/DL (ref 8.2–9.6)
CHLORIDE SERPL-SCNC: 105 MMOL/L (ref 98–107)
CO2 SERPL-SCNC: 25 MMOL/L (ref 22–29)
CREAT SERPL-MCNC: 0.78 MG/DL (ref 0.57–1)
EGFRCR SERPLBLD CKD-EPI 2021: 67.9 ML/MIN/1.73
GLUCOSE SERPL-MCNC: 176 MG/DL (ref 65–99)
PHOSPHATE SERPL-MCNC: 2.8 MG/DL (ref 2.5–4.5)
POTASSIUM SERPL-SCNC: 4 MMOL/L (ref 3.5–5.2)
SARS-COV-2 RNA RESP QL NAA+PROBE: DETECTED
SODIUM SERPL-SCNC: 139 MMOL/L (ref 136–145)

## 2023-02-25 PROCEDURE — 80069 RENAL FUNCTION PANEL: CPT | Performed by: HOSPITALIST

## 2023-02-25 PROCEDURE — U0003 INFECTIOUS AGENT DETECTION BY NUCLEIC ACID (DNA OR RNA); SEVERE ACUTE RESPIRATORY SYNDROME CORONAVIRUS 2 (SARS-COV-2) (CORONAVIRUS DISEASE [COVID-19]), AMPLIFIED PROBE TECHNIQUE, MAKING USE OF HIGH THROUGHPUT TECHNOLOGIES AS DESCRIBED BY CMS-2020-01-R: HCPCS | Performed by: INTERNAL MEDICINE

## 2023-02-25 RX ADMIN — Medication 10 ML: at 09:43

## 2023-02-25 RX ADMIN — ACETAMINOPHEN 650 MG: 325 TABLET, FILM COATED ORAL at 09:39

## 2023-02-25 RX ADMIN — ATORVASTATIN CALCIUM 20 MG: 20 TABLET, FILM COATED ORAL at 09:38

## 2023-02-25 RX ADMIN — CETIRIZINE HYDROCHLORIDE 10 MG: 10 TABLET ORAL at 09:38

## 2023-02-25 RX ADMIN — LEVOTHYROXINE SODIUM 50 MCG: 0.05 TABLET ORAL at 05:51

## 2023-02-25 RX ADMIN — MONTELUKAST SODIUM 10 MG: 10 TABLET, FILM COATED ORAL at 09:38

## 2023-02-25 RX ADMIN — Medication 10 ML: at 21:53

## 2023-02-25 RX ADMIN — Medication 10 ML: at 21:54

## 2023-02-26 LAB
ALBUMIN SERPL-MCNC: 3.5 G/DL (ref 3.5–5.2)
ANION GAP SERPL CALCULATED.3IONS-SCNC: 9 MMOL/L (ref 5–15)
BACTERIA SPEC AEROBE CULT: NORMAL
BACTERIA SPEC AEROBE CULT: NORMAL
BUN SERPL-MCNC: 13 MG/DL (ref 8–23)
BUN/CREAT SERPL: 19.4 (ref 7–25)
CALCIUM SPEC-SCNC: 8.6 MG/DL (ref 8.2–9.6)
CHLORIDE SERPL-SCNC: 101 MMOL/L (ref 98–107)
CK SERPL-CCNC: 101 U/L (ref 20–180)
CO2 SERPL-SCNC: 28 MMOL/L (ref 22–29)
CREAT SERPL-MCNC: 0.67 MG/DL (ref 0.57–1)
DEPRECATED RDW RBC AUTO: 42 FL (ref 37–54)
EGFRCR SERPLBLD CKD-EPI 2021: 78.1 ML/MIN/1.73
ERYTHROCYTE [DISTWIDTH] IN BLOOD BY AUTOMATED COUNT: 12.8 % (ref 12.3–15.4)
GLUCOSE SERPL-MCNC: 96 MG/DL (ref 65–99)
HCT VFR BLD AUTO: 44.6 % (ref 34–46.6)
HGB BLD-MCNC: 15.5 G/DL (ref 12–15.9)
MAGNESIUM SERPL-MCNC: 1.8 MG/DL (ref 1.7–2.3)
MCH RBC QN AUTO: 30.9 PG (ref 26.6–33)
MCHC RBC AUTO-ENTMCNC: 34.8 G/DL (ref 31.5–35.7)
MCV RBC AUTO: 89 FL (ref 79–97)
PHOSPHATE SERPL-MCNC: 3.3 MG/DL (ref 2.5–4.5)
PLATELET # BLD AUTO: 128 10*3/MM3 (ref 140–450)
PMV BLD AUTO: 10.7 FL (ref 6–12)
POTASSIUM SERPL-SCNC: 3.7 MMOL/L (ref 3.5–5.2)
RBC # BLD AUTO: 5.01 10*6/MM3 (ref 3.77–5.28)
SODIUM SERPL-SCNC: 138 MMOL/L (ref 136–145)
WBC NRBC COR # BLD: 7.86 10*3/MM3 (ref 3.4–10.8)

## 2023-02-26 PROCEDURE — 80069 RENAL FUNCTION PANEL: CPT | Performed by: HOSPITALIST

## 2023-02-26 PROCEDURE — 83735 ASSAY OF MAGNESIUM: CPT | Performed by: HOSPITALIST

## 2023-02-26 PROCEDURE — 82550 ASSAY OF CK (CPK): CPT | Performed by: HOSPITALIST

## 2023-02-26 PROCEDURE — 25010000002 ENOXAPARIN PER 10 MG: Performed by: HOSPITALIST

## 2023-02-26 PROCEDURE — 85027 COMPLETE CBC AUTOMATED: CPT | Performed by: HOSPITALIST

## 2023-02-26 RX ORDER — ENOXAPARIN SODIUM 100 MG/ML
30 INJECTION SUBCUTANEOUS EVERY 24 HOURS
Status: DISCONTINUED | OUTPATIENT
Start: 2023-02-26 | End: 2023-02-28 | Stop reason: HOSPADM

## 2023-02-26 RX ORDER — DEXTROSE AND SODIUM CHLORIDE 5; .45 G/100ML; G/100ML
100 INJECTION, SOLUTION INTRAVENOUS CONTINUOUS
Status: DISCONTINUED | OUTPATIENT
Start: 2023-02-26 | End: 2023-02-28

## 2023-02-26 RX ADMIN — ACETAMINOPHEN 650 MG: 325 TABLET, FILM COATED ORAL at 07:53

## 2023-02-26 RX ADMIN — LEVOTHYROXINE SODIUM 50 MCG: 0.05 TABLET ORAL at 06:33

## 2023-02-26 RX ADMIN — DEXTROSE AND SODIUM CHLORIDE 75 ML/HR: 5; 450 INJECTION, SOLUTION INTRAVENOUS at 11:24

## 2023-02-26 RX ADMIN — ATORVASTATIN CALCIUM 20 MG: 20 TABLET, FILM COATED ORAL at 08:01

## 2023-02-26 RX ADMIN — CETIRIZINE HYDROCHLORIDE 10 MG: 10 TABLET ORAL at 08:01

## 2023-02-26 RX ADMIN — Medication 10 ML: at 08:03

## 2023-02-26 RX ADMIN — MONTELUKAST SODIUM 10 MG: 10 TABLET, FILM COATED ORAL at 08:01

## 2023-02-26 RX ADMIN — ENOXAPARIN SODIUM 30 MG: 30 INJECTION SUBCUTANEOUS at 17:54

## 2023-02-27 PROBLEM — R41.0 DELIRIUM: Status: ACTIVE | Noted: 2023-02-27

## 2023-02-27 LAB
ALBUMIN SERPL-MCNC: 3.6 G/DL (ref 3.5–5.2)
ANION GAP SERPL CALCULATED.3IONS-SCNC: 7.8 MMOL/L (ref 5–15)
BUN SERPL-MCNC: 11 MG/DL (ref 8–23)
BUN/CREAT SERPL: 16.2 (ref 7–25)
CALCIUM SPEC-SCNC: 8.9 MG/DL (ref 8.2–9.6)
CHLORIDE SERPL-SCNC: 103 MMOL/L (ref 98–107)
CO2 SERPL-SCNC: 29.2 MMOL/L (ref 22–29)
CREAT SERPL-MCNC: 0.68 MG/DL (ref 0.57–1)
DEPRECATED RDW RBC AUTO: 41.9 FL (ref 37–54)
EGFRCR SERPLBLD CKD-EPI 2021: 77.9 ML/MIN/1.73
ERYTHROCYTE [DISTWIDTH] IN BLOOD BY AUTOMATED COUNT: 12.8 % (ref 12.3–15.4)
GLUCOSE SERPL-MCNC: 120 MG/DL (ref 65–99)
HCT VFR BLD AUTO: 46.9 % (ref 34–46.6)
HGB BLD-MCNC: 16.2 G/DL (ref 12–15.9)
MAGNESIUM SERPL-MCNC: 1.8 MG/DL (ref 1.7–2.3)
MCH RBC QN AUTO: 30.6 PG (ref 26.6–33)
MCHC RBC AUTO-ENTMCNC: 34.5 G/DL (ref 31.5–35.7)
MCV RBC AUTO: 88.7 FL (ref 79–97)
PHOSPHATE SERPL-MCNC: 3.1 MG/DL (ref 2.5–4.5)
PLATELET # BLD AUTO: 148 10*3/MM3 (ref 140–450)
PMV BLD AUTO: 10.4 FL (ref 6–12)
POTASSIUM SERPL-SCNC: 3.8 MMOL/L (ref 3.5–5.2)
RBC # BLD AUTO: 5.29 10*6/MM3 (ref 3.77–5.28)
SODIUM SERPL-SCNC: 140 MMOL/L (ref 136–145)
WBC NRBC COR # BLD: 8.75 10*3/MM3 (ref 3.4–10.8)

## 2023-02-27 PROCEDURE — 97110 THERAPEUTIC EXERCISES: CPT

## 2023-02-27 PROCEDURE — 80069 RENAL FUNCTION PANEL: CPT | Performed by: HOSPITALIST

## 2023-02-27 PROCEDURE — 83735 ASSAY OF MAGNESIUM: CPT | Performed by: HOSPITALIST

## 2023-02-27 PROCEDURE — 25010000002 ENOXAPARIN PER 10 MG: Performed by: HOSPITALIST

## 2023-02-27 PROCEDURE — 85027 COMPLETE CBC AUTOMATED: CPT | Performed by: HOSPITALIST

## 2023-02-27 RX ORDER — LOSARTAN POTASSIUM 50 MG/1
50 TABLET ORAL
Status: DISCONTINUED | OUTPATIENT
Start: 2023-02-27 | End: 2023-02-28 | Stop reason: HOSPADM

## 2023-02-27 RX ADMIN — CETIRIZINE HYDROCHLORIDE 10 MG: 10 TABLET ORAL at 09:56

## 2023-02-27 RX ADMIN — DEXTROSE AND SODIUM CHLORIDE 75 ML/HR: 5; 450 INJECTION, SOLUTION INTRAVENOUS at 01:19

## 2023-02-27 RX ADMIN — LEVOTHYROXINE SODIUM 50 MCG: 0.05 TABLET ORAL at 06:29

## 2023-02-27 RX ADMIN — Medication 10 ML: at 21:10

## 2023-02-27 RX ADMIN — ACETAMINOPHEN 650 MG: 325 TABLET, FILM COATED ORAL at 06:29

## 2023-02-27 RX ADMIN — LOSARTAN POTASSIUM 50 MG: 50 TABLET, FILM COATED ORAL at 21:07

## 2023-02-27 RX ADMIN — DEXTROSE AND SODIUM CHLORIDE 100 ML/HR: 5; 450 INJECTION, SOLUTION INTRAVENOUS at 12:42

## 2023-02-27 RX ADMIN — MONTELUKAST SODIUM 10 MG: 10 TABLET, FILM COATED ORAL at 09:56

## 2023-02-27 RX ADMIN — ATORVASTATIN CALCIUM 20 MG: 20 TABLET, FILM COATED ORAL at 09:56

## 2023-02-27 RX ADMIN — ENOXAPARIN SODIUM 30 MG: 30 INJECTION SUBCUTANEOUS at 18:05

## 2023-02-28 VITALS
BODY MASS INDEX: 17.85 KG/M2 | HEIGHT: 62 IN | SYSTOLIC BLOOD PRESSURE: 149 MMHG | RESPIRATION RATE: 18 BRPM | TEMPERATURE: 97.6 F | WEIGHT: 97 LBS | OXYGEN SATURATION: 96 % | DIASTOLIC BLOOD PRESSURE: 93 MMHG | HEART RATE: 79 BPM

## 2023-02-28 LAB
ALBUMIN SERPL-MCNC: 3.3 G/DL (ref 3.5–5.2)
ANION GAP SERPL CALCULATED.3IONS-SCNC: 8 MMOL/L (ref 5–15)
BASOPHILS # BLD AUTO: 0.04 10*3/MM3 (ref 0–0.2)
BASOPHILS NFR BLD AUTO: 0.7 % (ref 0–1.5)
BUN SERPL-MCNC: 8 MG/DL (ref 8–23)
BUN/CREAT SERPL: 12.7 (ref 7–25)
CALCIUM SPEC-SCNC: 8.6 MG/DL (ref 8.2–9.6)
CHLORIDE SERPL-SCNC: 106 MMOL/L (ref 98–107)
CO2 SERPL-SCNC: 27 MMOL/L (ref 22–29)
CREAT SERPL-MCNC: 0.63 MG/DL (ref 0.57–1)
DEPRECATED RDW RBC AUTO: 41.7 FL (ref 37–54)
EGFRCR SERPLBLD CKD-EPI 2021: 79.3 ML/MIN/1.73
EOSINOPHIL # BLD AUTO: 0.34 10*3/MM3 (ref 0–0.4)
EOSINOPHIL NFR BLD AUTO: 5.6 % (ref 0.3–6.2)
ERYTHROCYTE [DISTWIDTH] IN BLOOD BY AUTOMATED COUNT: 12.9 % (ref 12.3–15.4)
GLUCOSE SERPL-MCNC: 110 MG/DL (ref 65–99)
HCT VFR BLD AUTO: 45.3 % (ref 34–46.6)
HGB BLD-MCNC: 15.4 G/DL (ref 12–15.9)
LYMPHOCYTES # BLD AUTO: 1.23 10*3/MM3 (ref 0.7–3.1)
LYMPHOCYTES NFR BLD AUTO: 20.2 % (ref 19.6–45.3)
MCH RBC QN AUTO: 30.2 PG (ref 26.6–33)
MCHC RBC AUTO-ENTMCNC: 34 G/DL (ref 31.5–35.7)
MCV RBC AUTO: 88.8 FL (ref 79–97)
MONOCYTES # BLD AUTO: 0.61 10*3/MM3 (ref 0.1–0.9)
MONOCYTES NFR BLD AUTO: 10 % (ref 5–12)
NEUTROPHILS NFR BLD AUTO: 3.82 10*3/MM3 (ref 1.7–7)
NEUTROPHILS NFR BLD AUTO: 62.8 % (ref 42.7–76)
PHOSPHATE SERPL-MCNC: 3 MG/DL (ref 2.5–4.5)
PLATELET # BLD AUTO: 159 10*3/MM3 (ref 140–450)
PMV BLD AUTO: 10.9 FL (ref 6–12)
POTASSIUM SERPL-SCNC: 3.6 MMOL/L (ref 3.5–5.2)
RBC # BLD AUTO: 5.1 10*6/MM3 (ref 3.77–5.28)
SODIUM SERPL-SCNC: 141 MMOL/L (ref 136–145)
WBC NRBC COR # BLD: 6.08 10*3/MM3 (ref 3.4–10.8)

## 2023-02-28 PROCEDURE — 80069 RENAL FUNCTION PANEL: CPT | Performed by: STUDENT IN AN ORGANIZED HEALTH CARE EDUCATION/TRAINING PROGRAM

## 2023-02-28 PROCEDURE — 85025 COMPLETE CBC W/AUTO DIFF WBC: CPT | Performed by: STUDENT IN AN ORGANIZED HEALTH CARE EDUCATION/TRAINING PROGRAM

## 2023-02-28 PROCEDURE — 25010000002 ENOXAPARIN PER 10 MG: Performed by: HOSPITALIST

## 2023-02-28 RX ORDER — NIFEDIPINE 30 MG/1
30 TABLET, EXTENDED RELEASE ORAL
Status: DISCONTINUED | OUTPATIENT
Start: 2023-02-28 | End: 2023-02-28

## 2023-02-28 RX ADMIN — LEVOTHYROXINE SODIUM 50 MCG: 0.05 TABLET ORAL at 05:07

## 2023-02-28 RX ADMIN — ATORVASTATIN CALCIUM 20 MG: 20 TABLET, FILM COATED ORAL at 08:02

## 2023-02-28 RX ADMIN — CETIRIZINE HYDROCHLORIDE 10 MG: 10 TABLET ORAL at 08:02

## 2023-02-28 RX ADMIN — LOSARTAN POTASSIUM 50 MG: 50 TABLET, FILM COATED ORAL at 08:02

## 2023-02-28 RX ADMIN — ENOXAPARIN SODIUM 30 MG: 30 INJECTION SUBCUTANEOUS at 16:52

## 2023-02-28 RX ADMIN — MONTELUKAST SODIUM 10 MG: 10 TABLET, FILM COATED ORAL at 08:02

## 2023-02-28 RX ADMIN — DEXTROSE AND SODIUM CHLORIDE 100 ML/HR: 5; 450 INJECTION, SOLUTION INTRAVENOUS at 08:01

## 2023-03-08 ENCOUNTER — CLINICAL SUPPORT NO REQUIREMENTS (OUTPATIENT)
Dept: CARDIOLOGY | Facility: CLINIC | Age: 88
End: 2023-03-08
Payer: MEDICARE

## 2023-03-08 ENCOUNTER — OFFICE VISIT (OUTPATIENT)
Dept: CARDIOLOGY | Facility: CLINIC | Age: 88
End: 2023-03-08
Payer: MEDICARE

## 2023-03-08 VITALS
WEIGHT: 99 LBS | HEART RATE: 88 BPM | DIASTOLIC BLOOD PRESSURE: 80 MMHG | HEIGHT: 60 IN | BODY MASS INDEX: 19.44 KG/M2 | SYSTOLIC BLOOD PRESSURE: 160 MMHG

## 2023-03-08 VITALS
HEART RATE: 88 BPM | WEIGHT: 99 LBS | SYSTOLIC BLOOD PRESSURE: 160 MMHG | DIASTOLIC BLOOD PRESSURE: 80 MMHG | BODY MASS INDEX: 19.44 KG/M2 | HEIGHT: 60 IN

## 2023-03-08 DIAGNOSIS — Z95.0 PRESENCE OF CARDIAC PACEMAKER: Primary | ICD-10-CM

## 2023-03-08 DIAGNOSIS — E78.5 HYPERLIPIDEMIA, UNSPECIFIED HYPERLIPIDEMIA TYPE: Chronic | ICD-10-CM

## 2023-03-08 DIAGNOSIS — I10 ESSENTIAL HYPERTENSION: Chronic | ICD-10-CM

## 2023-03-08 PROCEDURE — 99214 OFFICE O/P EST MOD 30 MIN: CPT

## 2023-03-08 NOTE — PROGRESS NOTES
Subjective:        Maria Fernanda Gaviria is a 89 y.o. female who here for follow up    Chief Complaint   Patient presents with   • Hospital Follow Up Visit     SP PCM        HPI    Maria Fernanda gaviria is a 89-year-old female with hypertension and hyperlipidemia.  She follows up in office today after discharge from the hospital where she was found to have AV block and had pacemaker placed.    Echo 2/23/2023 EF 56 to 60%, normal LV function, calcification of the aortic valve.    The following portions of the patient's history were reviewed and updated as appropriate: allergies, current medications, past family history, past medical history, past social history, past surgical history and problem list.    Past Medical History:   Diagnosis Date   • Arthritis    • Disease of thyroid gland    • GERD (gastroesophageal reflux disease)    • Hyperlipidemia    • Hypertension    • Osteoporosis          reports that she has quit smoking. She has never used smokeless tobacco. She reports current alcohol use. She reports that she does not use drugs.     Family History   Problem Relation Age of Onset   • Hypertension Mother    • Heart disease Mother    • Thyroid disease Mother    • Hypertension Father    • Cancer Father    • Heart disease Brother    • Hypertension Daughter    • Thyroid disease Daughter    • Lung disease Daughter    • Depression Daughter    • Migraines Daughter    • Arthritis Daughter    • Heart disease Paternal Grandmother    • Stroke Paternal Grandfather    • Kidney disease Paternal Grandfather        ROS     Review of Systems  Constitutional: No wt loss, fever, fatigue  Gastrointestinal: No nausea, abdominal pain  Behavioral/Psych: No insomnia or anxiety  Cardiovascular no chest pain or shortness of breath      Objective:         Physical Exam:  General:  Appears in no acute distress  Eyes: eom normal no conjunctival drainage  HEENT:  No JVD. Thyroid not visibly enlarged. No mucosal pallor or cyanosis  Respiratory:  Respirations regular and unlabored at rest. BBS with good air entry in all fields. No crackles, rubs or wheezes auscultated  Cardiovascular: S1S2 Regular rate and rhythm. No murmur, rub or gallop. No carotid bruits. DP/PT pulses   2+  . No pretibial pitting edema  Gastrointestinal: Abdomen soft, non tender. Bowel sounds present.   Musculoskeletal: THOMAS x4. No abnormal movements  Extremities: No digital clubbing or cyanosis  Skin: Color pink. Skin warm and dry to touch. No rashes    Neuro: AAO x3 CN II-XII grossly intact  Psych: Mood and affect normal, pleasant and cooperative    Cardiovascular:      Comments: Left chest pcm site well healed        Procedures  Interpretation Summary       •  Left ventricular ejection fraction appears to be 56 - 60%.  •  Left ventricular diastolic function was normal.  •  There is calcification of the aortic valve.  •  Estimated right ventricular systolic pressure from tricuspid regurgitation is normal (<35 mmHg).        Current Outpatient Medications:   •  acetaminophen (TYLENOL) 500 MG tablet, Take 1 tablet by mouth Every 6 (Six) Hours As Needed for Mild Pain  or Moderate Pain ., Disp: , Rfl:   •  atorvastatin (LIPITOR) 20 MG tablet, Take 1 tablet by mouth Daily. Hold until instructed to resume by primary care or surgeon., Disp: 90 tablet, Rfl: 3  •  Cholecalciferol (VITAMIN D3) 2000 UNITS tablet, Take 1 tablet by mouth Daily., Disp: , Rfl:   •  fexofenadine (ALLEGRA) 180 MG tablet, Take 1 tablet by mouth Daily., Disp: , Rfl:   •  Glycerin-Polysorbate 80 (REFRESH DRY EYE THERAPY OP), Apply  to eye(s) as directed by provider., Disp: , Rfl:   •  levothyroxine (SYNTHROID, LEVOTHROID) 50 MCG tablet, Take 1 tablet by mouth Every Morning., Disp: 90 tablet, Rfl: 3  •  losartan (COZAAR) 50 MG tablet, Take 1 tablet by mouth Daily., Disp: 90 tablet, Rfl: 3  •  Misc Natural Products (Osteo Bi-Flex Joint Shield) tablet, Take 1 tablet by mouth Daily., Disp: , Rfl:   •  montelukast (SINGULAIR) 10  MG tablet, Take 1 tablet by mouth Daily., Disp: 90 tablet, Rfl: 3     Assessment:        Patient Active Problem List   Diagnosis   • Osteoarthritis of lumbar spine   • Osteoarthritis   • Sciatic leg pain   • Asthma   • Carotid stenosis   • Hyperlipidemia   • GERD (gastroesophageal reflux disease)   • Essential hypertension   • Hypothyroidism (acquired)   • Osteopenia   • Restless legs syndrome   • Thrombocytopenia (HCC)   • DNR (do not resuscitate)   • Non-seasonal allergic rhinitis   • Fall in home, initial encounter   • Bradycardia   • Rhabdomyolysis   • AV block   • Hypokalemia   • COVID-19 virus detected   • Delirium               Plan:   1. 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 Well healed. Ok to shower.  Device interrogation normal functioning device.    2.  Hypertension: 160/80, BP normally much better 140s systolic.  Given her advanced age I will not adjust any blood pressure medications at this time.    Importance of controlling hypertension and blood pressure  checkup on the regular basis has been explained. Hypertension as a silent killer has been discussed. Risk reduction of the weight and regular exercises to control the hypertension has been explained.    3.  Hyperlipidemia: Her PCP manages her cholesterol labs.    Risk of the hyperlipidemia, importance of the treatment has been explained. Pros and cons of the statins has been explained. Regular blood workup as well as side effects including the liver failure, myelopathy death has been explained.               No diagnosis found.    There are no diagnoses linked to this encounter.    COUNSELING: Cristobal Srivastava was given to patient for the following topics: diagnostic results, risk factor reductions, impressions, risks and benefits of treatment options and importance of treatment compliance .       SMOKING COUNSELING:  Denies    Follow up in 6 months or sooner if needed    Sincerely,   GRIFFIN Flaherty  [facility]  03/08/23  10:31 EST    EMR Dragon/Transcription disclaimer:   Much of this encounter note is an electronic transcription/translation of spoken language to printed text. The electronic translation of spoken language may permit erroneous, or at times, nonsensical words or phrases to be inadvertently transcribed; Although I have reviewed the note for such errors, some may still exist.

## 2023-03-13 ENCOUNTER — READMISSION MANAGEMENT (OUTPATIENT)
Dept: CALL CENTER | Facility: HOSPITAL | Age: 88
End: 2023-03-13
Payer: MEDICARE

## 2023-03-13 ENCOUNTER — HOME HEALTH ADMISSION (OUTPATIENT)
Dept: HOME HEALTH SERVICES | Facility: HOME HEALTHCARE | Age: 88
End: 2023-03-13
Payer: MEDICARE

## 2023-03-13 NOTE — OUTREACH NOTE
Prep Survey    Flowsheet Row Responses   [facility] facility patient discharged from? Non-BH   Is LACE score < 7 ? Non-BH Discharge   Eligibility [facility]   Date of Discharge 03/13/23   Discharge Disposition Home or Self Care   Discharge diagnosis COVID-19   Does the patient have one of the following disease processes/diagnoses(primary or secondary)? Other   Prep survey completed? Yes          Keiko BRUSH - Registered Nurse

## 2023-03-14 ENCOUNTER — TRANSITIONAL CARE MANAGEMENT TELEPHONE ENCOUNTER (OUTPATIENT)
Dept: CALL CENTER | Facility: HOSPITAL | Age: 88
End: 2023-03-14
Payer: MEDICARE

## 2023-03-14 NOTE — OUTREACH NOTE
Call Center TCM Note    Flowsheet Row Responses   [facility] patient discharged from? Non-   Does the patient have one of the following disease processes/diagnoses(primary or secondary)? Other   TCM attempt successful? Yes   Discharge diagnosis COVID-19   Meds reviewed with patient/caregiver? Yes   Is the patient having any side effects they believe may be caused by any medication additions or changes? No   Does the patient have all medications ordered at discharge? Yes   Is the patient taking all medications as directed (includes completed medication regime)? Yes   Does the patient have an appointment with their PCP within 7 days of discharge? Yes   Has home health visited the patient within 72 hours of discharge? N/A   Psychosocial issues? No   Did the patient receive a copy of their discharge instructions? Yes   Nursing interventions Reviewed instructions with patient   What is the patient's perception of their health status since discharge? Improving   Is the patient/caregiver able to teach back signs and symptoms related to disease process for when to call PCP? Yes   Is the patient/caregiver able to teach back signs and symptoms related to disease process for when to call 911? Yes   If the patient is a current smoker, are they able to teach back resources for cessation? Not a smoker   TCM call completed? Yes   Wrap up additional comments Pt d/c home from SNF 03/13 and from [facility] 02/28/2023. No questions pt has 4 daughters taking care of her. TCM APPT is sched for 03/21/2023          Krystle Simons MA    3/14/2023, 16:30 EDT

## 2023-03-14 NOTE — OUTREACH NOTE
Call Center TCM Note    Flowsheet Row Responses   [facility] patient discharged from? Non-BH   Does the patient have one of the following disease processes/diagnoses(primary or secondary)? Other   TCM attempt successful? No   Unsuccessful attempts Attempt 1          Krystle Simons MA    3/14/2023, 13:00 EDT

## 2023-03-15 LAB — QT INTERVAL: 454 MS

## 2023-03-19 PROBLEM — Z95.0 PRESENCE OF CARDIAC PACEMAKER: Status: ACTIVE | Noted: 2023-03-19

## 2023-05-03 ENCOUNTER — OFFICE VISIT (OUTPATIENT)
Dept: INTERNAL MEDICINE | Age: 88
End: 2023-05-03
Payer: MEDICARE

## 2023-05-03 VITALS
OXYGEN SATURATION: 97 % | TEMPERATURE: 97.3 F | DIASTOLIC BLOOD PRESSURE: 80 MMHG | SYSTOLIC BLOOD PRESSURE: 140 MMHG | HEART RATE: 88 BPM | BODY MASS INDEX: 21.2 KG/M2 | WEIGHT: 108 LBS | HEIGHT: 60 IN

## 2023-05-03 DIAGNOSIS — I10 ESSENTIAL HYPERTENSION: Primary | Chronic | ICD-10-CM

## 2023-05-03 DIAGNOSIS — E78.5 HYPERLIPIDEMIA, UNSPECIFIED HYPERLIPIDEMIA TYPE: Chronic | ICD-10-CM

## 2023-05-03 DIAGNOSIS — E03.9 HYPOTHYROIDISM (ACQUIRED): Chronic | ICD-10-CM

## 2023-05-03 PROBLEM — U07.1 COVID-19 VIRUS DETECTED: Status: RESOLVED | Noted: 2023-02-25 | Resolved: 2023-05-03

## 2023-05-03 PROBLEM — M62.82 RHABDOMYOLYSIS: Status: RESOLVED | Noted: 2023-02-22 | Resolved: 2023-05-03

## 2023-05-03 PROBLEM — Z95.0 PRESENCE OF CARDIAC PACEMAKER: Chronic | Status: ACTIVE | Noted: 2023-03-19
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
## 2023-05-03 PROBLEM — E87.6 HYPOKALEMIA: Status: RESOLVED | Noted: 2023-02-25 | Resolved: 2023-05-03
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
## 2023-05-03 PROBLEM — R00.1 BRADYCARDIA: Status: RESOLVED | Noted: 2023-02-21 | Resolved: 2023-05-03

## 2023-05-03 PROBLEM — R41.0 DELIRIUM: Status: RESOLVED | Noted: 2023-02-27 | Resolved: 2023-05-03

## 2023-05-03 NOTE — PROGRESS NOTES
"    I N T E R N A L  M E D I C I N E    J U N O H  K I M,  M D      ENCOUNTER DATE:  05/03/2023    Maria Fernanda Gaviria / 89 y.o. / female    CHIEF COMPLAINT / REASON FOR OFFICE VISIT     Hypertension, Hyperlipidemia, and Hypothyroidism      ASSESSMENT & PLAN     Problem List Items Addressed This Visit        Medium    Hyperlipidemia (Chronic)    Overview     Continue atorvastatin 20 mg qd.          Relevant Medications    atorvastatin (LIPITOR) 20 MG tablet    Essential hypertension - Primary (Chronic)    Overview     Continue losartan 50 mg qd.          Relevant Medications    losartan (COZAAR) 50 MG tablet    Hypothyroidism (acquired) (Chronic)    Overview     Continue current dose of levothyroxine 50 mcg.          Relevant Medications    levothyroxine (SYNTHROID, LEVOTHROID) 50 MCG tablet     No orders of the defined types were placed in this encounter.    No orders of the defined types were placed in this encounter.      SUMMARY/DISCUSSION  •       Next Appointment with me: Visit date not found    Return in about 6 months (around 11/3/2023) for Reassess chronic medical problems, Schedule AWV WITH APRN in AUGUST.      VITAL SIGNS     Vitals:    05/03/23 1032   BP: 140/80   Pulse: 88   Temp: 97.3 °F (36.3 °C)   SpO2: 97%   Weight: 49 kg (108 lb)   Height: 152.4 cm (60\")       BP Readings from Last 3 Encounters:   05/03/23 140/80   03/08/23 160/80   03/08/23 160/80     Wt Readings from Last 3 Encounters:   05/03/23 49 kg (108 lb)   03/08/23 44.9 kg (99 lb)   03/08/23 44.9 kg (99 lb)     Body mass index is 21.09 kg/m².    Blood pressure readings recorded on patient flowsheet:       View : No data to display.                  MEDICATIONS AT THE TIME OF OFFICE VISIT     Current Outpatient Medications on File Prior to Visit   Medication Sig   • acetaminophen (TYLENOL) 500 MG tablet Take 1 tablet by mouth Every 6 (Six) Hours As Needed for Mild Pain  or Moderate Pain .   • atorvastatin (LIPITOR) 20 MG tablet Take 1 tablet " by mouth Daily. Hold until instructed to resume by primary care or surgeon.   • Cholecalciferol (VITAMIN D3) 2000 UNITS tablet Take 1 tablet by mouth Daily.   • fexofenadine (ALLEGRA) 180 MG tablet Take 1 tablet by mouth Daily.   • Glycerin-Polysorbate 80 (REFRESH DRY EYE THERAPY OP) Apply  to eye(s) as directed by provider.   • levothyroxine (SYNTHROID, LEVOTHROID) 50 MCG tablet Take 1 tablet by mouth Every Morning.   • losartan (COZAAR) 50 MG tablet Take 1 tablet by mouth Daily.   • Misc Natural Products (Osteo Bi-Flex Joint Shield) tablet Take 1 tablet by mouth Daily.   • montelukast (SINGULAIR) 10 MG tablet Take 1 tablet by mouth Daily.     No current facility-administered medications on file prior to visit.          HISTORY OF PRESENT ILLNESS     Denies any significant changes or problems.  She reports compliance with her medications.  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  At that time she had positive COVID test with relatively mild illness.  She denies any chest pains dyspnea on exertion or heart palpitations.  She denies any syncope or presyncope.    Patient Care Team:  Henok Salcido MD as PCP - General  Christiano Dewitt MD as Consulting Physician (Obstetrics and Gynecology)  Rodolfo Shah MD as Consulting Physician (Allergy)  Michael Mendoza MD as Consulting Physician (Dermatology)  Jluis Bain MD as Consulting Physician (Otolaryngology)  Lucas Rueda OD (Optometry)  Ousmane Rhodes MD as Consulting Physician (Gastroenterology)    REVIEW OF SYSTEMS     Review of Systems   Constitutional neg except per HPI   Resp neg  CV neg   GI negative   incontinence uses Depends     PHYSICAL EXAMINATION     Physical Exam  General: No acute distress; appears frail due to age   Psych: Normal thought and judgment   Cardiovascular Rate: normal. Rhythm: regular. Heart sounds: normal   Pulm/Chest: Effort normal, breath  sounds normal.       REVIEWED DATA     Labs:     Lab Results   Component Value Date     02/28/2023    K 3.6 02/28/2023    CALCIUM 8.6 02/28/2023    AST 37 (H) 02/24/2023    ALT 11 02/24/2023    BUN 8 02/28/2023    CREATININE 0.63 02/28/2023    CREATININE 0.68 02/27/2023    CREATININE 0.67 02/26/2023    EGFRIFNONA 68 09/22/2021    EGFRIFAFRI 83 09/22/2021     Lab Results   Component Value Date    K 3.6 02/28/2023    K 3.8 02/27/2023    K 3.7 02/26/2023    K 4.0 02/25/2023    K 4.6 02/24/2023       No results found for: HGBA1C    Lab Results   Component Value Date    LDL 85 12/02/2022    LDL 81 09/22/2021     (H) 03/02/2020    HDL 63 (H) 12/02/2022    HDL 58 09/22/2021    TRIG 87 12/02/2022    TRIG 155 (H) 09/22/2021       Lab Results   Component Value Date    TSH 2.420 02/22/2023    TSH 2.160 12/02/2022    TSH 2.040 05/31/2022    FREET4 1.30 12/02/2022    FREET4 1.31 05/31/2022    FREET4 1.22 09/22/2021       Lab Results   Component Value Date    WBC 6.08 02/28/2023    HGB 15.4 02/28/2023     02/28/2023       No results found for: MALBCRERATIO         Imaging:     CT Head Without Contrast (02/21/2023 19:48)          Medical Tests:     Adult Transthoracic Echo Complete W/ Cont if Necessary Per Protocol (02/23/2023 17:02)     •  Left ventricular ejection fraction appears to be 56 - 60%.  •  Left ventricular diastolic function was normal.  •  There is calcification of the aortic valve.  •  Estimated right ventricular systolic pressure from tricuspid regurgitation is normal (<35 mmHg).           Summary of old records / correspondence / consultant report:           Request outside records:             *Examiner was wearing KN95 mask during the entire duration of the visit. Patient was masked the entire time. Minimum social distance of 6 ft maintained entire visit except if physical contact was necessary as documented.       Template created by Clayton Salcido MD

## 2023-05-30 ENCOUNTER — TELEPHONE (OUTPATIENT)
Dept: ORTHOPEDIC SURGERY | Facility: CLINIC | Age: 88
End: 2023-05-30

## 2023-05-30 NOTE — TELEPHONE ENCOUNTER
Caller: DON    Relationship to patient: HEALTH[facility]ARE [facility] ( NO BH VERBAL ON FILE)    Best call back number: [phone]    Chief complaint: RIGHT KNEE PAIN     Type of visit: INJECTION     Requested date: NEXT OPENING

## 2023-06-01 ENCOUNTER — OFFICE VISIT (OUTPATIENT)
Dept: ORTHOPEDIC SURGERY | Facility: CLINIC | Age: 88
End: 2023-06-01

## 2023-06-01 VITALS — HEIGHT: 60 IN | BODY MASS INDEX: 21.87 KG/M2 | RESPIRATION RATE: 16 BRPM | TEMPERATURE: 97.8 F | WEIGHT: 111.4 LBS

## 2023-06-01 DIAGNOSIS — M17.11 PRIMARY OSTEOARTHRITIS OF RIGHT KNEE: Primary | ICD-10-CM

## 2023-06-01 RX ORDER — LIDOCAINE HYDROCHLORIDE 10 MG/ML
5 INJECTION, SOLUTION EPIDURAL; INFILTRATION; INTRACAUDAL; PERINEURAL
Status: COMPLETED | OUTPATIENT
Start: 2023-06-01 | End: 2023-06-01

## 2023-06-01 RX ORDER — METHYLPREDNISOLONE ACETATE 80 MG/ML
80 INJECTION, SUSPENSION INTRA-ARTICULAR; INTRALESIONAL; INTRAMUSCULAR; SOFT TISSUE
Status: COMPLETED | OUTPATIENT
Start: 2023-06-01 | End: 2023-06-01

## 2023-06-01 RX ADMIN — METHYLPREDNISOLONE ACETATE 80 MG: 80 INJECTION, SUSPENSION INTRA-ARTICULAR; INTRALESIONAL; INTRAMUSCULAR; SOFT TISSUE at 11:12

## 2023-06-01 RX ADMIN — LIDOCAINE HYDROCHLORIDE 5 ML: 10 INJECTION, SOLUTION EPIDURAL; INFILTRATION; INTRACAUDAL; PERINEURAL at 11:12

## 2023-06-01 NOTE — PROGRESS NOTES
6/1/2023    Maria Fernanda Gaviria is here today for worsening knee pain. Pt has undergone injection of the knee in the past with good resolution of symptoms. Pt is requesting a repeat injection.     KNEE Injection Procedure Note:    Large Joint Arthrocentesis: R knee  Date/Time: 6/1/2023 11:12 AM  Consent given by: patient  Site marked: site marked  Timeout: Immediately prior to procedure a time out was called to verify the correct patient, procedure, equipment, support staff and site/side marked as required   Supporting Documentation  Indications: pain and joint swelling   Procedure Details  Location: knee - R knee  Preparation: Patient was prepped and draped in the usual sterile fashion  Needle size: 22 G  Approach: anterolateral  Medications administered: 80 mg methylPREDNISolone acetate 80 MG/ML; 5 mL lidocaine PF 1% 1 %  Patient tolerance: patient tolerated the procedure well with no immediate complications        (3 mL of lidocaine was used for anesthetic purposes)    At the conclusion of the injection I discussed the importance of continued quad strengthening exercises on a daily basis. I will see the patient back if the symptoms should fail to improve or worsen.    Pascual Crain, APRN  6/1/2023

## 2023-06-05 RX ORDER — MONTELUKAST SODIUM 10 MG/1
TABLET ORAL
Qty: 90 TABLET | Refills: 3 | Status: SHIPPED | OUTPATIENT
Start: 2023-06-05

## 2023-07-18 ENCOUNTER — TELEPHONE (OUTPATIENT)
Dept: CARDIOLOGY | Facility: CLINIC | Age: 88
End: 2023-07-18

## 2023-07-18 NOTE — TELEPHONE ENCOUNTER
Caller: Jillian Patel    Relationship: Emergency Contact    Best call back number: [phone]    What is the best time to reach you: ANYTIME    Who are you requesting to speak with (clinical staff, provider,  specific staff member): NAYAN MALONE      What was the call regarding: BP READINGS NOT SURE ABOUT TIME    07/11/2023 113/60,109/56,109/52  07/12/2023 120/61,129/70,112/80  07/13/2023,110/65,133/66,121/63,117/62  07/14/2023 112/62,119/73,148/76,136/101  07/15/2023 102/62,131/73,137/75  07/16/2023 116/68,114/68  07/17/2023 116/65,103/73,121/98,126/78,120/68  07/18/2023 127/75,124/70    CALL BACK DAUGHTER                       Is it okay if the provider responds through MyChart: NO

## 2023-07-23 ENCOUNTER — APPOINTMENT (OUTPATIENT)
Dept: GENERAL RADIOLOGY | Facility: HOSPITAL | Age: 88
End: 2023-07-23
Payer: MEDICARE

## 2023-07-23 ENCOUNTER — HOSPITAL ENCOUNTER (EMERGENCY)
Facility: HOSPITAL | Age: 88
Discharge: HOME | End: 2023-07-23
Attending: EMERGENCY MEDICINE | Admitting: EMERGENCY MEDICINE
Payer: MEDICARE

## 2023-07-23 ENCOUNTER — APPOINTMENT (OUTPATIENT)
Dept: CT IMAGING | Facility: HOSPITAL | Age: 88
End: 2023-07-23
Payer: MEDICARE

## 2023-07-23 VITALS
HEIGHT: 59 IN | RESPIRATION RATE: 16 BRPM | DIASTOLIC BLOOD PRESSURE: 85 MMHG | HEART RATE: 71 BPM | SYSTOLIC BLOOD PRESSURE: 151 MMHG | TEMPERATURE: 97.6 F | OXYGEN SATURATION: 96 % | BODY MASS INDEX: 20.36 KG/M2 | WEIGHT: 101 LBS

## 2023-07-23 DIAGNOSIS — M25.552 LEFT HIP PAIN: ICD-10-CM
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
## 2023-07-23 LAB
ALBUMIN SERPL-MCNC: 3.9 G/DL (ref 3.5–5.2)
ALBUMIN/GLOB SERPL: 1.5 G/DL
ALP SERPL-CCNC: 68 U/L (ref 39–117)
ALT SERPL W P-5'-P-CCNC: 12 U/L (ref 1–33)
ANION GAP SERPL CALCULATED.3IONS-SCNC: 14.9 MMOL/L (ref 5–15)
AST SERPL-CCNC: 22 U/L (ref 1–32)
BASOPHILS # BLD AUTO: 0.07 10*3/MM3 (ref 0–0.2)
BASOPHILS NFR BLD AUTO: 1.1 % (ref 0–1.5)
BILIRUB SERPL-MCNC: 0.9 MG/DL (ref 0–1.2)
BILIRUB UR QL STRIP: NEGATIVE
BUN SERPL-MCNC: 12 MG/DL (ref 8–23)
BUN/CREAT SERPL: 14.3 (ref 7–25)
CALCIUM SPEC-SCNC: 9.5 MG/DL (ref 8.2–9.6)
CHLORIDE SERPL-SCNC: 107 MMOL/L (ref 98–107)
CLARITY UR: CLEAR
CO2 SERPL-SCNC: 23.1 MMOL/L (ref 22–29)
COLOR UR: YELLOW
CREAT SERPL-MCNC: 0.84 MG/DL (ref 0.57–1)
DEPRECATED RDW RBC AUTO: 45.5 FL (ref 37–54)
EGFRCR SERPLBLD CKD-EPI 2021: 62.1 ML/MIN/1.73
EOSINOPHIL # BLD AUTO: 0.17 10*3/MM3 (ref 0–0.4)
EOSINOPHIL NFR BLD AUTO: 2.7 % (ref 0.3–6.2)
ERYTHROCYTE [DISTWIDTH] IN BLOOD BY AUTOMATED COUNT: 13.6 % (ref 12.3–15.4)
GLOBULIN UR ELPH-MCNC: 2.6 GM/DL
GLUCOSE SERPL-MCNC: 91 MG/DL (ref 65–99)
GLUCOSE UR STRIP-MCNC: NEGATIVE MG/DL
HCT VFR BLD AUTO: 48.5 % (ref 34–46.6)
HGB BLD-MCNC: 16.2 G/DL (ref 12–15.9)
HGB UR QL STRIP.AUTO: NEGATIVE
IMM GRANULOCYTES # BLD AUTO: 0.03 10*3/MM3 (ref 0–0.05)
IMM GRANULOCYTES NFR BLD AUTO: 0.5 % (ref 0–0.5)
INR PPP: 0.99 (ref 0.9–1.1)
KETONES UR QL STRIP: NEGATIVE
LEUKOCYTE ESTERASE UR QL STRIP.AUTO: NEGATIVE
LYMPHOCYTES # BLD AUTO: 1.18 10*3/MM3 (ref 0.7–3.1)
LYMPHOCYTES NFR BLD AUTO: 18.7 % (ref 19.6–45.3)
MAGNESIUM SERPL-MCNC: 2.2 MG/DL (ref 1.7–2.3)
MCH RBC QN AUTO: 30.4 PG (ref 26.6–33)
MCHC RBC AUTO-ENTMCNC: 33.4 G/DL (ref 31.5–35.7)
MCV RBC AUTO: 91 FL (ref 79–97)
MONOCYTES # BLD AUTO: 0.49 10*3/MM3 (ref 0.1–0.9)
MONOCYTES NFR BLD AUTO: 7.8 % (ref 5–12)
NEUTROPHILS NFR BLD AUTO: 4.38 10*3/MM3 (ref 1.7–7)
NEUTROPHILS NFR BLD AUTO: 69.2 % (ref 42.7–76)
NITRITE UR QL STRIP: NEGATIVE
NRBC BLD AUTO-RTO: 0 /100 WBC (ref 0–0.2)
PH UR STRIP.AUTO: 7 [PH] (ref 5–8)
PLATELET # BLD AUTO: 169 10*3/MM3 (ref 140–450)
PMV BLD AUTO: 9.6 FL (ref 6–12)
POTASSIUM SERPL-SCNC: 4.2 MMOL/L (ref 3.5–5.2)
PROT SERPL-MCNC: 6.5 G/DL (ref 6–8.5)
PROT UR QL STRIP: NEGATIVE
PROTHROMBIN TIME: 13.2 SECONDS (ref 11.7–14.2)
QT INTERVAL: 472 MS
RBC # BLD AUTO: 5.33 10*6/MM3 (ref 3.77–5.28)
SODIUM SERPL-SCNC: 145 MMOL/L (ref 136–145)
SP GR UR STRIP: 1.01 (ref 1–1.03)
T4 FREE SERPL-MCNC: 1.38 NG/DL (ref 0.93–1.7)
TROPONIN T SERPL HS-MCNC: 23 NG/L
TSH SERPL DL<=0.05 MIU/L-ACNC: 1.32 UIU/ML (ref 0.27–4.2)
UROBILINOGEN UR QL STRIP: NORMAL
WBC NRBC COR # BLD: 6.32 10*3/MM3 (ref 3.4–10.8)

## 2023-07-23 PROCEDURE — 80053 COMPREHEN METABOLIC PANEL: CPT | Performed by: EMERGENCY MEDICINE

## 2023-07-23 PROCEDURE — 84439 ASSAY OF FREE THYROXINE: CPT | Performed by: EMERGENCY MEDICINE

## 2023-07-23 PROCEDURE — 70450 CT HEAD/BRAIN W/O DYE: CPT

## 2023-07-23 PROCEDURE — 84443 ASSAY THYROID STIM HORMONE: CPT | Performed by: EMERGENCY MEDICINE

## 2023-07-23 PROCEDURE — 84484 ASSAY OF TROPONIN QUANT: CPT | Performed by: EMERGENCY MEDICINE

## 2023-07-23 PROCEDURE — 73502 X-RAY EXAM HIP UNI 2-3 VIEWS: CPT

## 2023-07-23 PROCEDURE — 99284 EMERGENCY DEPT VISIT MOD MDM: CPT

## 2023-07-23 PROCEDURE — 85025 COMPLETE CBC W/AUTO DIFF WBC: CPT | Performed by: EMERGENCY MEDICINE

## 2023-07-23 PROCEDURE — 93005 ELECTROCARDIOGRAM TRACING: CPT | Performed by: EMERGENCY MEDICINE

## 2023-07-23 PROCEDURE — 83735 ASSAY OF MAGNESIUM: CPT | Performed by: EMERGENCY MEDICINE

## 2023-07-23 PROCEDURE — 85610 PROTHROMBIN TIME: CPT | Performed by: EMERGENCY MEDICINE

## 2023-07-23 PROCEDURE — 93010 ELECTROCARDIOGRAM REPORT: CPT | Performed by: INTERNAL MEDICINE

## 2023-07-23 PROCEDURE — 72192 CT PELVIS W/O DYE: CPT

## 2023-07-23 PROCEDURE — 81003 URINALYSIS AUTO W/O SCOPE: CPT | Performed by: EMERGENCY MEDICINE

## 2023-07-23 NOTE — DISCHARGE INSTRUCTIONS
Go home and rest today.  Ice and Tylenol as needed.  Use your cane or walker to help ambulate.  Follow-up with your doctor this week for recheck or return if worse

## 2023-07-23 NOTE — ED TRIAGE NOTES
Patient to ED via PV from home. Patient to ED c/o left hip pain that radiates down her left leg. Patient reports that she was getting out of the shower and slipped and fell on to the shower seat.     Patient denies hitting head, does not take blood thinners.

## 2023-07-31 ENCOUNTER — TELEPHONE (OUTPATIENT)
Dept: INTERNAL MEDICINE | Age: 88
End: 2023-07-31

## 2023-07-31 NOTE — TELEPHONE ENCOUNTER
Caller: Jillian Patel    Relationship to patient: Emergency Contact    Best call back number: [phone]    Patient is needing: PATIENTS DAUGHTER WOULD LIKE TO SEE IF IT WOULD BE OK FOR PATIENT TO STOP TAKING THE MONTELUKAST? DAUGHTER STATES THAT SHE FEELS SHE DOESN'T NEED TO TAKE IT, THAT SHE IS ALREADY TAKING ALLEGRA. PLEASE REACH OUT TO DAUGHTER AND ADVISE.

## 2023-08-04 ENCOUNTER — OFFICE VISIT (OUTPATIENT)
Dept: CARDIOLOGY | Facility: CLINIC | Age: 88
End: 2023-08-04
Payer: MEDICARE

## 2023-08-04 VITALS
BODY MASS INDEX: 22.38 KG/M2 | WEIGHT: 111 LBS | DIASTOLIC BLOOD PRESSURE: 79 MMHG | HEIGHT: 59 IN | SYSTOLIC BLOOD PRESSURE: 129 MMHG | HEART RATE: 76 BPM

## 2023-08-04 DIAGNOSIS — E78.5 HYPERLIPIDEMIA, UNSPECIFIED HYPERLIPIDEMIA TYPE: ICD-10-CM

## 2023-08-04 DIAGNOSIS — R42 DIZZINESS: Primary | ICD-10-CM

## 2023-08-04 DIAGNOSIS — Z95.0 PRESENCE OF CARDIAC PACEMAKER: ICD-10-CM

## 2023-08-04 PROCEDURE — 99214 OFFICE O/P EST MOD 30 MIN: CPT | Performed by: NURSE PRACTITIONER

## 2023-08-04 RX ORDER — LOSARTAN POTASSIUM 25 MG/1
25 TABLET ORAL DAILY
Qty: 90 TABLET | Refills: 3 | Status: SHIPPED | OUTPATIENT
Start: 2023-08-04

## 2023-08-04 RX ORDER — TRIAMCINOLONE ACETONIDE 55 UG/1
2 SPRAY, METERED NASAL DAILY
COMMUNITY

## 2023-08-07 DIAGNOSIS — E03.9 HYPOTHYROIDISM: ICD-10-CM

## 2023-08-07 RX ORDER — LEVOTHYROXINE SODIUM 0.05 MG/1
TABLET ORAL
Qty: 90 TABLET | Refills: 1 | Status: SHIPPED | OUTPATIENT
Start: 2023-08-07

## 2023-08-10 ENCOUNTER — TELEPHONE (OUTPATIENT)
Dept: ORTHOPEDIC SURGERY | Facility: CLINIC | Age: 88
End: 2023-08-10

## 2023-08-10 NOTE — TELEPHONE ENCOUNTER
Caller: Jillian Patel    Relationship to patient: Emergency Contact    Best call back number: [phone]    Patient is needing: SCHEDULE RIGHT KNEE INJECTION AFTER 9.1.23

## 2023-08-18 ENCOUNTER — OFFICE VISIT (OUTPATIENT)
Dept: CARDIOLOGY | Facility: CLINIC | Age: 88
End: 2023-08-18
Payer: MEDICARE

## 2023-08-18 VITALS
DIASTOLIC BLOOD PRESSURE: 83 MMHG | HEART RATE: 78 BPM | HEIGHT: 59 IN | SYSTOLIC BLOOD PRESSURE: 151 MMHG | WEIGHT: 109.2 LBS | BODY MASS INDEX: 22.01 KG/M2

## 2023-08-18 DIAGNOSIS — E78.5 HYPERLIPIDEMIA, UNSPECIFIED HYPERLIPIDEMIA TYPE: ICD-10-CM

## 2023-08-18 DIAGNOSIS — Z95.0 PRESENCE OF CARDIAC PACEMAKER: Primary | ICD-10-CM

## 2023-08-18 DIAGNOSIS — I10 ESSENTIAL HYPERTENSION: ICD-10-CM

## 2023-08-18 NOTE — PROGRESS NOTES
Subjective:        Maria Fernanda Gaviria is a 89 y.o. female who here for follow up    Chief Complaint   Patient presents with    Follow-up     2 weeks   Hypertension    HPI    This is 89-year-old female who is new to this provider.  She has a history to include AV block with pacemaker, and hypertension, GERD, arthritis, and hyperlipidemia.  She is here today to go over her blood pressure.    She recently went to the emergency room on July 2023 due to fall.  She was sure if she passed out or not.  Symptoms after fall to include left hip pain at the time.  Fracture was noted on CT of pelvis and no acute findings on CT of head.    Echo February 2023 revealed EF 56 to 60%, with normal LV function.  Calcification of the aortic valve.      The following portions of the patient's history were reviewed and updated as appropriate: allergies, current medications, past family history, past medical history, past social history, past surgical history and problem list.    Past Medical History:   Diagnosis Date    Arthritis     COVID-19 virus detected 2/25/2023    Disease of thyroid gland     GERD (gastroesophageal reflux disease)     Hyperlipidemia     Hypertension     Osteoporosis     Restless legs syndrome 6/27/2016         reports that she has quit smoking. She has never used smokeless tobacco. She reports current alcohol use. She reports that she does not use drugs.     Family History   Problem Relation Age of Onset    Hypertension Mother     Heart disease Mother     Thyroid disease Mother     Hypertension Father     Cancer Father     Heart disease Brother     Hypertension Daughter     Thyroid disease Daughter     Lung disease Daughter     Depression Daughter     Migraines Daughter     Arthritis Daughter     Heart disease Paternal Grandmother     Stroke Paternal Grandfather     Kidney disease Paternal Grandfather        ROS     Review of Systems  Constitutional: No wt loss, fever, fatigue  Gastrointestinal: No nausea,  abdominal pain  Behavioral/Psych: No insomnia or anxiety  Cardiovascular: denies chest pain, and shortness of breath.      Objective:           Vitals and nursing note reviewed.   Constitutional:       Appearance: Well-developed.   HENT:      Head: Normocephalic.      Right Ear: External ear normal.      Left Ear: External ear normal.   Neck:      Vascular: No JVD.   Pulmonary:      Effort: Pulmonary effort is normal. No respiratory distress.      Breath sounds: Normal breath sounds. No stridor. No rales.   Cardiovascular:      Normal rate. Regular rhythm.      No gallop.    Pulses:     Intact distal pulses.   Edema:     Peripheral edema absent.   Abdominal:      General: Bowel sounds are normal. There is no distension.      Palpations: Abdomen is soft.      Tenderness: There is no abdominal tenderness. There is no guarding.   Musculoskeletal: Normal range of motion.         General: No tenderness.      Cervical back: Normal range of motion. Skin:     General: Skin is warm.   Neurological:      Mental Status: Alert and oriented to person, place, and time.      Deep Tendon Reflexes: Reflexes are normal and symmetric.   Psychiatric:         Judgment: Judgment normal.       Procedures    2/2023  Interpretation Summary         Left ventricular ejection fraction appears to be 56 - 60%.    Left ventricular diastolic function was normal.    There is calcification of the aortic valve.    Estimated right ventricular systolic pressure from tricuspid regurgitation is normal (<35 mmHg).     2023    Conclusion         Successful single-chamber pacemaker implantation      Current Outpatient Medications:     acetaminophen (TYLENOL) 500 MG tablet, Take 1 tablet by mouth Every 6 (Six) Hours As Needed for Mild Pain  or Moderate Pain ., Disp: , Rfl:     fexofenadine (ALLEGRA) 180 MG tablet, Take 1 tablet by mouth Daily., Disp: , Rfl:     levothyroxine (SYNTHROID, LEVOTHROID) 50 MCG tablet, TAKE 1 TABLET EVERY MORNING, Disp: 90 tablet,  Rfl: 1    losartan (Cozaar) 25 MG tablet, Take 1 tablet by mouth Daily., Disp: 90 tablet, Rfl: 3    Triamcinolone Acetonide (NASACORT) 55 MCG/ACT nasal inhaler, 2 sprays into the nostril(s) as directed by provider Daily., Disp: , Rfl:     Glycerin-Polysorbate 80 (REFRESH DRY EYE THERAPY OP), Apply  to eye(s) as directed by provider. (Patient not taking: Reported on 8/18/2023), Disp: , Rfl:      Assessment:        Patient Active Problem List   Diagnosis    Osteoarthritis of lumbar spine    Osteoarthritis    Asthma    Carotid stenosis    Hyperlipidemia    GERD (gastroesophageal reflux disease)    Essential hypertension    Hypothyroidism (acquired)    Osteopenia    DNR (do not resuscitate)    Non-seasonal allergic rhinitis    AV block    Presence of cardiac pacemaker               Plan:   1.  Hypertension: Today I reviewed her blood pressure documentation along with her heart rates and they are stable.  No changes at this time.  Continue current management.        Educated patient on exercising for at least 30 minutes a day for 2 to 3 days a week. Importance of controlling hypertension and blood pressure checkup on the regular basis has been explained. Hypertension as a silent killer has been discussed. Risk reduction of the weight and regular exercises to control the hypertension has been explained.    2.  Biotronik Pacemake: Functioning normally.     3.  Hyperlipidemia: Her primary care manages her cholesterol and labs.    Risk of the hyperlipidemia, importance of the treatment has been explained. Pros and cons of the statins has been explained. Regular blood workup as well as side effects including the liver failure, myelopathy death has been explained.    4.:  Dizziness: Improved           No diagnosis found.    There are no diagnoses linked to this encounter.    COUNSELING:aneudy Srivastava was given to patient for the following topics: diagnostic results, risk factor reductions, impressions,  risks and benefits of treatment options and importance of treatment compliance .       SMOKING COUNSELING: denies    She will up in 3 month follow up    Sincerely,   GRIFFIN Mora  [facility]  08/18/23  11:18 EDT    EMR Dragon/Transcription disclaimer:   Much of this encounter note is an electronic transcription/translation of spoken language to printed text. The electronic translation of spoken language may permit erroneous, or at times, nonsensical words or phrases to be inadvertently transcribed; Although I have reviewed the note for such errors, some may still exist.

## 2023-09-05 ENCOUNTER — CLINICAL SUPPORT (OUTPATIENT)
Dept: ORTHOPEDIC SURGERY | Facility: CLINIC | Age: 88
End: 2023-09-05
Payer: MEDICARE

## 2023-09-05 VITALS — HEIGHT: 59 IN | RESPIRATION RATE: 16 BRPM | WEIGHT: 111.2 LBS | TEMPERATURE: 97.3 F | BODY MASS INDEX: 22.42 KG/M2

## 2023-09-05 DIAGNOSIS — M17.11 PRIMARY OSTEOARTHRITIS OF RIGHT KNEE: Primary | ICD-10-CM

## 2023-09-05 RX ORDER — METHYLPREDNISOLONE ACETATE 80 MG/ML
80 INJECTION, SUSPENSION INTRA-ARTICULAR; INTRALESIONAL; INTRAMUSCULAR; SOFT TISSUE
Status: COMPLETED | OUTPATIENT
Start: 2023-09-05 | End: 2023-09-05

## 2023-09-05 RX ORDER — LIDOCAINE HYDROCHLORIDE 10 MG/ML
5 INJECTION, SOLUTION EPIDURAL; INFILTRATION; INTRACAUDAL; PERINEURAL
Status: COMPLETED | OUTPATIENT
Start: 2023-09-05 | End: 2023-09-05

## 2023-09-05 RX ADMIN — METHYLPREDNISOLONE ACETATE 80 MG: 80 INJECTION, SUSPENSION INTRA-ARTICULAR; INTRALESIONAL; INTRAMUSCULAR; SOFT TISSUE at 10:02

## 2023-09-05 RX ADMIN — LIDOCAINE HYDROCHLORIDE 5 ML: 10 INJECTION, SOLUTION EPIDURAL; INFILTRATION; INTRACAUDAL; PERINEURAL at 10:02

## 2023-09-05 NOTE — PROGRESS NOTES
9/5/2023    Maria Fernanda Gaviria is here today for worsening knee pain. Pt has undergone injection of the knee in the past with good resolution of symptoms. Pt is requesting a repeat injection.     KNEE Injection Procedure Note:    Large Joint Arthrocentesis: R knee  Date/Time: 9/5/2023 10:02 AM  Consent given by: patient  Site marked: site marked  Timeout: Immediately prior to procedure a time out was called to verify the correct patient, procedure, equipment, support staff and site/side marked as required   Supporting Documentation  Indications: pain and joint swelling   Procedure Details  Location: knee - R knee  Preparation: Patient was prepped and draped in the usual sterile fashion  Needle size: 22 G  Approach: anterolateral  Medications administered: 80 mg methylPREDNISolone acetate 80 MG/ML; 5 mL lidocaine PF 1% 1 %  Patient tolerance: patient tolerated the procedure well with no immediate complications      (3 mL of lidocaine was used for anesthetic purposes)    At the conclusion of the injection I discussed the importance of continued quad strengthening exercises on a daily basis. I will see the patient back if the symptoms should fail to improve or worsen.    Pascual Crain, APRN  9/5/2023

## 2023-11-22 ENCOUNTER — OFFICE VISIT (OUTPATIENT)
Dept: INTERNAL MEDICINE | Age: 88
End: 2023-11-22
Payer: MEDICARE

## 2023-11-22 VITALS
OXYGEN SATURATION: 98 % | HEART RATE: 86 BPM | TEMPERATURE: 97.3 F | HEIGHT: 59 IN | DIASTOLIC BLOOD PRESSURE: 88 MMHG | BODY MASS INDEX: 21.97 KG/M2 | SYSTOLIC BLOOD PRESSURE: 144 MMHG | WEIGHT: 109 LBS

## 2023-11-22 DIAGNOSIS — E03.9 HYPOTHYROIDISM (ACQUIRED): Chronic | ICD-10-CM

## 2023-11-22 DIAGNOSIS — E78.5 HYPERLIPIDEMIA, UNSPECIFIED HYPERLIPIDEMIA TYPE: Chronic | ICD-10-CM

## 2023-11-22 DIAGNOSIS — I10 ESSENTIAL HYPERTENSION: Primary | Chronic | ICD-10-CM

## 2023-11-22 PROCEDURE — 99214 OFFICE O/P EST MOD 30 MIN: CPT | Performed by: INTERNAL MEDICINE

## 2023-11-22 NOTE — ASSESSMENT & PLAN NOTE
Has not been taking levothyroxine for awhile.   Check lab today and resume medication if indicated.     Lab Results   Component Value Date    TSH 1.320 07/23/2023    TSH 2.420 02/22/2023    TSH 2.160 12/02/2022    FREET4 1.38 07/23/2023    FREET4 1.30 12/02/2022    FREET4 1.31 05/31/2022

## 2023-11-23 LAB
CHOLEST SERPL-MCNC: 251 MG/DL (ref 0–200)
CHOLEST/HDLC SERPL: 4.05 {RATIO}
HDLC SERPL-MCNC: 62 MG/DL (ref 40–60)
LDLC SERPL CALC-MCNC: 172 MG/DL (ref 0–100)
T4 FREE SERPL-MCNC: 1.34 NG/DL (ref 0.93–1.7)
TRIGL SERPL-MCNC: 99 MG/DL (ref 0–150)
TSH SERPL DL<=0.005 MIU/L-ACNC: 1.64 UIU/ML (ref 0.27–4.2)
VLDLC SERPL CALC-MCNC: 17 MG/DL (ref 5–40)

## 2023-11-27 NOTE — PROGRESS NOTES
CALL PATIENT with results:    Thyroid level is within normal range but LDL (bad cholesterol) is very high.   - She stated she was not taking her “thyroid” medication but was taking her cholesterol medication but she may have this reversed. Please have her daughter verify.   - Message back to me.

## 2023-12-08 DIAGNOSIS — E78.5 HYPERLIPIDEMIA, UNSPECIFIED HYPERLIPIDEMIA TYPE: Primary | ICD-10-CM

## 2023-12-08 RX ORDER — ATORVASTATIN CALCIUM 20 MG/1
20 TABLET, FILM COATED ORAL DAILY
Qty: 90 TABLET | Refills: 1 | Status: SHIPPED | OUTPATIENT
Start: 2023-12-08

## 2023-12-13 ENCOUNTER — CLINICAL SUPPORT NO REQUIREMENTS (OUTPATIENT)
Dept: CARDIOLOGY | Facility: CLINIC | Age: 88
End: 2023-12-13
Payer: MEDICARE

## 2023-12-13 ENCOUNTER — OFFICE VISIT (OUTPATIENT)
Dept: CARDIOLOGY | Facility: CLINIC | Age: 88
End: 2023-12-13
Payer: MEDICARE

## 2023-12-13 VITALS
HEART RATE: 89 BPM | SYSTOLIC BLOOD PRESSURE: 116 MMHG | HEIGHT: 59 IN | DIASTOLIC BLOOD PRESSURE: 72 MMHG | BODY MASS INDEX: 22.18 KG/M2 | WEIGHT: 110 LBS

## 2023-12-13 DIAGNOSIS — E78.5 HYPERLIPIDEMIA, UNSPECIFIED HYPERLIPIDEMIA TYPE: ICD-10-CM

## 2023-12-13 DIAGNOSIS — Z95.0 PRESENCE OF CARDIAC PACEMAKER: Primary | Chronic | ICD-10-CM

## 2023-12-13 DIAGNOSIS — Z95.0 PRESENCE OF CARDIAC PACEMAKER: Primary | ICD-10-CM

## 2023-12-13 NOTE — PROGRESS NOTES
Subjective:        Maria Fernanda Gaviria is a 89 y.o. female who here for follow up    No chief complaint on file.      HPI    Maria Fernanda Gaviria is 89-year-old female who is current with this provider.  She has a history of AV block with pacemaker, hypertension, GERD, arthritis, and hyperlipidemia.  She follows her primary care physician regarding her cholesterol.  She is here today for a 6 months follow-up.    Echo on February 2023 revealed EF 56 to 60%, normal LV function and calcification of the aortic valve.    The following portions of the patient's history were reviewed and updated as appropriate: allergies, current medications, past family history, past medical history, past social history, past surgical history and problem list.    Past Medical History:   Diagnosis Date    Arthritis     COVID-19 virus detected 2/25/2023    Disease of thyroid gland     GERD (gastroesophageal reflux disease)     Hyperlipidemia     Hypertension     Osteoporosis     Restless legs syndrome 6/27/2016         reports that she has quit smoking. She has never used smokeless tobacco. She reports current alcohol use. She reports that she does not use drugs.     Family History   Problem Relation Age of Onset    Hypertension Mother     Heart disease Mother     Thyroid disease Mother     Hypertension Father     Cancer Father     Heart disease Brother     Hypertension Daughter     Thyroid disease Daughter     Lung disease Daughter     Depression Daughter     Migraines Daughter     Arthritis Daughter     Heart disease Paternal Grandmother     Stroke Paternal Grandfather     Kidney disease Paternal Grandfather        ROS     Review of Systems  Constitutional: No wt loss, fever, fatigue  Gastrointestinal: No nausea, abdominal pain  Behavioral/Psych: No insomnia or anxiety  Cardiovascular : no chest pain or palps      Objective:           Constitutional:       Appearance: Well-developed.   Neck:      Vascular: No JVD.      Trachea: No tracheal  deviation.   Pulmonary:      Effort: Pulmonary effort is normal. No respiratory distress.      Breath sounds: Normal breath sounds. No stridor. No decreased breath sounds. No wheezing. No rhonchi. No rales.   Chest:      Chest wall: Not tender to palpatation.   Cardiovascular:      Normal rate. Regular rhythm.      No gallop.    Pulses:     Intact distal pulses.   Edema:     Peripheral edema absent.   Abdominal:      General: Bowel sounds are normal. There is no distension.      Palpations: Abdomen is soft.      Tenderness: There is no abdominal tenderness.   Skin:     General: Skin is warm.   Neurological:      Mental Status: Alert and oriented to person, place, and time.      Deep Tendon Reflexes: Reflexes are normal and symmetric.         Procedures    Conclusion         Successful single-chamber pacemaker implantation    Interpretation Summary         Left ventricular ejection fraction appears to be 56 - 60%.    Left ventricular diastolic function was normal.    There is calcification of the aortic valve.    Estimated right ventricular systolic pressure from tricuspid regurgitation is normal (<35 mmHg).       Current Outpatient Medications:     acetaminophen (TYLENOL) 500 MG tablet, Take 1 tablet by mouth Every 6 (Six) Hours As Needed for Mild Pain  or Moderate Pain ., Disp: , Rfl:     atorvastatin (LIPITOR) 20 MG tablet, Take 1 tablet by mouth Daily., Disp: 90 tablet, Rfl: 1    fexofenadine (ALLEGRA) 180 MG tablet, Take 1 tablet by mouth Daily., Disp: , Rfl:     Glycerin-Polysorbate 80 (REFRESH DRY EYE THERAPY OP), Apply  to eye(s) as directed by provider. (Patient not taking: Reported on 11/22/2023), Disp: , Rfl:     levothyroxine (SYNTHROID, LEVOTHROID) 50 MCG tablet, TAKE 1 TABLET EVERY MORNING (Patient not taking: Reported on 11/22/2023), Disp: 90 tablet, Rfl: 1    losartan (Cozaar) 25 MG tablet, Take 1 tablet by mouth Daily., Disp: 90 tablet, Rfl: 3    Triamcinolone Acetonide (NASACORT) 55 MCG/ACT nasal  inhaler, 2 sprays into the nostril(s) as directed by provider Daily., Disp: , Rfl:      Assessment:        Patient Active Problem List   Diagnosis    Osteoarthritis of lumbar spine    Osteoarthritis    Asthma    Carotid stenosis    Hyperlipidemia    GERD (gastroesophageal reflux disease)    Essential hypertension    Hypothyroidism (acquired)    Osteopenia    DNR (do not resuscitate)    Non-seasonal allergic rhinitis    AV block    Presence of cardiac pacemaker               Plan:   1.  Hypertension: Today in the office her blood pressure is controlled on current medications.  Continue current management.    Educated patient on exercising for at least 30 minutes a day for 2 to 3 days a week. Importance of controlling hypertension and blood pressure checkup on the regular basis has been explained. Hypertension as a silent killer has been discussed. Risk reduction of the weight and regular exercises to control the hypertension has been explained.    2.  Biotronik pacemaker.  Functioning normally.  Discussed with Fabrizio and no issues at this time.    3.  Hyperlipidemia: Her primary care manages her cholesterol and labs.    Risk of the hyperlipidemia, importance of the treatment has been explained. Pros and cons of the statins has been explained. Regular blood workup as well as side effects including the liver failure, myelopathy death has been explained.               No diagnosis found.    There are no diagnoses linked to this encounter.    COUNSELING: aneudy Srivastava was given to patient for the following topics: diagnostic results, risk factor reductions, impressions, risks and benefits of treatment options and importance of treatment compliance .       SMOKING COUNSELING: denies    Follow up in 6 months, unless she needs to be seen sooner.    Sincerely,   GRIFFIN Mora  [facility]  12/13/23  10:12 EST    EMR Dragon/Transcription disclaimer: Dictated utilizing Dragon  Dictation

## 2024-01-11 ENCOUNTER — OFFICE VISIT (OUTPATIENT)
Dept: ORTHOPEDIC SURGERY | Facility: CLINIC | Age: 89
End: 2024-01-11
Payer: MEDICARE

## 2024-01-11 VITALS — BODY MASS INDEX: 22.13 KG/M2 | HEIGHT: 59 IN | TEMPERATURE: 96.8 F | WEIGHT: 109.8 LBS

## 2024-01-11 DIAGNOSIS — R52 PAIN: Primary | ICD-10-CM

## 2024-01-11 DIAGNOSIS — M17.11 PRIMARY OSTEOARTHRITIS OF RIGHT KNEE: ICD-10-CM

## 2024-01-11 RX ORDER — LIDOCAINE HYDROCHLORIDE 10 MG/ML
5 INJECTION, SOLUTION EPIDURAL; INFILTRATION; INTRACAUDAL; PERINEURAL
Status: COMPLETED | OUTPATIENT
Start: 2024-01-11 | End: 2024-01-11

## 2024-01-11 RX ORDER — TRIAMCINOLONE ACETONIDE 40 MG/ML
80 INJECTION, SUSPENSION INTRA-ARTICULAR; INTRAMUSCULAR
Status: COMPLETED | OUTPATIENT
Start: 2024-01-11 | End: 2024-01-11

## 2024-01-11 RX ADMIN — LIDOCAINE HYDROCHLORIDE 5 ML: 10 INJECTION, SOLUTION EPIDURAL; INFILTRATION; INTRACAUDAL; PERINEURAL at 10:04

## 2024-01-11 RX ADMIN — TRIAMCINOLONE ACETONIDE 80 MG: 40 INJECTION, SUSPENSION INTRA-ARTICULAR; INTRAMUSCULAR at 10:04

## 2024-01-11 NOTE — PROGRESS NOTES
1/11/2024    Maria Fernanda Gaviria is here today for worsening knee pain. Pt has undergone injection of the knee in the past with good resolution of symptoms.  She does report that this previous injection did not last as long as the other 1.  Therefore we will proceed forward with Kenalog instead of methylprednisolone today.    KNEE Injection Procedure Note:    Large Joint Arthrocentesis: R knee  Date/Time: 1/11/2024 10:04 AM  Consent given by: patient  Site marked: site marked  Timeout: Immediately prior to procedure a time out was called to verify the correct patient, procedure, equipment, support staff and site/side marked as required   Supporting Documentation  Indications: pain and joint swelling   Procedure Details  Location: knee - R knee  Preparation: Patient was prepped and draped in the usual sterile fashion  Needle size: 22 G  Approach: anterolateral  Medications administered: 5 mL lidocaine PF 1% 1 %; 80 mg triamcinolone acetonide 40 MG/ML  Patient tolerance: patient tolerated the procedure well with no immediate complications      Imaging: X-rays 3 views right knee (PA, lateral, sunrise views) were ordered and reviewed for evaluation of right knee pain which demonstrate advanced varus deformity with bone-on-bone articulation, subchondral sclerosis and cystic changes, as well as periarticular osteophytes present.  In comparison to previous films patient does demonstrate slight arthritic progression.    At the conclusion of the injection I discussed the importance of continued quad strengthening exercises on a daily basis.  He did mention that she has been having some lower back issues as of recent.  We will get her evaluated with her orthopedic back specialist Daja Evans for further evaluation and treatment options.  I will see the patient back if the symptoms should fail to improve or worsen.    Pascual Crain, APRN  1/11/2024

## 2024-01-29 ENCOUNTER — OFFICE VISIT (OUTPATIENT)
Dept: ORTHOPEDIC SURGERY | Facility: CLINIC | Age: 89
End: 2024-01-29
Payer: MEDICARE

## 2024-01-29 VITALS — TEMPERATURE: 96.8 F | WEIGHT: 108.2 LBS | BODY MASS INDEX: 21.81 KG/M2 | HEIGHT: 59 IN

## 2024-01-29 DIAGNOSIS — M51.36 DDD (DEGENERATIVE DISC DISEASE), LUMBAR: Primary | ICD-10-CM

## 2024-01-29 DIAGNOSIS — R52 PAIN: ICD-10-CM

## 2024-01-29 DIAGNOSIS — M41.9 SCOLIOSIS OF LUMBAR SPINE, UNSPECIFIED SCOLIOSIS TYPE: ICD-10-CM

## 2024-01-29 PROCEDURE — 1159F MED LIST DOCD IN RCRD: CPT | Performed by: NURSE PRACTITIONER

## 2024-01-29 PROCEDURE — 99213 OFFICE O/P EST LOW 20 MIN: CPT | Performed by: NURSE PRACTITIONER

## 2024-01-29 PROCEDURE — 72100 X-RAY EXAM L-S SPINE 2/3 VWS: CPT | Performed by: NURSE PRACTITIONER

## 2024-01-29 PROCEDURE — 1160F RVW MEDS BY RX/DR IN RCRD: CPT | Performed by: NURSE PRACTITIONER
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
## 2024-02-01 DIAGNOSIS — E03.9 HYPOTHYROIDISM: ICD-10-CM

## 2024-02-01 RX ORDER — LEVOTHYROXINE SODIUM 0.05 MG/1
TABLET ORAL
Qty: 90 TABLET | Refills: 1 | Status: SHIPPED | OUTPATIENT
Start: 2024-02-01

## 2024-02-05 ENCOUNTER — HOSPITAL ENCOUNTER (OUTPATIENT)
Dept: PHYSICAL THERAPY | Facility: HOSPITAL | Age: 89
Discharge: HOME | End: 2024-02-05
Admitting: NURSE PRACTITIONER
Payer: MEDICARE

## 2024-02-05 DIAGNOSIS — M41.9 SCOLIOSIS OF LUMBAR SPINE, UNSPECIFIED SCOLIOSIS TYPE: ICD-10-CM

## 2024-02-05 DIAGNOSIS — M51.36 DDD (DEGENERATIVE DISC DISEASE), LUMBAR: Primary | ICD-10-CM

## 2024-02-05 PROCEDURE — 97110 THERAPEUTIC EXERCISES: CPT

## 2024-02-05 PROCEDURE — 97161 PT EVAL LOW COMPLEX 20 MIN: CPT

## 2024-02-05 NOTE — THERAPY EVALUATION
Outpatient Physical Therapy Ortho Initial Evaluation  [facility]     Patient Name: Maria Fernanda Gaviria  : 1923  MRN: [id number]  Today's Date: 2024      Visit Date: 2024    Patient Active Problem List   Diagnosis    Osteoarthritis of lumbar spine    Osteoarthritis    Asthma    Carotid stenosis    Hyperlipidemia    GERD (gastroesophageal reflux disease)    Essential hypertension    Hypothyroidism (acquired)    Osteopenia    DNR (do not resuscitate)    Non-seasonal allergic rhinitis    AV block    Presence of cardiac pacemaker        Past Medical History:   Diagnosis Date    Arthritis     Arthritis of back     COVID-19 virus detected 2023    Disease of thyroid gland     GERD (gastroesophageal reflux disease)     Hip arthrosis     Age related    Hyperlipidemia     Hypertension     Low back strain Age related    Osteoporosis     Restless legs syndrome 2016        Past Surgical History:   Procedure Laterality Date    CARDIAC ELECTROPHYSIOLOGY PROCEDURE N/A 2023    Procedure: Pacemaker SC new  biotronik;  Surgeon: Deny Khanna MD;  Location: [facility] CATH INVASIVE LOCATION;  Service: Cardiovascular;  Laterality: N/A;    CATARACT EXTRACTION      CHOLECYSTECTOMY WITH INTRAOPERATIVE CHOLANGIOGRAM N/A 2021    Procedure: CHOLECYSTECTOMY LAPAROSCOPIC INTRAOPERATIVE CHOLANGIOGRAM common bile duct exploration;  Surgeon: Huey Wilkinson MD;  Location: [facility] OR;  Service: General;  Laterality: N/A;    COLONOSCOPY      ERCP N/A 2021    Procedure: ENDOSCOPIC RETROGRADE CHOLANGIOPANCREATOGRAPHY WITH SPHINCTEROTOMY, BALLOON SWEEP AND STONE (2) EXTRACTION;  Surgeon: Justo Aden MD;  Location: [facility] ENDOSCOPY;  Service: Gastroenterology;  Laterality: N/A;  PRE- BILE DUCT OBSTRUCTION   POST- SAME      HYSTERECTOMY      REPLACEMENT TOTAL KNEE Right     THYROID SURGERY      TUBAL ABDOMINAL LIGATION         Visit Dx:     ICD-10-CM ICD-9-CM   1. DDD (degenerative disc  disease), lumbar  M51.36 722.52   2. Scoliosis of lumbar spine, unspecified scoliosis type  M41.9 737.30          Patient History       Row Name 02/05/24 0900             History    Chief Complaint Pain  -ER      Type of Pain Back pain  -ER      Date Current Problem(s) Began --  Chronic - comes and goes  -ER      Brief Description of Current Complaint Maria Fernanda Gaviria is a 89 year old female reporting to [facility] for evaluation of chronic LBP. She notes that she recently got imaging done that revealed she had unspecified lumber scoliosis and multilevel degenerative disc disease. She notes that she is very independent, living alone and she has most problems with reaching overhead into tall cabinets, holding her dog, and mopping her floors. Pt. takes tylenol as needed for pain management, and notes that she takes frequent walks throughout her home.  -ER      Previous treatment for THIS PROBLEM Other (comment)  Tylenol as needed  -ER      Patient/Caregiver Goals Relieve pain;Improve strength;Improve mobility;Know what to do to help the symptoms  -ER      Occupation/sports/leisure activities Reading  -ER      How has patient tried to help current problem? Tylenol as needed, rest  -ER      What clinical tests have you had for this problem? X-ray  -ER      Results of Clinical Tests 1/29/24 multilevel degenerative disc and facet changes, levoscoliosis apex to the mid lumbar spine, straightening of normal lumbar lordosis.  -ER         Pain     Pain Location Back  -ER      Pain at Present 5  -ER      Pain at Best 2  -ER      Pain at Worst 6  -ER      Pain Frequency Intermittent  -ER      What Performance Factors Make the Current Problem(s) WORSE? Mopping the floors, laying supine for extended periods of time, carrying her dog  -ER      What Performance Factors Make the Current Problem(s) BETTER? Tylenol, rest  -ER      Is your sleep disturbed? No  -ER      Difficulties at work? N/A  -ER       Difficulties with ADL's? Trouble with upper body dressing  -ER      Difficulties with recreational activities? N/A  -ER         Fall Risk Assessment    Any falls in the past year: Yes  -ER      Number of falls reported in the last 12 months 1  -ER      Factors that contributed to the fall: Slippery surface  ice  -ER         Services    Prior Rehab/Home Health Experiences No  -ER      Are you currently receiving Home Health services No  -ER      Do you plan to receive Home Health services in the near future No  -ER         Daily Activities    Primary Language English  -ER      How does patient learn best? Listening;Reading  -ER      Patient is concerned about/has problems with Difficulty with self care (i.e. bathing, dressing, toileting:  -ER      Barriers to learning None  -ER      Pt Participated in POC and Goals Yes  -ER                User Key  (r) = Recorded By, (t) = Taken By, (c) = Cosigned By      Initials Name Provider Type    ER Paola Amos, PT Physical Therapist                     PT Ortho       Row Name 02/05/24 0900       Posture/Observations    Alignment Options Forward head;Scoliosis;Rounded shoulders  genu recurvatum  -ER    Forward Head Moderate  -ER    Rounded Shoulders Moderate  -ER    Scoliosis Moderate;Severe  -ER       Quarter Clearing    Quarter Clearing Lower Quarter Clearing  -ER       DTR- Lower Quarter Clearing    Patellar tendon (L2-4) Bilateral:;1- Minimal response  -ER    Achilles tendon (S1-2) Bilateral:;1- Minimal response  -ER       Neural Tension Signs- Lower Quarter Clearing    Slump Bilateral:;Positive  -ER       Sensory Screen for Light Touch- Lower Quarter Clearing    L1 (inguinal area) Bilateral:;Intact  -ER    L2 (anterior mid thigh) Bilateral:;Intact  -ER    L3 (distal anterior thigh) Bilateral:;Intact  -ER    L4 (medial lower leg/foot) Bilateral:;Intact  -ER    L5 (lateral lower leg/great toe) Bilateral:;Intact  -ER    S1 (bottom of foot) Bilateral:;Intact  -ER        Myotomal Screen- Lower Quarter Clearing    Hip flexion (L2) Bilateral:;4 (Good)  -ER    Knee extension (L3) Bilateral:;4- (Good -)  -ER    Ankle DF (L4) Bilateral:;4 (Good)  -ER    Great toe extension (L5) Bilateral:;4 (Good)  -ER    Ankle PF (S1) Bilateral:;4 (Good)  -ER    Knee flexion (S2) Bilateral:;4- (Good -)  -ER       Lumbar ROM Screen- Lower Quarter Clearing    Lumbar Flexion Impaired  lacking 25%  -ER    Lumbar Extension Impaired  lacking 25%  -ER    Lumbar Lateral Flexion Impaired  lacking 25%  -ER    Lumbar Rotation Impaired  lacking 50%  -ER    Lumbar Quadrant  Impaired  -ER       General ROM    GENERAL ROM COMMENTS Pt. trunk ROM limited in all planes by ~25-50%, however participation in ROM measures was intermittent  -ER       Sensation    Sensation WNL? WNL  -ER       Pathomechanics    Lower Extremity Pathomechanics Pops knee into hyperextension with midstance  -ER              User Key  (r) = Recorded By, (t) = Taken By, (c) = Cosigned By      Initials Name Provider Type    ER Paola Amos, PT Physical Therapist                                Therapy Education  Education Details: Educated on PT role and POC; discussed expectations/timeframe for healing. Discussed use of HEP for independent progression of therapeutic benefit.  Given: HEP, Symptoms/condition management  Program: New  How Provided: Verbal, Demonstration  Provided to: Patient  Level of Understanding: Verbalized      PT OP Goals       Row Name 02/05/24 0900          PT Short Term Goals    STG Date to Achieve 03/06/24  -ER     STG 1 Pt. will be independent with initial HEP to improve self-management of condition.  -ER     STG 1 Progress New  -ER     STG 2 Pt. will demonstrate proper body mechanics with forward bending/lifting activities to preserve lumbar spine with functional activities.  -ER     STG 2 Progress New  -ER        Long Term Goals    LTG Date to Achieve 04/05/24  -ER     LTG 1 Pt. will be independent with advanced HEP to  improve long term independence with self management of condition.  -ER     LTG 1 Progress New  -ER     LTG 2 Pt. will demonstrate proper TrA engagement in standing and dynamic movements to improve lumbopelvic stability.  -ER     LTG 2 Progress New  -ER     LTG 3 Pt. will report <2/10 pain while mopping floors to demonstrate improved household management and activity tolerance.  -ER     LTG 3 Progress New  -ER        Time Calculation    PT Goal Re-Cert Due Date 05/05/24  -ER               User Key  (r) = Recorded By, (t) = Taken By, (c) = Cosigned By      Initials Name Provider Type    ER Paola Amos, PT Physical Therapist                     PT Assessment/Plan       Row Name 02/05/24 0900          PT Assessment    Functional Limitations Impaired locomotion;Limitations in community activities;Performance in leisure activities;Performance in self-care ADL;Performance in work activities  -ER     Impairments Impaired flexibility;Posture;Poor body mechanics;Pain;Muscle strength;Locomotion;Range of motion  -ER     Assessment Comments Maria Fernanda Gaviria is a 89 y.o. female referred to physical therapy for evaluation of chronic LBP, DDD and lumbar scoliosis. Pt. arrives 15 minutes late to evaluation, and needed increased time to fill out paper work, limiting the time available for evaluation. However, she presents with a stable clinical presentation, along with comorbidities of OA of the lumbar spine, asthma, GERD, osteopenia, and pacemaker placement and no remarkable personal factors that may impact her progress in the plan of care. Pt presents today with increased pain with functional mobility, however pt. Reports that she has been dealing with LBP for several years. She reports that her main concern is having a good understanding of an HEP to manage symptoms at home. Her signs and symptoms are consistent with referring diagnosis. The previous impairments limit her ability to manage household tasks without pain. Pt will  benefit from skilled PT to address the previous impairments and prolong therapeutic effects with an HEP.  -ER     Please refer to paper survey for additional self-reported information No  -ER     Rehab Potential Good  -ER     Patient/caregiver participated in establishment of treatment plan and goals Yes  -ER     Patient would benefit from skilled therapy intervention Yes  -ER        PT Plan    PT Frequency 1x/week  -ER     Predicted Duration of Therapy Intervention (PT) 3-4 visits  -ER     Planned CPT's? PT EVAL LOW COMPLEXITY: 46320;PT RE-EVAL: 55799;PT THER PROC EA 15 MIN: 51552;PT THER ACT EA 15 MIN: 21934;PT MANUAL THERAPY EA 15 MIN: 24225;PT NEUROMUSC RE-EDUCATION EA 15 MIN: 74705;PT GAIT TRAINING EA 15 MIN: 78462;PT SELF CARE/HOME MGMT/TRAIN EA 15: 27915;PT HOT OR COLD PACK TREAT [facility];PT ELECTRICAL STIM UNATTEND: ;PT ELECTRICAL STIM ATTD EA 15 MIN: 00039;PT ULTRASOUND EA 15 MIN: 22353;PT TRACTION LUMBAR: 27898  -ER     PT Plan Comments Pt. wanting HEP for independent management of LBP, pt. lives alone and is unclear of how long she wants to come in for visits. Consider hamstring stretch, PPT, LTR, small range bridge, STS with TrA? Pt. was 15 min late to eval, consider having her fill out an [employer] Back.  -ER               User Key  (r) = Recorded By, (t) = Taken By, (c) = Cosigned By      Initials Name Provider Type    ER Paola Amos, PT Physical Therapist                       OP Exercises       Row Name 02/05/24 0900             Subjective    Subjective Comments Maria Fernanda Gaviria is a 89 y.o. female who presents to physical therapy today with primary compliant of chronic LBP that has been consistent for years. She describes the pain as dull and intermittent. Maria Fernanda Gaviria reports difficulty/increased pain with reaching up to high cabinets, lying flat for long periods of time, holding her dog, and mopping her floors. Pain relieving factors include rest and tylenol as needed. PMH includes OA of  the lumbar spine, asthma, GERD, osteopenia, and pacemaker placement. Maria Fernanda Gaviria primary goal for attending skilled physical therapy is to reduce pain and have a good understanding of an HEP.  -ER         Subjective Pain    Able to rate subjective pain? yes  -ER      Pre-Treatment Pain Level 5  -ER         Total Minutes    20077 - PT Therapeutic Exercise Minutes 10  -ER         Exercise 1    Exercise Name 1 PT eval  -ER         Exercise 2    Exercise Name 2 Seated HS stretch  -ER      Reps 2 3  -ER      Time 2 20  -ER      Additional Comments bilateral  -ER         Exercise 3    Exercise Name 3 Nustep  -ER      Additional Comments initiate next visit  -ER         Exercise 4    Exercise Name 4 LTR  -ER      Additional Comments initiate next visit  -ER         Exercise 5    Exercise Name 5 Supine PPT  -ER      Additional Comments initiate next visit  -ER                User Key  (r) = Recorded By, (t) = Taken By, (c) = Cosigned By      Initials Name Provider Type    ER Paola Amos, PT Physical Therapist                                            Time Calculation:     Start Time: 0930  Stop Time: 1000  Time Calculation (min): 30 min  Total Timed Code Minutes- PT: 10 minute(s)  Timed Charges  81295 - PT Therapeutic Exercise Minutes: 10  Untimed Charges  PT Eval/Re-eval Minutes: 20  Total Minutes  Timed Charges Total Minutes: 10  Untimed Charges Total Minutes: 20   Total Minutes: 30     Therapy Charges for Today       Code Description Service Date Service Provider Modifiers Qty    08029240456 HC PT THER PROC EA 15 MIN 2/5/2024 Paola Amos, PT GP 1    [id number] HC PT EVAL LOW COMPLEXITY 1 2/5/2024 Paola Amos, PT GP 1                      Paola Amos PT  2/5/2024

## 2024-02-20 ENCOUNTER — HOSPITAL ENCOUNTER (OUTPATIENT)
Dept: PHYSICAL THERAPY | Facility: HOSPITAL | Age: 89
Setting detail: THERAPIES SERIES
Discharge: HOME | End: 2024-02-20
Payer: MEDICARE

## 2024-02-20 DIAGNOSIS — M51.36 DDD (DEGENERATIVE DISC DISEASE), LUMBAR: Primary | ICD-10-CM

## 2024-02-20 DIAGNOSIS — M41.9 SCOLIOSIS OF LUMBAR SPINE, UNSPECIFIED SCOLIOSIS TYPE: ICD-10-CM

## 2024-02-20 PROCEDURE — 97110 THERAPEUTIC EXERCISES: CPT

## 2024-02-20 NOTE — THERAPY TREATMENT NOTE
Outpatient Physical Therapy Ortho Treatment Note  [facility]     Patient Name: Maria Fernanda Gaviria  : 1923  MRN: [id number]  Today's Date: 2024      Visit Date: 2024    Visit Dx:    ICD-10-CM ICD-9-CM   1. DDD (degenerative disc disease), lumbar  M51.36 722.52   2. Scoliosis of lumbar spine, unspecified scoliosis type  M41.9 737.30       Patient Active Problem List   Diagnosis    Osteoarthritis of lumbar spine    Osteoarthritis    Asthma    Carotid stenosis    Hyperlipidemia    GERD (gastroesophageal reflux disease)    Essential hypertension    Hypothyroidism (acquired)    Osteopenia    DNR (do not resuscitate)    Non-seasonal allergic rhinitis    AV block    Presence of cardiac pacemaker        Past Medical History:   Diagnosis Date    Arthritis     Arthritis of back     COVID-19 virus detected 2023    Disease of thyroid gland     GERD (gastroesophageal reflux disease)     Hip arthrosis     Age related    Hyperlipidemia     Hypertension     Low back strain Age related    Osteoporosis     Restless legs syndrome 2016        Past Surgical History:   Procedure Laterality Date    CARDIAC ELECTROPHYSIOLOGY PROCEDURE N/A 2023    Procedure: Pacemaker SC new  biotronik;  Surgeon: Deny Khanna MD;  Location: [facility] CATH INVASIVE LOCATION;  Service: Cardiovascular;  Laterality: N/A;    CATARACT EXTRACTION      CHOLECYSTECTOMY WITH INTRAOPERATIVE CHOLANGIOGRAM N/A 2021    Procedure: CHOLECYSTECTOMY LAPAROSCOPIC INTRAOPERATIVE CHOLANGIOGRAM common bile duct exploration;  Surgeon: Huey Wilkinson MD;  Location: [facility] OR;  Service: General;  Laterality: N/A;    COLONOSCOPY      ERCP N/A 2021    Procedure: ENDOSCOPIC RETROGRADE CHOLANGIOPANCREATOGRAPHY WITH SPHINCTEROTOMY, BALLOON SWEEP AND STONE (2) EXTRACTION;  Surgeon: Justo Aden MD;  Location: [facility] ENDOSCOPY;  Service: Gastroenterology;  Laterality: N/A;  PRE- BILE DUCT OBSTRUCTION   POST- SAME       HYSTERECTOMY      REPLACEMENT TOTAL KNEE Right     THYROID SURGERY      TUBAL ABDOMINAL LIGATION                          PT Assessment/Plan       Row Name 02/20/24 0900          PT Assessment    Assessment Comments Maria Fernanda returns to clinic for first follow up from evaluation for LBP. Per pt. and patients daughter LBP is managed and they wish to try independent management following todays session. Focused on developing appropriate program and pt. confidence with HEP to address lumbar mobility and hip girdle/core strength.  -MP        PT Plan    PT Plan Comments independent management?  -MP               User Key  (r) = Recorded By, (t) = Taken By, (c) = Cosigned By      Initials Name Provider Type    Lashell Armenta, PT Physical Therapist                       OP Exercises       Row Name 02/20/24 0900             Subjective    Subjective Comments It's doing okay  -MP         Subjective Pain    Able to rate subjective pain? yes  -MP      Pre-Treatment Pain Level 0  -MP      Post-Treatment Pain Level 0  -MP      Subjective Pain Comment vague pain  -MP         Total Minutes    85669 - PT Therapeutic Exercise Minutes 40  -MP         Exercise 1    Exercise Name 1 NuStep  -MP      Cueing 1 Verbal  -MP      Time 1 5 min  -MP         Exercise 2    Exercise Name 2 Seated HS stretch  -MP      Cueing 2 Verbal;Tactile  -MP      Reps 2 3  -MP      Time 2 20  -MP      Additional Comments bilateral  -MP         Exercise 3    Exercise Name 3 piriformis stretch  -MP      Cueing 3 Verbal;Tactile  -MP      Reps 3 3e  -MP      Time 3 20s  -MP         Exercise 4    Exercise Name 4 LTR  -MP      Cueing 4 Verbal;Demo  -MP      Reps 4 10e  -MP      Time 4 5s  -MP         Exercise 5    Exercise Name 5 Supine PPT  -MP      Cueing 5 Verbal;Demo;Tactile  -MP      Reps 5 15  -MP      Time 5 5s  -MP         Exercise 6    Exercise Name 6 H/L hip add  -MP      Cueing 6 Verbal;Demo  -MP      Reps 6 15  -MP      Time 6 5s  -MP          Exercise 7    Exercise Name 7 bridges  -MP      Cueing 7 Verbal;Tactile  -MP      Reps 7 15  -MP         Exercise 8    Exercise Name 8 S/L clamshell  -MP      Cueing 8 Verbal;Tactile  -MP      Reps 8 15e  -MP                User Key  (r) = Recorded By, (t) = Taken By, (c) = Cosigned By      Initials Name Provider Type    MP Lashell Cardenas, PT Physical Therapist                                  PT OP Goals       Row Name 02/20/24 0900          PT Short Term Goals    STG Date to Achieve 03/06/24  -MP     STG 1 Pt. will be independent with initial HEP to improve self-management of condition.  -MP     STG 1 Progress Ongoing  -MP     STG 2 Pt. will demonstrate proper body mechanics with forward bending/lifting activities to preserve lumbar spine with functional activities.  -MP     STG 2 Progress Ongoing  -MP        Long Term Goals    LTG Date to Achieve 04/05/24  -MP     LTG 1 Pt. will be independent with advanced HEP to improve long term independence with self management of condition.  -MP     LTG 1 Progress Ongoing  -MP     LTG 2 Pt. will demonstrate proper TrA engagement in standing and dynamic movements to improve lumbopelvic stability.  -MP     LTG 2 Progress Ongoing  -MP     LTG 3 Pt. will report <2/10 pain while mopping floors to demonstrate improved household management and activity tolerance.  -MP     LTG 3 Progress Ongoing  -MP               User Key  (r) = Recorded By, (t) = Taken By, (c) = Cosigned By      Initials Name Provider Type    Lashell Armenta, PT Physical Therapist                    Therapy Education  Education Details: Issued HEP [id number]; pt. wanting to try independent management  Given: HEP, Symptoms/condition management  Program: New  How Provided: Verbal, Demonstration, Written  Provided to: Patient, Other (comment) (daughter)  Level of Understanding: Verbalized, Demonstrated              Time Calculation:   Start Time: 0903  Stop Time: 0943  Time Calculation (min): 40 min  Total Timed  Code Minutes- PT: 40 minute(s)  Timed Charges  66271 - PT Therapeutic Exercise Minutes: 40  Total Minutes  Timed Charges Total Minutes: 40   Total Minutes: 40  Therapy Charges for Today       Code Description Service Date Service Provider Modifiers Qty    06672384287 HC PT THER PROC EA 15 MIN 2/20/2024 Lashell Cardenas, PT GP 3                      Lashell Cardenas, PT  2/20/2024

## 2024-03-25 ENCOUNTER — OFFICE VISIT (OUTPATIENT)
Dept: INTERNAL MEDICINE | Age: 89
End: 2024-03-25
Payer: MEDICARE

## 2024-03-25 VITALS
OXYGEN SATURATION: 98 % | HEART RATE: 99 BPM | SYSTOLIC BLOOD PRESSURE: 130 MMHG | TEMPERATURE: 97.1 F | HEIGHT: 59 IN | BODY MASS INDEX: 20.56 KG/M2 | DIASTOLIC BLOOD PRESSURE: 70 MMHG | WEIGHT: 102 LBS

## 2024-03-25 DIAGNOSIS — E78.5 HYPERLIPIDEMIA, UNSPECIFIED HYPERLIPIDEMIA TYPE: Chronic | ICD-10-CM

## 2024-03-25 DIAGNOSIS — E03.9 HYPOTHYROIDISM (ACQUIRED): Chronic | ICD-10-CM

## 2024-03-25 DIAGNOSIS — I10 ESSENTIAL HYPERTENSION: Chronic | ICD-10-CM

## 2024-03-25 DIAGNOSIS — J45.21 INTERMITTENT ASTHMA WITH ACUTE EXACERBATION, UNSPECIFIED ASTHMA SEVERITY: Chronic | ICD-10-CM

## 2024-03-25 DIAGNOSIS — J20.9 ACUTE BRONCHITIS, UNSPECIFIED ORGANISM: Primary | ICD-10-CM

## 2024-03-25 LAB
EXPIRATION DATE: NORMAL
FLUAV AG UPPER RESP QL IA.RAPID: NOT DETECTED
FLUBV AG UPPER RESP QL IA.RAPID: NOT DETECTED
INTERNAL CONTROL: NORMAL
Lab: NORMAL
SARS-COV-2 AG UPPER RESP QL IA.RAPID: NOT DETECTED

## 2024-03-25 RX ORDER — GUAIFENESIN AND DEXTROMETHORPHAN HYDROBROMIDE 600; 30 MG/1; MG/1
1 TABLET, EXTENDED RELEASE ORAL 2 TIMES DAILY PRN
COMMUNITY
Start: 2024-03-25

## 2024-03-25 RX ORDER — AZITHROMYCIN 250 MG/1
TABLET, FILM COATED ORAL
Qty: 6 TABLET | Refills: 0 | Status: SHIPPED | OUTPATIENT
Start: 2024-03-25 | End: 2024-03-30

## 2024-03-25 RX ORDER — METHYLPREDNISOLONE 4 MG/1
TABLET ORAL
Qty: 1 EACH | Refills: 0 | Status: SHIPPED | OUTPATIENT
Start: 2024-03-25

## 2024-03-25 NOTE — PROGRESS NOTES
"    I N T E R N A L  M E D I C I N E    J U N O H  K I M,  M D      ENCOUNTER DATE:  03/25/2024    Maria Fernanda Gaviria / 89 y.o. / female    CHIEF COMPLAINT / REASON FOR OFFICE VISIT     Hypertension, Hyperlipidemia, Hypothyroidism, and Nasal Congestion (Cough 1 week.)      ASSESSMENT & PLAN     1. Acute bronchitis, unspecified organism    2. Intermittent asthma with acute exacerbation, unspecified asthma severity    3. Essential hypertension    4. Hyperlipidemia, unspecified hyperlipidemia type    5. Hypothyroidism (acquired)         Orders Placed This Encounter   Procedures    POCT SARS-CoV-2 + Flu Antigen JANENE     New Medications Ordered This Visit   Medications    guaifenesin-dextromethorphan 600-30 mg (MUCINEX DM)  MG tablet sustained-release 12 hour     Sig: Take 1 tablet by mouth 2 (Two) Times a Day As Needed (cough).    methylPREDNISolone (Medrol) 4 MG dose pack     Sig: Take as directed on package instructions.     Dispense:  1 each     Refill:  0    azithromycin (ZITHROMAX) 250 MG tablet     Sig: Take 2 tablets the first day, then 1 tablet daily for 4 days. (Take with food)     Dispense:  6 tablet     Refill:  0       SUMMARY/DISCUSSION  Today negative for flu and COVID-19   Start Z-Jv for possible bacterial bronchitis  Medrol Jv for congestion, cough and mild dyspnea   Mucinex DM BID for chest congestion and cough  She was instructed to call or return if the condition is not improving or worsening and she expressed understanding and agreed to follow above instructions.        Next Appointment with me: Visit date not found    Return in about 4 months (around 7/25/2024) for **SCHEDULE AWV (30 MIN) W/ DR. ROBERTSON**.      VITAL SIGNS     Vitals:    03/25/24 1110   BP: 130/70   Pulse: 99   Temp: 97.1 °F (36.2 °C)   SpO2: 98%   Weight: 46.3 kg (102 lb)   Height: 149.9 cm (59\")       BP Readings from Last 3 Encounters:   03/25/24 130/70   12/13/23 116/72   11/22/23 144/88     Wt Readings from Last 3 " Encounters:   03/25/24 46.3 kg (102 lb)   01/29/24 49.1 kg (108 lb 3.2 oz)   01/11/24 49.8 kg (109 lb 12.8 oz)     Body mass index is 20.6 kg/m².    Blood pressure readings recorded on patient flowsheet:       No data to display                  MEDICATIONS AT THE TIME OF OFFICE VISIT     Current Outpatient Medications on File Prior to Visit   Medication Sig    atorvastatin (LIPITOR) 20 MG tablet Take 1 tablet by mouth Daily.    fexofenadine (ALLEGRA) 180 MG tablet Take 1 tablet by mouth Daily.    levothyroxine (SYNTHROID, LEVOTHROID) 50 MCG tablet TAKE 1 TABLET EVERY MORNING    losartan (Cozaar) 25 MG tablet Take 1 tablet by mouth Daily.    [DISCONTINUED] acetaminophen (TYLENOL) 500 MG tablet Take 1 tablet by mouth Every 6 (Six) Hours As Needed for Mild Pain  or Moderate Pain .     No current facility-administered medications on file prior to visit.          HISTORY OF PRESENT ILLNESS     2 days of acute cough with mild phglegm production and dyspnea. No fever or chills. Lot of sinus congestion with PND. No wheezing but complains of mild dyspnea with protracted coughing. Cough is worse during daytime but better when lying down. Do immediate contact with anyone who has upper respiratory tract infection.     On losartan 50 mg for hypertension.   On levothyroxine 50 mcg for hypothyroidism.   On atorvastatin 20 mg for hyperlipidemia. Previously noted to have significantly high LDL cholesterol but was probably due to not taking medication. She thinks she has been taking it now. .       Patient Care Team:  Henok Salcido MD as PCP - General  Christiano Dewitt MD as Consulting Physician (Obstetrics and Gynecology)  Rodolfo Shah MD as Consulting Physician (Allergy)  Michael Mendoza MD as Consulting Physician (Dermatology)  Jluis Bain MD as Consulting Physician (Otolaryngology)  Lucas Rueda OD (Optometry)  Ousmane Rhodes MD as Consulting Physician (Gastroenterology)    REVIEW OF SYSTEMS     Review of  "Systems       PHYSICAL EXAMINATION     Physical Exam  Alert with normal thought and judgment.   Looks mildly ill but no acute distress ; not toxic appearing  Cardiovascular: Normal rate, regular rhythm.   Lungs: normal rate/effort, no crackles / rales / wheezing , coarse breath sounds bilaterally       REVIEWED DATA     Labs:     Office Visit on 03/25/2024   Component Date Value Ref Range Status    SARS Antigen 03/25/2024 Not Detected  Not Detected, Presumptive Negative Final    Influenza A Antigen JANENE 03/25/2024 Not Detected  Not Detected Final    Influenza B Antigen JANENE 03/25/2024 Not Detected  Not Detected Final    Internal Control 03/25/2024 Passed  Passed Final    Lot Number 03/25/2024 [id number]   Final    Expiration Date 03/25/2024 1/17/25   Final        Lab Results   Component Value Date     07/23/2023    K 4.2 07/23/2023    CALCIUM 9.5 07/23/2023    AST 22 07/23/2023    ALT 12 07/23/2023    BUN 12 07/23/2023    CREATININE 0.84 07/23/2023    CREATININE 0.90 06/25/2023    CREATININE 0.63 02/28/2023    EGFRRESULT 62.5 12/02/2022       No results found for: \"HGBA1C\"    Lab Results   Component Value Date     (H) 11/22/2023    LDL 85 12/02/2022    LDL 81 09/22/2021    HDL 62 (H) 11/22/2023    HDL 63 (H) 12/02/2022    TRIG 99 11/22/2023    TRIG 87 12/02/2022       Lab Results   Component Value Date    TSH 1.640 11/22/2023    TSH 1.320 07/23/2023    TSH 2.420 02/22/2023    FREET4 1.34 11/22/2023    FREET4 1.38 07/23/2023    FREET4 1.30 12/02/2022       Lab Results   Component Value Date    WBC 6.32 07/23/2023    HGB 16.2 (H) 07/23/2023     07/23/2023       No results found for: \"MALBCRERATIO\"        Imaging:           Medical Tests:           Summary of old records / correspondence / consultant report:           Request outside records:             "

## 2024-05-15 ENCOUNTER — OFFICE VISIT (OUTPATIENT)
Dept: CARDIOLOGY | Facility: CLINIC | Age: 89
End: 2024-05-15
Payer: MEDICARE

## 2024-05-15 ENCOUNTER — CLINICAL SUPPORT NO REQUIREMENTS (OUTPATIENT)
Dept: CARDIOLOGY | Facility: CLINIC | Age: 89
End: 2024-05-15
Payer: MEDICARE

## 2024-05-15 VITALS
DIASTOLIC BLOOD PRESSURE: 82 MMHG | HEIGHT: 59 IN | SYSTOLIC BLOOD PRESSURE: 140 MMHG | HEART RATE: 78 BPM | WEIGHT: 105 LBS | BODY MASS INDEX: 21.17 KG/M2

## 2024-05-15 DIAGNOSIS — E78.5 HYPERLIPIDEMIA, UNSPECIFIED HYPERLIPIDEMIA TYPE: ICD-10-CM

## 2024-05-15 DIAGNOSIS — I10 PRIMARY HYPERTENSION: ICD-10-CM

## 2024-05-15 DIAGNOSIS — Z95.0 PRESENCE OF CARDIAC PACEMAKER: Primary | ICD-10-CM

## 2024-05-15 DIAGNOSIS — Z95.0 PRESENCE OF CARDIAC PACEMAKER: Primary | Chronic | ICD-10-CM

## 2024-05-15 NOTE — PROGRESS NOTES
Subjective:        Maria Fernanda Gaviria is a 89 y.o. female who here for follow up    Chief Complaint   Patient presents with    Follow-up     6MO       HPI    Everette is a 89-year-old female who is current with this provider.  She has a history to include GERD, and arthritis.  She is here today for 6 months follow-up for single-chamber pacemaker due to AV block, hypertension and hyperlipidemia.  He does follow her primary care physician regarding her cholesterol and labs.  Recently taken off of her cholesterol medicine.  Discussed with pacemaker rep and device checked without any episodes.    The following portions of the patient's history were reviewed and updated as appropriate: allergies, current medications, past family history, past medical history, past social history, past surgical history and problem list.    Past Medical History:   Diagnosis Date    Arthritis     Arthritis of back     COVID-19 virus detected 02/25/2023    Disease of thyroid gland     GERD (gastroesophageal reflux disease)     Hip arthrosis     Age related    Hyperlipidemia     Hypertension     Low back strain Age related    Osteoporosis     Restless legs syndrome 06/27/2016         reports that she has quit smoking. Her smoking use included cigarettes. She has a 15 pack-year smoking history. She has been exposed to tobacco smoke. She has never used smokeless tobacco. She reports current alcohol use of about 2.0 standard drinks of alcohol per week. She reports that she does not use drugs.     Family History   Problem Relation Age of Onset    Hypertension Mother     Heart disease Mother     Thyroid disease Mother     Hypertension Father     Cancer Father     Heart disease Brother     Hypertension Daughter     Thyroid disease Daughter     Lung disease Daughter     Depression Daughter     Migraines Daughter     Arthritis Daughter     Heart disease Paternal Grandmother     Stroke Paternal Grandfather     Kidney disease Paternal Grandfather         ROS     Review of Systems  Constitutional: No wt loss, fever, fatigue  Gastrointestinal: No nausea, abdominal pain  Behavioral/Psych: No insomnia or anxiety  Cardiovascular denies chest pain, and shortness of breath.      Objective:           Constitutional:       Appearance: Well-developed.   Neck:      Vascular: No JVD.      Trachea: No tracheal deviation.   Pulmonary:      Effort: Pulmonary effort is normal. No respiratory distress.      Breath sounds: Normal breath sounds. No stridor. No decreased breath sounds. No wheezing. No rhonchi. No rales.   Chest:      Chest wall: Not tender to palpatation.   Cardiovascular:      Normal rate. Regular rhythm.      No gallop.    Pulses:     Intact distal pulses.   Edema:     Peripheral edema absent.   Abdominal:      General: Bowel sounds are normal. There is no distension.      Palpations: Abdomen is soft.      Tenderness: There is no abdominal tenderness.   Skin:     General: Skin is warm.   Neurological:      Mental Status: Alert and oriented to person, place, and time.      Deep Tendon Reflexes: Reflexes are normal and symmetric.         Procedures   2023  Interpretation Summary         Left ventricular ejection fraction appears to be 56 - 60%.    Left ventricular diastolic function was normal.    There is calcification of the aortic valve.    Estimated right ventricular systolic pressure from tricuspid regurgitation is normal (<35 mmHg).     2/2023      Conclusion         Successful single-chamber pacemaker implantation   Assessment:        Patient Active Problem List   Diagnosis    Osteoarthritis of lumbar spine    Osteoarthritis    Asthma    Carotid stenosis    Hyperlipidemia    GERD (gastroesophageal reflux disease)    Essential hypertension    Hypothyroidism (acquired)    Osteopenia    DNR (do not resuscitate)    Non-seasonal allergic rhinitis    AV block    Presence of cardiac pacemaker               Plan:   1.  Single-chamber Biotronik pacemaker due to AV  block: Discussed with representative and device checked without any episodes and functioning normally.      2.  Hypertension: Today in the office blood pressure is slightly elevated however will not add or make medications adjustments as she is 89 years old.  She will monitor her sodium intake as well as monitoring her blood pressure at home. She will do a blood pressure diary.    Educated patient on exercising for at least 30 minutes a day for 2 to 3 days a week. Importance of controlling hypertension and blood pressure checkup on the regular basis has been explained. Hypertension as a silent killer has been discussed. Risk reduction of the weight and regular exercises to control the hypertension has been explained.    3.  Hyperlipidemia: Her primary care manages her cholesterol labs.    Risk of the hyperlipidemia, importance of the treatment has been explained. Pros and cons of the statins has been explained. Regular blood workup as well as side effects including the liver failure, myelopathy death has been explained.  .           No diagnosis found.    There are no diagnoses linked to this encounter.    COUNSELING: aneudy Srivastava was given to patient for the following topics: diagnostic results, risk factor reductions, impressions, risks and benefits of treatment options and importance of treatment compliance .       SMOKING COUNSELING: denies    She will follow-up in 1 month with a blood pressure check. If blood pressure improves then they will change to 6 months, or sooner if needed.    Sincerely,   GRIFFIN Mora  [facility]  05/15/24  09:47 EDT    EMR Dragon/Transcription disclaimer: Dictated utilizing Dragon Dictation

## 2024-06-07 ENCOUNTER — OFFICE VISIT (OUTPATIENT)
Dept: CARDIOLOGY | Facility: CLINIC | Age: 89
End: 2024-06-07
Payer: MEDICARE

## 2024-06-07 VITALS
HEIGHT: 59 IN | SYSTOLIC BLOOD PRESSURE: 137 MMHG | HEART RATE: 85 BPM | DIASTOLIC BLOOD PRESSURE: 83 MMHG | WEIGHT: 105 LBS | BODY MASS INDEX: 21.17 KG/M2

## 2024-06-07 DIAGNOSIS — I10 PRIMARY HYPERTENSION: Primary | ICD-10-CM

## 2024-06-07 DIAGNOSIS — Z95.0 PRESENCE OF CARDIAC PACEMAKER: ICD-10-CM

## 2024-06-07 DIAGNOSIS — E78.5 HYPERLIPIDEMIA, UNSPECIFIED HYPERLIPIDEMIA TYPE: ICD-10-CM

## 2024-06-07 PROCEDURE — 99213 OFFICE O/P EST LOW 20 MIN: CPT | Performed by: NURSE PRACTITIONER

## 2024-06-07 PROCEDURE — 1159F MED LIST DOCD IN RCRD: CPT | Performed by: NURSE PRACTITIONER

## 2024-06-07 PROCEDURE — 1160F RVW MEDS BY RX/DR IN RCRD: CPT | Performed by: NURSE PRACTITIONER

## 2024-06-07 NOTE — PROGRESS NOTES
Subjective:        Maria Fernanda Gaviria is a 89 y.o. female who here for follow up    Chief Complaint   Patient presents with    Follow-up     1MO       HPI    Maria Fernanda Gaviria is 89-year-old female who is here today for hypertension.  She has a history to include hyperlipidemia, single-chamber pacemaker due to AV block and hyperlipidemia.  On her recent visit her device was checked and functioning normally without any episodes    2023 revealed EF 56 to 60%, LV diastolic function was normal, calcification of the aortic valve.      2/2023 single-chamber pacemaker implant.    The following portions of the patient's history were reviewed and updated as appropriate: allergies, current medications, past family history, past medical history, past social history, past surgical history and problem list.    Past Medical History:   Diagnosis Date    Arthritis     Arthritis of back     COVID-19 virus detected 02/25/2023    Disease of thyroid gland     GERD (gastroesophageal reflux disease)     Hip arthrosis     Age related    Hyperlipidemia     Hypertension     Low back strain Age related    Osteoporosis     Restless legs syndrome 06/27/2016         reports that she has quit smoking. Her smoking use included cigarettes. She has a 15 pack-year smoking history. She has been exposed to tobacco smoke. She has never used smokeless tobacco. She reports current alcohol use of about 2.0 standard drinks of alcohol per week. She reports that she does not use drugs.     Family History   Problem Relation Age of Onset    Hypertension Mother     Heart disease Mother     Thyroid disease Mother     Hypertension Father     Cancer Father     Heart disease Brother     Hypertension Daughter     Thyroid disease Daughter     Lung disease Daughter     Depression Daughter     Migraines Daughter     Arthritis Daughter     Heart disease Paternal Grandmother     Stroke Paternal Grandfather     Kidney disease Paternal Grandfather        ROS     Review of  Systems  Constitutional: No wt loss, fever, fatigue  Gastrointestinal: No nausea, abdominal pain  Behavioral/Psych: No insomnia or anxiety  Cardiovascular: Denies chest pain, and shortness of breath.      Objective:           Constitutional:       Appearance: Well-developed.   Neck:      Vascular: No JVD.      Trachea: No tracheal deviation.   Pulmonary:      Effort: Pulmonary effort is normal. No respiratory distress.      Breath sounds: Normal breath sounds. No stridor. No decreased breath sounds. No wheezing. No rhonchi. No rales.   Chest:      Chest wall: Not tender to palpatation.   Cardiovascular:      Normal rate. Regular rhythm.      No gallop.    Pulses:     Intact distal pulses.   Edema:     Peripheral edema absent.   Abdominal:      General: Bowel sounds are normal. There is no distension.      Palpations: Abdomen is soft.      Tenderness: There is no abdominal tenderness.   Skin:     General: Skin is warm.   Neurological:      Mental Status: Alert and oriented to person, place, and time.      Deep Tendon Reflexes: Reflexes are normal and symmetric.         Procedures  2023  Conclusion         Successful single-chamber pacemaker implantation    2023 Interpretation Summary         Left ventricular ejection fraction appears to be 56 - 60%.    Left ventricular diastolic function was normal.    There is calcification of the aortic valve.    Estimated right ventricular systolic pressure from tricuspid regurgitation is normal (<35 mmHg).       Current Outpatient Medications:     fexofenadine (ALLEGRA) 180 MG tablet, Take 1 tablet by mouth Daily., Disp: , Rfl:     guaifenesin-dextromethorphan 600-30 mg (MUCINEX DM)  MG tablet sustained-release 12 hour, Take 1 tablet by mouth 2 (Two) Times a Day As Needed (cough)., Disp: , Rfl:     levothyroxine (SYNTHROID, LEVOTHROID) 50 MCG tablet, TAKE 1 TABLET EVERY MORNING, Disp: 90 tablet, Rfl: 1     Assessment:        Patient Active Problem List   Diagnosis     Osteoarthritis of lumbar spine    Osteoarthritis    Asthma    Carotid stenosis    Hyperlipidemia    GERD (gastroesophageal reflux disease)    Essential hypertension    Hypothyroidism (acquired)    Osteopenia    DNR (do not resuscitate)    Non-seasonal allergic rhinitis    AV block    Presence of cardiac pacemaker               Plan:   1.  Single-chamber Biotronik pacemaker due to AV block.  Functioning normally.    2.  Hypertension: Controlled.  Reviewed blood pressures at home and no changes will be made.    Educated patient on exercising for at least 30 minutes a day for 2 to 3 days a week. Importance of controlling hypertension and blood pressure checkup on the regular basis has been explained. Hypertension as a silent killer has been discussed. Risk reduction of the weight and regular exercises to control the hypertension has been explained.    3.  Hyperlipidemia: Her primary care manages her cholesterol labs.    Risk of the hyperlipidemia, importance of the treatment has been explained. Pros and cons of the statins has been explained. Regular blood workup as well as side effects including the liver failure, myelopathy death has been explained.               No diagnosis found.    There are no diagnoses linked to this encounter.    COUNSELING: aneudy Srivastava was given to patient for the following topics: diagnostic results, risk factor reductions, impressions, risks and benefits of treatment options and importance of treatment compliance .       SMOKING COUNSELING: denies    6 months follow-up with EKG, unless she needs to be seen sooner.    Sincerely,   GRIFFIN Mora  [facility]  06/07/24  09:26 EDT    EMR Dragon/Transcription disclaimer: Dictated utilizing Dragon Dictation

## 2024-07-22 ENCOUNTER — APPOINTMENT (OUTPATIENT)
Dept: CT IMAGING | Facility: HOSPITAL | Age: 89
End: 2024-07-22
Payer: MEDICARE

## 2024-07-22 ENCOUNTER — HOSPITAL ENCOUNTER (OUTPATIENT)
Facility: HOSPITAL | Age: 89
Setting detail: OBSERVATION
Discharge: HOME | End: 2024-07-24
Attending: EMERGENCY MEDICINE | Admitting: STUDENT IN AN ORGANIZED HEALTH CARE EDUCATION/TRAINING PROGRAM
Payer: MEDICARE

## 2024-07-22 DIAGNOSIS — R26.9 GAIT DISTURBANCE: Primary | ICD-10-CM

## 2024-07-22 PROBLEM — R29.90 STROKE-LIKE SYMPTOMS: Status: ACTIVE | Noted: 2024-07-22

## 2024-07-22 LAB
ALBUMIN SERPL-MCNC: 4.3 G/DL (ref 3.5–5.2)
ALBUMIN/GLOB SERPL: 1.6 G/DL
ALP SERPL-CCNC: 83 U/L (ref 39–117)
ALT SERPL W P-5'-P-CCNC: 14 U/L (ref 1–33)
ANION GAP SERPL CALCULATED.3IONS-SCNC: 13 MMOL/L (ref 5–15)
APTT PPP: 28.8 SECONDS (ref 22.7–35.4)
AST SERPL-CCNC: 29 U/L (ref 1–32)
BASOPHILS # BLD AUTO: 0.04 10*3/MM3 (ref 0–0.2)
BASOPHILS NFR BLD AUTO: 0.6 % (ref 0–1.5)
BILIRUB SERPL-MCNC: 0.7 MG/DL (ref 0–1.2)
BUN SERPL-MCNC: 11 MG/DL (ref 8–23)
BUN/CREAT SERPL: 13.4 (ref 7–25)
CALCIUM SPEC-SCNC: 9.5 MG/DL (ref 8.2–9.6)
CHLORIDE SERPL-SCNC: 103 MMOL/L (ref 98–107)
CHOLEST SERPL-MCNC: 280 MG/DL (ref 0–200)
CO2 SERPL-SCNC: 25 MMOL/L (ref 22–29)
CREAT SERPL-MCNC: 0.82 MG/DL (ref 0.57–1)
DEPRECATED RDW RBC AUTO: 43 FL (ref 37–54)
EGFRCR SERPLBLD CKD-EPI 2021: 63.5 ML/MIN/1.73
EOSINOPHIL # BLD AUTO: 0.12 10*3/MM3 (ref 0–0.4)
EOSINOPHIL NFR BLD AUTO: 1.9 % (ref 0.3–6.2)
ERYTHROCYTE [DISTWIDTH] IN BLOOD BY AUTOMATED COUNT: 13 % (ref 12.3–15.4)
GLOBULIN UR ELPH-MCNC: 2.7 GM/DL
GLUCOSE SERPL-MCNC: 92 MG/DL (ref 65–99)
HBA1C MFR BLD: 5.3 % (ref 4.8–5.6)
HCT VFR BLD AUTO: 51.9 % (ref 34–46.6)
HDLC SERPL-MCNC: 69 MG/DL (ref 40–60)
HGB BLD-MCNC: 17.4 G/DL (ref 12–15.9)
IMM GRANULOCYTES # BLD AUTO: 0.02 10*3/MM3 (ref 0–0.05)
IMM GRANULOCYTES NFR BLD AUTO: 0.3 % (ref 0–0.5)
INR PPP: 1.04 (ref 0.9–1.1)
LDLC SERPL CALC-MCNC: 192 MG/DL (ref 0–100)
LDLC/HDLC SERPL: 2.74 {RATIO}
LYMPHOCYTES # BLD AUTO: 1.18 10*3/MM3 (ref 0.7–3.1)
LYMPHOCYTES NFR BLD AUTO: 18.6 % (ref 19.6–45.3)
MCH RBC QN AUTO: 30.3 PG (ref 26.6–33)
MCHC RBC AUTO-ENTMCNC: 33.5 G/DL (ref 31.5–35.7)
MCV RBC AUTO: 90.4 FL (ref 79–97)
MONOCYTES # BLD AUTO: 0.58 10*3/MM3 (ref 0.1–0.9)
MONOCYTES NFR BLD AUTO: 9.2 % (ref 5–12)
NEUTROPHILS NFR BLD AUTO: 4.39 10*3/MM3 (ref 1.7–7)
NEUTROPHILS NFR BLD AUTO: 69.4 % (ref 42.7–76)
NRBC BLD AUTO-RTO: 0 /100 WBC (ref 0–0.2)
PLATELET # BLD AUTO: 177 10*3/MM3 (ref 140–450)
PMV BLD AUTO: 9.4 FL (ref 6–12)
POTASSIUM SERPL-SCNC: 4 MMOL/L (ref 3.5–5.2)
PROT SERPL-MCNC: 7 G/DL (ref 6–8.5)
PROTHROMBIN TIME: 13.8 SECONDS (ref 11.7–14.2)
RBC # BLD AUTO: 5.74 10*6/MM3 (ref 3.77–5.28)
SODIUM SERPL-SCNC: 141 MMOL/L (ref 136–145)
TRIGL SERPL-MCNC: 111 MG/DL (ref 0–150)
VLDLC SERPL-MCNC: 19 MG/DL (ref 5–40)
WBC NRBC COR # BLD AUTO: 6.33 10*3/MM3 (ref 3.4–10.8)

## 2024-07-22 PROCEDURE — 70496 CT ANGIOGRAPHY HEAD: CPT

## 2024-07-22 PROCEDURE — 80061 LIPID PANEL: CPT | Performed by: PHYSICIAN ASSISTANT

## 2024-07-22 PROCEDURE — 25510000001 IOPAMIDOL PER 1 ML: Performed by: STUDENT IN AN ORGANIZED HEALTH CARE EDUCATION/TRAINING PROGRAM

## 2024-07-22 PROCEDURE — 93005 ELECTROCARDIOGRAM TRACING: CPT | Performed by: EMERGENCY MEDICINE

## 2024-07-22 PROCEDURE — G0378 HOSPITAL OBSERVATION PER HR: HCPCS

## 2024-07-22 PROCEDURE — 83036 HEMOGLOBIN GLYCOSYLATED A1C: CPT | Performed by: PHYSICIAN ASSISTANT

## 2024-07-22 PROCEDURE — 99285 EMERGENCY DEPT VISIT HI MDM: CPT

## 2024-07-22 PROCEDURE — 70498 CT ANGIOGRAPHY NECK: CPT

## 2024-07-22 PROCEDURE — 85610 PROTHROMBIN TIME: CPT | Performed by: PHYSICIAN ASSISTANT

## 2024-07-22 PROCEDURE — 70450 CT HEAD/BRAIN W/O DYE: CPT

## 2024-07-22 PROCEDURE — 85730 THROMBOPLASTIN TIME PARTIAL: CPT | Performed by: PHYSICIAN ASSISTANT

## 2024-07-22 PROCEDURE — 99214 OFFICE O/P EST MOD 30 MIN: CPT

## 2024-07-22 PROCEDURE — 93010 ELECTROCARDIOGRAM REPORT: CPT | Performed by: INTERNAL MEDICINE

## 2024-07-22 PROCEDURE — 85025 COMPLETE CBC W/AUTO DIFF WBC: CPT | Performed by: EMERGENCY MEDICINE

## 2024-07-22 PROCEDURE — 80053 COMPREHEN METABOLIC PANEL: CPT | Performed by: EMERGENCY MEDICINE

## 2024-07-22 RX ORDER — ACETAMINOPHEN 325 MG/1
650 TABLET ORAL EVERY 6 HOURS PRN
Status: DISCONTINUED | OUTPATIENT
Start: 2024-07-22 | End: 2024-07-24 | Stop reason: HOSPADM

## 2024-07-22 RX ORDER — ASPIRIN 325 MG
325 TABLET ORAL DAILY
Status: DISCONTINUED | OUTPATIENT
Start: 2024-07-22 | End: 2024-07-22

## 2024-07-22 RX ORDER — SODIUM CHLORIDE 0.9 % (FLUSH) 0.9 %
10 SYRINGE (ML) INJECTION AS NEEDED
Status: DISCONTINUED | OUTPATIENT
Start: 2024-07-22 | End: 2024-07-24 | Stop reason: HOSPADM

## 2024-07-22 RX ORDER — CETIRIZINE HYDROCHLORIDE 10 MG/1
10 TABLET ORAL DAILY
Status: DISCONTINUED | OUTPATIENT
Start: 2024-07-22 | End: 2024-07-24 | Stop reason: HOSPADM

## 2024-07-22 RX ORDER — ATORVASTATIN CALCIUM 20 MG/1
40 TABLET, FILM COATED ORAL NIGHTLY
Status: DISCONTINUED | OUTPATIENT
Start: 2024-07-22 | End: 2024-07-24 | Stop reason: HOSPADM

## 2024-07-22 RX ORDER — ASPIRIN 300 MG/1
300 SUPPOSITORY RECTAL DAILY
Status: DISCONTINUED | OUTPATIENT
Start: 2024-07-22 | End: 2024-07-22

## 2024-07-22 RX ORDER — CLOPIDOGREL BISULFATE 75 MG/1
75 TABLET ORAL DAILY
Status: DISCONTINUED | OUTPATIENT
Start: 2024-07-23 | End: 2024-07-24 | Stop reason: HOSPADM

## 2024-07-22 RX ORDER — LEVOTHYROXINE SODIUM 0.05 MG/1
50 TABLET ORAL
Status: DISCONTINUED | OUTPATIENT
Start: 2024-07-23 | End: 2024-07-24 | Stop reason: HOSPADM

## 2024-07-22 RX ORDER — SODIUM CHLORIDE 0.9 % (FLUSH) 0.9 %
10 SYRINGE (ML) INJECTION EVERY 12 HOURS SCHEDULED
Status: DISCONTINUED | OUTPATIENT
Start: 2024-07-22 | End: 2024-07-24 | Stop reason: HOSPADM

## 2024-07-22 RX ORDER — ACETAMINOPHEN 325 MG/1
650 TABLET ORAL EVERY 6 HOURS PRN
COMMUNITY

## 2024-07-22 RX ORDER — SODIUM CHLORIDE 9 MG/ML
40 INJECTION, SOLUTION INTRAVENOUS AS NEEDED
Status: DISCONTINUED | OUTPATIENT
Start: 2024-07-22 | End: 2024-07-24 | Stop reason: HOSPADM

## 2024-07-22 RX ORDER — ONDANSETRON 2 MG/ML
4 INJECTION INTRAMUSCULAR; INTRAVENOUS EVERY 6 HOURS PRN
Status: DISCONTINUED | OUTPATIENT
Start: 2024-07-22 | End: 2024-07-24 | Stop reason: HOSPADM

## 2024-07-22 RX ADMIN — ASPIRIN 325 MG: 325 TABLET ORAL at 17:35

## 2024-07-22 RX ADMIN — CETIRIZINE HYDROCHLORIDE 10 MG: 10 TABLET ORAL at 17:35

## 2024-07-22 RX ADMIN — IOPAMIDOL 95 ML: 755 INJECTION, SOLUTION INTRAVENOUS at 16:13

## 2024-07-22 NOTE — H&P
[facility]   HISTORY AND PHYSICAL    Patient Name: Maria Fernanda Gaviria  : 1923  MRN: [id number]  Primary Care Physician:  Henok Salcido MD  Date of admission: 2024    Subjective   Subjective     Chief Complaint:   Chief Complaint   Patient presents with    Dizziness         HPI:    Maria Fernanda Gaviria is a 89 y.o. female with significant past medical history of osteoarthritis, pacemaker, hyperlipidemia who presents to the ED complaining of acute difficulty walking this morning.  Patient states she tried to get out of bed but felt she was falling and veering to the left.  She was certain she would not be able to walk so remained in bed.  She denies vertigo and sensation of the room spinning.  Patient denies focal weakness or sensory changes in extremities.  She denies speech disturbance or trouble expressing words and thoughts.  She denies visual disturbance.  Symptoms have mostly resolved now.  She was able to ambulate her cane as normal while getting in the car on the way to the hospital.  She denies headache or recent injury.  She is here for further evaluation.  Reviewing the patient's medication she does not appear to be on anticoagulant or antiplatelet therapy.  Pacemaker appears to be Biotronik.    ED workup revealed an NIHS of 0, a sodium 141, potassium 4, creatinine 0.82, glucose 92, normal transaminase and T bilirubin levels.  WBC 6.33, hemoglobin 17.4, platelets 177.  CT head without contrast showed moderate to severe small vessel disease but no acute process.  MRI recommended.  ECG performed at 310 showed a rate of 77, V paced, short WI, V paced complexes, no acute ischemic changes, similar to 2023.        Review of Systems   All systems were reviewed and negative except for: That listed above    Personal History     Past Medical History:   Diagnosis Date    Arthritis     Arthritis of back     COVID-19 virus detected 2023    Disease of thyroid gland     GERD (gastroesophageal  reflux disease)     Hip arthrosis     Age related    Hyperlipidemia     Hypertension     Low back strain Age related    Osteoporosis     Restless legs syndrome 06/27/2016       Past Surgical History:   Procedure Laterality Date    CARDIAC ELECTROPHYSIOLOGY PROCEDURE N/A 02/22/2023    Procedure: Pacemaker SC new  biotronik;  Surgeon: Deny Khanna MD;  Location: [facility] CATH INVASIVE LOCATION;  Service: Cardiovascular;  Laterality: N/A;    CATARACT EXTRACTION      CHOLECYSTECTOMY WITH INTRAOPERATIVE CHOLANGIOGRAM N/A 06/06/2021    Procedure: CHOLECYSTECTOMY LAPAROSCOPIC INTRAOPERATIVE CHOLANGIOGRAM common bile duct exploration;  Surgeon: Huey Wilkinson MD;  Location: [facility] MAIN OR;  Service: General;  Laterality: N/A;    COLONOSCOPY      ERCP N/A 07/07/2021    Procedure: ENDOSCOPIC RETROGRADE CHOLANGIOPANCREATOGRAPHY WITH SPHINCTEROTOMY, BALLOON SWEEP AND STONE (2) EXTRACTION;  Surgeon: Justo Aden MD;  Location: [facility] ENDOSCOPY;  Service: Gastroenterology;  Laterality: N/A;  PRE- BILE DUCT OBSTRUCTION   POST- SAME      HYSTERECTOMY      REPLACEMENT TOTAL KNEE Right     THYROID SURGERY      TUBAL ABDOMINAL LIGATION         Family History: family history includes Arthritis in her daughter; Cancer in her father; Depression in her daughter; Heart disease in her brother, mother, and paternal grandmother; Hypertension in her daughter, father, and mother; Kidney disease in her paternal grandfather; Lung disease in her daughter; Migraines in her daughter; Stroke in her paternal grandfather; Thyroid disease in her daughter and mother. Otherwise pertinent FHx was reviewed and not pertinent to current issue.    Social History:  reports that she has quit smoking. Her smoking use included cigarettes. She has a 15 pack-year smoking history. She has been exposed to tobacco smoke. She has never used smokeless tobacco. She reports current alcohol use of about 2.0 standard drinks of alcohol per week. She reports  that she does not use drugs.    Home Medications:  acetaminophen, fexofenadine, and levothyroxine    Allergies:  Allergies   Allergen Reactions    Aspirin Nausea And Vomiting and Nausea Only    Aspirin Buf(Cacarb-Mgcarb-Mgo) Nausea And Vomiting    Latex Rash     Redness on skin       Objective   Objective     Vitals:   Temp:  [97.5 °F (36.4 °C)] 97.5 °F (36.4 °C)  Heart Rate:  [70-98] 75  Resp:  [19] 19  BP: (148-171)/(88-99) 162/88  Physical Exam    Constitutional: Awake, alert   Eyes: PERRLA, sclerae anicteric, no conjunctival injection   HENT: NCAT, mucous membranes moist   Neck: Supple, no thyromegaly, no lymphadenopathy, trachea midline   Respiratory: Clear to auscultation bilaterally, nonlabored respirations    Cardiovascular: RRR, no murmurs, rubs, or gallops, palpable pedal pulses bilaterally   Gastrointestinal: Positive bowel sounds, soft, nontender, nondistended   Musculoskeletal: No bilateral ankle edema, no clubbing or cyanosis to extremities   Psychiatric: Appropriate affect, cooperative   Neurologic: Oriented x 3, strength symmetric in all extremities, Cranial Nerves grossly intact to confrontation, speech clear   Skin: No rashes     Result Review    Result Review:  I have personally reviewed the results from the time of this admission to 7/22/2024 15:57 EDT and agree with these findings:  [x]  Laboratory list / accordion  []  Microbiology  [x]  Radiology  [x]  EKG/Telemetry   []  Cardiology/Vascular   []  Pathology  []  Old records  []  Other:        Assessment & Plan   Assessment / Plan     Brief Patient Summary:  Maria Fernanda Gaviria is a 89 y.o. female who was admitted to the observation unit to r/o CVA.  CT head in ED neg, labs baseline for patient.  Patient has pacemaker and we are unsure if this is compatible with MRI at this juncture.  We are working this issue to find out if this can be done.  NIH is 0 and patient is back to baseline at this time.    Active Hospital Problems:  Active Hospital  Problems    Diagnosis     **Stroke-like symptoms      Plan:     Ataxic gait/strokelike symptoms  -CTA head and neck  -MRI pacemaker compatible  -TTE echo with bubble study   -Serial neurochecks  -Continuous cardiac monitoring  -Neurology consultation    Hypothyroid  -Continue with Synthroid    Hypertension  -Continue with losartan          VTE Prophylaxis:  Mechanical VTE prophylaxis orders are present.        CODE STATUS:    Level Of Support Discussed With: Patient  Code Status (Patient has no pulse and is not breathing): No CPR (Do Not Attempt to Resuscitate)  Medical Interventions (Patient has pulse or is breathing): Comfort Measures    Admission Status:  I believe this patient meets observation status.    Electronically signed by Fred Walker III, PA, 07/22/24, 3:24 PM EDT.        75 minutes has been spent by [facility] providers in the care of this patient while under observation status      I have worn appropriate PPE during this patient encounter, sanitized my hands both with entering and exiting patient's room.    I have discussed plan of care with patient including advance care plan and/or surrogate decision maker.  Patient advises that their daughter/Jillian Patel will be their primary surrogate decision maker

## 2024-07-22 NOTE — ED NOTES
Pt to Er via PV from home for dizziness x this morning, pt was unable to get up and walk around without her walker. PT can now walk and dizziness is better.

## 2024-07-22 NOTE — PROGRESS NOTES
MD Attestation Note    I supervised care provided by the midlevel provider.    The OLLIE and I have discussed this patient's history, physical exam, and treatment plan. I have reviewed the documentation and personally had a face to face interaction with the patient  I affirm the documentation and agree with the treatment and plan.       My personal findings are:        Subjective:  Patient doing well, feels like balance is improved.  Getting up to the bathroom.  Still feels bad with standing.        Objective:      PHYSICAL EXAM  Vitals:    07/22/24 1437 07/22/24 1438 07/22/24 1439 07/22/24 1631   BP:   162/88 145/100   BP Location:    Left arm   Patient Position:    Sitting   Pulse: 76 75  79   Resp:    16   Temp:    97.5 °F (36.4 °C)   TempSrc:    Oral   SpO2: 97% 96%  90%   Weight:       Height:             GENERAL: no acute distress  HENT: nares patent, no nystagmus  EYES: no scleral icterus  CV: regular rhythm, normal rate  RESPIRATORY: normal effort  ABDOMEN: soft  MUSCULOSKELETAL: no deformity  NEURO: alert, moves all extremities, follows commands  PSYCH:  calm, cooperative  SKIN: warm, dry    Vital signs and nursing notes reviewed.      Assessment/ Plan:  Patient presenting with acute balance difficulty, generally feeling bad.  Ongoing all day today.  Currently improved however still persistent.  Still ambulatory with a cane.  Plan for MRI if pacemaker is compatible and neurology consultation.

## 2024-07-22 NOTE — PLAN OF CARE
Goal Outcome Evaluation:   89 year old female arrived from ED with acute stroke like symptoms for rule out. Pass dysphagia, NIHSS 0, AO x4 , RA, Standby assist with cane. NPO sips with meds. Daughter in room.
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
## 2024-07-22 NOTE — PROGRESS NOTES
Clinical Pharmacy Services: Medication History    Maria Fernanda Gaviria is a 89 y.o. female presenting to [facility] for   Chief Complaint   Patient presents with    Dizziness       She  has a past medical history of Arthritis, Arthritis of back, COVID-19 virus detected (02/25/2023), Disease of thyroid gland, GERD (gastroesophageal reflux disease), Hip arthrosis, Hyperlipidemia, Hypertension, Low back strain (Age related), Osteoporosis, and Restless legs syndrome (06/27/2016).    Allergies as of 07/22/2024 - Reviewed 07/22/2024   Allergen Reaction Noted    Aspirin Nausea And Vomiting and Nausea Only 03/24/2016    Aspirin buf(cacarb-mgcarb-mgo) Nausea And Vomiting 02/06/2012    Latex Rash 06/06/2021       Medication information was obtained from: Patient   Pharmacy and Phone Number:     Prior to Admission Medications       Prescriptions Last Dose Informant Patient Reported? Taking?    acetaminophen (TYLENOL) 325 MG tablet  Self Yes Yes    Take 2 tablets by mouth Every 6 (Six) Hours As Needed for Mild Pain.    fexofenadine (ALLEGRA) 180 MG tablet Past Month Family Member Yes Yes    Take 1 tablet by mouth Daily.    levothyroxine (SYNTHROID, LEVOTHROID) 50 MCG tablet 7/21/2024 Pharmacy No Yes    TAKE 1 TABLET EVERY MORNING    Patient taking differently:  Take 1 tablet by mouth Every Morning.              Medication notes:     This medication list is complete to the best of my knowledge as of 7/22/2024    Please call if questions.    Baron Hess  Medication History Technician  [phone]    7/22/2024 15:01 EDT

## 2024-07-22 NOTE — ED NOTES
Nursing report ED to floor  Maria Fernanda Gaviria  89 y.o.  female    HPI :  HPI (Adult)  Stated Reason for Visit: dizziness    Chief Complaint  Chief Complaint   Patient presents with    Dizziness       Admitting doctor:   Gerard Wyatt MD    Admitting diagnosis:   The encounter diagnosis was Gait disturbance.    Code status:   Current Code Status       Date Active Code Status Order ID Comments User Context       Prior            Allergies:   Aspirin, Aspirin buf(cacarb-mgcarb-mgo), and Latex    Isolation:   No active isolations    Intake and Output  No intake or output data in the 24 hours ending 07/22/24 1415    Weight:       07/22/24  1146   Weight: 48.3 kg (106 lb 7.7 oz)       Most recent vitals:   Vitals:    07/22/24 1149 07/22/24 1150 07/22/24 1151 07/22/24 1231   BP:    171/89   BP Location:       Patient Position:       Pulse: 78 78 78 76   Resp:       Temp:       SpO2: 98% 97% 99% 97%   Weight:       Height:           Active LDAs/IV Access:   Lines, Drains & Airways       Active LDAs       Name Placement date Placement time Site Days    Peripheral IV 07/22/24 1317 Right Forearm 07/22/24  1317  Forearm  less than 1                    Labs (abnormal labs have a star):   Labs Reviewed   CBC WITH AUTO DIFFERENTIAL - Abnormal; Notable for the following components:       Result Value    RBC 5.74 (*)     Hemoglobin 17.4 (*)     Hematocrit 51.9 (*)     Lymphocyte % 18.6 (*)     All other components within normal limits   COMPREHENSIVE METABOLIC PANEL   CBC AND DIFFERENTIAL    Narrative:     The following orders were created for panel order CBC & Differential.  Procedure                               Abnormality         Status                     ---------                               -----------         ------                     CBC Auto Differential[[id number]]        Abnormal            Final result                 Please view results for these tests on the individual orders.       EKG:   ECG 12 Lead Stroke  Evaluation   Preliminary Result   HEART RATE=77  bpm   RR Fvrmxage=826  ms   OR [place]=409  ms   P Horizontal Axis=141  deg   P Front Axis=267  deg   QRSD Heyplfbi=246  ms   QT Tdwsvdzb=731  ms   JTbA=465  ms   QRS Axis=-86  deg   T Wave Axis=84  deg   - ABNORMAL ECG -   Sinus or ectopic atrial rhythm   Ventricular premature complex   Short OR interval   Right bundle branch block   Left ventricular hypertrophy   Date and Time of Study:2024-07-22 13:10:20          Meds given in ED:   Medications   sodium chloride 0.9 % flush 10 mL (has no administration in time range)       Imaging results:  No radiology results for the last day    Ambulatory status:   - up with standby    Social issues:   Social History     Socioeconomic History    Marital status:    Tobacco Use    Smoking status: Former     Current packs/day: 1.00     Average packs/day: 1 pack/day for 15.0 years (15.0 ttl pk-yrs)     Types: Cigarettes     Passive exposure: Past    Smokeless tobacco: Never    Tobacco comments:     ?   Vaping Use    Vaping status: Never Used   Substance and Sexual Activity    Alcohol use: Yes     Alcohol/week: 2.0 standard drinks of alcohol     Types: 2 Glasses of wine per week     Comment: ?    Drug use: No    Sexual activity: Not Currently     Comment: ?       Peripheral Neurovascular  Peripheral Neurovascular (Adult)  Peripheral Neurovascular WDL: WDL    Neuro Cognitive  Neuro Cognitive (Adult)  Cognitive/Neuro/Behavioral WDL: WDL  Pupils  Pupil PERRLA: yes  [place] Coma Scale  Best Eye Response: 4-->(E4) spontaneous  Best Motor Response: 6-->(M6) obeys commands  Best Verbal Response: 5-->(V5) oriented  [place] Coma Scale Score: 15    Learning  Learning Assessment (Adult)  Learning Readiness and Ability: no barriers identified    Respiratory  Respiratory WDL  Respiratory WDL: WDL    Abdominal Pain       Pain Assessments  Pain (Adult)  (0-10) Pain Rating: Rest: 0    NIH Stroke Scale       Ambrosio Martin RN  07/22/24  14:15 EDT

## 2024-07-23 ENCOUNTER — APPOINTMENT (OUTPATIENT)
Dept: CARDIOLOGY | Facility: HOSPITAL | Age: 89
End: 2024-07-23
Payer: MEDICARE

## 2024-07-23 LAB
ALBUMIN SERPL-MCNC: 3.8 G/DL (ref 3.5–5.2)
ALBUMIN/GLOB SERPL: 1.7 G/DL
ALP SERPL-CCNC: 68 U/L (ref 39–117)
ALT SERPL W P-5'-P-CCNC: 10 U/L (ref 1–33)
ANION GAP SERPL CALCULATED.3IONS-SCNC: 9 MMOL/L (ref 5–15)
AST SERPL-CCNC: 24 U/L (ref 1–32)
BH CV ECHO MEAS - ACS: 1.33 CM
BH CV ECHO MEAS - AI P1/2T: 2078 MSEC
BH CV ECHO MEAS - AO MAX PG: 3.6 MMHG
BH CV ECHO MEAS - AO MEAN PG: 1.92 MMHG
BH CV ECHO MEAS - AO ROOT DIAM: 3.5 CM
BH CV ECHO MEAS - AO V2 MAX: 95 CM/SEC
BH CV ECHO MEAS - AO V2 VTI: 17.6 CM
BH CV ECHO MEAS - AVA(I,D): 2.5 CM2
BH CV ECHO MEAS - EDV(CUBED): 35.8 ML
BH CV ECHO MEAS - EDV(MOD-SP2): 80 ML
BH CV ECHO MEAS - EDV(MOD-SP4): 64 ML
BH CV ECHO MEAS - EF(MOD-BP): 62.6 %
BH CV ECHO MEAS - EF(MOD-SP2): 62.5 %
BH CV ECHO MEAS - EF(MOD-SP4): 60.9 %
BH CV ECHO MEAS - ESV(CUBED): 13.3 ML
BH CV ECHO MEAS - ESV(MOD-SP2): 30 ML
BH CV ECHO MEAS - ESV(MOD-SP4): 25 ML
BH CV ECHO MEAS - FS: 28.1 %
BH CV ECHO MEAS - IVS/LVPW: 1.09 CM
BH CV ECHO MEAS - IVSD: 1.07 CM
BH CV ECHO MEAS - LAT PEAK E' VEL: 5 CM/SEC
BH CV ECHO MEAS - LV DIASTOLIC VOL/BSA (35-75): 45.5 CM2
BH CV ECHO MEAS - LV MASS(C)D: 97.8 GRAMS
BH CV ECHO MEAS - LV MAX PG: 1.95 MMHG
BH CV ECHO MEAS - LV MEAN PG: 0.71 MMHG
BH CV ECHO MEAS - LV SYSTOLIC VOL/BSA (12-30): 17.8 CM2
BH CV ECHO MEAS - LV V1 MAX: 69.8 CM/SEC
BH CV ECHO MEAS - LV V1 VTI: 14.4 CM
BH CV ECHO MEAS - LVIDD: 3.3 CM
BH CV ECHO MEAS - LVIDS: 2.37 CM
BH CV ECHO MEAS - LVOT AREA: 3.1 CM2
BH CV ECHO MEAS - LVOT DIAM: 1.99 CM
BH CV ECHO MEAS - LVPWD: 0.98 CM
BH CV ECHO MEAS - MED PEAK E' VEL: 5.2 CM/SEC
BH CV ECHO MEAS - MV A DUR: 0.1 SEC
BH CV ECHO MEAS - MV A MAX VEL: 107.7 CM/SEC
BH CV ECHO MEAS - MV DEC SLOPE: 310.1 CM/SEC2
BH CV ECHO MEAS - MV DEC TIME: 0.21 SEC
BH CV ECHO MEAS - MV E MAX VEL: 63.7 CM/SEC
BH CV ECHO MEAS - MV E/A: 0.59
BH CV ECHO MEAS - MV MAX PG: 4.6 MMHG
BH CV ECHO MEAS - MV MEAN PG: 1.42 MMHG
BH CV ECHO MEAS - MV P1/2T: 71.8 MSEC
BH CV ECHO MEAS - MV V2 VTI: 23.6 CM
BH CV ECHO MEAS - MVA(P1/2T): 3.1 CM2
BH CV ECHO MEAS - MVA(VTI): 1.89 CM2
BH CV ECHO MEAS - PA ACC TIME: 0.11 SEC
BH CV ECHO MEAS - PA V2 MAX: 58.9 CM/SEC
BH CV ECHO MEAS - PULM A REVS DUR: 0.11 SEC
BH CV ECHO MEAS - PULM A REVS VEL: 33.5 CM/SEC
BH CV ECHO MEAS - PULM DIAS VEL: 24 CM/SEC
BH CV ECHO MEAS - PULM S/D: 1.28
BH CV ECHO MEAS - PULM SYS VEL: 30.7 CM/SEC
BH CV ECHO MEAS - QP/QS: 1.28
BH CV ECHO MEAS - RAP SYSTOLE: 3 MMHG
BH CV ECHO MEAS - RV MAX PG: 2.12 MMHG
BH CV ECHO MEAS - RV V1 MAX: 72.8 CM/SEC
BH CV ECHO MEAS - RV V1 VTI: 12.5 CM
BH CV ECHO MEAS - RVOT DIAM: 2.41 CM
BH CV ECHO MEAS - RVSP: 22 MMHG
BH CV ECHO MEAS - SV(LVOT): 44.5 ML
BH CV ECHO MEAS - SV(MOD-SP2): 50 ML
BH CV ECHO MEAS - SV(MOD-SP4): 39 ML
BH CV ECHO MEAS - SV(RVOT): 56.8 ML
BH CV ECHO MEAS - SVI(LVOT): 31.6 ML/M2
BH CV ECHO MEAS - SVI(MOD-SP2): 35.5 ML/M2
BH CV ECHO MEAS - SVI(MOD-SP4): 27.7 ML/M2
BH CV ECHO MEAS - TAPSE (>1.6): 2.12 CM
BH CV ECHO MEAS - TR MAX PG: 18.9 MMHG
BH CV ECHO MEAS - TR MAX VEL: 217.2 CM/SEC
BH CV ECHO MEASUREMENTS AVERAGE E/E' RATIO: 12.49
BH CV ECHO SHUNT ASSESSMENT PERFORMED (HIDDEN SCRIPTING): 1
BH CV XLRA - TDI S': 11.4 CM/SEC
BILIRUB SERPL-MCNC: 0.8 MG/DL (ref 0–1.2)
BUN SERPL-MCNC: 13 MG/DL (ref 8–23)
BUN/CREAT SERPL: 18.1 (ref 7–25)
CALCIUM SPEC-SCNC: 9.3 MG/DL (ref 8.2–9.6)
CHLORIDE SERPL-SCNC: 105 MMOL/L (ref 98–107)
CO2 SERPL-SCNC: 25 MMOL/L (ref 22–29)
CREAT SERPL-MCNC: 0.72 MG/DL (ref 0.57–1)
DEPRECATED RDW RBC AUTO: 44.3 FL (ref 37–54)
EGFRCR SERPLBLD CKD-EPI 2021: 74.3 ML/MIN/1.73
ERYTHROCYTE [DISTWIDTH] IN BLOOD BY AUTOMATED COUNT: 13.3 % (ref 12.3–15.4)
GLOBULIN UR ELPH-MCNC: 2.3 GM/DL
GLUCOSE BLDC GLUCOMTR-MCNC: 145 MG/DL (ref 70–130)
GLUCOSE BLDC GLUCOMTR-MCNC: 78 MG/DL (ref 70–130)
GLUCOSE BLDC GLUCOMTR-MCNC: 79 MG/DL (ref 70–130)
GLUCOSE SERPL-MCNC: 87 MG/DL (ref 65–99)
HCT VFR BLD AUTO: 47.4 % (ref 34–46.6)
HGB BLD-MCNC: 15.9 G/DL (ref 12–15.9)
MCH RBC QN AUTO: 30.5 PG (ref 26.6–33)
MCHC RBC AUTO-ENTMCNC: 33.5 G/DL (ref 31.5–35.7)
MCV RBC AUTO: 90.8 FL (ref 79–97)
PLATELET # BLD AUTO: 159 10*3/MM3 (ref 140–450)
PMV BLD AUTO: 9.4 FL (ref 6–12)
POTASSIUM SERPL-SCNC: 3.4 MMOL/L (ref 3.5–5.2)
PROT SERPL-MCNC: 6.1 G/DL (ref 6–8.5)
QT INTERVAL: 464 MS
QTC INTERVAL: 526 MS
RBC # BLD AUTO: 5.22 10*6/MM3 (ref 3.77–5.28)
SINUS: 3.1 CM
SODIUM SERPL-SCNC: 139 MMOL/L (ref 136–145)
STJ: 2.7 CM
WBC NRBC COR # BLD AUTO: 5.8 10*3/MM3 (ref 3.4–10.8)

## 2024-07-23 PROCEDURE — 93306 TTE W/DOPPLER COMPLETE: CPT | Performed by: INTERNAL MEDICINE

## 2024-07-23 PROCEDURE — 80053 COMPREHEN METABOLIC PANEL: CPT | Performed by: PHYSICIAN ASSISTANT

## 2024-07-23 PROCEDURE — 82948 REAGENT STRIP/BLOOD GLUCOSE: CPT

## 2024-07-23 PROCEDURE — 85027 COMPLETE CBC AUTOMATED: CPT | Performed by: PHYSICIAN ASSISTANT

## 2024-07-23 PROCEDURE — 93306 TTE W/DOPPLER COMPLETE: CPT

## 2024-07-23 PROCEDURE — 25810000003 SODIUM CHLORIDE 0.9 % SOLUTION: Performed by: PHYSICIAN ASSISTANT

## 2024-07-23 PROCEDURE — 25010000002 POTASSIUM CHLORIDE 10 MEQ/100ML SOLUTION: Performed by: PHYSICIAN ASSISTANT

## 2024-07-23 PROCEDURE — G0378 HOSPITAL OBSERVATION PER HR: HCPCS

## 2024-07-23 PROCEDURE — 97162 PT EVAL MOD COMPLEX 30 MIN: CPT

## 2024-07-23 PROCEDURE — 25510000001 PERFLUTREN (DEFINITY) 8.476 MG IN SODIUM CHLORIDE (PF) 0.9 % 10 ML INJECTION: Performed by: PHYSICIAN ASSISTANT

## 2024-07-23 PROCEDURE — 97530 THERAPEUTIC ACTIVITIES: CPT

## 2024-07-23 RX ORDER — POTASSIUM CHLORIDE 7.45 MG/ML
10 INJECTION INTRAVENOUS
Status: DISCONTINUED | OUTPATIENT
Start: 2024-07-23 | End: 2024-07-23

## 2024-07-23 RX ORDER — POTASSIUM CHLORIDE 750 MG/1
10 TABLET, FILM COATED, EXTENDED RELEASE ORAL EVERY 4 HOURS
Status: COMPLETED | OUTPATIENT
Start: 2024-07-23 | End: 2024-07-23

## 2024-07-23 RX ADMIN — POTASSIUM CHLORIDE 10 MEQ: 750 TABLET, EXTENDED RELEASE ORAL at 17:52

## 2024-07-23 RX ADMIN — POTASSIUM CHLORIDE 10 MEQ: 750 TABLET, EXTENDED RELEASE ORAL at 11:06

## 2024-07-23 RX ADMIN — ATORVASTATIN CALCIUM 40 MG: 20 TABLET, FILM COATED ORAL at 20:53

## 2024-07-23 RX ADMIN — ACETAMINOPHEN 325MG 650 MG: 325 TABLET ORAL at 08:14

## 2024-07-23 RX ADMIN — LEVOTHYROXINE SODIUM 50 MCG: 50 TABLET ORAL at 06:51

## 2024-07-23 RX ADMIN — POTASSIUM CHLORIDE 10 MEQ: 7.46 INJECTION, SOLUTION INTRAVENOUS at 06:51

## 2024-07-23 RX ADMIN — Medication 10 ML: at 21:34

## 2024-07-23 RX ADMIN — PERFLUTREN 2 ML: 6.52 INJECTION, SUSPENSION INTRAVENOUS at 09:53

## 2024-07-23 RX ADMIN — SODIUM CHLORIDE 500 ML: 9 INJECTION, SOLUTION INTRAVENOUS at 12:06

## 2024-07-23 RX ADMIN — CLOPIDOGREL BISULFATE 75 MG: 75 TABLET, FILM COATED ORAL at 08:14

## 2024-07-23 RX ADMIN — CETIRIZINE HYDROCHLORIDE 10 MG: 10 TABLET ORAL at 08:14

## 2024-07-23 RX ADMIN — Medication 10 ML: at 08:14

## 2024-07-23 NOTE — THERAPY EVALUATION
Patient Name: Maria Fernanda Gaviria  : 1923    MRN: [id number]                              Today's Date: 2024       Admit Date: 2024    Visit Dx:     ICD-10-CM ICD-9-CM   1. Gait disturbance  R26.9 781.2     Patient Active Problem List   Diagnosis    Osteoarthritis of lumbar spine    Osteoarthritis    Asthma    Carotid stenosis    Hyperlipidemia    GERD (gastroesophageal reflux disease)    Essential hypertension    Hypothyroidism (acquired)    Osteopenia    DNR (do not resuscitate)    Non-seasonal allergic rhinitis    AV block    Presence of cardiac pacemaker    Stroke-like symptoms     Past Medical History:   Diagnosis Date    Arthritis     Arthritis of back     COVID-19 virus detected 2023    Disease of thyroid gland     GERD (gastroesophageal reflux disease)     Hip arthrosis     Age related    Hyperlipidemia     Hypertension     Low back strain Age related    Osteoporosis     Restless legs syndrome 2016     Past Surgical History:   Procedure Laterality Date    CARDIAC ELECTROPHYSIOLOGY PROCEDURE N/A 2023    Procedure: Pacemaker SC new  biotronik;  Surgeon: Deny Khanna MD;  Location: [facility] CATH INVASIVE LOCATION;  Service: Cardiovascular;  Laterality: N/A;    CATARACT EXTRACTION      CHOLECYSTECTOMY WITH INTRAOPERATIVE CHOLANGIOGRAM N/A 2021    Procedure: CHOLECYSTECTOMY LAPAROSCOPIC INTRAOPERATIVE CHOLANGIOGRAM common bile duct exploration;  Surgeon: Huey Wilkinson MD;  Location: [facility] OR;  Service: General;  Laterality: N/A;    COLONOSCOPY      ERCP N/A 2021    Procedure: ENDOSCOPIC RETROGRADE CHOLANGIOPANCREATOGRAPHY WITH SPHINCTEROTOMY, BALLOON SWEEP AND STONE (2) EXTRACTION;  Surgeon: Justo Aden MD;  Location: [facility] ENDOSCOPY;  Service: Gastroenterology;  Laterality: N/A;  PRE- BILE DUCT OBSTRUCTION   POST- SAME      HYSTERECTOMY      REPLACEMENT TOTAL KNEE Right     THYROID SURGERY      TUBAL ABDOMINAL LIGATION        General  Information       Row Name 07/23/24 1547          Physical Therapy Time and Intention    Document Type evaluation  -CB     Mode of Treatment individual therapy;physical therapy  -CB       Row Name 07/23/24 1547          General Information    Patient Profile Reviewed yes  -CB     Prior Level of Function independent:;gait;transfer;bed mobility  rwx or SPC  -CB     Existing Precautions/Restrictions fall  -CB     Barriers to Rehab none identified  -CB       Row Name 07/23/24 1547          Living Environment    People in Home alone  family to stay at discharge  -CB       Row Name 07/23/24 1547          Home Main Entrance    Number of Stairs, Main Entrance three  -CB     Stair Railings, Main Entrance railings safe and in good condition  -CB       Row Name 07/23/24 1547          Cognition    Orientation Status (Cognition) oriented to;person  July. thinks she is at a police station and does not know the year  -CB       Row Name 07/23/24 1547          Safety Issues, Functional Mobility    Safety Issues Affecting Function (Mobility) insight into deficits/self-awareness  -CB     Impairments Affecting Function (Mobility) endurance/activity tolerance;balance;pain;sensation/sensory awareness;cognition  -CB               User Key  (r) = Recorded By, (t) = Taken By, (c) = Cosigned By      Initials Name Provider Type    CB Mariya Syed PT Physical Therapist                   Mobility       Row Name 07/23/24 1551          Bed Mobility    Bed Mobility supine-sit;sit-supine  -CB     Supine-Sit [place] (Bed Mobility) standby assist;verbal cues  -CB     Sit-Supine [place] (Bed Mobility) standby assist;verbal cues  -CB       Row Name 07/23/24 1551          Sit-Stand Transfer    Sit-Stand [place] (Transfers) contact guard;verbal cues  -CB     Assistive Device (Sit-Stand Transfers) cane, straight  -CB       Row Name 07/23/24 1551          Gait/Stairs (Locomotion)    [place] Level (Gait) contact guard;minimum assist  (75% patient effort)  -CB     Assistive Device (Gait) cane, straight  and HHA  -CB     Distance in Feet (Gait) 30  -CB     Deviations/Abnormal Patterns (Gait) gait speed decreased;stride length decreased;base of support, narrow  -CB     Bilateral Gait Deviations forward flexed posture;heel strike decreased  -CB     Comment, (Gait/Stairs) 1 LOB and assist to correct  -CB               User Key  (r) = Recorded By, (t) = Taken By, (c) = Cosigned By      Initials Name Provider Type    CB Mariya Syed, PT Physical Therapist                   Obj/Interventions       Row Name 07/23/24 1556          Range of Motion Comprehensive    General Range of Motion bilateral lower extremity ROM WFL  -CB       Row Name 07/23/24 1556          Strength Comprehensive (MMT)    Comment, General Manual Muscle Testing (MMT) Assessment BLE 4/5; no asymmetry noted;  equal but weak  -CB       Row Name 07/23/24 1556          Balance    Balance Assessment standing static balance;standing dynamic balance  -CB     Static Standing Balance contact guard  -CB     Dynamic Standing Balance contact guard;minimal assist  -CB     Position/Device Used, Standing Balance supported;cane, straight  -CB     Balance Interventions sitting;standing;sit to stand;supported;static;dynamic;minimal challenge  -CB       Row Name 07/23/24 1556          Sensory Assessment (Somatosensory)    Sensory Assessment (Somatosensory) --  L UE tingling per pt report  -CB               User Key  (r) = Recorded By, (t) = Taken By, (c) = Cosigned By      Initials Name Provider Type    Mariya Bailey, PT Physical Therapist                   Goals/Plan       Row Name 07/23/24 1558          Bed Mobility Goal 1 (PT)    Activity/Assistive Device (Bed Mobility Goal 1, PT) bed mobility activities, all  -CB     [place] Level/Cues Needed (Bed Mobility Goal 1, PT) modified independence  -CB     Time Frame (Bed Mobility Goal 1, PT) long term goal (LTG);1 week  -CB       Row Name  07/23/24 1556          Transfer Goal 1 (PT)    Activity/Assistive Device (Transfer Goal 1, PT) sit-to-stand/stand-to-sit;bed-to-chair/chair-to-bed;walker, rolling  -CB     [place] Level/Cues Needed (Transfer Goal 1, PT) standby assist  -CB     Time Frame (Transfer Goal 1, PT) long term goal (LTG);1 week  -CB       Row Name 07/23/24 1552          Gait Training Goal 1 (PT)    Activity/Assistive Device (Gait Training Goal 1, PT) gait (walking locomotion);assistive device use;increase endurance/gait distance;improve balance and speed;decrease fall risk;diminish gait deviation;walker, rolling  -CB     [place] Level (Gait Training Goal 1, PT) standby assist  -CB     Distance (Gait Training Goal 1, PT) 100ft  -CB     Time Frame (Gait Training Goal 1, PT) long term goal (LTG);1 week  -CB       Row Name 07/23/24 7792          Therapy Assessment/Plan (PT)    Planned Therapy Interventions (PT) balance training;bed mobility training;gait training;home exercise program;patient/family education;transfer training;strengthening  -CB               User Key  (r) = Recorded By, (t) = Taken By, (c) = Cosigned By      Initials Name Provider Type    Mariya Bailey, PT Physical Therapist                   Clinical Impression       Row Name 07/23/24 2408          Pain    Pretreatment Pain Rating 10/10  -CB     Posttreatment Pain Rating 10/10  -CB     Pain Location - back  -CB     Pain Intervention(s) Repositioned;Rest;Ambulation/increased activity  -CB       Row Name 07/23/24 2646          Plan of Care Review    Plan of Care Reviewed With patient;daughter  -CB     Outcome Evaluation Patient is a 89 yo female who presented with stroke like symptoms. PMHx includes OA, hyperlipidemia. Pt lives alone and uses rwx or SPC at baseline. PT rec use rwx at [place]. She has 2-3 JULIA home and [place]. She presents today with confusion, decreased balance, and decreased activity tolerance. She reports LUE tingling but  then states later she does not have tingling. 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 She completed bed mobility requiring SBA, STS to SPC requiring CGA, and ambulated 30ft using SPC and HHA requiring CGA-Nevaeh. Pt had 1 LOB and PT assisted to correct. PT rec use rwx at [place]. Will continue to progress as pt tolerates.  -CB       Row Name 07/23/24 1555          Therapy Assessment/Plan (PT)    Rehab Potential (PT) good, to achieve stated therapy goals  -CB     Criteria for Skilled Interventions Met (PT) yes  -CB     Therapy Frequency (PT) 5 times/wk  -CB       Row Name 07/23/24 1554          Positioning and Restraints    Pre-Treatment Position in bed  -CB     Post Treatment Position bed  -CB     In Bed notified nsg;fowlers;call light within reach;encouraged to call for assist;exit alarm on;side rails up x3;with family/caregiver  -CB               User Key  (r) = Recorded By, (t) = Taken By, (c) = Cosigned By      Initials Name Provider Type    Mariya Bailey, PT Physical Therapist                   Outcome Measures       Row Name 07/23/24 1600 07/23/24 0805       How much help from another person do you currently need...    Turning from your back to your side while in flat bed without using bedrails? 3  -CB 4  -DS    Moving from lying on back to sitting on the side of a flat bed without bedrails? 3  -CB 4  -DS    Moving to and from a bed to a chair (including a wheelchair)? 3  -CB 4  -DS    Standing up from a chair using your arms (e.g., wheelchair, bedside chair)? 3  -CB 3  -DS    Climbing 3-5 steps with a railing? 2  -CB 3  -DS    To walk in hospital room? 3  -CB 3  -DS    AM-PAC 6 Clicks Score (PT) 17  -CB 21  -DS    Highest Level of Mobility Goal 5 --> Static standing  -CB 6 --> Walk 10 steps or more  -DS      Row Name 07/23/24 1600          Modified [place] Scale    Modified [place] Scale 4 - Moderately severe disability.  Unable to walk without assistance,  and unable to attend to own bodily needs without assistance.  -CB       Row Name 07/23/24 1600          Functional Assessment    Outcome Measure Options AM-PAC 6 Clicks Basic Mobility (PT);Modified [place]  -CB               User Key  (r) = Recorded By, (t) = Taken By, (c) = Cosigned By      Initials Name Provider Type    CB Mariya Syed, TERRA Physical Therapist    Mery John RN Registered Nurse                                 Physical Therapy Education       Title: PT OT SLP Therapies (In Progress)       Topic: Physical Therapy (In Progress)       Point: Mobility training (In Progress)       Learning Progress Summary             Patient Acceptance, E,TB, NR by CB at 7/23/2024 1602                         Point: Home exercise program (Not Started)       Learner Progress:  Not documented in this visit.              Point: Body mechanics (In Progress)       Learning Progress Summary             Patient Acceptance, E,TB, NR by CB at 7/23/2024 1602                         Point: Precautions (In Progress)       Learning Progress Summary             Patient Acceptance, E,TB, NR by CB at 7/23/2024 1602                                         User Key       Initials Effective Dates Name Provider Type Discipline     10/22/21 -  Mariya Syed, PT Physical Therapist PT                  PT Recommendation and Plan  Planned Therapy Interventions (PT): balance training, bed mobility training, gait training, home exercise program, patient/family education, transfer training, strengthening  Plan of Care Reviewed With: patient, daughter  Outcome Evaluation: Patient is a 89 yo femal who presented with stroke like symptoms. PMHx includes OA, hyperlipidemia. Pt lives alone and uses rwx or SPC at baseline. PT rec use rwx at [place]. She has 2-3 [place] home and family plans to stay with pt at [place]. She presents today with confusion, decreased balance, and decreased activity tolerance. She reports LUE tingling but then states later she  does not have tingling. 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 She completed bed mobility requiring SBA, STS to SPC requiring CGA, and ambulated 30ft using SPC and HHA requiring CGA-Nevaeh. Pt had 1 LOB and PT assisted to correct. PT rec use rwx at [place]. Will continue to progress as pt tolerates.     Time Calculation:         PT Charges       Row Name 07/23/24 1609             Time Calculation    Start Time 1546  -CB      Stop Time 1607  -CB      Time Calculation (min) 21 min  -CB      PT Received On 07/23/24  -CB      PT - Next Appointment 07/24/24  -CB      PT Goal Re-Cert Due Date 07/30/24  -CB         Time Calculation- PT    Total Timed Code Minutes- PT 10 minute(s)  -CB         Timed Charges    36604 - PT Therapeutic Activity Minutes 10  -CB         Total Minutes    Timed Charges Total Minutes 10  -CB       Total Minutes 10  -CB                User Key  (r) = Recorded By, (t) = Taken By, (c) = Cosigned By      Initials Name Provider Type    CB Mariya Syed, PT Physical Therapist                  Therapy Charges for Today       Code Description Service Date Service Provider Modifiers Qty    31105257419  PT THERAPEUTIC ACT EA 15 MIN 7/23/2024 Mariya Syed, PT GP 1    [id number] HC PT EVAL MOD COMPLEXITY 3 7/23/2024 Mariya Syed, PT GP 1            PT G-Codes  Outcome Measure Options: AM-PAC 6 Clicks Basic Mobility (PT), Modified [place]  AM-PAC 6 Clicks Score (PT): 17  Modified [place] Scale: 4 - Moderately severe disability.  Unable to walk without assistance, and unable to attend to own bodily needs without assistance.  PT Discharge Summary  Anticipated Discharge Disposition (PT): skilled nursing facility, home with home health, home with 24/7 care    Mariya Syed PT  7/23/2024

## 2024-07-23 NOTE — PLAN OF CARE
Goal Outcome Evaluation:  Plan of Care Reviewed With: patient           Outcome Evaluation: oriented w/periods of confusion. No c/o pain, orthostatics 7/23 in the morning, electrolyte replacement added, x1 assist w/cane to the bathroom, and a consult to neuro.

## 2024-07-23 NOTE — PROGRESS NOTES
ED OBSERVATION PROGRESS/DISCHARGE SUMMARY    Date of Admission: 7/22/2024   LOS: 0 days   PCP: Henok Salcido MD    Final Diagnosis pending      Subjective     Hospital Outcome: Pending    Maria Fernanda Gaviria is a 89 y.o. female with significant past medical history of osteoarthritis, pacemaker, hyperlipidemia who presents to the ED complaining of acute difficulty walking this morning.  Patient states she tried to get out of bed but felt she was falling and veering to the left.  She was certain she would not be able to walk so remained in bed.  She denies vertigo and sensation of the room spinning.  Patient denies focal weakness or sensory changes in extremities.  She denies speech disturbance or trouble expressing words and thoughts.  She denies visual disturbance.  Symptoms have mostly resolved now.  She was able to ambulate her cane as normal while getting in the car on the way to the hospital.  She denies headache or recent injury.  She is here for further evaluation.  Reviewing the patient's medication she does not appear to be on anticoagulant or antiplatelet therapy.  Pacemaker appears to be Biotronik.     ED workup revealed an NIHS of 0, a sodium 141, potassium 4, creatinine 0.82, glucose 92, normal transaminase and T bilirubin levels.  WBC 6.33, hemoglobin 17.4, platelets 177.  CT head without contrast showed moderate to severe small vessel disease but no acute process.  MRI recommended.  ECG performed at 310 showed a rate of 77, V paced, short WV, V paced complexes, no acute ischemic changes, similar to 7/23/2023.    ROS:  General: no fevers, chills  Respiratory: no cough, dyspnea  Cardiovascular: no chest pain, palpitations  Abdomen: No abdominal pain, nausea, vomiting, or diarrhea  Neurologic: No focal weakness    7/23/2024.      6:09 PM.  Patient cleared by neurology for discharge from their standpoint.  Her symptoms were felt to be more related to orthostasis.  Patient has had a few bouts of transient  acute hospital induced delirium upon awakening.  She wakes up to briefly be confused for 30 to 45 minutes before she she becomes oriented back to baseline.  Family is concerned about the patient going home.  They would like to stay overnight and see about placement in an SNF for safety purposes.  Physical therapy continues to follow.    Objective   Physical Exam:  I have reviewed the vital signs.  Temp:  [97.7 °F (36.5 °C)-98.2 °F (36.8 °C)] 97.8 °F (36.6 °C)  Heart Rate:  [70-94] 93  Resp:  [18] 18  BP: ()/(54-95) 161/91  General Appearance:    Alert, cooperative, no distress  Head:    Normocephalic, atraumatic  Eyes:    Sclerae anicteric  Neck:   Supple, no mass  Lungs: Clear to auscultation bilaterally, respirations unlabored  Heart: Regular rate and rhythm, S1 and S2 normal, no murmur, rub or gallop  Abdomen:  Soft, non-tender, bowel sounds active, nondistended  Extremities: No clubbing, cyanosis, or edema to lower extremities  Pulses:  2+ and symmetric in distal lower extremities  Skin: No rashes   Neurologic: Oriented x3, Normal strength to extremities    Results Review:    I have reviewed the labs, radiology results and diagnostic studies.    Results from last 7 days   Lab Units 07/23/24  0441   WBC 10*3/mm3 5.80   HEMOGLOBIN g/dL 15.9   HEMATOCRIT % 47.4*   PLATELETS 10*3/mm3 159     Results from last 7 days   Lab Units 07/23/24  0441 07/22/24  1304   SODIUM mmol/L 139 141   POTASSIUM mmol/L 3.4* 4.0   CHLORIDE mmol/L 105 103   CO2 mmol/L 25.0 25.0   BUN mg/dL 13 11   CREATININE mg/dL 0.72 0.82   CALCIUM mg/dL 9.3 9.5   BILIRUBIN mg/dL 0.8 0.7   ALK PHOS U/L 68 83   ALT (SGPT) U/L 10 14   AST (SGOT) U/L 24 29   GLUCOSE mg/dL 87 92     Imaging Results (Last 24 Hours)       ** No results found for the last 24 hours. **            I have reviewed the medications.  ---------------------------------------------------------------------------------------------  Assessment & Plan   Assessment/Problem List     Stroke-like symptoms      Plan:    Ataxic gait/strokelike symptoms  -CTA head and neck negative  -MRI pacemaker compatible pending  -TTE echo with bubble study   -Serial neurochecks  -Continuous cardiac monitoring  -Neurology has cleared.  The patient's symptoms felt to be more orthostasis related.  -Family concern for safety purposes.  Plan as above     Hypothyroid  -Continue with Synthroid     Hypertension  -Continue with losartan    VTE Prophylaxis:  Mechanical VTE prophylaxis orders are present.    Disposition: Pending    Follow-up after Discharge: Pending    This note will serve as a progress note    Fred Walker III, PA 07/23/24 18:12 EDT    I have worn appropriate PPE during this patient encounter, sanitized my hands both with entering and exiting patient's room.      51 minutes has been spent by [facility] providers in the care of this patient while under observation status

## 2024-07-23 NOTE — PLAN OF CARE
Problem: Adult Inpatient Plan of Care  Goal: Plan of Care Review  Outcome: Ongoing, Progressing  Goal: Patient-Specific Goal (Individualized)  Outcome: Ongoing, Progressing  Goal: Absence of Hospital-Acquired Illness or Injury  Outcome: Ongoing, Progressing  Intervention: Identify and Manage Fall Risk  Recent Flowsheet Documentation  Taken 7/23/2024 1800 by Mery Milligan RN  Safety Promotion/Fall Prevention: room organization consistent  Taken 7/23/2024 1400 by Mery Milligan RN  Safety Promotion/Fall Prevention: room organization consistent  Taken 7/23/2024 1205 by Mery Milligan RN  Safety Promotion/Fall Prevention: room organization consistent  Taken 7/23/2024 1000 by Mery Milligan RN  Safety Promotion/Fall Prevention: patient off unit  Taken 7/23/2024 0805 by Mery Milligan RN  Safety Promotion/Fall Prevention:   safety round/check completed   room organization consistent  Intervention: Prevent Skin Injury  Recent Flowsheet Documentation  Taken 7/23/2024 1800 by Mery Milligan RN  Body Position: position changed independently  Taken 7/23/2024 1400 by Mery Milligan RN  Body Position: position changed independently  Taken 7/23/2024 1205 by Mery Milligan RN  Body Position: position changed independently  Taken 7/23/2024 0805 by Mery Milligan RN  Body Position: position changed independently  Intervention: Prevent and Manage VTE (Venous Thromboembolism) Risk  Recent Flowsheet Documentation  Taken 7/23/2024 1800 by Mery Milligan RN  Activity Management: activity encouraged  Taken 7/23/2024 1400 by Mery Milligan RN  Activity Management: activity encouraged  Taken 7/23/2024 1205 by Mery Milligan RN  Activity Management: activity encouraged  Taken 7/23/2024 0805 by Mery Milligan RN  Activity Management: activity encouraged  VTE Prevention/Management: patient refused intervention  Range of Motion: active ROM (range of motion) encouraged  Intervention: Prevent Infection  Recent Flowsheet  Documentation  Taken 7/23/2024 1800 by Mery Milligan RN  Infection Prevention: rest/sleep promoted  Taken 7/23/2024 1400 by Mery Milligan RN  Infection Prevention: rest/sleep promoted  Taken 7/23/2024 1205 by Mery Milligan RN  Infection Prevention: rest/sleep promoted  Taken 7/23/2024 0805 by Mery Milligan RN  Infection Prevention: rest/sleep promoted  Goal: Optimal Comfort and Wellbeing  Outcome: Ongoing, Progressing  Intervention: Provide Person-Centered Care  Recent Flowsheet Documentation  Taken 7/23/2024 0805 by Mery Milligan RN  Trust Relationship/Rapport:   care explained   choices provided  Goal: Readiness for Transition of Care  Outcome: Ongoing, Progressing   Goal Outcome Evaluation:

## 2024-07-23 NOTE — PROGRESS NOTES
TAY GALO Attestation Note    I supervised care provided by the midlevel provider.    The OLLIE and I have discussed this patient's history, physical exam, and treatment plan. I have reviewed the documentation and personally had a face to face interaction with the patient  I affirm the documentation and agree with the treatment and plan. I provided a substantive portion of the care of this patient.  I personally performed the physical exam, in its entirety.  My personal findings are documented in below:    History:  Patient admitted to observation unit for workup of strokelike symptoms.  Developed ataxia symptoms and imbalance with leaning to the left when standing and walking.  Denies headache.  No new complaints this morning.  Getting ready to transfer for echocardiogram study during my evaluation    Physical Exam:  General: No acute distress.  HENT: NCAT, PERRL, Nares patent.  Eyes: no scleral icterus.  Neck: trachea midline, no ROM limitations.  CV: Pink warm and well-perfused throughout  Respiratory: No distress or increased work of breathing  Abdomen: soft, no focal tenderness or rigidity  Musculoskeletal: no deformity.  Neuro: alert, moves all extremities, follows commands.  Skin: warm, dry.    Assessment and Plan:  Ataxia: Neurology consult.  Echo and MRI brain study today.  Serial neurochecks    Hypertension: Continue losartan    Hypothyroidism: Continue levothyroxine

## 2024-07-23 NOTE — CASE MANAGEMENT/SOCIAL WORK
Discharge Planning Assessment  [facility]     Patient Name: Maria Fernanda Gaviria  MRN: [id number]  Today's Date: 7/23/2024    Admit Date: 7/22/2024    Plan: Home with family support   Discharge Needs Assessment       Row Name 07/23/24 1141       Living Environment    People in Home alone    Current Living Arrangements condominium    Potentially Unsafe Housing Conditions none    Primary Care Provided by self    Provides Primary Care For no one    Family Caregiver if Needed child(dejon), adult    Family Caregiver Names Jillian Patel [facility] [phone]    Quality of Family Relationships supportive    Able to Return to Prior Arrangements yes       Resource/Environmental Concerns    Resource/Environmental Concerns none    Transportation Concerns none       Transition Planning    Patient/Family Anticipates Transition to home    Patient/Family Anticipated Services at Transition none    Transportation Anticipated family or friend will provide       Discharge Needs Assessment    Readmission Within the Last 30 Days no previous admission in last 30 days    Equipment Currently Used at Home cane, straight;walker, standard    Concerns to be Addressed discharge planning    Anticipated Changes Related to Illness none    Equipment Needed After Discharge none    Provided Post Acute Provider List? N/A    N/A Provider List Comment No needs identified.    Current Discharge Risk lives alone                   Discharge Plan       Row Name 07/23/24 1147       Plan    Plan Home with family support    Patient/Family in Agreement with Plan yes    Plan Comments BECERRIL 7/22/24. Met with patient and daughter at bedside, patient granted me permission to speak with her while daughter remained in the room. Prior to admission patient was living alone in her own home, she has a supportive family that helps her out if needed. Patient has a cane and walker. Patient uses the [facility] on [address] and [facility], denies any issues affording or remembering to take  her medications. Patient will use [facility] at discharge. Jillian Patel [facility] [phone] is her POA. Discharge plan is to return home with family support. PT evaluation pending. Daughter will transport home. Will continue to monitor for new or changing discharge needs. Elisa ALEXIS RN CCP                  Continued Care and Services - Admitted Since 7/22/2024    No active coordination exists for this encounter.       Expected Discharge Date and Time       Expected Discharge Date Expected Discharge Time    Jul 24, 2024            Demographic Summary       Row Name 07/23/24 1139       General Information    Admission Type observation    Arrived From emergency department    Required Notices Provided Observation Status Notice    Referral Source admission list    Reason for Consult discharge planning    Preferred Language English       Contact Information    Permission Granted to Share Info With family/designee  Jillian Patel [facility] [phone]                   Functional Status       Row Name 07/23/24 1139       Functional Status    Usual Activity Tolerance good    Current Activity Tolerance good       Functional Status, IADL    Medications independent    Meal Preparation independent;assistive person    Housekeeping assistive person    Laundry independent    Shopping assistive person       Mental Status    General Appearance WDL WDL       Mental Status Summary    Recent Changes in Mental Status/Cognitive Functioning no changes       Employment/    Employment Status retired                   Psychosocial    No documentation.                  Abuse/Neglect    No documentation.                  Legal    No documentation.                  Substance Abuse    No documentation.                  Patient Forms    No documentation.                     Elisa Funez RN

## 2024-07-23 NOTE — PLAN OF CARE
Goal Outcome Evaluation:  Plan of Care Reviewed With: patient, daughter           Outcome Evaluation: Patient is a 89 yo femal who presented with stroke like symptoms. PMHx includes OA, hyperlipidemia. Pt lives alone and uses rwx or SPC at baseline. PT rec use rwx at [place]. She has 2-3 JULIA home and family plans to stay with pt at [place]. She presents today with confusion, decreased balance, and decreased activity tolerance. She reports LUE tingling but then states later she does not have tingling. 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 She completed bed mobility requiring SBA, STS to SPC requiring CGA, and ambulated 30ft using SPC and HHA requiring CGA-Nevaeh. Pt had 1 LOB and PT assisted to correct. PT rec use rwx at [place]. Will continue to progress as pt tolerates.      Anticipated Discharge Disposition (PT): skilled nursing facility, home with home health, home with 24/7 care

## 2024-07-23 NOTE — CONSULTS
Neurology Consult Note    Consult Date: 7/22/2024    Referring MD: Matthew Amos MD    Reason for Consult I have been asked to see the patient in neurological consultation to render advice and opinion regarding concern for stroke.     Maria Fernanda Gaviria is a 89 y.o. female  with PMH of HLD, HTN, hypothyroidism, osteoporosis, osteoarthritis, and RLS presents to the ED with gait imbalance this morning. Patient is unsure of what time or how long symptoms lasted. She states when she was getting out of bed she felt she was falling to her left. She was sure she was not able to walk so she did not continue to try to get up. She denies dizziness, unilateral weakness/numbness, speech or visual disturbance. She states symptoms have been resolved since before she came to the hospital. She actually was able to ambulate with her cane and drive herself to the hospital. She denies headache, N/V. She denies history of stroke or use of blood thinners. Patient vitals on arrival were 148/99 mmHg and pulse was 82 bpm. Blood glucose was 92.     Past Medical/Surgical Hx:  Past Medical History:   Diagnosis Date    Arthritis     Arthritis of back     COVID-19 virus detected 02/25/2023    Disease of thyroid gland     GERD (gastroesophageal reflux disease)     Hip arthrosis     Age related    Hyperlipidemia     Hypertension     Low back strain Age related    Osteoporosis     Restless legs syndrome 06/27/2016     Past Surgical History:   Procedure Laterality Date    CARDIAC ELECTROPHYSIOLOGY PROCEDURE N/A 02/22/2023    Procedure: Pacemaker SC new  biotronik;  Surgeon: Deny Khanna MD;  Location: [facility] INVASIVE LOCATION;  Service: Cardiovascular;  Laterality: N/A;    CATARACT EXTRACTION      CHOLECYSTECTOMY WITH INTRAOPERATIVE CHOLANGIOGRAM N/A 06/06/2021    Procedure: CHOLECYSTECTOMY LAPAROSCOPIC INTRAOPERATIVE CHOLANGIOGRAM common bile duct exploration;  Surgeon: Huey Wilkinson MD;  Location: [facility] OR;  Service:  General;  Laterality: N/A;    COLONOSCOPY      ERCP N/A 07/07/2021    Procedure: ENDOSCOPIC RETROGRADE CHOLANGIOPANCREATOGRAPHY WITH SPHINCTEROTOMY, BALLOON SWEEP AND STONE (2) EXTRACTION;  Surgeon: Justo Aden MD;  Location: [facility] ENDOSCOPY;  Service: Gastroenterology;  Laterality: N/A;  PRE- BILE DUCT OBSTRUCTION   POST- SAME      HYSTERECTOMY      REPLACEMENT TOTAL KNEE Right     THYROID SURGERY      TUBAL ABDOMINAL LIGATION         Medications On Admission  Medications Prior to Admission   Medication Sig Dispense Refill Last Dose    acetaminophen (TYLENOL) 325 MG tablet Take 2 tablets by mouth Every 6 (Six) Hours As Needed for Mild Pain.   Past Month    fexofenadine (ALLEGRA) 180 MG tablet Take 1 tablet by mouth Daily.   Past Month    levothyroxine (SYNTHROID, LEVOTHROID) 50 MCG tablet TAKE 1 TABLET EVERY MORNING (Patient taking differently: Take 1 tablet by mouth Every Morning.) 90 tablet 1 7/21/2024       Allergies:  Allergies   Allergen Reactions    Aspirin Nausea And Vomiting and Nausea Only    Aspirin Buf(Cacarb-Mgcarb-Mgo) Nausea And Vomiting    Latex Rash     Redness on skin       Social Hx:  Social History     Socioeconomic History    Marital status:    Tobacco Use    Smoking status: Former     Current packs/day: 1.00     Average packs/day: 1 pack/day for 15.0 years (15.0 ttl pk-yrs)     Types: Cigarettes     Passive exposure: Past    Smokeless tobacco: Never    Tobacco comments:     ?   Vaping Use    Vaping status: Never Used   Substance and Sexual Activity    Alcohol use: Yes     Alcohol/week: 2.0 standard drinks of alcohol     Types: 2 Glasses of wine per week     Comment: ?    Drug use: No    Sexual activity: Not Currently     Comment: ?       Family Hx:  Family History   Problem Relation Age of Onset    Hypertension Mother     Heart disease Mother     Thyroid disease Mother     Hypertension Father     Cancer Father     Heart disease Brother     Hypertension Daughter     Thyroid  "disease Daughter     Lung disease Daughter     Depression Daughter     Migraines Daughter     Arthritis Daughter     Heart disease Paternal Grandmother     Stroke Paternal Grandfather     Kidney disease Paternal Grandfather      Exam    /94 (BP Location: Left arm, Patient Position: Lying)   Pulse 83   Temp 97.9 °F (36.6 °C) (Oral)   Resp 18   Ht 149.9 cm (59.02\")   Wt 48.3 kg (106 lb 7.7 oz)   LMP  (LMP Unknown)   SpO2 97%   BMI 21.50 kg/m²   gen: NAD, vitals reviewed  MS: oriented x4, recent/remote memory intact, normal attention/concentration, repeats without error, normal comprehension and fluency, some mild word finding difficulties, no neglect  CN: visual acuity grossly normal, visual fields full, PERRL, EOMI, facial sensation equal, no facial droop, hearing symmetric, palate elevates symmetrically, shoulder shrug equal, tongue midline  Motor: 5/5 throughout upper and lower extremities, normal tone  Sensation: intact to cold temperature throughout  Coordination: no dysmetria with finger to nose bilaterally  Gait: no ataxia, normal station    DATA:    Lab Results   Component Value Date    GLUCOSE 92 07/22/2024    CALCIUM 9.5 07/22/2024     07/22/2024    K 4.0 07/22/2024    CO2 25.0 07/22/2024     07/22/2024    BUN 11 07/22/2024    CREATININE 0.82 07/22/2024    EGFRIFAFRI 83 09/22/2021    EGFRIFNONA 68 09/22/2021    BCR 13.4 07/22/2024    ANIONGAP 13.0 07/22/2024     Lab Results   Component Value Date    WBC 6.33 07/22/2024    HGB 17.4 (H) 07/22/2024    HCT 51.9 (H) 07/22/2024    MCV 90.4 07/22/2024     07/22/2024     Lab Results   Component Value Date     (H) 07/22/2024     (H) 11/22/2023    LDL 85 12/02/2022     Lab Results   Component Value Date    HGBA1C 5.30 07/22/2024     Lab Results   Component Value Date    INR 1.04 07/22/2024    INR 0.99 07/23/2023    INR 1.04 02/21/2023    PROTIME 13.8 07/22/2024    PROTIME 13.2 07/23/2023    PROTIME 13.7 02/21/2023 "       Lab review:   Labs:    Glucose 92  Hemoglobin A1c 5.3    HgB 17.4     Imaging review:   CTH:  FINDINGS: There are patchy and confluent areas of low-density extending  from the periventricular into the subcortical white matter of the  cerebral hemispheres consistent with moderate small vessel disease. The  remainder of the brain parenchyma is normal in attenuation. The  ventricles are normal in size. I see no focal mass effect. There is no  midline shift. No extra-axial fluid collections are identified. There is  no evidence of acute intracranial hemorrhage. No acute skull fracture is  identified. There has been previous paranasal sinus surgery with a right  uncinectomy and currently the paranasal sinuses and the mastoid air  cells and middle ear cavities are clear.  IMPRESSION:  1. No acute intracranial abnormality is identified. There is confluent  low-density in the cerebral white matter consistent with  moderate-to-severe small vessel disease and there is diffuse cerebral  atrophy. Patient has had a previous right uncinectomy and has bilateral  intraocular lens implants in the globes from previous bilateral cataract  surgery. The remainder of the head CT is normal with no acute completed  infarct or intracranial hemorrhage seen. The etiology of the patient's  new-onset unsteady gait and falling to the left this morning is not  established on this exam. If there remains any clinical suspicion of an  acute stroke, I recommend an MRI of the brain for more complete  assessment.    CTA head and neck:  FINDINGS:  Noncontrast CT: No CT evidence of acute territorial infarction. 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 Confluent periventricular and  subcortical white matter hypoattenuation without mass effect suggesting  advanced small vessel ischemic changes. No intracranial hemorrhage. No  midline shift or mass effect. No extra axial collections. No  hydrocephalus. Clear paranasal sinuses and mastoid  air cells.  CTA NECK: Patent major arterial vasculature.  No dissection or  pseudoaneurysm. Right dominant vertebral artery. Calcified  atherosclerotic plaque in the bilateral proximal cervical internal  carotid arteries causing approximately 10-20% stenosis bilaterally.  CTA HEAD: Patent major arterial vasculature.  No aneurysm. No abnormal  parenchymal enhancement.  IMPRESSION:  1. No acute intracranial abnormality.    Diagnoses:  Transient gait imbalance  HLD  HTN    Comment: Patient is 89 y/o female with PMH of HTN, HLD, presence of permanent pacemaker due to heart block presents to the ED with transient gait imbalance this morning and felt she was leaning to the left and felt unsafe to walk so she did not. Patient denied focal weakness/numbness, dizziness, speech or visual deficits. No focal deficits or cortical findings on exam. However, patient symptoms resolved shortly later and walked with cane and drove herself to the hospital. CTH demonstrates no acute abnormality but shows moderate small vessel disease and diffuse atrophy. CTA head and neck show no acute LVO or severe stenosis but demonstrate mild bilateral proximal ICA stenosis. Plan is for TTE and MRI brain depending if pacemaker is MRI safe.     NIHSS: 0    PLAN:   -Pt was not a candidate for tnk given due to resolution of symptoms.  -Admit to observation unit  -Plavix 75 mg daily  -Lipitor 40 mg daily  -Maintain permissive HTN for 24-48hrs, do not treat unless BP > 220/120 if no contraindication  -Perform bedside swallow test  -Telemetry to monitor for arrhythmia  -VTE prophylaxis  -Neuro checks, if change in exam call neuro oncall  -PT/OT when appropriate   -MRI brain without contrast if pacemaker is deemed MRI safe  -TTE  -orthostatic vital signs    Further recommendations to follow from Dr. Rajput.

## 2024-07-24 ENCOUNTER — READMISSION MANAGEMENT (OUTPATIENT)
Dept: CALL CENTER | Facility: HOSPITAL | Age: 89
End: 2024-07-24
Payer: MEDICARE

## 2024-07-24 VITALS
DIASTOLIC BLOOD PRESSURE: 90 MMHG | OXYGEN SATURATION: 96 % | WEIGHT: 106 LBS | RESPIRATION RATE: 18 BRPM | HEIGHT: 59 IN | SYSTOLIC BLOOD PRESSURE: 180 MMHG | TEMPERATURE: 98.4 F | BODY MASS INDEX: 21.37 KG/M2 | HEART RATE: 84 BPM

## 2024-07-24 LAB
ANION GAP SERPL CALCULATED.3IONS-SCNC: 8.5 MMOL/L (ref 5–15)
BUN SERPL-MCNC: 18 MG/DL (ref 8–23)
BUN/CREAT SERPL: 22.5 (ref 7–25)
CALCIUM SPEC-SCNC: 8.6 MG/DL (ref 8.2–9.6)
CHLORIDE SERPL-SCNC: 107 MMOL/L (ref 98–107)
CO2 SERPL-SCNC: 23.5 MMOL/L (ref 22–29)
CREAT SERPL-MCNC: 0.8 MG/DL (ref 0.57–1)
DEPRECATED RDW RBC AUTO: 43.2 FL (ref 37–54)
EGFRCR SERPLBLD CKD-EPI 2021: 65.5 ML/MIN/1.73
ERYTHROCYTE [DISTWIDTH] IN BLOOD BY AUTOMATED COUNT: 13.1 % (ref 12.3–15.4)
GLUCOSE SERPL-MCNC: 90 MG/DL (ref 65–99)
HCT VFR BLD AUTO: 44.7 % (ref 34–46.6)
HGB BLD-MCNC: 15.1 G/DL (ref 12–15.9)
MCH RBC QN AUTO: 30.7 PG (ref 26.6–33)
MCHC RBC AUTO-ENTMCNC: 33.8 G/DL (ref 31.5–35.7)
MCV RBC AUTO: 90.9 FL (ref 79–97)
PLATELET # BLD AUTO: 158 10*3/MM3 (ref 140–450)
PMV BLD AUTO: 9.9 FL (ref 6–12)
POTASSIUM SERPL-SCNC: 3.8 MMOL/L (ref 3.5–5.2)
RBC # BLD AUTO: 4.92 10*6/MM3 (ref 3.77–5.28)
SODIUM SERPL-SCNC: 139 MMOL/L (ref 136–145)
WBC NRBC COR # BLD AUTO: 6.62 10*3/MM3 (ref 3.4–10.8)

## 2024-07-24 PROCEDURE — G0378 HOSPITAL OBSERVATION PER HR: HCPCS

## 2024-07-24 PROCEDURE — 85027 COMPLETE CBC AUTOMATED: CPT | Performed by: PHYSICIAN ASSISTANT

## 2024-07-24 PROCEDURE — 97110 THERAPEUTIC EXERCISES: CPT

## 2024-07-24 PROCEDURE — 80048 BASIC METABOLIC PNL TOTAL CA: CPT | Performed by: PHYSICIAN ASSISTANT

## 2024-07-24 PROCEDURE — 99214 OFFICE O/P EST MOD 30 MIN: CPT | Performed by: NURSE PRACTITIONER

## 2024-07-24 RX ORDER — ATORVASTATIN CALCIUM 40 MG/1
40 TABLET, FILM COATED ORAL NIGHTLY
Qty: 90 TABLET | Refills: 0 | Status: SHIPPED | OUTPATIENT
Start: 2024-07-24

## 2024-07-24 RX ORDER — CLOPIDOGREL BISULFATE 75 MG/1
75 TABLET ORAL DAILY
Qty: 30 TABLET | Refills: 0 | Status: SHIPPED | OUTPATIENT
Start: 2024-07-25

## 2024-07-24 RX ADMIN — CETIRIZINE HYDROCHLORIDE 10 MG: 10 TABLET ORAL at 09:21

## 2024-07-24 RX ADMIN — Medication 10 ML: at 09:20

## 2024-07-24 RX ADMIN — LEVOTHYROXINE SODIUM 50 MCG: 50 TABLET ORAL at 06:12

## 2024-07-24 NOTE — PLAN OF CARE
Goal Outcome Evaluation:   Discharge instructions and follow up care reviewed with patient and family at bedside, no questions or concerns at this time.  PIV removed.  Personal belongings with Pt.  AO x 4, VSS, and NAD.  Pt wheeled out of observation unit to private vehicle.

## 2024-07-24 NOTE — PROGRESS NOTES
ED OBSERVATION PROGRESS/DISCHARGE SUMMARY    Date of Admission: 7/22/2024   LOS: 0 days   PCP: Henok Salcido MD    Final Diagnosis gait imbalance      Subjective     Hospital Outcome:     Maria Fernanda Gaviria is a 89 y.o. female with significant past medical history of osteoarthritis, pacemaker, hyperlipidemia who presents to the ED complaining of acute difficulty walking this morning.  Patient states she tried to get out of bed but felt she was falling and veering to the left.  She was certain she would not be able to walk so remained in bed.  She denies vertigo and sensation of the room spinning.  Patient denies focal weakness or sensory changes in extremities.  She denies speech disturbance or trouble expressing words and thoughts.  She denies visual disturbance.  Symptoms have mostly resolved now.  She was able to ambulate her cane as normal while getting in the car on the way to the hospital.  She denies headache or recent injury.  She is here for further evaluation.  Reviewing the patient's medication she does not appear to be on anticoagulant or antiplatelet therapy.  Pacemaker appears to be Biotronik.     ED workup revealed an NIHS of 0, a sodium 141, potassium 4, creatinine 0.82, glucose 92, normal transaminase and T bilirubin levels.  WBC 6.33, hemoglobin 17.4, platelets 177.  CT head without contrast showed moderate to severe small vessel disease but no acute process.  MRI recommended.  ECG performed at 310 showed a rate of 77, V paced, short NM, V paced complexes, no acute ischemic changes, similar to 7/23/2023.    7/24: This a.m. patient reports she is feeling well.  She denies any complaints overnight.  She was seen and evaluated by physical therapy service and able to ambulate with use of rolling walker daughter at bedside reports she feels comfortable with patient returning back to her residence.    Seen and evaluated by APRN with neurology service today who recommends discharge home on Plavix 75 mg and  Lipitor 40 mg daily with outpatient follow-up.  Patient and family agreeable to plan    ROS:  General: no fevers, chills  Respiratory: no cough, dyspnea  Cardiovascular: no chest pain, palpitations  Abdomen: No abdominal pain, nausea, vomiting, or diarrhea  Neurologic: No focal weakness    Objective   Physical Exam:  I have reviewed the vital signs.  Temp:  [97.5 °F (36.4 °C)-98.2 °F (36.8 °C)] 97.5 °F (36.4 °C)  Heart Rate:  [72-94] 83  Resp:  [16-18] 18  BP: ()/(54-94) 135/92  General Appearance:    Alert, cooperative, no distress, appears remarkably well for stated age head:    Normocephalic, atraumatic  Eyes:    Sclerae anicteric  Neck:   Supple, no mass  Lungs: Clear to auscultation bilaterally, respirations unlabored  Heart: Regular rate and rhythm, S1 and S2 normal, no murmur, rub or gallop  Abdomen:  Soft, non-tender, bowel sounds active, nondistended  Extremities: No clubbing, cyanosis, or edema to lower extremities  Pulses:  2+ and symmetric in distal lower extremities  Skin: No rashes   Neurologic: Oriented x3, Normal strength to extremities    Results Review:    I have reviewed the labs, radiology results and diagnostic studies.    Results from last 7 days   Lab Units 07/23/24  0441   WBC 10*3/mm3 5.80   HEMOGLOBIN g/dL 15.9   HEMATOCRIT % 47.4*   PLATELETS 10*3/mm3 159     Results from last 7 days   Lab Units 07/23/24  0441 07/22/24  1304   SODIUM mmol/L 139 141   POTASSIUM mmol/L 3.4* 4.0   CHLORIDE mmol/L 105 103   CO2 mmol/L 25.0 25.0   BUN mg/dL 13 11   CREATININE mg/dL 0.72 0.82   CALCIUM mg/dL 9.3 9.5   BILIRUBIN mg/dL 0.8 0.7   ALK PHOS U/L 68 83   ALT (SGPT) U/L 10 14   AST (SGOT) U/L 24 29   GLUCOSE mg/dL 87 92     Imaging Results (Last 24 Hours)       ** No results found for the last 24 hours. **            I have reviewed the medications.  ---------------------------------------------------------------------------------------------  Assessment & Plan   Assessment/Problem List     Stroke-like symptoms      Plan:    Ataxic gait/strokelike symptoms  -CTA head and neck negative  -Echocardiogram shows ejection fraction of 62% with negative saline test and moderate calcification of her aortic valve  -Continuous cardiac monitoring  -Neurology consult, cleared for discharge home on Plavix 75 mg, Lipitor 40 mg and outpatient follow-up  -PT consult, patient tolerated ambulating well, cleared for discharge home    Hypothyroid  -Continue with Synthroid     Hypertension  -Continue with losartan     VTE Prophylaxis:  Mechanical VTE prophylaxis orders are present.       Disposition: Home    Follow-up after Discharge: PCP & neurology    This note will serve as a discharge summary    REJI Saab 07/24/24 04:59 EDT    I have worn appropriate PPE during this patient encounter, sanitized my hands both with entering and exiting patient's room.      36 minutes has been spent by [facility] providers in the care of this patient while under observation status

## 2024-07-24 NOTE — OUTREACH NOTE
Prep Survey      Flowsheet Row Responses   Congregational facility patient discharged from? [place]   Is LACE score < 7 ? Yes   Eligibility [facility]   Date of Admission 07/22/24   Date of Discharge 07/24/24   Discharge Disposition Home or Self Care   Discharge diagnosis stroke-like symptoms   Does the patient have one of the following disease processes/diagnoses(primary or secondary)? Stroke   Does the patient have Home health ordered? No   Is there a DME ordered? No   Prep survey completed? Yes            YRIS A - Registered Nurse

## 2024-07-24 NOTE — PLAN OF CARE
Goal Outcome Evaluation:  Plan of Care Reviewed With: patient           Outcome Evaluation: Patient pleasant and agreeable to PT this morning. Significant improvement in cognitive status since yesterday as she was oriented x 4. Today, patient completed bed mobility independently, stood SV with her SPC and ambulated 150' SV-SBA with a RW. Initially trialed ambulation with SPC but patient minimally unsteady. No balance deficits noted when using AD and was encouraged to use upon DC. Daughter at bedside present and in agreement patient is near her baseline. No further acute PT Needs and will sign off.      Anticipated Discharge Disposition (PT): home

## 2024-07-24 NOTE — PLAN OF CARE
Goal Outcome Evaluation:            Pt admitted for stroke like symptoms. She has no weakness, facial droop or physical deficits. NIH continues to be 0. She is ambulatory with assist x 1 with a cane and is alert and oriented x 4 with periods of confusion upon waking. Family at bedside.

## 2024-07-24 NOTE — PROGRESS NOTES
"DOS: 2024  NAME: Maria Fernanda Gaviria   : 1923  PCP: Henok Salcido MD  Chief Complaint   Patient presents with    Dizziness   Patient seen in follow-up today; new to me        Stroke    Subjective: No acute events overnight. Patient remains at neurologic baseline.       Daughter at bedside.     Objective:  Vital signs: /90 (BP Location: Right arm, Patient Position: Lying)   Pulse 84   Temp 98.4 °F (36.9 °C) (Oral)   Resp 18   Ht 149.9 cm (59\")   Wt 48.1 kg (106 lb)   LMP  (LMP Unknown)   SpO2 96%   BMI 21.41 kg/m²       HEENT: Normocephalic, atraumatic   COR: RRR  Resp: Even and unlabored  Extremities: Equal pulses, nondistal embolization    Neurological:   MS: AO. Language normal. No neglect. Higher integrative function normal  CN: II-XII normal  Motor: 5/5, normal tone  Sensory: Intact- to light touch   Coordination: Normal- finger to nose     Laboratory results:  Lab Results   Component Value Date    GLUCOSE 90 2024    CALCIUM 8.6 2024     2024    K 3.8 2024    CO2 23.5 2024     2024    BUN 18 2024    CREATININE 0.80 2024    EGFRIFAFRI 83 2021    EGFRIFNONA 68 2021    BCR 22.5 2024    ANIONGAP 8.5 2024     Lab Results   Component Value Date    WBC 6.62 2024    HGB 15.1 2024    HCT 44.7 2024    MCV 90.9 2024     2024     Lab Results   Component Value Date    CHOL 280 (H) 2024     Lab Results   Component Value Date    HDL 69 (H) 2024    HDL 62 (H) 2023    HDL 63 (H) 2022     Lab Results   Component Value Date     (H) 2024     (H) 2023    LDL 85 2022     Lab Results   Component Value Date    TRIG 111 2024    TRIG 99 2023    TRIG 87 2022         Lab 24  1304   HEMOGLOBIN A1C 5.30      Review and interpretation of imaging:  Imaging discussed below reviewed independently.    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  Symptoms lasted for unknown amount of time.  She noted that she felt sensation of falling to her left and was unable to walk she denied any specific stroke symptoms no unilateral weakness/numbness/visual or speech disturbances and/or dizziness.  By the time she presented to the hospital symptoms were resolved.  She was afebrile and normotensive on arrival and glucose was within normal limits.    Since admission, CT of the head negative for acute findings.  Moderate to severe small vessel disease.  Evidence of lens implants.  CTA head and neck showed no evidence of high-grade stenosis/aneurysm and/or dissection.  TTE showed EF 62.6%.  LV/LA normal.  Saline test negative.  Moderate calcification of aortic valve noted.  No mention of PFO.  labs: A1c 5.30, lipid panel total cholesterol 280, HDL 69, triglycerides 111,  and A1c 5.30. MRI not felt to be warranted.     Neurologically,stable/at baseline.  Recommendations below.No further neurology workup indicated. We will sign off and see again upon request.        Diagnosis:  1.  Gait imbalance without any other stroke associated symptoms; CT/CTA head and neck/TTE reassuring-awaiting MRI brain-started on primary stroke prevention Plavix and atorvastatin-not on prior to arrival  2.  Hypothyroidism  3.  Hypertension  4.  Hyperlipidemia  5.  History of heart block status post PPM placement    Plan:  Plavix 75 mg day-started this admission  Lipitor 40 mg daily started this admission  Neurochecks  BP control  Stroke Education  MAXWELL/SCDs  PT/OT/ST  Follow-up with Neurology as needed        Case reviewed with attending neurologist  he agrees with treatment plan above.      Kylah Michele, APRN

## 2024-07-24 NOTE — PROGRESS NOTES
MD ATTESTATION NOTE    The OLLIE and I have discussed this patient's history, physical exam, and treatment plan.  I have reviewed the documentation and personally had a face to face interaction with the patient. I affirm the documentation and agree with the treatment and plan.  The attached note describes my personal findings.      I provided a substantive portion of the care of the patient.  I personally performed the physical exam in its entirety, and below are my findings.        Brief HPI: This patient is a 89-year-old female with a history of a chronic pacemaker admitted to the observation unit for strokelike symptoms.  She did have some dizziness though described any spinning or vertiginous type symptoms with an associated off-balance type feeling.  This morning, she feels as though her symptoms have fully resolved and she is back to her baseline.  She denies headache, chest pain, shortness of breath, speech abnormalities, or unilateral weakness/numbness.      PHYSICAL EXAM  ED Triage Vitals   Temp Heart Rate Resp BP SpO2   07/22/24 1142 07/22/24 1142 07/22/24 1142 07/22/24 1147 07/22/24 1142   97.5 °F (36.4 °C) 82 19 148/99 97 %      Temp src Heart Rate Source Patient Position BP Location FiO2 (%)   07/22/24 1631 07/22/24 1631 07/22/24 1147 07/22/24 1147 --   Oral Monitor Lying Right arm          GENERAL: Resting comfortably and in no acute distress, nontoxic in appearance  HENT: nares patent  EYES: no scleral icterus  CV: regular rhythm, normal rate, no M/R/G  RESPIRATORY: normal effort, lungs clear bilaterally  ABDOMEN: soft, nontender, no rebound or guarding  MUSCULOSKELETAL: no deformity, no edema  NEURO: alert, moves all extremities, follows commands  PSYCH:  calm, cooperative  SKIN: warm, dry    Vital signs and nursing notes reviewed.        Plan: The patient's echocardiogram is unremarkable and CTA head and neck unremarkable.  Neurology has seen the patient and felt as though the symptoms were more  related to orthostasis.  She has walked with physical therapy stably and successfully.  We will await any further recommendations by neurology but anticipate discharge to home.

## 2024-07-24 NOTE — THERAPY DISCHARGE NOTE
Patient Name: Maria Fernanda Gaviria  : 1923    MRN: [id number]                              Today's Date: 2024       Admit Date: 2024    Visit Dx:     ICD-10-CM ICD-9-CM   1. Gait disturbance  R26.9 781.2     Patient Active Problem List   Diagnosis    Osteoarthritis of lumbar spine    Osteoarthritis    Asthma    Carotid stenosis    Hyperlipidemia    GERD (gastroesophageal reflux disease)    Essential hypertension    Hypothyroidism (acquired)    Osteopenia    DNR (do not resuscitate)    Non-seasonal allergic rhinitis    AV block    Presence of cardiac pacemaker    Stroke-like symptoms     Past Medical History:   Diagnosis Date    Arthritis     Arthritis of back     COVID-19 virus detected 2023    Disease of thyroid gland     GERD (gastroesophageal reflux disease)     Hip arthrosis     Age related    Hyperlipidemia     Hypertension     Low back strain Age related    Osteoporosis     Restless legs syndrome 2016     Past Surgical History:   Procedure Laterality Date    CARDIAC ELECTROPHYSIOLOGY PROCEDURE N/A 2023    Procedure: Pacemaker SC new  biotronik;  Surgeon: Deny Khanna MD;  Location: [facility] CATH INVASIVE LOCATION;  Service: Cardiovascular;  Laterality: N/A;    CATARACT EXTRACTION      CHOLECYSTECTOMY WITH INTRAOPERATIVE CHOLANGIOGRAM N/A 2021    Procedure: CHOLECYSTECTOMY LAPAROSCOPIC INTRAOPERATIVE CHOLANGIOGRAM common bile duct exploration;  Surgeon: Huey Wilkinson MD;  Location: [facility] OR;  Service: General;  Laterality: N/A;    COLONOSCOPY      ERCP N/A 2021    Procedure: ENDOSCOPIC RETROGRADE CHOLANGIOPANCREATOGRAPHY WITH SPHINCTEROTOMY, BALLOON SWEEP AND STONE (2) EXTRACTION;  Surgeon: Justo Aden MD;  Location: [facility] ENDOSCOPY;  Service: Gastroenterology;  Laterality: N/A;  PRE- BILE DUCT OBSTRUCTION   POST- SAME      HYSTERECTOMY      REPLACEMENT TOTAL KNEE Right     THYROID SURGERY      TUBAL ABDOMINAL LIGATION        General  Information       Row Name 07/24/24 0909          Physical Therapy Time and Intention    Document Type therapy note (daily note)  -MS     Mode of Treatment physical therapy  -MS       Row Name 07/24/24 0909          General Information    Existing Precautions/Restrictions fall  -MS     Barriers to Rehab none identified  -MS       Row Name 07/24/24 0909          Cognition    Orientation Status (Cognition) oriented x 4  -MS       Row Name 07/24/24 0909          Safety Issues, Functional Mobility    Impairments Affecting Function (Mobility) balance  -MS     Comment, Safety Issues/Impairments (Mobility) Gait belt and non skid socks donned.  -MS               User Key  (r) = Recorded By, (t) = Taken By, (c) = Cosigned By      Initials Name Provider Type    MS Pilar Thomas, PT Physical Therapist                   Mobility       Row Name 07/24/24 0910          Bed Mobility    Bed Mobility supine-sit;sit-supine  -MS     Sit-Supine [place] (Bed Mobility) independent  -MS     Assistive Device (Bed Mobility) bed rails;head of bed elevated  -MS       Row Name 07/24/24 0910          Sit-Stand Transfer    Sit-Stand [place] (Transfers) supervision;verbal cues  -MS     Assistive Device (Sit-Stand Transfers) cane, straight  -MS       Row Name 07/24/24 0910          Gait/Stairs (Locomotion)    [place] Level (Gait) supervision;standby assist;verbal cues  -MS     Assistive Device (Gait) cane, straight;walker, front-wheeled  -MS     Patient was able to Ambulate yes  -MS     Distance in Feet (Gait) 150  -MS     Deviations/Abnormal Patterns (Gait) gait speed decreased;stride length decreased;base of support, narrow  -MS     Bilateral Gait Deviations forward flexed posture;heel strike decreased  -MS     Comment, (Gait/Stairs) Initially using SPC- minimally unsteady and reaching for objects for support. Implemented RW and much better. No overt LOB with AD and encouraged to use upon DC.  -MS               User Key  (r) =  Recorded By, (t) = Taken By, (c) = Cosigned By      Initials Name Provider Type    Pilar Rankin MCKINLEY, PT Physical Therapist                   Obj/Interventions       Row Name 07/24/24 0912          Balance    Balance Assessment sitting static balance  -MS     Static Sitting Balance standby assist  -MS     Position, Sitting Balance sitting edge of bed  -MS     Static Standing Balance supervision  -MS     Dynamic Standing Balance supervision  -MS     Position/Device Used, Standing Balance supported;walker, rolling  -MS               User Key  (r) = Recorded By, (t) = Taken By, (c) = Cosigned By      Initials Name Provider Type    Pilar Rankin MCKINLEY, PT Physical Therapist                   Goals/Plan    No documentation.                  Clinical Impression       Row Name 07/24/24 0912          Pain    Pretreatment Pain Rating 0/10 - no pain  -MS     Posttreatment Pain Rating 0/10 - no pain  -MS       Row Name 07/24/24 0912          Plan of Care Review    Plan of Care Reviewed With patient  -MS     Outcome Evaluation Patient pleasant and agreeable to PT this morning. Significant improvement in cognitive status since yesterday as she was oriented x 4. Today, patient completed bed mobility independently, stood SV with her SPC and ambulated 150' SV-SBA with a RW. Initially trialed ambulation with SPC but patient minimally unsteady. No balance deficits noted when using AD and was encouraged to use upon DC. Daughter at bedside present and in agreement patient is near her baseline. No further acute PT Needs and will sign off.  -MS       Row Name 07/24/24 0912          Vital Signs    O2 Delivery Pre Treatment room air  -MS       Row Name 07/24/24 0912          Positioning and Restraints    Pre-Treatment Position in bed  no exit alarm  -MS     Post Treatment Position bed  -MS     In Bed notified nsg;fowlers;call light within reach;encouraged to call for assist  -MS               User Key  (r) = Recorded By, (t) = Taken  By, (c) = Cosigned By      Initials Name Provider Type    MS ThomasPilar, PT Physical Therapist                   Outcome Measures       Row Name 07/24/24 0916          How much help from another person do you currently need...    Turning from your back to your side while in flat bed without using bedrails? 4  -MS     Moving from lying on back to sitting on the side of a flat bed without bedrails? 4  -MS     Moving to and from a bed to a chair (including a wheelchair)? 3  -MS     Standing up from a chair using your arms (e.g., wheelchair, bedside chair)? 3  -MS     Climbing 3-5 steps with a railing? 3  -MS     To walk in hospital room? 3  -MS     AM-PAC 6 Clicks Score (PT) 20  -MS     Highest Level of Mobility Goal 6 --> Walk 10 steps or more  -MS               User Key  (r) = Recorded By, (t) = Taken By, (c) = Cosigned By      Initials Name Provider Type    MS ThomasPilar PT Physical Therapist                                 Physical Therapy Education       Title: PT OT SLP Therapies (In Progress)       Topic: Physical Therapy (In Progress)       Point: Mobility training (In Progress)       Learning Progress Summary             Patient Acceptance, E,TB, NR by CB at 7/23/2024 1602                         Point: Home exercise program (Not Started)       Learner Progress:  Not documented in this visit.              Point: Body mechanics (In Progress)       Learning Progress Summary             Patient Acceptance, E,TB, NR by CB at 7/23/2024 1602                         Point: Precautions (In Progress)       Learning Progress Summary             Patient Acceptance, E,TB, NR by CB at 7/23/2024 1602                                         User Key       Initials Effective Dates Name Provider Type Discipline     10/22/21 -  Mariya Syed, PT Physical Therapist PT                  PT Recommendation and Plan     Plan of Care Reviewed With: patient  Outcome Evaluation: Patient pleasant and agreeable to PT  this morning. Significant improvement in cognitive status since yesterday as she was oriented x 4. Today, patient completed bed mobility independently, stood SV with her SPC and ambulated 150' SV-SBA with a RW. Initially trialed ambulation with SPC but patient minimally unsteady. No balance deficits noted when using AD and was encouraged to use upon DC. Daughter at bedside present and in agreement patient is near her baseline. No further acute PT Needs and will sign off.     Time Calculation:         PT Charges       Row Name 07/24/24 0908             Time Calculation    Start Time 0852  -MS      Stop Time 0906  -MS      Time Calculation (min) 14 min  -MS      PT Received On 07/24/24  -MS                User Key  (r) = Recorded By, (t) = Taken By, (c) = Cosigned By      Initials Name Provider Type    Pilar Rankin, PT Physical Therapist                  Therapy Charges for Today       Code Description Service Date Service Provider Modifiers Qty    [id number] HC PT THER PROC EA 15 MIN 7/24/2024 Pilar Thomas, PT GP 1            PT G-Codes  Outcome Measure Options: AM-PAC 6 Clicks Basic Mobility (PT), Modified [place]  AM-PAC 6 Clicks Score (PT): 20  Modified [place] Scale: 4 - Moderately severe disability.  Unable to walk without assistance, and unable to attend to own bodily needs without assistance.  PT Discharge Summary  Anticipated Discharge Disposition (PT): home    Pilar Thomas PT  7/24/2024

## 2024-07-25 ENCOUNTER — TRANSITIONAL CARE MANAGEMENT TELEPHONE ENCOUNTER (OUTPATIENT)
Dept: CALL CENTER | Facility: HOSPITAL | Age: 89
End: 2024-07-25
Payer: MEDICARE

## 2024-07-25 NOTE — OUTREACH NOTE
"Call Center TCM Note      Flowsheet Row Responses   [facility], [place] patient discharged from? [place]   Does the patient have one of the following disease processes/diagnoses(primary or secondary)? Stroke   TCM attempt successful? Yes   Call start time 1111   Call end time 1120   Discharge diagnosis stroke-like symptoms   Person spoke with today (if not patient) and relationship Jillian/daughter   Meds reviewed with patient/caregiver? Yes   Is the patient having any side effects they believe may be caused by any medication additions or changes? No   Does the patient have all medications ordered at discharge? Yes   Is the patient taking all medications as directed (includes completed medication regime)? No   What is preventing the patient from taking all medications as directed? Other  [Daughter reports patient will not be taking the discharge medications per AVS lipitor, plavix. Jillian reports \"doctors are aware.\"]   Nursing Interventions Nurse provided patient education   Comments Medicare wellness appt. with PCP 8/12/24 @ 10am.   Does the patient have an appointment with their PCP within 7-14 days of discharge? No   Nursing Interventions Patient declined scheduling/rescheduling appointment at this time   Has home health visited the patient within 72 hours of discharge? N/A   Psychosocial issues? No   Does the patient require any assistance with activities of daily living such as eating, bathing, dressing, walking, etc.? Yes  [walker]   Did the patient receive a copy of their discharge instructions? Yes   Nursing interventions Reviewed instructions with patient   What is the patient's perception of their health status since discharge? Improving   Nursing interventions Nurse provided patient education   Is the patient/caregiver able to teach back signs and symptoms related to disease process for when to call PCP? Yes   Is the patient/caregiver able to teach back signs and symptoms related to disease process for when " to call 911? Yes   If the patient is a current smoker, are they able to teach back resources for cessation? Not a smoker   Is the patient/caregiver able to teach back the hierarchy of who to call/visit for symptoms/problems? PCP, Specialist, Home health nurse, Urgent Care, ED, 911 Yes   Is the patient able to teach back FAST for Stroke? A rm: Check if one arm is weak, S peech: Listen for slurred speech, T maryam: Call 9-1-1 right away   TCM call completed? Yes   Wrap up additional comments Jillian reports patient doing well, has some dizziness, confusion upon awakening in am that resolves. She reports patient did not have a stroke or TIA but informed symptoms due to autoresponse, aging. Advised any worsening symptoms go to ED, any questions contact PCP, v/u. No questions at this time.   Call end time 1120   Would this patient benefit from a Referral to Amb Social Work? No   Is the patient interested in additional calls from an ambulatory ? No            Gayle Roach RN    7/25/2024, 11:28 EDT

## 2024-07-30 DIAGNOSIS — E03.9 HYPOTHYROIDISM: ICD-10-CM

## 2024-07-30 RX ORDER — LEVOTHYROXINE SODIUM 0.05 MG/1
TABLET ORAL
Qty: 90 TABLET | Refills: 1 | Status: SHIPPED | OUTPATIENT
Start: 2024-07-30

## 2024-09-05 ENCOUNTER — OFFICE VISIT (OUTPATIENT)
Dept: ORTHOPEDIC SURGERY | Facility: CLINIC | Age: 89
End: 2024-09-05
Payer: MEDICARE

## 2024-09-05 VITALS — WEIGHT: 109.2 LBS | HEIGHT: 59 IN | TEMPERATURE: 96.9 F | BODY MASS INDEX: 22.01 KG/M2

## 2024-09-05 DIAGNOSIS — M17.11 PRIMARY OSTEOARTHRITIS OF RIGHT KNEE: Primary | ICD-10-CM

## 2024-09-05 RX ORDER — TRIAMCINOLONE ACETONIDE 40 MG/ML
80 INJECTION, SUSPENSION INTRA-ARTICULAR; INTRAMUSCULAR
Status: COMPLETED | OUTPATIENT
Start: 2024-09-05 | End: 2024-09-05

## 2024-09-05 RX ORDER — LIDOCAINE HYDROCHLORIDE 10 MG/ML
5 INJECTION, SOLUTION EPIDURAL; INFILTRATION; INTRACAUDAL; PERINEURAL
Status: COMPLETED | OUTPATIENT
Start: 2024-09-05 | End: 2024-09-05

## 2024-09-05 RX ADMIN — LIDOCAINE HYDROCHLORIDE 5 ML: 10 INJECTION, SOLUTION EPIDURAL; INFILTRATION; INTRACAUDAL; PERINEURAL at 17:13

## 2024-09-05 RX ADMIN — TRIAMCINOLONE ACETONIDE 80 MG: 40 INJECTION, SUSPENSION INTRA-ARTICULAR; INTRAMUSCULAR at 17:13

## 2024-09-05 NOTE — PROGRESS NOTES
9/5/2024    Maria Fernanda Gaviria is here today for worsening knee pain. Pt has undergone injection of the knee in the past with good resolution of symptoms. Pt is requesting a repeat injection.     KNEE Injection Procedure Note:    Large Joint Arthrocentesis: R knee  Date/Time: 9/5/2024 5:13 PM  Consent given by: patient  Site marked: site marked  Timeout: Immediately prior to procedure a time out was called to verify the correct patient, procedure, equipment, support staff and site/side marked as required   Supporting Documentation  Indications: pain and joint swelling   Procedure Details  Location: knee - R knee  Preparation: Patient was prepped and draped in the usual sterile fashion  Needle size: 22 G  Approach: anterolateral  Medications administered: 5 mL lidocaine PF 1% 1 %; 80 mg triamcinolone acetonide 40 MG/ML  Patient tolerance: patient tolerated the procedure well with no immediate complications      (3 mL of lidocaine was used for anesthetic purposes)    At the conclusion of the injection I discussed the importance of continued quad strengthening exercises on a daily basis. I will see the patient back if the symptoms should fail to improve or worsen.    Pascual Crain, APRN  9/5/2024

## 2024-11-05 ENCOUNTER — TELEPHONE (OUTPATIENT)
Dept: ORTHOPEDIC SURGERY | Facility: CLINIC | Age: 89
End: 2024-11-05
Payer: MEDICARE

## 2024-11-05 NOTE — TELEPHONE ENCOUNTER
Caller: Jillian Patel    Relationship: Emergency Contact    Best call back number:     Who are you requesting to speak with (clinical staff, provider,  specific staff member):CLINICAL    What was the call regarding: MS PATEL SENT A MESSAGE TO MEGAN HAWKINS ABOUT GETTING HER MOM A KNEE BRACE. SHE WOULD LIKE TO KNOW WHAT SHE NEEDS TO DO TO GET THAT PROCESS STARTED    Is it okay if the provider responds through MyChart: CALL

## 2024-11-06 NOTE — TELEPHONE ENCOUNTER
Deng Machado  Spoke with patient and daughter and will be coming to the office today to be fit for brace

## 2024-11-13 ENCOUNTER — CLINICAL SUPPORT NO REQUIREMENTS (OUTPATIENT)
Dept: CARDIOLOGY | Facility: CLINIC | Age: 89
End: 2024-11-13
Payer: MEDICARE

## 2024-11-13 DIAGNOSIS — Z95.0 PRESENCE OF CARDIAC PACEMAKER: Primary | Chronic | ICD-10-CM

## 2024-12-26 ENCOUNTER — TELEPHONE (OUTPATIENT)
Dept: ORTHOPEDIC SURGERY | Facility: CLINIC | Age: 89
End: 2024-12-26

## 2024-12-26 NOTE — TELEPHONE ENCOUNTER
Caller: Jillian Patel    Relationship to patient: Emergency Contact    Best call back number: [phone]*    Patient is needing: JILLIAN IS RETURNING A MISSED CALL FROM [facility] AND IS WANTING TO SPEAK WITH HER.. PLEASE ADVISE..

## 2024-12-26 NOTE — TELEPHONE ENCOUNTER
Caller: Jillian Patel    Relationship to patient: Emergency Contact    Best call back number: [phone]    Patient is needing: PATIENT HAS AN APPOINTMENT ON 01/10/25 AT ANOTHER PROVIDER AT [facility] AND WOULD LIKE TO KNOW IF CAN COME IN AND GET INJECTIONS ON SAME DAY - PLEASE REACH OUT AND ADVISE

## 2024-12-26 NOTE — TELEPHONE ENCOUNTER
Spoke with patient's daughter, Jillian and we scheduled for 2/3/25 but patient has been having some pain in that RT knee and is having problems with her brace as well. Can we work her in sooner? Please advise.

## 2025-01-24 ENCOUNTER — OFFICE VISIT (OUTPATIENT)
Dept: CARDIOLOGY | Facility: CLINIC | Age: OVER 89
End: 2025-01-24
Payer: MEDICARE

## 2025-01-24 VITALS
DIASTOLIC BLOOD PRESSURE: 65 MMHG | BODY MASS INDEX: 22.67 KG/M2 | HEART RATE: 73 BPM | HEIGHT: 58 IN | WEIGHT: 108 LBS | SYSTOLIC BLOOD PRESSURE: 133 MMHG

## 2025-01-24 DIAGNOSIS — Z95.0 PRESENCE OF CARDIAC PACEMAKER: ICD-10-CM

## 2025-01-24 DIAGNOSIS — I10 ESSENTIAL HYPERTENSION: Primary | ICD-10-CM

## 2025-01-24 DIAGNOSIS — E78.5 HYPERLIPIDEMIA, UNSPECIFIED HYPERLIPIDEMIA TYPE: ICD-10-CM

## 2025-01-24 PROCEDURE — 93000 ELECTROCARDIOGRAM COMPLETE: CPT

## 2025-01-24 PROCEDURE — 99213 OFFICE O/P EST LOW 20 MIN: CPT

## 2025-01-24 NOTE — PROGRESS NOTES
Subjective:        Maria Fernanda Gaviria is a 89 y.o. female who here for follow up    No chief complaint on file.      This is a 89-year-old female who is known with this provider with hypertension, hyperlipidemia, AV block s/p pacemaker, seen in office today for annual follow-up.  Feeling well other than some knee pain, has bone on bone in knee.     Reviewed and still relevant:  -Echo 2/23/2023 EF 56 to 60%, normal LV function, calcification of the aortic valve.    The following portions of the patient's history were reviewed and updated as appropriate: allergies, current medications, past family history, past medical history, past social history, past surgical history and problem list.    Past Medical History:   Diagnosis Date    Arthritis     Arthritis of back     COVID-19 virus detected 02/25/2023    Disease of thyroid gland     GERD (gastroesophageal reflux disease)     Hip arthrosis     Age related    Hyperlipidemia     Hypertension     Low back strain Age related    Osteoporosis     Restless legs syndrome 06/27/2016         reports that she has quit smoking. Her smoking use included cigarettes. She has a 15 pack-year smoking history. She has been exposed to tobacco smoke. She has never used smokeless tobacco. She reports current alcohol use of about 2.0 standard drinks of alcohol per week. She reports that she does not use drugs.     Family History   Problem Relation Age of Onset    Hypertension Mother     Heart disease Mother     Thyroid disease Mother     Hypertension Father     Cancer Father     Heart disease Brother     Hypertension Daughter     Thyroid disease Daughter     Lung disease Daughter     Depression Daughter     Migraines Daughter     Arthritis Daughter     Heart disease Paternal Grandmother     Stroke Paternal Grandfather     Kidney disease Paternal Grandfather        ROS     Review of Systems  Constitutional: no wt loss, fever, fatigue  Gastrointestinal: no nausea, abdominal  pain  Behavioral/Psych: no insomnia or anxiety  Cardiovascular no chest pain or shortness of breath      Objective:         Vitals and nursing note reviewed.   Constitutional:       Appearance: Well-developed.   HENT:      Head: Normocephalic.      Right Ear: External ear normal.      Left Ear: External ear normal.   Neck:      Vascular: No JVD.   Pulmonary:      Effort: Pulmonary effort is normal. No respiratory distress.      Breath sounds: Normal breath sounds. No stridor. No rales.   Cardiovascular:      Normal rate. Regular rhythm.      No gallop.    Pulses:     Intact distal pulses.   Edema:     Peripheral edema absent.   Abdominal:      General: Bowel sounds are normal. There is no distension.      Palpations: Abdomen is soft.      Tenderness: There is no abdominal tenderness. There is no guarding.   Musculoskeletal: Normal range of motion.         General: No tenderness.      Cervical back: Normal range of motion. Skin:     General: Skin is warm.   Neurological:      Mental Status: Alert and oriented to person, place, and time.      Deep Tendon Reflexes: Reflexes are normal and symmetric.   Psychiatric:         Judgment: Judgment normal.             ECG 12 Lead    Date/Time: 2025 11:02 AM  Performed by: Leeann Mccullough APRN    Authorized by: Leeann Mccullough APRN  Comparison: compared with previous ECG from 2024  Similar to previous ECG  Rhythm: paced  BPM: 73  Pacin% capture  Clinical impression: abnormal EKG        Interpretation Summary         Left ventricular ejection fraction appears to be 56 - 60%.    Left ventricular diastolic function was normal.    There is calcification of the aortic valve.    Estimated right ventricular systolic pressure from tricuspid regurgitation is normal (<35 mmHg).        Current Outpatient Medications:     acetaminophen (TYLENOL) 325 MG tablet, Take 2 tablets by mouth Every 6 (Six) Hours As Needed for Mild Pain., Disp: , Rfl:     atorvastatin (LIPITOR)  40 MG tablet, Take 1 tablet by mouth Every Night., Disp: 90 tablet, Rfl: 0    clopidogrel (PLAVIX) 75 MG tablet, Take 1 tablet by mouth Daily., Disp: 30 tablet, Rfl: 0    fexofenadine (ALLEGRA) 180 MG tablet, Take 1 tablet by mouth Daily., Disp: , Rfl:     levothyroxine (SYNTHROID, LEVOTHROID) 50 MCG tablet, TAKE 1 TABLET EVERY MORNING, Disp: 90 tablet, Rfl: 1     Assessment:        Patient Active Problem List   Diagnosis    Osteoarthritis of lumbar spine    Osteoarthritis    Asthma    Carotid stenosis    Hyperlipidemia    GERD (gastroesophageal reflux disease)    Essential hypertension    Hypothyroidism (acquired)    Osteopenia    DNR (do not resuscitate)    Non-seasonal allergic rhinitis    AV block    Presence of cardiac pacemaker    Stroke-like symptoms               Plan:   1. High grade AV block now s/p Pacemaker: device interrogated 1/20/25 normal function, no events.     2.  Hypertension: Controlled not on any antihypertensives.    Importance of controlling hypertension and blood pressure  checkup on the regular basis has been explained. Hypertension as a silent killer has been discussed. Risk reduction of the weight and regular exercises to control the hypertension has been explained.    3.  Hyperlipidemia: Not on any antilipidemia medications, she reports her PCP manages her cholesterol labs.  Continue current management    Risk of the hyperlipidemia, importance of the treatment has been explained. Pros and cons of the statins has been explained. Regular blood workup as well as side effects including the liver failure, myelopathy death has been explained.                 No diagnosis found.    There are no diagnoses linked to this encounter.    COUNSELING: aneudy Srivastava was given to patient for the following topics: diagnostic results, risk factor reductions, impressions, risks and benefits of treatment options and importance of treatment compliance .       SMOKING COUNSELING:  denies    Follow up in 1 year or sooner if needd    Sincerely,   GRIFFIN Flaherty  [facility]  01/24/25  10:38 EST    INDY Dragon/Transcription disclaimer:   Much of this encounter note is an electronic transcription/translation of spoken language to printed text. The electronic translation of spoken language may permit erroneous, or at times, nonsensical words or phrases to be inadvertently transcribed; Although I have reviewed the note for such errors, some may still exist.

## 2025-01-27 DIAGNOSIS — E03.9 HYPOTHYROIDISM: ICD-10-CM

## 2025-01-30 RX ORDER — LEVOTHYROXINE SODIUM 50 UG/1
TABLET ORAL
Qty: 90 TABLET | Refills: 1 | Status: SHIPPED | OUTPATIENT
Start: 2025-01-30

## 2025-01-31 ENCOUNTER — TELEPHONE (OUTPATIENT)
Dept: ORTHOPEDIC SURGERY | Facility: CLINIC | Age: OVER 89
End: 2025-01-31

## 2025-01-31 NOTE — TELEPHONE ENCOUNTER
Provider: MEGAN HAWKINS     Caller: Jillian Patel    Relationship to Patient: Emergency Contact    Phone Number: [phone]    Reason for Call: PATIENT STATES HER MOTHER HAS A KNEE BRACE THAT SHE IS HAVING PROBLEMS WITH AND WOULD LIKE TO SEE THE [facility] WHEN THEY ARE THERE ON MONDAY FOR HER APPT. PLEASE ADVISE.

## 2025-02-04 ENCOUNTER — OFFICE VISIT (OUTPATIENT)
Dept: ORTHOPEDIC SURGERY | Facility: CLINIC | Age: OVER 89
End: 2025-02-04
Payer: MEDICARE

## 2025-02-04 VITALS — BODY MASS INDEX: 21.94 KG/M2 | TEMPERATURE: 98.2 F | WEIGHT: 104.5 LBS | HEIGHT: 58 IN

## 2025-02-04 DIAGNOSIS — R52 PAIN: Primary | ICD-10-CM

## 2025-02-04 DIAGNOSIS — M17.11 PRIMARY OSTEOARTHRITIS OF RIGHT KNEE: ICD-10-CM

## 2025-02-04 RX ORDER — LIDOCAINE HYDROCHLORIDE 10 MG/ML
3 INJECTION, SOLUTION EPIDURAL; INFILTRATION; INTRACAUDAL; PERINEURAL
Status: COMPLETED | OUTPATIENT
Start: 2025-02-04 | End: 2025-02-04

## 2025-02-04 RX ADMIN — LIDOCAINE HYDROCHLORIDE 3 ML: 10 INJECTION, SOLUTION EPIDURAL; INFILTRATION; INTRACAUDAL; PERINEURAL at 12:39

## 2025-02-04 NOTE — PROGRESS NOTES
2/4/2025    Maria Fernanda Gaviria is here today for worsening knee pain. Pt has undergone injection of the knee in the past with good resolution of symptoms. Pt is requesting a repeat injection.     KNEE Injection Procedure Note:    Large Joint Arthrocentesis: R knee  Date/Time: 2/4/2025 12:39 PM  Consent given by: patient  Site marked: site marked  Timeout: Immediately prior to procedure a time out was called to verify the correct patient, procedure, equipment, support staff and site/side marked as required   Supporting Documentation  Indications: pain and joint swelling   Procedure Details  Location: knee - R knee  Preparation: Patient was prepped and draped in the usual sterile fashion  Needle size: 22 G  Approach: anterolateral  Medications administered: 3 mL lidocaine PF 1% 1 %; 32 mg Triamcinolone Acetonide 32 MG  Patient tolerance: patient tolerated the procedure well with no immediate complications    (3 mL of lidocaine was used for anesthetic purposes)        At the conclusion of the injection I discussed the importance of continued quad strengthening exercises on a daily basis. I will see the patient back if the symptoms should fail to improve or worsen.    Pascual Crain, APRN  2/4/2025

## 2025-02-12 ENCOUNTER — HOSPITAL ENCOUNTER (INPATIENT)
Facility: HOSPITAL | Age: OVER 89
LOS: 6 days | Discharge: NURSING, REHAB OR LONG-TERM CARE | End: 2025-02-20
Attending: EMERGENCY MEDICINE
Payer: MEDICARE

## 2025-02-12 ENCOUNTER — APPOINTMENT (OUTPATIENT)
Dept: CT IMAGING | Facility: HOSPITAL | Age: OVER 89
End: 2025-02-12
Payer: MEDICARE

## 2025-02-12 ENCOUNTER — APPOINTMENT (OUTPATIENT)
Dept: GENERAL RADIOLOGY | Facility: HOSPITAL | Age: OVER 89
End: 2025-02-12
Payer: MEDICARE

## 2025-02-12 DIAGNOSIS — R53.1 GENERALIZED WEAKNESS: ICD-10-CM

## 2025-02-12 DIAGNOSIS — N39.0 URINARY TRACT INFECTION IN FEMALE: Primary | ICD-10-CM

## 2025-02-12 DIAGNOSIS — G93.40 ACUTE ENCEPHALOPATHY: ICD-10-CM

## 2025-02-12 DIAGNOSIS — R29.6 UNWITNESSED FALL: ICD-10-CM

## 2025-02-12 LAB
ALBUMIN SERPL-MCNC: 4 G/DL (ref 3.5–5.2)
ALBUMIN/GLOB SERPL: 1.7 G/DL
ALP SERPL-CCNC: 73 U/L (ref 39–117)
ALT SERPL W P-5'-P-CCNC: 24 U/L (ref 1–33)
ANION GAP SERPL CALCULATED.3IONS-SCNC: 10 MMOL/L (ref 5–15)
AST SERPL-CCNC: 34 U/L (ref 1–32)
BACTERIA UR QL AUTO: ABNORMAL /HPF
BASOPHILS # BLD AUTO: 0.06 10*3/MM3 (ref 0–0.2)
BASOPHILS NFR BLD AUTO: 0.5 % (ref 0–1.5)
BILIRUB SERPL-MCNC: 1.1 MG/DL (ref 0–1.2)
BILIRUB UR QL STRIP: NEGATIVE
BUN SERPL-MCNC: 14 MG/DL (ref 8–23)
BUN/CREAT SERPL: 17.3 (ref 7–25)
CALCIUM SPEC-SCNC: 9 MG/DL (ref 8.2–9.6)
CHLORIDE SERPL-SCNC: 100 MMOL/L (ref 98–107)
CK SERPL-CCNC: 85 U/L (ref 20–180)
CLARITY UR: ABNORMAL
CO2 SERPL-SCNC: 26 MMOL/L (ref 22–29)
COLOR UR: YELLOW
CREAT SERPL-MCNC: 0.81 MG/DL (ref 0.57–1)
D-LACTATE SERPL-SCNC: 1.5 MMOL/L (ref 0.5–2)
DEPRECATED RDW RBC AUTO: 47.8 FL (ref 37–54)
EGFRCR SERPLBLD CKD-EPI 2021: 64.1 ML/MIN/1.73
EOSINOPHIL # BLD AUTO: 0.26 10*3/MM3 (ref 0–0.4)
EOSINOPHIL NFR BLD AUTO: 2.2 % (ref 0.3–6.2)
ERYTHROCYTE [DISTWIDTH] IN BLOOD BY AUTOMATED COUNT: 13.7 % (ref 12.3–15.4)
GEN 5 1HR TROPONIN T REFLEX: 13 NG/L
GLOBULIN UR ELPH-MCNC: 2.4 GM/DL
GLUCOSE SERPL-MCNC: 83 MG/DL (ref 65–99)
GLUCOSE UR STRIP-MCNC: NEGATIVE MG/DL
HCT VFR BLD AUTO: 49.7 % (ref 34–46.6)
HGB BLD-MCNC: 16.3 G/DL (ref 12–15.9)
HGB UR QL STRIP.AUTO: ABNORMAL
HYALINE CASTS UR QL AUTO: ABNORMAL /LPF
IMM GRANULOCYTES # BLD AUTO: 0.09 10*3/MM3 (ref 0–0.05)
IMM GRANULOCYTES NFR BLD AUTO: 0.8 % (ref 0–0.5)
KETONES UR QL STRIP: NEGATIVE
LEUKOCYTE ESTERASE UR QL STRIP.AUTO: ABNORMAL
LYMPHOCYTES # BLD AUTO: 1.64 10*3/MM3 (ref 0.7–3.1)
LYMPHOCYTES NFR BLD AUTO: 13.8 % (ref 19.6–45.3)
MAGNESIUM SERPL-MCNC: 2.2 MG/DL (ref 1.7–2.3)
MCH RBC QN AUTO: 30.9 PG (ref 26.6–33)
MCHC RBC AUTO-ENTMCNC: 32.8 G/DL (ref 31.5–35.7)
MCV RBC AUTO: 94.1 FL (ref 79–97)
MONOCYTES # BLD AUTO: 1.26 10*3/MM3 (ref 0.1–0.9)
MONOCYTES NFR BLD AUTO: 10.6 % (ref 5–12)
NEUTROPHILS NFR BLD AUTO: 72.1 % (ref 42.7–76)
NEUTROPHILS NFR BLD AUTO: 8.55 10*3/MM3 (ref 1.7–7)
NITRITE UR QL STRIP: NEGATIVE
NRBC BLD AUTO-RTO: 0 /100 WBC (ref 0–0.2)
PH UR STRIP.AUTO: 6 [PH] (ref 5–8)
PLATELET # BLD AUTO: 222 10*3/MM3 (ref 140–450)
PMV BLD AUTO: 9.9 FL (ref 6–12)
POTASSIUM SERPL-SCNC: 3.7 MMOL/L (ref 3.5–5.2)
PROT SERPL-MCNC: 6.4 G/DL (ref 6–8.5)
PROT UR QL STRIP: NEGATIVE
QT INTERVAL: 438 MS
QTC INTERVAL: 492 MS
RBC # BLD AUTO: 5.28 10*6/MM3 (ref 3.77–5.28)
RBC # UR STRIP: ABNORMAL /HPF
REF LAB TEST METHOD: ABNORMAL
SODIUM SERPL-SCNC: 136 MMOL/L (ref 136–145)
SP GR UR STRIP: 1.01 (ref 1–1.03)
SQUAMOUS #/AREA URNS HPF: ABNORMAL /HPF
T4 FREE SERPL-MCNC: 1.32 NG/DL (ref 0.92–1.68)
TROPONIN T % DELTA: -69
TROPONIN T NUMERIC DELTA: -29 NG/L
TROPONIN T SERPL HS-MCNC: 42 NG/L
TSH SERPL DL<=0.05 MIU/L-ACNC: 2.62 UIU/ML (ref 0.27–4.2)
UROBILINOGEN UR QL STRIP: ABNORMAL
WBC # UR STRIP: ABNORMAL /HPF
WBC NRBC COR # BLD AUTO: 11.86 10*3/MM3 (ref 3.4–10.8)

## 2025-02-12 PROCEDURE — 25810000003 SODIUM CHLORIDE 0.9 % SOLUTION: Performed by: EMERGENCY MEDICINE

## 2025-02-12 PROCEDURE — 99285 EMERGENCY DEPT VISIT HI MDM: CPT

## 2025-02-12 PROCEDURE — 71045 X-RAY EXAM CHEST 1 VIEW: CPT

## 2025-02-12 PROCEDURE — 25010000002 CEFTRIAXONE PER 250 MG: Performed by: EMERGENCY MEDICINE

## 2025-02-12 PROCEDURE — 84443 ASSAY THYROID STIM HORMONE: CPT | Performed by: EMERGENCY MEDICINE

## 2025-02-12 PROCEDURE — 84439 ASSAY OF FREE THYROXINE: CPT | Performed by: EMERGENCY MEDICINE

## 2025-02-12 PROCEDURE — 80053 COMPREHEN METABOLIC PANEL: CPT | Performed by: EMERGENCY MEDICINE

## 2025-02-12 PROCEDURE — G0378 HOSPITAL OBSERVATION PER HR: HCPCS

## 2025-02-12 PROCEDURE — 36415 COLL VENOUS BLD VENIPUNCTURE: CPT

## 2025-02-12 PROCEDURE — 84484 ASSAY OF TROPONIN QUANT: CPT | Performed by: EMERGENCY MEDICINE

## 2025-02-12 PROCEDURE — 81001 URINALYSIS AUTO W/SCOPE: CPT | Performed by: EMERGENCY MEDICINE

## 2025-02-12 PROCEDURE — 83735 ASSAY OF MAGNESIUM: CPT | Performed by: EMERGENCY MEDICINE

## 2025-02-12 PROCEDURE — 82550 ASSAY OF CK (CPK): CPT | Performed by: EMERGENCY MEDICINE

## 2025-02-12 PROCEDURE — 72125 CT NECK SPINE W/O DYE: CPT

## 2025-02-12 PROCEDURE — 93005 ELECTROCARDIOGRAM TRACING: CPT | Performed by: EMERGENCY MEDICINE

## 2025-02-12 PROCEDURE — 85025 COMPLETE CBC W/AUTO DIFF WBC: CPT | Performed by: EMERGENCY MEDICINE

## 2025-02-12 PROCEDURE — 70450 CT HEAD/BRAIN W/O DYE: CPT

## 2025-02-12 PROCEDURE — 83605 ASSAY OF LACTIC ACID: CPT | Performed by: EMERGENCY MEDICINE

## 2025-02-12 PROCEDURE — 93010 ELECTROCARDIOGRAM REPORT: CPT | Performed by: INTERNAL MEDICINE

## 2025-02-12 PROCEDURE — P9612 CATHETERIZE FOR URINE SPEC: HCPCS

## 2025-02-12 RX ORDER — SODIUM CHLORIDE 0.9 % (FLUSH) 0.9 %
10 SYRINGE (ML) INJECTION AS NEEDED
Status: DISCONTINUED | OUTPATIENT
Start: 2025-02-12 | End: 2025-02-20 | Stop reason: HOSPADM

## 2025-02-12 RX ORDER — SODIUM CHLORIDE 9 MG/ML
40 INJECTION, SOLUTION INTRAVENOUS AS NEEDED
Status: DISCONTINUED | OUTPATIENT
Start: 2025-02-12 | End: 2025-02-20 | Stop reason: HOSPADM

## 2025-02-12 RX ORDER — AMOXICILLIN 250 MG
2 CAPSULE ORAL 2 TIMES DAILY PRN
Status: DISCONTINUED | OUTPATIENT
Start: 2025-02-12 | End: 2025-02-20 | Stop reason: HOSPADM

## 2025-02-12 RX ORDER — BISACODYL 5 MG/1
5 TABLET, DELAYED RELEASE ORAL DAILY PRN
Status: DISCONTINUED | OUTPATIENT
Start: 2025-02-12 | End: 2025-02-20 | Stop reason: HOSPADM

## 2025-02-12 RX ORDER — BISACODYL 10 MG
10 SUPPOSITORY, RECTAL RECTAL DAILY PRN
Status: DISCONTINUED | OUTPATIENT
Start: 2025-02-12 | End: 2025-02-20 | Stop reason: HOSPADM

## 2025-02-12 RX ORDER — SODIUM CHLORIDE 0.9 % (FLUSH) 0.9 %
10 SYRINGE (ML) INJECTION EVERY 12 HOURS SCHEDULED
Status: DISCONTINUED | OUTPATIENT
Start: 2025-02-12 | End: 2025-02-20 | Stop reason: HOSPADM

## 2025-02-12 RX ORDER — POLYETHYLENE GLYCOL 3350 17 G/17G
17 POWDER, FOR SOLUTION ORAL DAILY PRN
Status: DISCONTINUED | OUTPATIENT
Start: 2025-02-12 | End: 2025-02-20 | Stop reason: HOSPADM

## 2025-02-12 RX ADMIN — CEFTRIAXONE SODIUM 1000 MG: 1 INJECTION, POWDER, FOR SOLUTION INTRAMUSCULAR; INTRAVENOUS at 17:43

## 2025-02-12 RX ADMIN — SODIUM CHLORIDE 1000 ML: 9 INJECTION, SOLUTION INTRAVENOUS at 15:29

## 2025-02-12 NOTE — ED NOTES
Patient to ER via car from home for found on the ground this morning and confusion    Family states she is off balance and has foul smelling urine and loss of vision     Last time family saw her was yesterday aprox around 5 pm
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
## 2025-02-12 NOTE — ED NOTES
"Nursing report ED to floor  Maria Fernanda Gaviria  89 y.o.  female    HPI :  HPI  Stated Reason for Visit: AMS    Chief Complaint  Chief Complaint   Patient presents with    Altered Mental Status       Admitting doctor:   Mayito Smith MD    Admitting diagnosis:   The primary encounter diagnosis was Urinary tract infection in female. Diagnoses of Generalized weakness, Acute encephalopathy, and Unwitnessed fall were also pertinent to this visit.    Code status:   Current Code Status       Date Active Code Status Order ID Comments User Context       2/12/2025 1825 No CPR (Do Not Attempt to Resuscitate) [id number]  Mayito Smith MD ED        Question Answer    Code Status (Patient has no pulse and is not breathing) No CPR (Do Not Attempt to Resuscitate)    Medical Interventions (Patient has pulse or is breathing) Limited Support    Medical Intervention Limits: No intubation (DNI)     No cardioversion                    Allergies:   Aspirin, Aspirin buf(cacarb-mgcarb-mgo), and Latex    Isolation:   No active isolations    Intake and Output    Intake/Output Summary (Last 24 hours) at 2/12/2025 1848  Last data filed at 2/12/2025 1832  Gross per 24 hour   Intake 1100 ml   Output --   Net 1100 ml       Weight:       02/12/25  1525   Weight: 47.4 kg (104 lb 8 oz)       Most recent vitals:   Vitals:    02/12/25 1525 02/12/25 1601 02/12/25 1801 02/12/25 1832   BP:  (!) 127/107 140/94    Pulse:  73 80 78   Resp:  18 18 18   Temp:       SpO2:  95% 94% 97%   Weight: 47.4 kg (104 lb 8 oz)      Height: 147.3 cm (57.99\")          Active LDAs/IV Access:   Lines, Drains & Airways       Active LDAs       Name Placement date Placement time Site Days    Peripheral IV 02/12/25 1522 Right Antecubital 02/12/25  1522  Antecubital  less than 1                    Labs (abnormal labs have a star):   Labs Reviewed   COMPREHENSIVE METABOLIC PANEL - Abnormal; Notable for the following components:       Result Value    AST (SGOT) 34 (*)     All other " components within normal limits    Narrative:     GFR Categories in Chronic Kidney Disease (CKD)      GFR Category          GFR (mL/min/1.73)    Interpretation  G1                     90 or greater         Normal or high (1)  G2                      60-89                Mild decrease (1)  G3a                   45-59                Mild to moderate decrease  G3b                   30-44                Moderate to severe decrease  G4                    15-29                Severe decrease  G5                    14 or less           Kidney failure          (1)In the absence of evidence of kidney disease, neither GFR category G1 or G2 fulfill the criteria for CKD.    eGFR calculation 2021 CKD-EPI creatinine equation, which does not include race as a factor   URINALYSIS W/ MICROSCOPIC IF INDICATED (NO CULTURE) - Abnormal; Notable for the following components:    Appearance, UA Cloudy (*)     Blood, UA Trace (*)     Leuk Esterase, UA Large (3+) (*)     All other components within normal limits   CBC WITH AUTO DIFFERENTIAL - Abnormal; Notable for the following components:    WBC 11.86 (*)     Hemoglobin 16.3 (*)     Hematocrit 49.7 (*)     Lymphocyte % 13.8 (*)     Immature Grans % 0.8 (*)     Neutrophils, Absolute 8.55 (*)     Monocytes, Absolute 1.26 (*)     Immature Grans, Absolute 0.09 (*)     All other components within normal limits   TROPONIN - Abnormal; Notable for the following components:    HS Troponin T 42 (*)     All other components within normal limits    Narrative:     High Sensitive Troponin T Reference Range:  <14.0 ng/L- Negative Female for AMI  <22.0 ng/L- Negative Male for AMI  >=14 - Abnormal Female indicating possible myocardial injury.  >=22 - Abnormal Male indicating possible myocardial injury.   Clinicians would have to utilize clinical acumen, EKG, Troponin, and serial changes to determine if it is an Acute Myocardial Infarction or myocardial injury due to an underlying chronic condition.         URINALYSIS, MICROSCOPIC ONLY - Abnormal; Notable for the following components:    WBC, UA Too Numerous to Count (*)     Bacteria, UA 3+ (*)     Squamous Epithelial Cells, UA 7-12 (*)     All other components within normal limits   LACTIC ACID, PLASMA - Normal   CK - Normal   MAGNESIUM - Normal   TSH - Normal   T4, FREE - Normal   HIGH SENSITIVITIY TROPONIN T 1HR    Narrative:     High Sensitive Troponin T Reference Range:  <14.0 ng/L- Negative Female for AMI  <22.0 ng/L- Negative Male for AMI  >=14 - Abnormal Female indicating possible myocardial injury.  >=22 - Abnormal Male indicating possible myocardial injury.   Clinicians would have to utilize clinical acumen, EKG, Troponin, and serial changes to determine if it is an Acute Myocardial Infarction or myocardial injury due to an underlying chronic condition.        URINALYSIS W/ CULTURE IF INDICATED   CBC AND DIFFERENTIAL    Narrative:     The following orders were created for panel order CBC & Differential.  Procedure                               Abnormality         Status                     ---------                               -----------         ------                     CBC Auto Differential[[id number]]        Abnormal            Final result                 Please view results for these tests on the individual orders.       EKG:   ECG 12 Lead Altered Mental Status   Final Result   HEART RATE=76  bpm   RR Uwygwtgd=918  ms   CO Kbheqlka=888  ms   P Horizontal Axis=-4  deg   P Front Axis=45  deg   QRSD Szrznuua=086  ms   QT Kohfxemi=337  ms   LHgB=427  ms   QRS Axis=-85  deg   T Wave Axis=100  deg   - ABNORMAL ECG -   Ventricular-paced rhythm   No further analysis attempted due to paced rhythm   No change from prior tracing   Electronically Signed By: Rocío Ying ([facility]) 2025-02-12 17:22:04   Date and Time of Study:2025-02-12 15:29:12          Meds given in ED:   Medications   sodium chloride 0.9 % flush 10 mL (has no administration in time range)    sodium chloride 0.9 % flush 10 mL (has no administration in time range)   sodium chloride 0.9 % infusion 40 mL (has no administration in time range)   sennosides-docusate (PERICOLACE) 8.6-50 MG per tablet 2 tablet (has no administration in time range)     And   polyethylene glycol (MIRALAX) packet 17 g (has no administration in time range)     And   bisacodyl (DULCOLAX) EC tablet 5 mg (has no administration in time range)     And   bisacodyl (DULCOLAX) suppository 10 mg (has no administration in time range)   Potassium Replacement - Follow Nurse / BPA Driven Protocol (has no administration in time range)   Magnesium Standard Dose Replacement - Follow Nurse / BPA Driven Protocol (has no administration in time range)   Phosphorus Replacement - Follow Nurse / BPA Driven Protocol (has no administration in time range)   Calcium Replacement - Follow Nurse / BPA Driven Protocol (has no administration in time range)   sodium chloride 0.9 % bolus 1,000 mL (0 mL Intravenous Stopped 2/12/25 1743)   cefTRIAXone (ROCEPHIN) 1,000 mg in sodium chloride 0.9 % 100 mL MBP (0 mg Intravenous Stopped 2/12/25 1832)       Imaging results:  XR Chest 1 View    Result Date: 2/12/2025  Stable chest with some subsegmental atelectasis or scarring at the bases  This report was finalized on 2/12/2025 3:49 PM by Dr. Jaron Jennings M.D on Workstation: [id number]       Ambulatory status:   - assist     Social issues:   Social History     Socioeconomic History    Marital status:    Tobacco Use    Smoking status: Former     Current packs/day: 1.00     Average packs/day: 1 pack/day for 15.0 years (15.0 ttl pk-yrs)     Types: Cigarettes     Passive exposure: Past    Smokeless tobacco: Never    Tobacco comments:     ?   Vaping Use    Vaping status: Never Used   Substance and Sexual Activity    Alcohol use: Yes     Alcohol/week: 2.0 standard drinks of alcohol     Types: 2 Glasses of wine per week     Comment: ?    Drug use: No    Sexual  activity: Not Currently     Comment: ?       Peripheral Neurovascular  Peripheral Neurovascular (Adult)  Peripheral Neurovascular WDL: WDL    Neuro Cognitive  Neuro Cognitive (Adult)  Cognitive/Neuro/Behavioral WDL: WDL    Learning  Learning Assessment  Learning Readiness and Ability: no barriers identified    Respiratory  Respiratory  Airway WDL: WDL  Respiratory WDL  Respiratory WDL: WDL    Abdominal Pain       Pain Assessments  Pain (Adult)  (0-10) Pain Rating: Rest: 0  (0-10) Pain Rating: Activity: 0    NIH Stroke Scale       Chantale Benoit RN  02/12/25 18:48 EST

## 2025-02-12 NOTE — H&P
Patient Name:  Maria Fernanda Gaviria  YOB: 1923  MRN:  [id number]  Admit Date:  2/12/2025  Patient Care Team:  Henok Salcido MD as PCP - General  Christiano Dewitt MD as Consulting Physician (Obstetrics and Gynecology)  Rodolfo Shah MD as Consulting Physician (Allergy)  Michael Mendoza MD as Consulting Physician (Dermatology)  Jluis Bain MD as Consulting Physician (Otolaryngology)  Lucas Rueda OD (Optometry)  Ousmane Rhodes MD as Consulting Physician (Gastroenterology)      Subjective   History Present Illness     Chief Complaint   Patient presents with    Altered Mental Status     This is 89-year-old with a past medical history of hypertension, hypothyroidism, AV block status post cardiac pacemaker, carotid stenosis who presents to the hospital after being found down.  Her last known normal was around 5 PM on the day prior to presentation.  She was found by her family today and it was unknown how long she was down for.  There was some concern that she had some malodorous urine and balance issues.  She also reportedly took about a weeks worth of levothyroxine and Allegra but 2 days ago but then had not taken any medications since.  Workup in the emergency department showed a slight leukocytosis to 11.86.  Urinalysis was concerning for too numerous to count WBCs and 3+ bacteria however it also showed 7-12 squamous epithelial cells.  Chest x-ray was obtained which was grossly unremarkable.  The CT scans of the head and cervical spine were also taken which were negative for any acute findings.  EKG showed a paced rhythm.      Personal History     Past Medical History:   Diagnosis Date    Arthritis     Arthritis of back     COVID-19 virus detected 02/25/2023    Disease of thyroid gland     GERD (gastroesophageal reflux disease)     Hip arthrosis     Age related    Hyperlipidemia     Hypertension     Low back strain Age related    Osteoporosis     Restless legs syndrome 06/27/2016      Past Surgical History:   Procedure Laterality Date    CARDIAC ELECTROPHYSIOLOGY PROCEDURE N/A 02/22/2023    Procedure: Pacemaker SC new  biotronik;  Surgeon: Deny Khanna MD;  Location: [facility] CATH INVASIVE LOCATION;  Service: Cardiovascular;  Laterality: N/A;    CATARACT EXTRACTION      CHOLECYSTECTOMY WITH INTRAOPERATIVE CHOLANGIOGRAM N/A 06/06/2021    Procedure: CHOLECYSTECTOMY LAPAROSCOPIC INTRAOPERATIVE CHOLANGIOGRAM common bile duct exploration;  Surgeon: Huey Wilkinson MD;  Location: [facility] MAIN OR;  Service: General;  Laterality: N/A;    COLONOSCOPY      ERCP N/A 07/07/2021    Procedure: ENDOSCOPIC RETROGRADE CHOLANGIOPANCREATOGRAPHY WITH SPHINCTEROTOMY, BALLOON SWEEP AND STONE (2) EXTRACTION;  Surgeon: Justo Aden MD;  Location: [facility] ENDOSCOPY;  Service: Gastroenterology;  Laterality: N/A;  PRE- BILE DUCT OBSTRUCTION   POST- SAME      HYSTERECTOMY      REPLACEMENT TOTAL KNEE Left     THYROID SURGERY      TUBAL ABDOMINAL LIGATION       Family History   Problem Relation Age of Onset    Hypertension Mother     Heart disease Mother     Thyroid disease Mother     Hypertension Father     Cancer Father     Heart disease Brother     Hypertension Daughter     Thyroid disease Daughter     Lung disease Daughter     Depression Daughter     Migraines Daughter     Arthritis Daughter     Heart disease Paternal Grandmother     Stroke Paternal Grandfather     Kidney disease Paternal Grandfather      Social History     Tobacco Use    Smoking status: Former     Current packs/day: 1.00     Average packs/day: 1 pack/day for 15.0 years (15.0 ttl pk-yrs)     Types: Cigarettes     Passive exposure: Past    Smokeless tobacco: Never    Tobacco comments:     ?   Vaping Use    Vaping status: Never Used   Substance Use Topics    Alcohol use: Yes     Alcohol/week: 2.0 standard drinks of alcohol     Types: 2 Glasses of wine per week     Comment: ?    Drug use: No     No current facility-administered  medications on file prior to encounter.     Current Outpatient Medications on File Prior to Encounter   Medication Sig Dispense Refill    acetaminophen (TYLENOL) 325 MG tablet Take 2 tablets by mouth Every 6 (Six) Hours As Needed for Mild Pain.      fexofenadine (ALLEGRA) 180 MG tablet Take 1 tablet by mouth Daily.      levothyroxine (SYNTHROID, LEVOTHROID) 50 MCG tablet TAKE 1 TABLET EVERY MORNING 90 tablet 1     Allergies   Allergen Reactions    Aspirin Nausea And Vomiting and Nausea Only    Aspirin Buf(Cacarb-Mgcarb-Mgo) Nausea And Vomiting    Latex Rash     Redness on skin       Objective    Objective     Vital Signs  Temp:  [97.1 °F (36.2 °C)] 97.1 °F (36.2 °C)  Heart Rate:  [71-80] 75  Resp:  [16-18] 18  BP: (127-156)/() 140/86  SpO2:  [94 %-100 %] 96 %  on   ;      Body mass index is 21.85 kg/m².    Physical Exam    Results Review:  I reviewed the patient's new clinical results.  I reviewed the patient's new imaging results and agree with the interpretation.  I reviewed the patient's other test results and agree with the interpretation  I personally viewed and interpreted the patient's EKG/Telemetry data  Discussed with ED provider.    Lab Results (last 24 hours)       Procedure Component Value Units Date/Time    CBC & Differential [[id number]]  (Abnormal) Collected: 02/12/25 1528    Specimen: Blood Updated: 02/12/25 1537    Narrative:      The following orders were created for panel order CBC & Differential.  Procedure                               Abnormality         Status                     ---------                               -----------         ------                     CBC Auto Differential[[id number]]        Abnormal            Final result                 Please view results for these tests on the individual orders.    Comprehensive Metabolic Panel [[id number]]  (Abnormal) Collected: 02/12/25 1528    Specimen: Blood Updated: 02/12/25 1600     Glucose 83 mg/dL      BUN 14 mg/dL      Creatinine  0.81 mg/dL      Sodium 136 mmol/L      Potassium 3.7 mmol/L      Comment: Slight hemolysis detected by analyzer. Result may be falsely elevated.        Chloride 100 mmol/L      CO2 26.0 mmol/L      Calcium 9.0 mg/dL      Total Protein 6.4 g/dL      Albumin 4.0 g/dL      ALT (SGPT) 24 U/L      AST (SGOT) 34 U/L      Alkaline Phosphatase 73 U/L      Total Bilirubin 1.1 mg/dL      Globulin 2.4 gm/dL      A/G Ratio 1.7 g/dL      BUN/Creatinine Ratio 17.3     Anion Gap 10.0 mmol/L      eGFR 64.1 mL/min/1.73     Narrative:      GFR Categories in Chronic Kidney Disease (CKD)      GFR Category          GFR (mL/min/1.73)    Interpretation  G1                     90 or greater         Normal or high (1)  G2                      60-89                Mild decrease (1)  G3a                   45-59                Mild to moderate decrease  G3b                   30-44                Moderate to severe decrease  G4                    15-29                Severe decrease  G5                    14 or less           Kidney failure          (1)In the absence of evidence of kidney disease, neither GFR category G1 or G2 fulfill the criteria for CKD.    eGFR calculation 2021 CKD-EPI creatinine equation, which does not include race as a factor    Urinalysis With Microscopic If Indicated (No Culture) - Urine, Catheter [[id number]]  (Abnormal) Collected: 02/12/25 1528    Specimen: Urine, Catheter Updated: 02/12/25 1610     Color, UA Yellow     Appearance, UA Cloudy     pH, UA 6.0     Specific Gravity, UA 1.012     Glucose, UA Negative     Ketones, UA Negative     Bilirubin, UA Negative     Blood, UA Trace     Protein, UA Negative     Leuk Esterase, UA Large (3+)     Nitrite, UA Negative     Urobilinogen, UA 1.0 E.U./dL    Lactic Acid, Plasma [[id number]]  (Normal) Collected: 02/12/25 1528    Specimen: Blood Updated: 02/12/25 1559     Lactate 1.5 mmol/L     CK [[id number]]  (Normal) Collected: 02/12/25 1528    Specimen: Blood Updated: 02/12/25  1600     Creatine Kinase 85 U/L     Magnesium [[id number]]  (Normal) Collected: 02/12/25 1528    Specimen: Blood Updated: 02/12/25 1600     Magnesium 2.2 mg/dL     CBC Auto Differential [[id number]]  (Abnormal) Collected: 02/12/25 1528    Specimen: Blood Updated: 02/12/25 1537     WBC 11.86 10*3/mm3      RBC 5.28 10*6/mm3      Hemoglobin 16.3 g/dL      Hematocrit 49.7 %      MCV 94.1 fL      MCH 30.9 pg      MCHC 32.8 g/dL      RDW 13.7 %      RDW-SD 47.8 fl      MPV 9.9 fL      Platelets 222 10*3/mm3      Neutrophil % 72.1 %      Lymphocyte % 13.8 %      Monocyte % 10.6 %      Eosinophil % 2.2 %      Basophil % 0.5 %      Immature Grans % 0.8 %      Neutrophils, Absolute 8.55 10*3/mm3      Lymphocytes, Absolute 1.64 10*3/mm3      Monocytes, Absolute 1.26 10*3/mm3      Eosinophils, Absolute 0.26 10*3/mm3      Basophils, Absolute 0.06 10*3/mm3      Immature Grans, Absolute 0.09 10*3/mm3      nRBC 0.0 /100 WBC     High Sensitivity Troponin T [[id number]]  (Abnormal) Collected: 02/12/25 1528    Specimen: Blood Updated: 02/12/25 1600     HS Troponin T 42 ng/L     Narrative:      High Sensitive Troponin T Reference Range:  <14.0 ng/L- Negative Female for AMI  <22.0 ng/L- Negative Male for AMI  >=14 - Abnormal Female indicating possible myocardial injury.  >=22 - Abnormal Male indicating possible myocardial injury.   Clinicians would have to utilize clinical acumen, EKG, Troponin, and serial changes to determine if it is an Acute Myocardial Infarction or myocardial injury due to an underlying chronic condition.         Urinalysis, Microscopic Only - Urine, Catheter [[id number]]  (Abnormal) Collected: 02/12/25 1528    Specimen: Urine, Catheter Updated: 02/12/25 1654     RBC, UA 0-2 /HPF      WBC, UA Too Numerous to Count /HPF      Bacteria, UA 3+ /HPF      Squamous Epithelial Cells, UA 7-12 /HPF      Hyaline Casts, UA 0-2 /LPF      Methodology Manual Light Microscopy    TSH [[id number]]  (Normal) Collected: 02/12/25 1528     Specimen: Blood Updated: 02/12/25 1656     TSH 2.620 uIU/mL     T4, Free [[id number]]  (Normal) Collected: 02/12/25 1528    Specimen: Blood Updated: 02/12/25 1656     Free T4 1.32 ng/dL     High Sensitivity Troponin T 1Hr [[id number]] Collected: 02/12/25 1652    Specimen: Blood Updated: 02/12/25 1721     HS Troponin T 13 ng/L      Troponin T Numeric Delta -29 ng/L      Troponin T % Delta -69    Narrative:      High Sensitive Troponin T Reference Range:  <14.0 ng/L- Negative Female for AMI  <22.0 ng/L- Negative Male for AMI  >=14 - Abnormal Female indicating possible myocardial injury.  >=22 - Abnormal Male indicating possible myocardial injury.   Clinicians would have to utilize clinical acumen, EKG, Troponin, and serial changes to determine if it is an Acute Myocardial Infarction or myocardial injury due to an underlying chronic condition.                 Results for orders placed or performed during the hospital encounter of 02/21/23   Blood Culture - Blood, Arm, Right    Specimen: Arm, Right; Blood   Result Value Ref Range    Blood Culture No growth at 5 days        Imaging Results (Last 24 Hours)       Procedure Component Value Units Date/Time    CT Head Without Contrast [[id number]] Collected: 02/12/25 1723     Updated: 02/12/25 1734    Narrative:      CT HEAD WO CONTRAST-, CT CERVICAL SPINE WO CONTRAST-     HISTORY:  unwitnessed fall, confusion; N39.0-Urinary tract infection,  site not specified; R53.1-Weakness; G93.40-Encephalopathy, unspecified;  R29.6-Repeated falls     COMPARISON: CT angiogram neck and head 7/22/2024     CT HEAD WITHOUT CONTRAST: The brain ventricles are symmetrical. There is  no evidence of hemorrhage, hydrocephalus or of a focal area of decreased  attenuation to suggest acute infarction. Moderate small vessel ischemic  disease is noted. Moderate vascular calcification is noted. Bone windows  showed no evidence of a calvarial fracture. Further evaluation could be  performed with a MRI  examination of the brain as indicated.     CT CERVICAL SPINE WITHOUT CONTRAST: There is a grade 1 anterolisthesis  of C3 upon C4 and to lesser extent C4 upon C5. There is severe loss of  disc height at C5-6 and moderate to severe loss of disc height at C6-7.  The facets are fused on the right from C3-4 to C6. There is no evidence  of prevertebral edema or fracture.     C2-3: Mild facet degenerative disease is present bilaterally.     C3-4: Moderate facet degenerative disease is present on the left.     C5-6: Mild to moderate facet degenerative disease is present on the  right.     C6-7: A central and right paramedian disc osteophyte complex is present  which abuts and mildly flattens the ventral surface of the cord.     C7-T1: There is no evidence of a disc bulge or herniation.                 Radiation dose reduction techniques were utilized, including automated  exposure modulation based on body size.     This report was finalized on 2/12/2025 5:31 PM by Dr. Robert Galvan M.D  on Workstation: [id number]       CT Cervical Spine Without Contrast [[id number]] Collected: 02/12/25 1723     Updated: 02/12/25 1734    Narrative:      CT HEAD WO CONTRAST-, CT CERVICAL SPINE WO CONTRAST-     HISTORY:  unwitnessed fall, confusion; N39.0-Urinary tract infection,  site not specified; R53.1-Weakness; G93.40-Encephalopathy, unspecified;  R29.6-Repeated falls     COMPARISON: CT angiogram neck and head 7/22/2024     CT HEAD WITHOUT CONTRAST: The brain ventricles are symmetrical. There is  no evidence of hemorrhage, hydrocephalus or of a focal area of decreased  attenuation to suggest acute infarction. Moderate small vessel ischemic  disease is noted. Moderate vascular calcification is noted. Bone windows  showed no evidence of a calvarial fracture. Further evaluation could be  performed with a MRI examination of the brain as indicated.     CT CERVICAL SPINE WITHOUT CONTRAST: There is a grade 1 anterolisthesis  of C3 upon C4 and  to lesser extent C4 upon C5. There is severe loss of  disc height at C5-6 and moderate to severe loss of disc height at C6-7.  The facets are fused on the right from C3-4 to C6. There is no evidence  of prevertebral edema or fracture.     C2-3: Mild facet degenerative disease is present bilaterally.     C3-4: Moderate facet degenerative disease is present on the left.     C5-6: Mild to moderate facet degenerative disease is present on the  right.     C6-7: A central and right paramedian disc osteophyte complex is present  which abuts and mildly flattens the ventral surface of the cord.     C7-T1: There is no evidence of a disc bulge or herniation.                 Radiation dose reduction techniques were utilized, including automated  exposure modulation based on body size.     This report was finalized on 2/12/2025 5:31 PM by Dr. Robert Galvan M.D  on Workstation: [id number]       XR Chest 1 View [[id number]] Collected: 02/12/25 1547     Updated: 02/12/25 1552    Narrative:      XR CHEST 1 VW-        INDICATION: Unwitnessed fall, altered mental status     COMPARISON: Chest radiograph February 23, 2023     TECHNIQUE: 1 view chest     FINDINGS:      Small bibasilar opacities. No effusions. Stable mediastinum. Heart is  normal in size. Left-sided pacemaker with a right ventricular lead.       Impression:      Stable chest with some subsegmental atelectasis or scarring at the bases     This report was finalized on 2/12/2025 3:49 PM by Dr. Jaron Jennings M.D on Workstation: [id number]               Results for orders placed during the hospital encounter of 07/22/24    Adult Transthoracic Echo Complete W/ Cont if Necessary Per Protocol (With Agitated Saline)    Interpretation Summary    Left ventricular systolic function is normal. Calculated left ventricular EF = 62.6%    Left ventricular diastolic function is consistent with (grade I) impaired relaxation.    Saline test results are negative.    There is moderate  calcification of the aortic valve.    Estimated right ventricular systolic pressure from tricuspid regurgitation is normal (<35 mmHg). Calculated right ventricular systolic pressure from tricuspid regurgitation is 22 mmHg.      ECG 12 Lead Altered Mental Status   Final Result   HEART RATE=76  bpm   RR Qhbyjbwx=245  ms   CA Zrwbhrql=655  ms   P Horizontal Axis=-4  deg   P Front Axis=45  deg   QRSD Kfufjamh=911  ms   QT Wuxwiunh=728  ms   YMgM=791  ms   QRS Axis=-85  deg   T Wave Axis=100  deg   - ABNORMAL ECG -   Ventricular-paced rhythm   No further analysis attempted due to paced rhythm   No change from prior tracing   Electronically Signed By: Rocío Ying ([facility]) 2025-02-12 17:22:04   Date and Time of Study:2025-02-12 15:29:12      Telemetry Scan   Final Result                 Assessment/Plan     Active Hospital Problems    Diagnosis  POA    **Urinary tract infection in female [N39.0]  Yes    Presence of cardiac pacemaker [Z95.0]  Yes    AV block [I44.30]  Yes    Hypothyroidism (acquired) [E03.9]  Yes    GERD (gastroesophageal reflux disease) [K21.9]  Yes    Essential hypertension [I10]  Yes    Hyperlipidemia [E78.5]  Yes      Resolved Hospital Problems   No resolved problems to display.     Acute cystitis without hematuria  Continue Rocephin.  Follow  Cultures    Generalized weakness  Unwitnessed fall  Dizziness   Patient was found down.  Unclear for how long exactly, and the last known normal was about 1 hour prior to presentation.  No focal deficits on exam but due to dizziness, will order MRI  CT head without any acute intracranial findings.  CT cervical spine without any acute fractures    AV block status post pacemaker  EKG with V paced rhythm  Pacemaker to be interrogated  Check TTE    Hypothyroidism  Patient reportedly took 1 week worth of levothyroxine about 4 days ago.  Family would like to take patient off the medication and see how she responds.      I discussed the patient's findings and my  recommendations with patient, family, and ED provider.    VTE Prophylaxis - SCDs.  Code Status - Limited code (no CPR, no intubation).       Mayito Smith MD  [facility]  02/12/25  20:38 EST

## 2025-02-13 ENCOUNTER — APPOINTMENT (OUTPATIENT)
Dept: NEUROLOGY | Facility: HOSPITAL | Age: OVER 89
End: 2025-02-13
Payer: MEDICARE

## 2025-02-13 ENCOUNTER — TELEPHONE (OUTPATIENT)
Dept: INTERNAL MEDICINE | Age: OVER 89
End: 2025-02-13
Payer: MEDICARE

## 2025-02-13 ENCOUNTER — APPOINTMENT (OUTPATIENT)
Dept: MRI IMAGING | Facility: HOSPITAL | Age: OVER 89
End: 2025-02-13
Payer: MEDICARE

## 2025-02-13 LAB
ALBUMIN SERPL-MCNC: 3.2 G/DL (ref 3.5–5.2)
ALBUMIN/GLOB SERPL: 1.4 G/DL
ALP SERPL-CCNC: 59 U/L (ref 39–117)
ALT SERPL W P-5'-P-CCNC: 16 U/L (ref 1–33)
ANION GAP SERPL CALCULATED.3IONS-SCNC: 11.2 MMOL/L (ref 5–15)
AST SERPL-CCNC: 23 U/L (ref 1–32)
BILIRUB SERPL-MCNC: 0.8 MG/DL (ref 0–1.2)
BUN SERPL-MCNC: 11 MG/DL (ref 8–23)
BUN/CREAT SERPL: 16.4 (ref 7–25)
CALCIUM SPEC-SCNC: 8.6 MG/DL (ref 8.2–9.6)
CHLORIDE SERPL-SCNC: 106 MMOL/L (ref 98–107)
CO2 SERPL-SCNC: 23.8 MMOL/L (ref 22–29)
CREAT SERPL-MCNC: 0.67 MG/DL (ref 0.57–1)
DEPRECATED RDW RBC AUTO: 46.6 FL (ref 37–54)
EGFRCR SERPLBLD CKD-EPI 2021: 77.2 ML/MIN/1.73
ERYTHROCYTE [DISTWIDTH] IN BLOOD BY AUTOMATED COUNT: 13.8 % (ref 12.3–15.4)
GLOBULIN UR ELPH-MCNC: 2.3 GM/DL
GLUCOSE BLDC GLUCOMTR-MCNC: 147 MG/DL (ref 70–130)
GLUCOSE BLDC GLUCOMTR-MCNC: 175 MG/DL (ref 70–130)
GLUCOSE SERPL-MCNC: 101 MG/DL (ref 65–99)
HCT VFR BLD AUTO: 43.7 % (ref 34–46.6)
HGB BLD-MCNC: 14.7 G/DL (ref 12–15.9)
MAGNESIUM SERPL-MCNC: 2 MG/DL (ref 1.7–2.3)
MCH RBC QN AUTO: 30.9 PG (ref 26.6–33)
MCHC RBC AUTO-ENTMCNC: 33.6 G/DL (ref 31.5–35.7)
MCV RBC AUTO: 91.8 FL (ref 79–97)
PHOSPHATE SERPL-MCNC: 3 MG/DL (ref 2.5–4.5)
PLATELET # BLD AUTO: 192 10*3/MM3 (ref 140–450)
PMV BLD AUTO: 10.2 FL (ref 6–12)
POTASSIUM SERPL-SCNC: 3.7 MMOL/L (ref 3.5–5.2)
PROT SERPL-MCNC: 5.5 G/DL (ref 6–8.5)
RBC # BLD AUTO: 4.76 10*6/MM3 (ref 3.77–5.28)
SODIUM SERPL-SCNC: 141 MMOL/L (ref 136–145)
WBC NRBC COR # BLD AUTO: 11.35 10*3/MM3 (ref 3.4–10.8)

## 2025-02-13 PROCEDURE — 25010000002 CEFTRIAXONE PER 250 MG: Performed by: INTERNAL MEDICINE

## 2025-02-13 PROCEDURE — G0378 HOSPITAL OBSERVATION PER HR: HCPCS

## 2025-02-13 PROCEDURE — 99214 OFFICE O/P EST MOD 30 MIN: CPT

## 2025-02-13 PROCEDURE — 36415 COLL VENOUS BLD VENIPUNCTURE: CPT | Performed by: STUDENT IN AN ORGANIZED HEALTH CARE EDUCATION/TRAINING PROGRAM

## 2025-02-13 PROCEDURE — 70551 MRI BRAIN STEM W/O DYE: CPT

## 2025-02-13 PROCEDURE — 97162 PT EVAL MOD COMPLEX 30 MIN: CPT

## 2025-02-13 PROCEDURE — 95819 EEG AWAKE AND ASLEEP: CPT | Performed by: STUDENT IN AN ORGANIZED HEALTH CARE EDUCATION/TRAINING PROGRAM

## 2025-02-13 PROCEDURE — 82948 REAGENT STRIP/BLOOD GLUCOSE: CPT

## 2025-02-13 PROCEDURE — 97116 GAIT TRAINING THERAPY: CPT

## 2025-02-13 PROCEDURE — 84100 ASSAY OF PHOSPHORUS: CPT | Performed by: STUDENT IN AN ORGANIZED HEALTH CARE EDUCATION/TRAINING PROGRAM

## 2025-02-13 PROCEDURE — 83735 ASSAY OF MAGNESIUM: CPT | Performed by: STUDENT IN AN ORGANIZED HEALTH CARE EDUCATION/TRAINING PROGRAM

## 2025-02-13 PROCEDURE — 80053 COMPREHEN METABOLIC PANEL: CPT | Performed by: STUDENT IN AN ORGANIZED HEALTH CARE EDUCATION/TRAINING PROGRAM

## 2025-02-13 PROCEDURE — 95819 EEG AWAKE AND ASLEEP: CPT

## 2025-02-13 PROCEDURE — 85027 COMPLETE CBC AUTOMATED: CPT | Performed by: STUDENT IN AN ORGANIZED HEALTH CARE EDUCATION/TRAINING PROGRAM

## 2025-02-13 RX ORDER — ASPIRIN 81 MG/1
81 TABLET ORAL DAILY
Status: DISCONTINUED | OUTPATIENT
Start: 2025-02-13 | End: 2025-02-15

## 2025-02-13 RX ORDER — CLOPIDOGREL BISULFATE 75 MG/1
300 TABLET ORAL ONCE
Status: DISCONTINUED | OUTPATIENT
Start: 2025-02-13 | End: 2025-02-13

## 2025-02-13 RX ORDER — SODIUM CHLORIDE 0.9 % (FLUSH) 0.9 %
10 SYRINGE (ML) INJECTION AS NEEDED
Status: DISCONTINUED | OUTPATIENT
Start: 2025-02-13 | End: 2025-02-20 | Stop reason: HOSPADM

## 2025-02-13 RX ORDER — ATORVASTATIN CALCIUM 80 MG/1
80 TABLET, FILM COATED ORAL NIGHTLY
Status: DISCONTINUED | OUTPATIENT
Start: 2025-02-13 | End: 2025-02-20 | Stop reason: HOSPADM

## 2025-02-13 RX ORDER — LEVOTHYROXINE SODIUM 50 UG/1
50 TABLET ORAL
Status: DISCONTINUED | OUTPATIENT
Start: 2025-02-14 | End: 2025-02-20 | Stop reason: HOSPADM

## 2025-02-13 RX ORDER — PANTOPRAZOLE SODIUM 40 MG/1
40 TABLET, DELAYED RELEASE ORAL
Status: DISCONTINUED | OUTPATIENT
Start: 2025-02-14 | End: 2025-02-20 | Stop reason: HOSPADM

## 2025-02-13 RX ORDER — SODIUM CHLORIDE 0.9 % (FLUSH) 0.9 %
10 SYRINGE (ML) INJECTION EVERY 12 HOURS SCHEDULED
Status: DISCONTINUED | OUTPATIENT
Start: 2025-02-13 | End: 2025-02-20 | Stop reason: HOSPADM

## 2025-02-13 RX ORDER — CLOPIDOGREL BISULFATE 75 MG/1
75 TABLET ORAL DAILY
Status: DISCONTINUED | OUTPATIENT
Start: 2025-02-14 | End: 2025-02-13

## 2025-02-13 RX ORDER — SODIUM CHLORIDE 9 MG/ML
40 INJECTION, SOLUTION INTRAVENOUS AS NEEDED
Status: DISCONTINUED | OUTPATIENT
Start: 2025-02-13 | End: 2025-02-20 | Stop reason: HOSPADM

## 2025-02-13 RX ADMIN — CEFTRIAXONE SODIUM 1000 MG: 1 INJECTION, POWDER, FOR SOLUTION INTRAMUSCULAR; INTRAVENOUS at 17:07

## 2025-02-13 RX ADMIN — Medication 10 ML: at 22:00

## 2025-02-13 RX ADMIN — ASPIRIN 81 MG: 81 TABLET, COATED ORAL at 17:06

## 2025-02-13 RX ADMIN — ATORVASTATIN CALCIUM 80 MG: 80 TABLET, FILM COATED ORAL at 20:29

## 2025-02-13 RX ADMIN — Medication 10 ML: at 20:32

## 2025-02-13 RX ADMIN — Medication 10 ML: at 10:40

## 2025-02-13 NOTE — TELEPHONE ENCOUNTER
Received FreeBrie message from another patient with regards to this patient. Message pasted below along with responses from myself and provider. Patient who messaged is not on verbal release so we cannot discuss patient's health information.    Initial Message:  My grandma has an appointment on March 31, her name is Maria Fernanda Gaviria, I have been taking care of her for the past few months and she is on a thyroid medication. We have been having problems with her taking medicine, sometimes taking several days worth in 1 day, sometimes not taking it. Is it ok if she doesn’t take her medicine anymore? Or could we have her come have lab work done before her appointment?     Provider's feedback:   **IMPORTANT    Message should not be sent from non-patient account as her questions related to her grandmother.  Verify okay to release medical information.    She should not stop her thyroid medication.  Someone should assist patient with medication administration.  She should have her TSH and Free T4 checked next week to verify her levels.  Send order to me if agreeable.    COPY THIS TO THE PROPER PATIENT'S CHART PLEASE.     Manager's response to initial message:  Marquis White,     You are not on the verbal release for that patient so we will not be able to discuss this unfortunately. Please have someone reach out who is on the release. Additionally, messages like these become part of the permanent medical record. Please ensure that moving forward the account associated with the person who the question(s) pertain to is the one being used to send these kinds of messages. If you have other questions please let us know. Thank you and have a great day!     Kindly,  Olesya  Practice Manager

## 2025-02-13 NOTE — PLAN OF CARE
Goal Outcome Evaluation:  Plan of Care Reviewed With: patient           Outcome Evaluation: Pt is a 89 yo F admitted from home after a fall - family found her down confused and weak. Pt lives alone, and per chart is independent at BL, reports using a rwx for mobility, cane occasionally. Pt has 5 JULIA with no steps inside. Pt is AO x2, did not know she was in the hospital but easily reoriented, knew month but not year. Pt presents to PT with impaired strength, endurance, and balance limiting overall mobility. Pt transferred to EOB with CGA - difficulty scooting hips out. Pt stood with CGA and ambulated 150ft with CGA initially, progressing to SBA/SV. Pt needing constant directional cues with confusion limiting independence, but mobilizes well with rwx, no LOB. Pt returned to room and agreeable to sitting UIC, left with needs met. PT will continue to follow, anticipate DC home with HHPT and increased family support pending cog improvement.    Anticipated Discharge Disposition (PT): home with home health, home with 24/7 care

## 2025-02-13 NOTE — CASE MANAGEMENT/SOCIAL WORK
Discharge Planning Assessment  [facility]     Patient Name: Maria Fernanda Gaviria  MRN: [id number]  Today's Date: 2/13/2025    Admit Date: 2/12/2025    Plan: Acute vs SNF   Discharge Needs Assessment       Row Name 02/13/25 1529       Living Environment    People in Home alone    Current Living Arrangements condominium    Potentially Unsafe Housing Conditions none    Primary Care Provided by self    Provides Primary Care For no one    Family Caregiver if Needed child(dejon), adult    Family Caregiver Names Jillian and Anna    Quality of Family Relationships supportive    Able to Return to Prior Arrangements no       Resource/Environmental Concerns    Resource/Environmental Concerns none    Transportation Concerns none       Transition Planning    Patient/Family Anticipates Transition to inpatient rehabilitation facility    Patient/Family Anticipated Services at Transition rehabilitation services    Transportation Anticipated family or friend will provide       Discharge Needs Assessment    Equipment Currently Used at Home cane, straight;bp cuff;bath bench;walker, rolling;rollator;shower chair    Concerns to be Addressed no discharge needs identified    Anticipated Changes Related to Illness inability to care for self    Equipment Needed After Discharge none                   Discharge Plan       Row Name 02/13/25 9422       Plan    Plan Acute vs SNF    Patient/Family in Agreement with Plan yes    Plan Comments Met with pt, daughters, and granddaughter in room. Introduced self, explained  role, verified face sheet. Pt sleeping though most of visit. Daughters stated the plan is for the pt to go to rehab at discharge. They would prefer acute rehab over SNF. Daughters will provide transportation. They will discuss the options and then let CCP know what facility they would like referrals entered for. They also stated that they will need help finding somewhere for the pt to live after she gets out of rehab.  Discussed [facility] with them and they are agreeable. Called Christine/ [facility] and she will come see the family. They denied any further needs at this time. CCP following. Rakesh CASTRO RN                  Continued Care and Services - Admitted Since 2/12/2025    No active coordination exists for this encounter.          Demographic Summary       Row Name 02/13/25 1528       General Information    Admission Type observation    Referral Source admission list       Contact Information    Permission Granted to Share Info With                    Functional Status       Row Name 02/13/25 1529       Functional Status    Usual Activity Tolerance good    Current Activity Tolerance good                   Psychosocial    No documentation.                  Abuse/Neglect    No documentation.                  Legal    No documentation.                  Substance Abuse    No documentation.                  Patient Forms    No documentation.                     Rakesh Washington RN

## 2025-02-13 NOTE — CONSULTS
"Neurology Consult Note    Consult Date: 2/13/2025    Referring MD: Mayito Smith MD    Reason for Consult I have been asked to see the patient in neurological consultation to render advice and opinion regarding multiple small acute infarcts on MRI.    Maria Fernanda Gaviria is a 89 y.o. female with a past medical history of COVID-19, hyperlipidemia, hypertension, osteoporosis, arthritis, hypothyroidism, AV block status post cardiac pacemaker presents to the hospital after being found down. Her pants had been pulled off and chair by her was turned around with cane wrapped around it like she was trying to get up but could not. No tongue bite, incontinence. Patient says that she did not fall but was \"pushed down\" even though she lives home alone.  Her last known well was around 5 PM on 2/11/2025.  Unclear how long patient was down for by family. Daughter got her up and walked to her to the bathroom and noticed she was walking straight instead of to the left like she needed to get to the bathroom.  There was concern that she had malodorous urine. She also seemed to be ignoring her left side to voice and vision since the fall. Patient does have son who has epilepsy. She denies recent illness or infection but does have possible UTI in ED. Denies history of head trauma, stroke, alcohol or drug use.     She apparently had taken about a weeks worth of levothyroxine Allegra 2 days ago but not taking any medication since. She said she took all of this medication because \"someone told her to.\" Patient at baseline orientation varies throughout the day typically worst at night and when fatigued. She normally lives at home alone and requires no help with dressing/bathing/toileting/eating. She no longer drives and gets around with a cane. Family is concerned about recent events and worsening memory and want to consider options for 24 hour care.      Workup in the ED was positive for slight leukocytosis.  Urinalysis demonstrated " numerous WBCs and 3+ bacteria but also 7-12 squamous epithelial cells.  Chest x-ray unremarkable.  CK 85.  TSH, lactate unremarkable.  Blood pressure arrival was 156/91 mmHg and highest has been 192/92 mmHg.    Patient was seen in July 2020 for gait imbalance.  Symptoms have been [place] for unknown amount of time but she felt a sensation of falling to her left and was unable to walk.  No other focal deficits.  Symptoms resolved by the time she got to the hospital.  She was normotensive and afebrile on arrival.  Glucose was within normal limits.  CT head demonstrated no acute findings with moderate to severe small vessel disease.  CTA head and neck showed no high-grade stenosis/aneurysm/dissection.  2D echo demonstrated ejection fraction of 62.6%.  Negative saline test, no LAE or thrombus.  Hemoglobin A1c 5.30 and LDL waas 192 at the time.. MRI brain not felt to be warranted and was discharged home on Plavix 75 mg daily and Lipitor 40 mg daily. However, patient has not been taking per family and were unaware of this (granddaughter expressed concerns that patient POA may be developing memory impairment as well so may be why). Follows with geriatric medicine in [place] last seen in March 2023 diagnosed with mild cognitive impairment and most likely stress incontinence.    Past Medical/Surgical Hx:  Past Medical History:   Diagnosis Date    Arthritis     Arthritis of back     COVID-19 virus detected 02/25/2023    Disease of thyroid gland     GERD (gastroesophageal reflux disease)     Hip arthrosis     Age related    Hyperlipidemia     Hypertension     Low back strain Age related    Osteoporosis     Restless legs syndrome 06/27/2016     Past Surgical History:   Procedure Laterality Date    CARDIAC ELECTROPHYSIOLOGY PROCEDURE N/A 02/22/2023    Procedure: Pacemaker SC new  biotronik;  Surgeon: Deny Khanna MD;  Location: [facility] INVASIVE LOCATION;  Service: Cardiovascular;  Laterality: N/A;    CATARACT  EXTRACTION      CHOLECYSTECTOMY WITH INTRAOPERATIVE CHOLANGIOGRAM N/A 06/06/2021    Procedure: CHOLECYSTECTOMY LAPAROSCOPIC INTRAOPERATIVE CHOLANGIOGRAM common bile duct exploration;  Surgeon: Huey Wilkinson MD;  Location: [facility] MAIN OR;  Service: General;  Laterality: N/A;    COLONOSCOPY      ERCP N/A 07/07/2021    Procedure: ENDOSCOPIC RETROGRADE CHOLANGIOPANCREATOGRAPHY WITH SPHINCTEROTOMY, BALLOON SWEEP AND STONE (2) EXTRACTION;  Surgeon: Justo Aden MD;  Location: [facility] ENDOSCOPY;  Service: Gastroenterology;  Laterality: N/A;  PRE- BILE DUCT OBSTRUCTION   POST- SAME      HYSTERECTOMY      REPLACEMENT TOTAL KNEE Left     THYROID SURGERY      TUBAL ABDOMINAL LIGATION         Medications On Admission  Medications Prior to Admission   Medication Sig Dispense Refill Last Dose/Taking    acetaminophen (TYLENOL) 325 MG tablet Take 2 tablets by mouth Every 6 (Six) Hours As Needed for Mild Pain.   Past Week    fexofenadine (ALLEGRA) 180 MG tablet Take 1 tablet by mouth Daily.   Past Week    levothyroxine (SYNTHROID, LEVOTHROID) 50 MCG tablet TAKE 1 TABLET EVERY MORNING 90 tablet 1 Past Week       Allergies:  Allergies   Allergen Reactions    Aspirin Nausea And Vomiting and Nausea Only    Aspirin Buf(Cacarb-Mgcarb-Mgo) Nausea And Vomiting    Latex Rash     Redness on skin       Social Hx:  Social History     Socioeconomic History    Marital status:    Tobacco Use    Smoking status: Former     Current packs/day: 1.00     Average packs/day: 1 pack/day for 15.0 years (15.0 ttl pk-yrs)     Types: Cigarettes     Passive exposure: Past    Smokeless tobacco: Never    Tobacco comments:     ?   Vaping Use    Vaping status: Never Used   Substance and Sexual Activity    Alcohol use: Yes     Alcohol/week: 2.0 standard drinks of alcohol     Types: 2 Glasses of wine per week     Comment: ?    Drug use: No    Sexual activity: Not Currently     Comment: ?       Family Hx:  Family History   Problem Relation Age of  "Onset    Hypertension Mother     Heart disease Mother     Thyroid disease Mother     Hypertension Father     Cancer Father     Heart disease Brother     Hypertension Daughter     Thyroid disease Daughter     Lung disease Daughter     Depression Daughter     Migraines Daughter     Arthritis Daughter     Heart disease Paternal Grandmother     Stroke Paternal Grandfather     Kidney disease Paternal Grandfather      Exam    /73 (BP Location: Right arm)   Pulse 69   Temp 97.3 °F (36.3 °C) (Temporal)   Resp 18   Ht 162.6 cm (64\")   Wt 51.3 kg (113 lb 1.5 oz)   LMP  (LMP Unknown)   SpO2 98%   BMI 19.41 kg/m²   gen: NAD, vitals reviewed  MS: oriented to self only, recent/remote memory impaired, impaired attention/concentration, language intact, no neglect, left hemineglect, gerstmann syndrome  CN: visual acuity grossly normal, left homonymous hemianopia, PERRL, EOMI, facial sensation equal, no facial droop, hearing symmetric, palate elevates symmetrically, shoulder shrug equal, tongue midline  Motor: 5/5 right upper extremity, at least 4/5 left upper extremity, at least 3/5 bilateral lower extremities  Sensation: intact to cold temperature throughout  Coordination: no dysmetria with finger to nose with right, unable to assess left due to ignoring this side of her body.    DATA:    Lab Results   Component Value Date    GLUCOSE 101 (H) 02/13/2025    CALCIUM 8.6 02/13/2025     02/13/2025    K 3.7 02/13/2025    CO2 23.8 02/13/2025     02/13/2025    BUN 11 02/13/2025    CREATININE 0.67 02/13/2025    EGFRIFAFRI 83 09/22/2021    EGFRIFNONA 68 09/22/2021    BCR 16.4 02/13/2025    ANIONGAP 11.2 02/13/2025     Lab Results   Component Value Date    WBC 11.35 (H) 02/13/2025    HGB 14.7 02/13/2025    HCT 43.7 02/13/2025    MCV 91.8 02/13/2025     02/13/2025     Lab Results   Component Value Date     (H) 07/22/2024     (H) 11/22/2023    LDL 85 12/02/2022     Lab Results   Component Value " Date    HGBA1C 5.30 07/22/2024     Lab Results   Component Value Date    INR 1.04 07/22/2024    INR 0.99 07/23/2023    INR 1.04 02/21/2023    PROTIME 13.8 07/22/2024    PROTIME 13.2 07/23/2023    PROTIME 13.7 02/21/2023       Lab review:   Glucose 101  Total protein 5.5  Albumin 3.2  WBC 11.35  Urinalysis cloudy, trace blood, 3+ leukocytes, too numerous WBC, 3+ bacteria, squamous epithelial cells 7-12, rest WNL  Lactate 1.5  TSH 2.62  Free T4: 1.32  CK 85  Troponin 42, 13    Imaging review:   CT HEAD WITHOUT CONTRAST: The brain ventricles are symmetrical. There is  no evidence of hemorrhage, hydrocephalus or of a focal area of decreased  attenuation to suggest acute infarction. Moderate small vessel ischemic  disease is noted. Moderate vascular calcification is noted. Bone windows  showed no evidence of a calvarial fracture. Further evaluation could be  performed with a MRI examination of the brain as indicated.     CT CERVICAL SPINE WITHOUT CONTRAST: There is a grade 1 anterolisthesis  of C3 upon C4 and to lesser extent C4 upon C5. There is severe loss of  disc height at C5-6 and moderate to severe loss of disc height at C6-7.  The facets are fused on the right from C3-4 to C6. There is no evidence  of prevertebral edema or fracture.     C2-3: Mild facet degenerative disease is present bilaterally.     C3-4: Moderate facet degenerative disease is present on the left.     C5-6: Mild to moderate facet degenerative disease is present on the  right.     C6-7: A central and right paramedian disc osteophyte complex is present  which abuts and mildly flattens the ventral surface of the cord.     C7-T1: There is no evidence of a disc bulge or herniation.    MRI brain without contrast:  FINDINGS: There are patchy nodular foci of T2 and FLAIR hyperintensity  in the periventricular extending into the subcortical white matter of  the cerebral hemispheres most consistent with moderate small vessel  disease. 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 This is a new finding when compared to prior MRI of the  brain on 03/17/2015. There is a cluster of small nodular and patchy  diffusion hyperintense foci in the medial right occipital lobe that are  high signal on the FLAIR and T2-weighted images and low signal on the  ADC maps compatible with small cluster of acute infarct, the largest of  which measures 2.2 x 2.3 cm in size in the posterior medial right  occipital lobe with several subcentimeter foci in the mid and anterior  medial right occipital lobe compatible with small acute infarcts in the  distribution of medial occipital branches of the right PCA territory.  Furthermore there is a diffusion hyperintense nodular focus in the  posterior lateral right parietal white matter measuring 6 x 5 mm in size  and 2 separate 3 and 6 mm diffusion hyperintense foci in the superior  lateral right parietal cortex and a very tiny 7 x 3 mm focus in the  posterior right insular cortex and these tiny acute infarcts are in the  right MCA territory. The remainder of the brain parenchyma is normal in  signal intensity. There is mild cerebral atrophy. The ventricles are  upper limits of normal in size. I see no mass effect and no midline  shift and no extra-axial fluid collections are identified. The calvarium  and the skull base demonstrate normal marrow signal intensity. There are  intraocular lens implants in the globes from previous bilateral cataract  surgery. Otherwise the orbits are normal in appearance. The paranasal  sinuses and the mastoid air cells and middle ear cavities are  clear.  Good flow voids are demonstrated within the cerebral vessels and in the  dural venous sinuses.      IMPRESSION:     1. There is a cluster small patchy nodular diffusion hyperintense foci  in the medial right occipital lobe compatible with multiple small acute  medial right occipital infarcts in the right PCA territory, the largest  measuring 2.2 x 2.3 cm in size in the posterior medial right occipital  lobe and there are multiple subcentimeter foci of acute infarction in  the mid and anteromedial right occipital lobe in the right PCA  territory. Furthermore there are several small acute infarcts in the  right MCA territory including a 6 x 5 mm acute posterior lateral right  parietal white matter infarct, 2 separate 3 and 6 mm acute infarcts in  the superior lateral right occipital lobe and a 7 x 3 mm acute infarct  in the posterior right insular cortex, all 4 of which are in the right  MCA territory. No additional acute abnormality is seen.     2. There is moderate small vessel disease in the cerebral white matter.  There is mild diffuse cerebral atrophy. This patient has a 16 x 5 mm  hemosiderin lined area of encephalomalacia in the posterior inferior  left parietal cortex from the sequela of either an old infarct that had  hemorrhage and or possibly old head trauma with hemorrhage or focus of  primary intraparenchymal hemorrhage from amyloid that bled into the  parenchyma. There is adjacent hemosiderin staining the adjacent left  posterior parietal sulci from prior subarachnoid hemorrhage that likely  occurred at the same time as the small focus of intraparenchymal  hemorrhage in the posterior left parietal lobe and correlation with  clinical history suggested.     3. The remainder of the MRI of the brain is unremarkable.    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 Her CT head demonstrated no acute abnormality but did show moderate small vessel disease. With unwitnessed fall and family history of epilepsy and dementia will obtain routine EEG to further evaluate as well.     NIHSS: 4    PLAN:   -CTA head and neck  -TTE  -Routine EEG  -Admit   -ASA 81 mg, family does not believe she has allergy to ASA. Chart says nausea and vomiting so will try lower dose along with PPI  -Pantoprazole 40 mg daily  -Atorvastatin 80 mg daily  -Normalize BP  -Perform bedside swallow test  -Telemetry to monitor for arrhythmia  -VTE prophylaxis  -Neuro checks, if change in exam call neuro oncall  -PT/OT when appropriate   -Hemoglobin A1c, lipid panel, b12, vitamin d, folate  -Case management consult for discussion of 24 hour care options for patient with family    Recommendations discussed with Dr. Schroeder who is in agreement with plan.

## 2025-02-13 NOTE — NURSING NOTE
From [employer] report:    11:16  Pacemaker put in Auto Mode for MRI by Melvin Yuen of [employer]    Remains for 2 weeks, until 2/27/25.

## 2025-02-13 NOTE — TELEPHONE ENCOUNTER
Called daughter Jillian to discuss. Patient in hospital at this time. Had TSH and Free T4 drawn yesterday. Jillian states patient lives alone and she self administers meds. Last week she took 7 days of pills in one day. Took all pills away for now. Will see us in a couple weeks as a follow up. Discussed meds with doctors in the hospital and recommended that they discuss this will their PCP. Jillian discussed some instances of patient disorientation/confusion. Scheduled hospital follow up as well to discuss medications further. Advised updating the verbal release when here in office too.Jillian acknowledged all information.

## 2025-02-13 NOTE — THERAPY EVALUATION
Patient Name: Maria Fernanda Gaviria  : 1923    MRN: [id number]                              Today's Date: 2025       Admit Date: 2025    Visit Dx:     ICD-10-CM ICD-9-CM   1. Urinary tract infection in female  N39.0 599.0   2. Generalized weakness  R53.1 780.79   3. Acute encephalopathy  G93.40 348.30   4. Unwitnessed fall  R29.6 [id number]     Patient Active Problem List   Diagnosis    Osteoarthritis of lumbar spine    Osteoarthritis    Asthma    Carotid stenosis    Hyperlipidemia    GERD (gastroesophageal reflux disease)    Essential hypertension    Hypothyroidism (acquired)    Osteopenia    DNR (do not resuscitate)    Non-seasonal allergic rhinitis    AV block    Presence of cardiac pacemaker    Stroke-like symptoms    Urinary tract infection in female     Past Medical History:   Diagnosis Date    Arthritis     Arthritis of back     COVID-19 virus detected 2023    Disease of thyroid gland     GERD (gastroesophageal reflux disease)     Hip arthrosis     Age related    Hyperlipidemia     Hypertension     Low back strain Age related    Osteoporosis     Restless legs syndrome 2016     Past Surgical History:   Procedure Laterality Date    CARDIAC ELECTROPHYSIOLOGY PROCEDURE N/A 2023    Procedure: Pacemaker SC new  biotronik;  Surgeon: Deny Khanna MD;  Location: [facility] CATH INVASIVE LOCATION;  Service: Cardiovascular;  Laterality: N/A;    CATARACT EXTRACTION      CHOLECYSTECTOMY WITH INTRAOPERATIVE CHOLANGIOGRAM N/A 2021    Procedure: CHOLECYSTECTOMY LAPAROSCOPIC INTRAOPERATIVE CHOLANGIOGRAM common bile duct exploration;  Surgeon: Huey Wilkinson MD;  Location: [facility] MAIN OR;  Service: General;  Laterality: N/A;    COLONOSCOPY      ERCP N/A 2021    Procedure: ENDOSCOPIC RETROGRADE CHOLANGIOPANCREATOGRAPHY WITH SPHINCTEROTOMY, BALLOON SWEEP AND STONE (2) EXTRACTION;  Surgeon: Justo Aden MD;  Location: [facility] ENDOSCOPY;  Service: Gastroenterology;   Laterality: N/A;  PRE- BILE DUCT OBSTRUCTION   POST- SAME      HYSTERECTOMY      REPLACEMENT TOTAL KNEE Left     THYROID SURGERY      TUBAL ABDOMINAL LIGATION        General Information       [facility] Name 02/13/25 0935          Physical Therapy Time and Intention    Document Type evaluation  -     Mode of Treatment individual therapy;physical therapy  -[facility] Name 02/13/25 0935          General Information    Patient Profile Reviewed yes  -     Prior Level of Function independent:;gait;transfer;bed mobility  -     Existing Precautions/Restrictions fall  -     Barriers to Rehab cognitive status  -[facility] Name 02/13/25 0935          Living Environment    People in Home alone  -[facility] Name 02/13/25 0935          Home Main Entrance    Number of Stairs, Main Entrance five  -[facility] Name 02/13/25 0935          Stairs Within Home, Primary    Number of Stairs, Within Home, Primary none  -[facility] Name 02/13/25 0935          Cognition    Orientation Status (Cognition) oriented to;person;situation;other (see comments)  Oriented to month, not year  -[facility] Name 02/13/25 0935          Safety Issues/Impairments Affecting Functional Mobility    Impairments Affecting Function (Mobility) balance;endurance/activity tolerance;strength  -               User Key  (r) = Recorded By, (t) = Taken By, (c) = Cosigned By      Initials Name Provider Type     Marine Hinton PT Physical Therapist                   Mobility       [facility] Name 02/13/25 0936          Bed Mobility    Bed Mobility supine-sit  -     Supine-Sit [place] (Bed Mobility) contact guard;verbal cues  -     Assistive Device (Bed Mobility) head of bed elevated;bed rails  -[facility] Name 02/13/25 0936          Sit-Stand Transfer    Sit-Stand [place] (Transfers) contact guard;verbal cues  -     Assistive Device (Sit-Stand Transfers) walker, front-wheeled  -[facility] Name 02/13/25 0936          Gait/Stairs (Locomotion)     [place] Level (Gait) verbal cues;contact guard;standby assist  -     Assistive Device (Gait) walker, front-wheeled  -     Distance in Feet (Gait) 150  -     Deviations/Abnormal Patterns (Gait) jack decreased;gait speed decreased  -     Bilateral Gait Deviations forward flexed posture  -               User Key  (r) = Recorded By, (t) = Taken By, (c) = Cosigned By      Initials Name Provider Type     Marine Hinton PT Physical Therapist                   Obj/Interventions       [facility] Name 02/13/25 0937          Range of Motion Comprehensive    General Range of Motion bilateral lower extremity ROM WFL  -[facility] Name 02/13/25 0937          Strength Comprehensive (MMT)    General Manual Muscle Testing (MMT) Assessment lower extremity strength deficits identified  -     Comment, General Manual Muscle Testing (MMT) Assessment Generalized weakness, BLE grossly 4/5  -[facility] Name 02/13/25 0937          Balance    Balance Assessment sitting static balance;sitting dynamic balance;standing static balance;standing dynamic balance  -     Static Sitting Balance standby assist  -     Dynamic Sitting Balance standby assist  -     Position, Sitting Balance unsupported;sitting edge of bed  -     Static Standing Balance verbal cues;standby assist  -     Dynamic Standing Balance verbal cues;standby assist  -     Position/Device Used, Standing Balance supported;walker, front-wheeled  -     Balance Interventions sitting;standing;sit to stand;supported;static;dynamic  -[facility] Name 02/13/25 0937          Sensory Assessment (Somatosensory)    Sensory Assessment (Somatosensory) LE sensation intact  -               User Key  (r) = Recorded By, (t) = Taken By, (c) = Cosigned By      Initials Name Provider Type     Marine Hinton PT Physical Therapist                   Goals/Plan       Row Name 02/13/25 0942          Bed Mobility Goal 1 (PT)    Activity/Assistive Device (Bed Mobility  Goal 1, PT) bed mobility activities, all  -     [place] Level/Cues Needed (Bed Mobility Goal 1, PT) independent  -     Time Frame (Bed Mobility Goal 1, PT) 1 week  -       Row Name 02/13/25 0942          Transfer Goal 1 (PT)    Activity/Assistive Device (Transfer Goal 1, PT) transfers, all  -     [place] Level/Cues Needed (Transfer Goal 1, PT) modified independence  -     Time Frame (Transfer Goal 1, PT) 1 week  -       Row Name 02/13/25 0942          Gait Training Goal 1 (PT)    Activity/Assistive Device (Gait Training Goal 1, PT) gait (walking locomotion)  -     [place] Level (Gait Training Goal 1, PT) modified independence  -     Distance (Gait Training Goal 1, PT) 300ft  -     Time Frame (Gait Training Goal 1, PT) 1 week  -       Row Name 02/13/25 0942          Therapy Assessment/Plan (PT)    Planned Therapy Interventions (PT) balance training;bed mobility training;gait training;home exercise program;patient/family education;ROM (range of motion);stair training;strengthening;stretching;transfer training  -               User Key  (r) = Recorded By, (t) = Taken By, (c) = Cosigned By      Initials Name Provider Type     Marine Hinton, PT Physical Therapist                   Clinical Impression       Row Name 02/13/25 0938          Pain    Pretreatment Pain Rating 0/10 - no pain  -     Posttreatment Pain Rating 0/10 - no pain  -       Row Name 02/13/25 0938          Plan of Care Review    Plan of Care Reviewed With patient  -     Outcome Evaluation Pt is a 89 yo F admitted from home after a fall - family found her down confused and weak. Pt lives alone, and per chart is independent at BL, reports using a rwx for mobility, cane occasionally. Pt has 5 JULIA with no steps inside. Pt is AO x2, did not know she was in the hospital but easily reoriented, knew month but not year. Pt presents to PT with impaired strength, endurance, and balance limiting overall mobility. Pt  transferred to EOB with CGA - difficulty scooting hips out. Pt stood with CGA and ambulated 150ft with CGA initially, progressing to SBA/SV. Pt needing constant directional cues with confusion limiting independence, but mobilizes well with rwx, no LOB. Pt returned to room and agreeable to sitting UIC, left with needs met. PT will continue to follow, anticipate DC home with HHPT and increased family support pending cog improvement.  -       Row Name 02/13/25 0938          Therapy Assessment/Plan (PT)    Patient/Family Therapy Goals Statement (PT) Return to PLOF  -     Rehab Potential (PT) good  -     Criteria for Skilled Interventions Met (PT) yes  -     Therapy Frequency (PT) 5 times/wk  -       Row Name 02/13/25 0938          Vital Signs    O2 Delivery Pre Treatment room air  -     O2 Delivery Intra Treatment room air  -     O2 Delivery Post Treatment room air  -       Row Name 02/13/25 0938          Positioning and Restraints    Pre-Treatment Position in bed  -     Post Treatment Position chair  -     In Chair notified nsg;reclined;call light within reach;encouraged to call for assist;exit alarm on  -               User Key  (r) = Recorded By, (t) = Taken By, (c) = Cosigned By      Initials Name Provider Type     Marine Hinton, PT Physical Therapist                   Outcome Measures       Row Name 02/13/25 0942 02/12/25 2201       How much help from another person do you currently need...    Turning from your back to your side while in flat bed without using bedrails? 4  - 3  -SG    Moving from lying on back to sitting on the side of a flat bed without bedrails? 4  - 3  -SG    Moving to and from a bed to a chair (including a wheelchair)? 3  - 3  -SG    Standing up from a chair using your arms (e.g., wheelchair, bedside chair)? 3  - 3  -SG    Climbing 3-5 steps with a railing? 3  - 2  -SG    To walk in hospital room? 3  - 3  -SG    AM-PAC 6 Clicks Score (PT) 20  - 17  -SG     Highest Level of Mobility Goal 6 --> Walk 10 steps or more  - 5 --> Static standing  -      Row Name 02/13/25 0942          Functional Assessment    Outcome Measure Options AM-PAC 6 Clicks Basic Mobility (PT)  -               User Key  (r) = Recorded By, (t) = Taken By, (c) = Cosigned By      Initials Name Provider Type     Brandi Crocker, RN Registered Nurse     Marine Hinton PT Physical Therapist                                 Physical Therapy Education       Title: PT OT SLP Therapies (In Progress)       Topic: Physical Therapy (In Progress)       Point: Mobility training (In Progress)       Learning Progress Summary            Patient Acceptance, E,TB,D, NR,NL by  at 2/13/2025 0943                      Point: Home exercise program (In Progress)       Learning Progress Summary            Patient Acceptance, E,TB,D, NR,NL by  at 2/13/2025 0943                      Point: Body mechanics (In Progress)       Learning Progress Summary            Patient Acceptance, E,TB,D, NR,NL by  at 2/13/2025 0943                      Point: Precautions (In Progress)       Learning Progress Summary            Patient Acceptance, E,TB,D, NR,NL by  at 2/13/2025 0943                                      User Key       Initials Effective Dates Name Provider Type Discipline     04/08/22 -  Marine Hinton PT Physical Therapist PT                  PT Recommendation and Plan  Planned Therapy Interventions (PT): balance training, bed mobility training, gait training, home exercise program, patient/family education, ROM (range of motion), stair training, strengthening, stretching, transfer training  Outcome Evaluation: Pt is a 89 yo F admitted from home after a fall - family found her down confused and weak. Pt lives alone, and per chart is independent at BL, reports using a rwx for mobility, cane occasionally. Pt has 5 JULIA with no steps inside. Pt is AO x2, did not know she was in the hospital but easily  reoriented, knew month but not year. Pt presents to PT with impaired strength, endurance, and balance limiting overall mobility. Pt transferred to EOB with CGA - difficulty scooting hips out. Pt stood with CGA and ambulated 150ft with CGA initially, progressing to SBA/SV. Pt needing constant directional cues with confusion limiting independence, but mobilizes well with rwx, no LOB. Pt returned to room and agreeable to sitting UIC, left with needs met. PT will continue to follow, anticipate DC home with HHPT and increased family support pending cog improvement.     Time Calculation:   PT Evaluation Complexity  History, PT Evaluation Complexity: 1-2 personal factors and/or comorbidities  Examination of Body Systems (PT Eval Complexity): total of 3 or more elements  Clinical Presentation (PT Evaluation Complexity): evolving  Clinical Decision Making (PT Evaluation Complexity): moderate complexity  Overall Complexity (PT Evaluation Complexity): moderate complexity     PT Charges       Row Name 02/13/25 0943             Time Calculation    Start Time 0912  -      Stop Time 0929  -      Time Calculation (min) 17 min  -      PT Received On 02/13/25  -      PT - Next Appointment 02/14/25  -      PT Goal Re-Cert Due Date 02/20/25  -         Time Calculation- PT    Total Timed Code Minutes- PT 13 minute(s)  -         Timed Charges    56490 - Gait Training Minutes  8  -      41799 - PT Therapeutic Activity Minutes 5  -BH         Total Minutes    Timed Charges Total Minutes 13  -       Total Minutes 13  -BH                User Key  (r) = Recorded By, (t) = Taken By, (c) = Cosigned By      Initials Name Provider Type     Marine Hinton, PT Physical Therapist                  Therapy Charges for Today       Code Description Service Date Service Provider Modifiers Qty    37042936105 HC GAIT TRAINING EA 15 MIN 2/13/2025 Marien Hinton, PT GP 1    74654565557 HC PT EVAL MOD COMPLEXITY 3 2/13/2025 Jamaal  Marine STROUD, PT GP 1            PT G-Codes  Outcome Measure Options: AM-PAC 6 Clicks Basic Mobility (PT)  AM-PAC 6 Clicks Score (PT): 20  PT Discharge Summary  Anticipated Discharge Disposition (PT): home with home health, home with 24/7 care    Marine Hinton, PT  2/13/2025

## 2025-02-13 NOTE — PROGRESS NOTES
Name: Maria Fernanda Gaviria ADMIT: 2025   : 1923  PCP: Henok Salcido MD    MRN: [id number] LOS: 0 days   AGE/SEX: 89 y.o. female  ROOM: [facility]     Subjective   Subjective   Patient is lying on the bed and does not appear to be any distress.  No more dizziness today.  No reports of nausea, vomiting, chest pain, shortness of breath.       Objective   Objective   Vital Signs  Temp:  [97.3 °F (36.3 °C)-98.5 °F (36.9 °C)] 98.5 °F (36.9 °C)  Heart Rate:  [67-80] 71  Resp:  [16-18] 16  BP: (100-192)/() 100/80  SpO2:  [94 %-99 %] 99 %  on   ;   Device (Oxygen Therapy): room air  Body mass index is 19.41 kg/m².  Physical Exam  HEENT: PERRLA, extraocular movements intact, Scleras no icterus  Neck: Supple, no JVD  Cardiovascular: Regular rate and rhythm with normal S1 and S2  Respiratory: Fairly clear to auscultation anteriorly with no wheezes  GI: Soft, nontender, bowel sounds are present  Extremities: + edema, palpable pedal pulses.    Results Review     I reviewed the patient's new clinical results.  Results from last 7 days   Lab Units 25  0517 25  1528   WBC 10*3/mm3 11.35* 11.86*   HEMOGLOBIN g/dL 14.7 16.3*   PLATELETS 10*3/mm3 192 222     Results from last 7 days   Lab Units 25  0517 25  1528   SODIUM mmol/L 141 136   POTASSIUM mmol/L 3.7 3.7   CHLORIDE mmol/L 106 100   CO2 mmol/L 23.8 26.0   BUN mg/dL 11 14   CREATININE mg/dL 0.67 0.81   GLUCOSE mg/dL 101* 83   EGFR mL/min/1.73 77.2 64.1     Results from last 7 days   Lab Units 25  0517 25  1528   ALBUMIN g/dL 3.2* 4.0   BILIRUBIN mg/dL 0.8 1.1   ALK PHOS U/L 59 73   AST (SGOT) U/L 23 34*   ALT (SGPT) U/L 16 24     Results from last 7 days   Lab Units 25  0517 25  1528   CALCIUM mg/dL 8.6 9.0   ALBUMIN g/dL 3.2* 4.0   MAGNESIUM mg/dL 2.0 2.2   PHOSPHORUS mg/dL 3.0  --      Results from last 7 days   Lab Units 25  1528   LACTATE mmol/L 1.5     Glucose   Date/Time Value Ref Range Status   2025  1612 175 (H) 70 - 130 mg/dL Final       MRI Brain Without Contrast    Result Date: 2/13/2025   1. There is a cluster small patchy nodular diffusion hyperintense foci in the medial right occipital lobe compatible with multiple small acute medial right occipital infarcts in the right PCA territory, the largest measuring 2.2 x 2.3 cm in size in the posterior medial right occipital lobe and there are multiple subcentimeter foci of acute infarction in the mid and anteromedial right occipital lobe in the right PCA territory. Furthermore there are several small acute infarcts in the right MCA territory including a 6 x 5 mm acute posterior lateral right parietal white matter infarct, 2 separate 3 and 6 mm acute infarcts in the superior lateral right occipital lobe and a 7 x 3 mm acute infarct in the posterior right insular cortex, all 4 of which are in the right MCA territory. No additional acute abnormality is seen.  2. There is moderate small vessel disease in the cerebral white matter. There is mild diffuse cerebral atrophy. 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 There is adjacent hemosiderin staining the adjacent left posterior parietal sulci from prior subarachnoid hemorrhage that likely occurred at the same time as the small focus of intraparenchymal hemorrhage in the posterior left parietal lobe and correlation with clinical history suggested.  3. The remainder of the MRI of the brain is unremarkable. All of the above findings were discussed in great detail with Dr. William Martinez, the hospitalist taking care of the patient, by telephone on 02/13/2025 at 1:20 p.m.      XR Chest 1 View    Result Date: 2/12/2025  Stable chest with some  subsegmental atelectasis or scarring at the bases  This report was finalized on 2/12/2025 3:49 PM by Dr. Jaron Jennings M.D on Workstation: [id number]       I have personally reviewed all medications:  Scheduled Medications  aspirin, 81 mg, Oral, Daily  atorvastatin, 80 mg, Oral, Nightly  [START ON 2/14/2025] pantoprazole, 40 mg, Oral, Q AM  sodium chloride, 10 mL, Intravenous, Q12H  sodium chloride, 10 mL, Intravenous, Q12H    Infusions   Diet  NPO Diet NPO Type: Strict NPO    I have personally reviewed:  [x]  Laboratory   [x]  Microbiology   [x]  Radiology   [x]  EKG/Telemetry  [x]  Cardiology/Vascular   []  Pathology    []  Records       Assessment/Plan     Active Hospital Problems    Diagnosis  POA    **Urinary tract infection in female [N39.0]  Yes    Presence of cardiac pacemaker [Z95.0]  Yes    AV block [I44.30]  Yes    Hypothyroidism (acquired) [E03.9]  Yes    GERD (gastroesophageal reflux disease) [K21.9]  Yes    Essential hypertension [I10]  Yes    Hyperlipidemia [E78.5]  Yes      Resolved Hospital Problems   No resolved problems to display.       1 Dizziness with unwitnessed fall, CT brain with no acute findings and MRI has been ordered which revealed multiple small infarcts.  2.  Acute multiple small infarcts, neurology consult is being obtained and is on aspirin and statins.  PT/OT consult has been obtained as well.  Check echocardiogram.  3.  Hypothyroidism, on Synthroid.  4.  History of AV block requiring pacemaker placement.  5.  Acute cystitis, on IV Rocephin and will be continued.  6.  On SCDs for DVT prophylaxis.  7.  CODE STATUS is DNMARIEL Martinez MD  [facility]  02/13/25  16:24 EST

## 2025-02-13 NOTE — PLAN OF CARE
Problem: Adult Inpatient Plan of Care  Goal: Plan of Care Review  Outcome: Progressing  Flowsheets (Taken 2/13/2025 1534)  Outcome Evaluation: patient is alert and confused. MRI today. up in the chair. Room air. Neurology consulted.  Goal: Patient-Specific Goal (Individualized)  Outcome: Progressing  Goal: Absence of Hospital-Acquired Illness or Injury  Outcome: Progressing  Intervention: Identify and Manage Fall Risk  Recent Flowsheet Documentation  Taken 2/13/2025 1410 by Kaylene Maldonado RN  Safety Promotion/Fall Prevention:   clutter free environment maintained   activity supervised  Taken 2/13/2025 1205 by Kaylene Maldonado RN  Safety Promotion/Fall Prevention: patient off unit  Taken 2/13/2025 1020 by Kaylene Maldonado RN  Safety Promotion/Fall Prevention:   clutter free environment maintained   activity supervised  Taken 2/13/2025 0900 by Kyalene Maldonado RN  Safety Promotion/Fall Prevention:   clutter free environment maintained   activity supervised  Intervention: Prevent and Manage VTE (Venous Thromboembolism) Risk  Recent Flowsheet Documentation  Taken 2/13/2025 0900 by Kaylene Maldonado RN  VTE Prevention/Management:   bilateral   SCDs (sequential compression devices) off   patient refused intervention  Intervention: Prevent Infection  Recent Flowsheet Documentation  Taken 2/13/2025 1410 by Kaylene Maldonado RN  Infection Prevention: single patient room provided  Taken 2/13/2025 1020 by Kaylene Maldonado RN  Infection Prevention: single patient room provided  Taken 2/13/2025 0900 by Kaylene Maldonado RN  Infection Prevention: single patient room provided  Goal: Optimal Comfort and Wellbeing  Outcome: Progressing  Intervention: Provide Person-Centered Care  Recent Flowsheet Documentation  Taken 2/13/2025 1410 by Kaylene Maldonado RN  Trust Relationship/Rapport:   care explained   choices provided  Taken 2/13/2025 0900 by Kaylene Maldonado RN  Trust Relationship/Rapport:   care explained   choices provided  Goal: Readiness for  Transition of Care  Outcome: Progressing   Goal Outcome Evaluation:              Outcome Evaluation: patient is alert and confused. MRI today. up in the chair. Room air. Neurology consulted.

## 2025-02-14 ENCOUNTER — APPOINTMENT (OUTPATIENT)
Dept: CARDIOLOGY | Facility: HOSPITAL | Age: OVER 89
End: 2025-02-14
Payer: MEDICARE

## 2025-02-14 ENCOUNTER — APPOINTMENT (OUTPATIENT)
Dept: CT IMAGING | Facility: HOSPITAL | Age: OVER 89
End: 2025-02-14
Payer: MEDICARE

## 2025-02-14 PROBLEM — N39.0 UTI (URINARY TRACT INFECTION): Status: ACTIVE | Noted: 2025-02-14

## 2025-02-14 LAB
25(OH)D3 SERPL-MCNC: 22.8 NG/ML (ref 30–100)
ANION GAP SERPL CALCULATED.3IONS-SCNC: 9 MMOL/L (ref 5–15)
AORTIC ARCH: 1.9 CM
AORTIC DIMENSIONLESS INDEX: 0.6 (DI)
ASCENDING AORTA: 3.2 CM
AV MEAN PRESS GRAD SYS DOP V1V2: 2.6 MMHG
AV VMAX SYS DOP: 106.8 CM/SEC
BASOPHILS # BLD AUTO: 0.04 10*3/MM3 (ref 0–0.2)
BASOPHILS NFR BLD AUTO: 0.5 % (ref 0–1.5)
BH CV ECHO MEAS - ACS: 1.35 CM
BH CV ECHO MEAS - AO MAX PG: 4.6 MMHG
BH CV ECHO MEAS - AO ROOT DIAM: 2.9 CM
BH CV ECHO MEAS - AO V2 VTI: 17.7 CM
BH CV ECHO MEAS - AVA(I,D): 1.81 CM2
BH CV ECHO MEAS - EDV(CUBED): 44 ML
BH CV ECHO MEAS - EDV(MOD-SP2): 87 ML
BH CV ECHO MEAS - EDV(MOD-SP4): 83 ML
BH CV ECHO MEAS - EF(MOD-SP2): 52.9 %
BH CV ECHO MEAS - EF(MOD-SP4): 60.2 %
BH CV ECHO MEAS - ESV(CUBED): 15.8 ML
BH CV ECHO MEAS - ESV(MOD-SP2): 41 ML
BH CV ECHO MEAS - ESV(MOD-SP4): 33 ML
BH CV ECHO MEAS - FS: 28.9 %
BH CV ECHO MEAS - IVS/LVPW: 1.09 CM
BH CV ECHO MEAS - IVSD: 1.26 CM
BH CV ECHO MEAS - LAT PEAK E' VEL: 6.2 CM/SEC
BH CV ECHO MEAS - LV MASS(C)D: 139.5 GRAMS
BH CV ECHO MEAS - LV MAX PG: 1.53 MMHG
BH CV ECHO MEAS - LV MEAN PG: 0.91 MMHG
BH CV ECHO MEAS - LV V1 MAX: 61.8 CM/SEC
BH CV ECHO MEAS - LV V1 VTI: 10.4 CM
BH CV ECHO MEAS - LVIDD: 3.5 CM
BH CV ECHO MEAS - LVIDS: 2.5 CM
BH CV ECHO MEAS - LVOT AREA: 3.1 CM2
BH CV ECHO MEAS - LVOT DIAM: 1.98 CM
BH CV ECHO MEAS - LVPWD: 1.16 CM
BH CV ECHO MEAS - MED PEAK E' VEL: 4.8 CM/SEC
BH CV ECHO MEAS - MV A DUR: 0.11 SEC
BH CV ECHO MEAS - MV A MAX VEL: 115.7 CM/SEC
BH CV ECHO MEAS - MV DEC SLOPE: 1497 CM/SEC2
BH CV ECHO MEAS - MV DEC TIME: 0.15 SEC
BH CV ECHO MEAS - MV E MAX VEL: 85.1 CM/SEC
BH CV ECHO MEAS - MV E/A: 0.74
BH CV ECHO MEAS - MV MAX PG: 5.4 MMHG
BH CV ECHO MEAS - MV MEAN PG: 2.8 MMHG
BH CV ECHO MEAS - MV P1/2T: 23 MSEC
BH CV ECHO MEAS - MV V2 VTI: 21.1 CM
BH CV ECHO MEAS - MVA(P1/2T): 9.6 CM2
BH CV ECHO MEAS - MVA(VTI): 1.52 CM2
BH CV ECHO MEAS - PA ACC TIME: 0.09 SEC
BH CV ECHO MEAS - PA V2 MAX: 66.9 CM/SEC
BH CV ECHO MEAS - PI END-D VEL: 78.2 CM/SEC
BH CV ECHO MEAS - PULM A REVS DUR: 0.12 SEC
BH CV ECHO MEAS - PULM A REVS VEL: 30.3 CM/SEC
BH CV ECHO MEAS - PULM DIAS VEL: 36.6 CM/SEC
BH CV ECHO MEAS - PULM S/D: 0.84
BH CV ECHO MEAS - PULM SYS VEL: 30.8 CM/SEC
BH CV ECHO MEAS - QP/QS: 0.83
BH CV ECHO MEAS - RAP SYSTOLE: 3 MMHG
BH CV ECHO MEAS - RV MAX PG: 0.82 MMHG
BH CV ECHO MEAS - RV V1 MAX: 45.2 CM/SEC
BH CV ECHO MEAS - RV V1 VTI: 8.6 CM
BH CV ECHO MEAS - RVOT DIAM: 1.99 CM
BH CV ECHO MEAS - RVSP: 18 MMHG
BH CV ECHO MEAS - SUP REN AO DIAM: 1.7 CM
BH CV ECHO MEAS - SV(LVOT): 32 ML
BH CV ECHO MEAS - SV(MOD-SP2): 46 ML
BH CV ECHO MEAS - SV(MOD-SP4): 50 ML
BH CV ECHO MEAS - SV(RVOT): 26.7 ML
BH CV ECHO MEAS - TAPSE (>1.6): 2.07 CM
BH CV ECHO MEAS - TR MAX PG: 15.1 MMHG
BH CV ECHO MEAS - TR MAX VEL: 194.1 CM/SEC
BH CV ECHO MEASUREMENTS AVERAGE E/E' RATIO: 15.47
BH CV LOW VAS RIGHT POPLITEAL SPONT: 1
BH CV LOWER VASCULAR LEFT COMMON FEMORAL AUGMENT: NORMAL
BH CV LOWER VASCULAR LEFT COMMON FEMORAL COMPETENT: NORMAL
BH CV LOWER VASCULAR LEFT COMMON FEMORAL COMPRESS: NORMAL
BH CV LOWER VASCULAR LEFT COMMON FEMORAL PHASIC: NORMAL
BH CV LOWER VASCULAR LEFT COMMON FEMORAL SPONT: NORMAL
BH CV LOWER VASCULAR LEFT DISTAL FEMORAL COMPRESS: NORMAL
BH CV LOWER VASCULAR LEFT GASTRONEMIUS COMPRESS: NORMAL
BH CV LOWER VASCULAR LEFT GREATER SAPH AK COMPRESS: NORMAL
BH CV LOWER VASCULAR LEFT GREATER SAPH BK COMPRESS: NORMAL
BH CV LOWER VASCULAR LEFT LESSER SAPH COMPRESS: NORMAL
BH CV LOWER VASCULAR LEFT MID FEMORAL AUGMENT: NORMAL
BH CV LOWER VASCULAR LEFT MID FEMORAL COMPETENT: NORMAL
BH CV LOWER VASCULAR LEFT MID FEMORAL COMPRESS: NORMAL
BH CV LOWER VASCULAR LEFT MID FEMORAL PHASIC: NORMAL
BH CV LOWER VASCULAR LEFT MID FEMORAL SPONT: NORMAL
BH CV LOWER VASCULAR LEFT PERONEAL COMPRESS: NORMAL
BH CV LOWER VASCULAR LEFT POPLITEAL AUGMENT: NORMAL
BH CV LOWER VASCULAR LEFT POPLITEAL COMPETENT: NORMAL
BH CV LOWER VASCULAR LEFT POPLITEAL COMPRESS: NORMAL
BH CV LOWER VASCULAR LEFT POPLITEAL PHASIC: NORMAL
BH CV LOWER VASCULAR LEFT POPLITEAL SPONT: NORMAL
BH CV LOWER VASCULAR LEFT POSTERIOR TIBIAL COMPRESS: NORMAL
BH CV LOWER VASCULAR LEFT PROFUNDA FEMORAL COMPRESS: NORMAL
BH CV LOWER VASCULAR LEFT PROXIMAL FEMORAL COMPRESS: NORMAL
BH CV LOWER VASCULAR LEFT SAPHENOFEMORAL JUNCTION COMPRESS: NORMAL
BH CV LOWER VASCULAR RIGHT COMMON FEMORAL AUGMENT: NORMAL
BH CV LOWER VASCULAR RIGHT COMMON FEMORAL COMPETENT: NORMAL
BH CV LOWER VASCULAR RIGHT COMMON FEMORAL COMPRESS: NORMAL
BH CV LOWER VASCULAR RIGHT COMMON FEMORAL PHASIC: NORMAL
BH CV LOWER VASCULAR RIGHT COMMON FEMORAL SPONT: NORMAL
BH CV LOWER VASCULAR RIGHT DISTAL FEMORAL COMPRESS: NORMAL
BH CV LOWER VASCULAR RIGHT GASTRONEMIUS COMPRESS: NORMAL
BH CV LOWER VASCULAR RIGHT GREATER SAPH AK COMPRESS: NORMAL
BH CV LOWER VASCULAR RIGHT GREATER SAPH BK COMPRESS: NORMAL
BH CV LOWER VASCULAR RIGHT LESSER SAPH COMPRESS: NORMAL
BH CV LOWER VASCULAR RIGHT MID FEMORAL AUGMENT: NORMAL
BH CV LOWER VASCULAR RIGHT MID FEMORAL COMPETENT: NORMAL
BH CV LOWER VASCULAR RIGHT MID FEMORAL COMPRESS: NORMAL
BH CV LOWER VASCULAR RIGHT MID FEMORAL PHASIC: NORMAL
BH CV LOWER VASCULAR RIGHT MID FEMORAL SPONT: NORMAL
BH CV LOWER VASCULAR RIGHT PERONEAL COMPRESS: NORMAL
BH CV LOWER VASCULAR RIGHT POPLITEAL AUGMENT: NORMAL
BH CV LOWER VASCULAR RIGHT POPLITEAL COMPETENT: NORMAL
BH CV LOWER VASCULAR RIGHT POPLITEAL COMPRESS: NORMAL
BH CV LOWER VASCULAR RIGHT POPLITEAL PHASIC: NORMAL
BH CV LOWER VASCULAR RIGHT POPLITEAL SPONT: NORMAL
BH CV LOWER VASCULAR RIGHT POSTERIOR TIBIAL COMPRESS: NORMAL
BH CV LOWER VASCULAR RIGHT PROFUNDA FEMORAL COMPRESS: NORMAL
BH CV LOWER VASCULAR RIGHT PROXIMAL FEMORAL COMPRESS: NORMAL
BH CV LOWER VASCULAR RIGHT SAPHENOFEMORAL JUNCTION COMPRESS: NORMAL
BH CV VAS POP FLUID COLLECTED: 1
BH CV XLRA - RV BASE: 2.9 CM
BH CV XLRA - RV LENGTH: 5.1 CM
BH CV XLRA - RV MID: 2.06 CM
BH CV XLRA - TDI S': 9.1 CM/SEC
BUN SERPL-MCNC: 13 MG/DL (ref 8–23)
BUN/CREAT SERPL: 17.3 (ref 7–25)
CALCIUM SPEC-SCNC: 8.8 MG/DL (ref 8.2–9.6)
CHLORIDE SERPL-SCNC: 106 MMOL/L (ref 98–107)
CHOLEST SERPL-MCNC: 240 MG/DL (ref 0–200)
CO2 SERPL-SCNC: 24 MMOL/L (ref 22–29)
CREAT SERPL-MCNC: 0.75 MG/DL (ref 0.57–1)
DEPRECATED RDW RBC AUTO: 44.9 FL (ref 37–54)
EGFRCR SERPLBLD CKD-EPI 2021: 70.3 ML/MIN/1.73
EOSINOPHIL # BLD AUTO: 0.23 10*3/MM3 (ref 0–0.4)
EOSINOPHIL NFR BLD AUTO: 2.6 % (ref 0.3–6.2)
ERYTHROCYTE [DISTWIDTH] IN BLOOD BY AUTOMATED COUNT: 13.7 % (ref 12.3–15.4)
FOLATE SERPL-MCNC: 5.88 NG/ML (ref 4.78–24.2)
GLUCOSE BLDC GLUCOMTR-MCNC: 83 MG/DL (ref 70–130)
GLUCOSE BLDC GLUCOMTR-MCNC: 93 MG/DL (ref 70–130)
GLUCOSE BLDC GLUCOMTR-MCNC: 95 MG/DL (ref 70–130)
GLUCOSE SERPL-MCNC: 103 MG/DL (ref 65–99)
HBA1C MFR BLD: 5.4 % (ref 4.8–5.6)
HCT VFR BLD AUTO: 47.8 % (ref 34–46.6)
HDLC SERPL-MCNC: 64 MG/DL (ref 40–60)
HGB BLD-MCNC: 16.9 G/DL (ref 12–15.9)
IMM GRANULOCYTES # BLD AUTO: 0.06 10*3/MM3 (ref 0–0.05)
IMM GRANULOCYTES NFR BLD AUTO: 0.7 % (ref 0–0.5)
LDLC SERPL CALC-MCNC: 165 MG/DL (ref 0–100)
LDLC/HDLC SERPL: 2.55 {RATIO}
LEFT ATRIUM VOLUME INDEX: 22.1 ML/M2
LV EF BIPLANE MOD: 57.8 %
LYMPHOCYTES # BLD AUTO: 1.63 10*3/MM3 (ref 0.7–3.1)
LYMPHOCYTES NFR BLD AUTO: 18.6 % (ref 19.6–45.3)
MCH RBC QN AUTO: 31.8 PG (ref 26.6–33)
MCHC RBC AUTO-ENTMCNC: 35.4 G/DL (ref 31.5–35.7)
MCV RBC AUTO: 89.8 FL (ref 79–97)
MONOCYTES # BLD AUTO: 0.88 10*3/MM3 (ref 0.1–0.9)
MONOCYTES NFR BLD AUTO: 10.1 % (ref 5–12)
NEUTROPHILS NFR BLD AUTO: 5.9 10*3/MM3 (ref 1.7–7)
NEUTROPHILS NFR BLD AUTO: 67.5 % (ref 42.7–76)
NRBC BLD AUTO-RTO: 0 /100 WBC (ref 0–0.2)
PLATELET # BLD AUTO: 199 10*3/MM3 (ref 140–450)
PMV BLD AUTO: 10.1 FL (ref 6–12)
POTASSIUM SERPL-SCNC: 3.6 MMOL/L (ref 3.5–5.2)
POTASSIUM SERPL-SCNC: 4.8 MMOL/L (ref 3.5–5.2)
RBC # BLD AUTO: 5.32 10*6/MM3 (ref 3.77–5.28)
SINUS: 3.1 CM
SODIUM SERPL-SCNC: 139 MMOL/L (ref 136–145)
STJ: 2.47 CM
TRIGL SERPL-MCNC: 65 MG/DL (ref 0–150)
VIT B12 BLD-MCNC: 237 PG/ML (ref 211–946)
VLDLC SERPL-MCNC: 11 MG/DL (ref 5–40)
WBC NRBC COR # BLD AUTO: 8.74 10*3/MM3 (ref 3.4–10.8)

## 2025-02-14 PROCEDURE — 97116 GAIT TRAINING THERAPY: CPT

## 2025-02-14 PROCEDURE — 82746 ASSAY OF FOLIC ACID SERUM: CPT

## 2025-02-14 PROCEDURE — 85025 COMPLETE CBC W/AUTO DIFF WBC: CPT | Performed by: INTERNAL MEDICINE

## 2025-02-14 PROCEDURE — 93970 EXTREMITY STUDY: CPT

## 2025-02-14 PROCEDURE — 97110 THERAPEUTIC EXERCISES: CPT

## 2025-02-14 PROCEDURE — 83036 HEMOGLOBIN GLYCOSYLATED A1C: CPT

## 2025-02-14 PROCEDURE — 70498 CT ANGIOGRAPHY NECK: CPT

## 2025-02-14 PROCEDURE — 25010000002 CYANOCOBALAMIN PER 1000 MCG: Performed by: NURSE PRACTITIONER

## 2025-02-14 PROCEDURE — 25510000001 IOPAMIDOL PER 1 ML: Performed by: INTERNAL MEDICINE

## 2025-02-14 PROCEDURE — 25510000001 PERFLUTREN 6.52 MG/ML SUSPENSION 2 ML VIAL: Performed by: STUDENT IN AN ORGANIZED HEALTH CARE EDUCATION/TRAINING PROGRAM

## 2025-02-14 PROCEDURE — 93306 TTE W/DOPPLER COMPLETE: CPT

## 2025-02-14 PROCEDURE — 80048 BASIC METABOLIC PNL TOTAL CA: CPT | Performed by: INTERNAL MEDICINE

## 2025-02-14 PROCEDURE — 82948 REAGENT STRIP/BLOOD GLUCOSE: CPT

## 2025-02-14 PROCEDURE — 97112 NEUROMUSCULAR REEDUCATION: CPT

## 2025-02-14 PROCEDURE — 99233 SBSQ HOSP IP/OBS HIGH 50: CPT | Performed by: NURSE PRACTITIONER

## 2025-02-14 PROCEDURE — 93306 TTE W/DOPPLER COMPLETE: CPT | Performed by: INTERNAL MEDICINE

## 2025-02-14 PROCEDURE — 97166 OT EVAL MOD COMPLEX 45 MIN: CPT

## 2025-02-14 PROCEDURE — 80061 LIPID PANEL: CPT

## 2025-02-14 PROCEDURE — 84132 ASSAY OF SERUM POTASSIUM: CPT | Performed by: STUDENT IN AN ORGANIZED HEALTH CARE EDUCATION/TRAINING PROGRAM

## 2025-02-14 PROCEDURE — 70496 CT ANGIOGRAPHY HEAD: CPT

## 2025-02-14 PROCEDURE — 93970 EXTREMITY STUDY: CPT | Performed by: SURGERY

## 2025-02-14 PROCEDURE — 82306 VITAMIN D 25 HYDROXY: CPT

## 2025-02-14 PROCEDURE — 92610 EVALUATE SWALLOWING FUNCTION: CPT

## 2025-02-14 PROCEDURE — 82607 VITAMIN B-12: CPT

## 2025-02-14 PROCEDURE — 97535 SELF CARE MNGMENT TRAINING: CPT

## 2025-02-14 PROCEDURE — 25010000002 CEFTRIAXONE PER 250 MG: Performed by: INTERNAL MEDICINE

## 2025-02-14 RX ORDER — MULTIVITAMIN WITH IRON
500 TABLET ORAL DAILY
Status: DISCONTINUED | OUTPATIENT
Start: 2025-02-15 | End: 2025-02-20 | Stop reason: HOSPADM

## 2025-02-14 RX ORDER — IOPAMIDOL 755 MG/ML
100 INJECTION, SOLUTION INTRAVASCULAR
Status: COMPLETED | OUTPATIENT
Start: 2025-02-14 | End: 2025-02-14

## 2025-02-14 RX ORDER — CYANOCOBALAMIN 1000 UG/ML
1000 INJECTION, SOLUTION INTRAMUSCULAR; SUBCUTANEOUS ONCE
Status: COMPLETED | OUTPATIENT
Start: 2025-02-14 | End: 2025-02-14

## 2025-02-14 RX ORDER — POTASSIUM CHLORIDE 1500 MG/1
40 TABLET, EXTENDED RELEASE ORAL EVERY 4 HOURS
Status: COMPLETED | OUTPATIENT
Start: 2025-02-14 | End: 2025-02-14

## 2025-02-14 RX ADMIN — CEFTRIAXONE SODIUM 1000 MG: 1 INJECTION, POWDER, FOR SOLUTION INTRAMUSCULAR; INTRAVENOUS at 16:15

## 2025-02-14 RX ADMIN — ATORVASTATIN CALCIUM 80 MG: 80 TABLET, FILM COATED ORAL at 22:11

## 2025-02-14 RX ADMIN — Medication 10 ML: at 08:14

## 2025-02-14 RX ADMIN — Medication 10 ML: at 22:11

## 2025-02-14 RX ADMIN — PANTOPRAZOLE SODIUM 40 MG: 40 TABLET, DELAYED RELEASE ORAL at 07:00

## 2025-02-14 RX ADMIN — ASPIRIN 81 MG: 81 TABLET, COATED ORAL at 08:13

## 2025-02-14 RX ADMIN — LEVOTHYROXINE SODIUM 50 MCG: 50 TABLET ORAL at 07:00

## 2025-02-14 RX ADMIN — IOPAMIDOL 95 ML: 755 INJECTION, SOLUTION INTRAVENOUS at 15:25

## 2025-02-14 RX ADMIN — PERFLUTREN 2 ML: 6.52 INJECTION, SUSPENSION INTRAVENOUS at 09:10

## 2025-02-14 RX ADMIN — POTASSIUM CHLORIDE 40 MEQ: 1500 TABLET, EXTENDED RELEASE ORAL at 16:09

## 2025-02-14 RX ADMIN — CYANOCOBALAMIN 1000 MCG: 1000 INJECTION, SOLUTION INTRAMUSCULAR; SUBCUTANEOUS at 19:37

## 2025-02-14 RX ADMIN — POTASSIUM CHLORIDE 40 MEQ: 1500 TABLET, EXTENDED RELEASE ORAL at 11:24

## 2025-02-14 NOTE — PROGRESS NOTES
Name: Maria Fernanda Gaviria ADMIT: 2025   : 1923  PCP: Henok Salcido MD    MRN: [id number] LOS: 0 days   AGE/SEX: 89 y.o. female  ROOM: [facility]     Subjective   Subjective   Patient is lying on the bed and does not appear to be any distress.  No more dizziness today.  No reports of nausea, vomiting, chest pain, shortness of breath.       Objective   Objective   Vital Signs  Temp:  [97.4 °F (36.3 °C)-97.7 °F (36.5 °C)] 97.5 °F (36.4 °C)  Heart Rate:  [65-83] 73  Resp:  [16] 16  BP: (148-165)/(70-89) 165/89  SpO2:  [96 %-99 %] 97 %  on   ;   Device (Oxygen Therapy): room air  Body mass index is 19.91 kg/m².  Physical Exam  HEENT: PERRLA, extraocular movements intact, Scleras no icterus  Neck: Supple, no JVD  Cardiovascular: Regular rate and rhythm with normal S1 and S2  Respiratory: Fairly clear to auscultation anteriorly with no wheezes  GI: Soft, nontender, bowel sounds are present  Extremities: + edema, palpable pedal pulses.  Neurologic: Left upper and lower extremity strength 3/5, no facial asymmetry, no vertigo, no dysarthria    Results Review     I reviewed the patient's new clinical results.  Results from last 7 days   Lab Units 25  0541 25  0517 25  1528   WBC 10*3/mm3 8.74 11.35* 11.86*   HEMOGLOBIN g/dL 16.9* 14.7 16.3*   PLATELETS 10*3/mm3 199 192 222     Results from last 7 days   Lab Units 25  0541 25  0517 25  1528   SODIUM mmol/L 139 141 136   POTASSIUM mmol/L 3.6 3.7 3.7   CHLORIDE mmol/L 106 106 100   CO2 mmol/L 24.0 23.8 26.0   BUN mg/dL 13 11 14   CREATININE mg/dL 0.75 0.67 0.81   GLUCOSE mg/dL 103* 101* 83   EGFR mL/min/1.73 70.3 77.2 64.1     Results from last 7 days   Lab Units 25  0517 25  1528   ALBUMIN g/dL 3.2* 4.0   BILIRUBIN mg/dL 0.8 1.1   ALK PHOS U/L 59 73   AST (SGOT) U/L 23 34*   ALT (SGPT) U/L 16 24     Results from last 7 days   Lab Units 25  0541 25  0517 25  1528   CALCIUM mg/dL 8.8 8.6 9.0   ALBUMIN g/dL   --  3.2* 4.0   MAGNESIUM mg/dL  --  2.0 2.2   PHOSPHORUS mg/dL  --  3.0  --      Results from last 7 days   Lab Units 02/12/25  1528   LACTATE mmol/L 1.5     Hemoglobin A1C   Date/Time Value Ref Range Status   02/14/2025 0541 5.40 4.80 - 5.60 % Final     Glucose   Date/Time Value Ref Range Status   02/14/2025 1600 95 70 - 130 mg/dL Final   02/14/2025 1109 93 70 - 130 mg/dL Final   02/14/2025 0630 83 70 - 130 mg/dL Final   02/13/2025 2049 147 (H) 70 - 130 mg/dL Final   02/13/2025 1612 175 (H) 70 - 130 mg/dL Final       MRI Brain Without Contrast    Result Date: 2/14/2025   1. There is a cluster small patchy and nodular diffusion hyperintense foci in the medial right occipital lobe compatible with multiple small acute medial right occipital infarcts in the right PCA territory, the largest measuring 2.2 x 2.3 cm in size in the posterior medial right occipital lobe and there are multiple subcentimeter foci of acute infarction in the mid and anteromedial right occipital lobe in the right PCA territory. Furthermore there are several small acute infarcts in the right MCA territory including a 6 x 5 mm acute posterior lateral right parietal white matter infarct, 2 separate 3 and 6 mm acute infarcts in the superior lateral right occipital lobe and a 7 x 3 mm acute infarct in the posterior right insular cortex, all 4 of which are in the right MCA territory. No additional acute abnormality is seen.  2. There is moderate small vessel disease in the cerebral white matter. There is mild diffuse cerebral atrophy. 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 There is adjacent hemosiderin staining the adjacent left posterior parietal sulci from prior  subarachnoid hemorrhage that likely occurred at the same time as the small focus of intraparenchymal hemorrhage in the posterior left parietal lobe and correlation with clinical history suggested.  3. The remainder of the MRI of the brain is unremarkable. All of the above findings were discussed in great detail with Dr. William Martinez, the hospitalist taking care of the patient, by telephone on 02/13/2025 at 1:20 p.m.  This report was finalized on 2/14/2025 6:13 AM by Dr. Morgan Horta M.D on Workstation: [id number]       I have personally reviewed all medications:  Scheduled Medications  aspirin, 81 mg, Oral, Daily  atorvastatin, 80 mg, Oral, Nightly  cefTRIAXone, 1,000 mg, Intravenous, Q24H  cyanocobalamin, 1,000 mcg, Intramuscular, Once  levothyroxine, 50 mcg, Oral, Q AM  pantoprazole, 40 mg, Oral, Q AM  sodium chloride, 10 mL, Intravenous, Q12H  sodium chloride, 10 mL, Intravenous, Q12H  [START ON 2/15/2025] vitamin B-12, 500 mcg, Oral, Daily    Infusions   Diet  Diet: Regular/House; No Mixed Consistencies; Texture: Mechanical Ground (NDD 2); Fluid Consistency: Nectar Thick    I have personally reviewed:  [x]  Laboratory   [x]  Microbiology   [x]  Radiology   [x]  EKG/Telemetry  [x]  Cardiology/Vascular   []  Pathology    []  Records       Assessment/Plan     Active Hospital Problems    Diagnosis  POA    **Urinary tract infection in female [N39.0]  Yes    UTI (urinary tract infection) [N39.0]  Yes    Presence of cardiac pacemaker [Z95.0]  Yes    AV block [I44.30]  Yes    Hypothyroidism (acquired) [E03.9]  Yes    GERD (gastroesophageal reflux disease) [K21.9]  Yes    Essential hypertension [I10]  Yes    Hyperlipidemia [E78.5]  Yes      Resolved Hospital Problems   No resolved problems to display.       1 Dizziness with unwitnessed fall, CT brain with no acute findings and MRI has been ordered which revealed multiple small infarcts.  Currently on aspirin and statins and 2D echo with normal ejection fraction and no  wall motion abnormalities.  Await EEG and CT of the head and neck results.  PT/OT/speech following along.    2.  Acute multiple small infarcts, neurology did evalaute and is on aspirin and statins.     3.  Hypothyroidism, on Synthroid.    4.  History of AV block requiring pacemaker placement.    5.  Acute cystitis, on IV Rocephin and will be continued.    6.  On SCDs for DVT prophylaxis.    7.  CODE STATUS is DNR    Copy text on this note has been reviewed by me on 2/14/2025    William Martinez MD  [facility]  02/14/25  17:30 EST

## 2025-02-14 NOTE — DISCHARGE PLACEMENT REQUEST
"Maria Fernanda Gaviria (89 y.o. Female)       Date of Birth   01/05/1923    Social Security Number       Address   [address] [place] [address] [address] [place] [address]    Home Phone   [phone]    MRN   [id number]       Gnosticist   Tenriism    Marital Status                               Admission Date   2/12/25    Admission Type   Emergency    Admitting Provider   Mayito Smith MD    Attending Provider   William Martinez MD    Department, Room/Bed   [address], E560/1       Discharge Date       Discharge Disposition       Discharge Destination                                 Attending Provider: William Martinez MD    Allergies: Aspirin, Aspirin Buf(cacarb-mgcarb-mgo), Latex    Isolation: None   Infection: None   Code Status: No CPR    Ht: 162.6 cm (64\")   Wt: 52.6 kg (116 lb)    Admission Cmt: None   Principal Problem: Urinary tract infection in female [N39.0]                   Active Insurance as of 2/12/2025       Primary Coverage       Payor Plan Insurance Group Employer/Plan Group    [facility] MEDICARE REPLACEMENT [facility] MED [facility] GROUP [id number]       Payor Plan Address Payor Plan Phone Number Payor Plan Fax Number Effective Dates    [address] [address]   1/1/2023 - None Entered    [facility] [id number]         Subscriber Name Subscriber Birth Date Member ID       MARIA FERNANDA GAVIRIA 1/5/1923 [id number]                     Emergency Contacts        (Rel.) Home Phone Work Phone Mobile Phone    Jillian Patel (Daughter) [phone] -- --    Anna Kevin (Daughter) [phone] -- --    Ephraim Lobo (Daughter) [phone] -- --    Juan Jones (Daughter) [phone] -- --    RAMBO URRUTIA (Grandchild) -- -- [phone]                "

## 2025-02-14 NOTE — CASE MANAGEMENT/SOCIAL WORK
Continued Stay Note  [facility]     Patient Name: Maria Fernanda Gaviria  MRN: [id number]  Today's Date: 2/14/2025    Admit Date: 2/12/2025    Plan: Acute rehab   Discharge Plan       Row Name 02/14/25 1719       Plan    Plan Acute rehab    Patient/Family in Agreement with Plan yes    Plan Comments Met with pt's daughter in room. She confirmed the plan is for the patient to go to acute rehab at discharge. She requested referrals be placed for Rudi boyer and Filipe Kemp. Referrals entered. She denied any other needs at this time. CCP following. Rakesh CASTRO RN                   Discharge Codes    No documentation.                 Expected Discharge Date and Time       Expected Discharge Date Expected Discharge Time    Feb 17, 2025               Rakesh Washington RN

## 2025-02-14 NOTE — PLAN OF CARE
Goal Outcome Evaluation:              Outcome Evaluation: Clinical swallow evaluation completed. Left neglect appreciated. Recommend mechanical ground diet with nectar thick liquids. Meds crushed in puree, or whole in puree if contraindicated. Ensure alertness prior to PO, sitting upright, slow rate, small bites/sips, supervision during meals by nursing or family, check mouth for pocketing. With negative lung changes or clinical s/s of aspiration, make NPO and notify SLP.

## 2025-02-14 NOTE — PLAN OF CARE
Goal Outcome Evaluation:  Plan of Care Reviewed With: patient        Progress: improving  Outcome Evaluation: Pt tolerated treatment fair this date. Required min A to stand from chair, then CGA-min A to ambulated 30ft w/ Rw. Several cues needed throughout for direction as pt was running into items in room, requiring assist to guide walker frequently. Pt ambulated to the bathroom, though began to attempt stepping into shower, thinking the toilet was there. Pt was directed to look towards the L and take steps backwards to get to the toilet, though pt not understanding w/out much assist to guide backwards. Mod A was needed to stand from low commode. L neglect noted throughout session, again w/ much cues to direct through tasks. Would highly recommend d/c to IRF at this time.    Anticipated Discharge Disposition (PT): inpatient rehabilitation facility

## 2025-02-14 NOTE — PLAN OF CARE
Goal Outcome Evaluation:   VSS. Mentation varies. Alert x2 at times. Appears to have difficulty finding words.Up with assist x1. Remains on room air. Call light in reach.

## 2025-02-14 NOTE — PROGRESS NOTES
"DOS: 2025  NAME: Maria Fernanda Gaviria   : 1923  PCP: Henok Salcido MD    Chief Complaint   Patient presents with    Altered Mental Status      Stroke    Subjective: Pt seen in follow up, however the problem is new to me.  Patient lying in bed with 3 family members at bedside.  They state she continues to have some dense left-sided neglect but overall is more awake today.  The patient herself does not complain of any new symptoms.    Objective:  Vital signs:      Vitals:    25 2353 25 0910 25 1100 25 1500   BP: 148/70 148/70 155/74 165/89   BP Location: Right arm  Right arm Right arm   Patient Position: Lying  Lying Lying   Pulse: 79 65 73 73   Resp: 16  16 16   Temp: 97.4 °F (36.3 °C)  97.5 °F (36.4 °C) 97.5 °F (36.4 °C)   TempSrc: Oral  Oral Oral   SpO2: 97%  99% 97%   Weight:  52.6 kg (116 lb)     Height:  162.6 cm (64\")         Current Facility-Administered Medications:     aspirin EC tablet 81 mg, 81 mg, Oral, Daily, Joya Mckeon PA-C, 81 mg at 25 08    atorvastatin (LIPITOR) tablet 80 mg, 80 mg, Oral, Nightly, Joya Mckeon PA-C, 80 mg at 25    sennosides-docusate (PERICOLACE) 8.6-50 MG per tablet 2 tablet, 2 tablet, Oral, BID PRN **AND** polyethylene glycol (MIRALAX) packet 17 g, 17 g, Oral, Daily PRN **AND** bisacodyl (DULCOLAX) EC tablet 5 mg, 5 mg, Oral, Daily PRN **AND** bisacodyl (DULCOLAX) suppository 10 mg, 10 mg, Rectal, Daily PRN, Mayito Smith MD    Calcium Replacement - Follow Nurse / BPA Driven Protocol, , Not Applicable, PRN, Mayito Smith MD    cefTRIAXone (ROCEPHIN) 1,000 mg in sodium chloride 0.9 % 100 mL MBP, 1,000 mg, Intravenous, Q24H, William Martinez MD, Last Rate: 200 mL/hr at 25 1615, 1,000 mg at 25 1615    levothyroxine (SYNTHROID, LEVOTHROID) tablet 50 mcg, 50 mcg, Oral, Q AM, William Martinez MD, 50 mcg at 25 0700    Magnesium Standard Dose Replacement - Follow Nurse / BPA Driven Protocol, , Not " Applicable, PRLuis CASTRO Aakash, MD    pantoprazole (PROTONIX) EC tablet 40 mg, 40 mg, Oral, Q AM, Joya Mckeon PA-C, 40 mg at 02/14/25 0700    Phosphorus Replacement - Follow Nurse / BPA Driven Protocol, , Not Applicable, Luis MCMAHAN Aakash, MD    Potassium Replacement - Follow Nurse / BPA Driven Protocol, , Not Applicable, PRLuis CASTRO Aakash, MD    sodium chloride 0.9 % flush 10 mL, 10 mL, Intravenous, Q12H, Mayito Smith MD, 10 mL at 02/14/25 0814    sodium chloride 0.9 % flush 10 mL, 10 mL, Intravenous, PRN, Mayito Smith MD    sodium chloride 0.9 % flush 10 mL, 10 mL, Intravenous, Q12H, Joya Mckeon PA-C, 10 mL at 02/14/25 0814    sodium chloride 0.9 % flush 10 mL, 10 mL, Intravenous, PRNLinda Katherine, PA-C    sodium chloride 0.9 % infusion 40 mL, 40 mL, Intravenous, PRN, Mayito Smith MD    sodium chloride 0.9 % infusion 40 mL, 40 mL, Intravenous, PRN, Joya Mckeon PA-C    PRN meds    senna-docusate sodium **AND** polyethylene glycol **AND** bisacodyl **AND** bisacodyl    Calcium Replacement - Follow Nurse / BPA Driven Protocol    Magnesium Standard Dose Replacement - Follow Nurse / BPA Driven Protocol    Phosphorus Replacement - Follow Nurse / BPA Driven Protocol    Potassium Replacement - Follow Nurse / BPA Driven Protocol    sodium chloride    sodium chloride    sodium chloride    sodium chloride    No current facility-administered medications on file prior to encounter.     Current Outpatient Medications on File Prior to Encounter   Medication Sig    acetaminophen (TYLENOL) 325 MG tablet Take 2 tablets by mouth Every 6 (Six) Hours As Needed for Mild Pain.    fexofenadine (ALLEGRA) 180 MG tablet Take 1 tablet by mouth Daily.    levothyroxine (SYNTHROID, LEVOTHROID) 50 MCG tablet TAKE 1 TABLET EVERY MORNING       General appearance: Elderly female, NAD, alert and cooperative  HEENT: Normocephalic, atraumatic    Neurological:   MS: oriented to self only, language sparse but intact for the  most part, decreased attention/concentration  CN: Left visual neglect, left homonymous hemianopia, facial sensation equal, no facial droop  Motor: 5/5 RUE, 4/5 RLE, 3/5 LLE/LUE  Sensory: light touch sensation intact in all 4 ext.    Laboratory results:  Lab Results   Component Value Date    TSH 2.620 02/12/2025     Lab Results   Component Value Date    HGBA1C 5.40 02/14/2025     Lab Results   Component Value Date    HZOJYUSH09 237 02/14/2025     Lab Results   Component Value Date    CHOL 240 (H) 02/14/2025    CHOL 280 (H) 07/22/2024    CHLPL 251 (H) 11/22/2023    CHLPL 164 12/02/2022    CHLPL 166 09/22/2021     Lab Results   Component Value Date    TRIG 65 02/14/2025    TRIG 111 07/22/2024    TRIG 99 11/22/2023     Lab Results   Component Value Date    HDL 64 (H) 02/14/2025    HDL 69 (H) 07/22/2024    HDL 62 (H) 11/22/2023     Lab Results   Component Value Date     (H) 02/14/2025     (H) 07/22/2024     (H) 11/22/2023     Lab Results   Component Value Date    WBC 8.74 02/14/2025    HGB 16.9 (H) 02/14/2025    HCT 47.8 (H) 02/14/2025    MCV 89.8 02/14/2025     02/14/2025     Lab Results   Component Value Date    GLUCOSE 103 (H) 02/14/2025    BUN 13 02/14/2025    CREATININE 0.75 02/14/2025    EGFRIFNONA 68 09/22/2021    EGFRIFAFRI 83 09/22/2021    BCR 17.3 02/14/2025    K 3.6 02/14/2025    CO2 24.0 02/14/2025    CALCIUM 8.8 02/14/2025    ALBUMIN 3.2 (L) 02/13/2025    AST 23 02/13/2025    ALT 16 02/13/2025     Lab Results   Component Value Date    PTT 28.8 07/22/2024     Lab Results   Component Value Date    INR 1.04 07/22/2024    INR 0.99 07/23/2023    INR 1.04 02/21/2023    PROTIME 13.8 07/22/2024    PROTIME 13.2 07/23/2023    PROTIME 13.7 02/21/2023     Brief Urine Lab Results  (Last result in the past 365 days)        Color   Clarity   Blood   Leuk Est   Nitrite   Protein   CREAT   Urine HCG        02/12/25 1528 Yellow   Cloudy   Trace   Large (3+)   Negative   Negative                    Review and interpretation of imaging:  MRI Brain Without Contrast    Result Date: 2/14/2025   1. There is a cluster small patchy and nodular diffusion hyperintense foci in the medial right occipital lobe compatible with multiple small acute medial right occipital infarcts in the right PCA territory, the largest measuring 2.2 x 2.3 cm in size in the posterior medial right occipital lobe and there are multiple subcentimeter foci of acute infarction in the mid and anteromedial right occipital lobe in the right PCA territory. Furthermore there are several small acute infarcts in the right MCA territory including a 6 x 5 mm acute posterior lateral right parietal white matter infarct, 2 separate 3 and 6 mm acute infarcts in the superior lateral right occipital lobe and a 7 x 3 mm acute infarct in the posterior right insular cortex, all 4 of which are in the right MCA territory. No additional acute abnormality is seen.  2. There is moderate small vessel disease in the cerebral white matter. There is mild diffuse cerebral atrophy. 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 There is adjacent hemosiderin staining the adjacent left posterior parietal sulci from prior subarachnoid hemorrhage that likely occurred at the same time as the small focus of intraparenchymal hemorrhage in the posterior left parietal lobe and correlation with clinical history suggested.  3. The remainder of the MRI of the brain is unremarkable. All of the above findings were discussed in great detail with Dr. William Martinez, the hospitalist taking care of the patient, by telephone on 02/13/2025 at 1:20 p.m.  This report was finalized on 2/14/2025 6:13 AM by Dr. Morgan Horta M.D  on Workstation: [id number]      XR Chest 1 View    Result Date: 2/12/2025  Stable chest with some subsegmental atelectasis or scarring at the bases  This report was finalized on 2/12/2025 3:49 PM by Dr. Jaron Jennigns M.D on Workstation: [id number]     Results for orders placed during the hospital encounter of 02/12/25    Adult Transthoracic Echo Complete W/ Cont if Necessary Per Protocol    Interpretation Summary    Left ventricular systolic function is normal. Calculated left ventricular EF = 57.8%    Left ventricular wall thickness is consistent with mild concentric hypertrophy. Sigmoid-shaped ventricular septum is present.    Left ventricular diastolic function was indeterminate.    There is moderate calcification of the aortic valve. Trace aortic valve regurgitation is present. No aortic valve stenosis is present.    Estimated right ventricular systolic pressure from tricuspid regurgitation is normal (<35 mmHg).        Impression/Assessment:  This is a 89-year-old female with past medical history of hyperlipidemia, hypertension, thyroid disease who presented to the hospital on 2/12/2025 with complaints of being found down and confused.  Last known normal was around 5 PM the day before admission.  Family felt that she appeared off balance and was not paying attention to her left side.  She also had urinated on herself.  BP on arrival 156/91.  She had a noncontrast CT head on admission that revealed no acute intracranial abnormalities.  Noncontrasted MRI brain was obtained and revealed right PCA  and MCA territory infarcts.    Diagnosis:  Acute right PCA and MCA territory infarcts, embolic, cryptogenic  Essential hypertension  Mixed hyperlipidemia  S/P permanent pacemaker placement  Low B12    Additional work up to date:  2D Echo: Normal LA cavity size, EF 57.8%, no AV stenosis, no MVR, mild concentric hypertrophy noted.  BLE duplex: Right popliteal fossa fluid collection otherwise normal venous duplex  scan.    Plan:  - She remains with left hemineglect and left homonymous hemianopia on exam.  - 2D echo looks benign. EEG and CTA H/N are still pending  - Would like to obtain pacemaker interrogation to assess for any episodes of A-fib/flutter.  - Continue ASA 81mg PO daily  - Give one dose of IM 1000 mcg cyanocobalamin today and then start 500 mics p.o. daily tomorrow  - Continue Lipitor 80mg,   Neurochecks per stroke protocol  Normalize BP  Stroke Education  MAXWELL/SCDs  PT/OT/ST  Therapies as written. CCP for discharge planning. Call RRT for any acute neurological changes.   We will continue to follow and advise.    Case discussed with patient, 3 family members at bedside, and Dr. Bauer, and he agrees with plan above.     GRIFFIN Prajapati

## 2025-02-14 NOTE — THERAPY EVALUATION
Patient Name: Maria Fernanda Gaviria  : 1923    MRN: [id number]                              Today's Date: 2025       Admit Date: 2025    Visit Dx:     ICD-10-CM ICD-9-CM   1. Urinary tract infection in female  N39.0 599.0   2. Generalized weakness  R53.1 780.79   3. Acute encephalopathy  G93.40 348.30   4. Unwitnessed fall  R29.6 [id number]     Patient Active Problem List   Diagnosis    Osteoarthritis of lumbar spine    Osteoarthritis    Asthma    Carotid stenosis    Hyperlipidemia    GERD (gastroesophageal reflux disease)    Essential hypertension    Hypothyroidism (acquired)    Osteopenia    DNR (do not resuscitate)    Non-seasonal allergic rhinitis    AV block    Presence of cardiac pacemaker    Stroke-like symptoms    Urinary tract infection in female     Past Medical History:   Diagnosis Date    Arthritis     Arthritis of back     COVID-19 virus detected 2023    Disease of thyroid gland     GERD (gastroesophageal reflux disease)     Hip arthrosis     Age related    Hyperlipidemia     Hypertension     Low back strain Age related    Osteoporosis     Restless legs syndrome 2016     Past Surgical History:   Procedure Laterality Date    CARDIAC ELECTROPHYSIOLOGY PROCEDURE N/A 2023    Procedure: Pacemaker SC new  biotronik;  Surgeon: Deny Khanna MD;  Location: [facility] CATH INVASIVE LOCATION;  Service: Cardiovascular;  Laterality: N/A;    CATARACT EXTRACTION      CHOLECYSTECTOMY WITH INTRAOPERATIVE CHOLANGIOGRAM N/A 2021    Procedure: CHOLECYSTECTOMY LAPAROSCOPIC INTRAOPERATIVE CHOLANGIOGRAM common bile duct exploration;  Surgeon: Huey Wilkinson MD;  Location: [facility] MAIN OR;  Service: General;  Laterality: N/A;    COLONOSCOPY      ERCP N/A 2021    Procedure: ENDOSCOPIC RETROGRADE CHOLANGIOPANCREATOGRAPHY WITH SPHINCTEROTOMY, BALLOON SWEEP AND STONE (2) EXTRACTION;  Surgeon: Justo Aden MD;  Location: [facility] ENDOSCOPY;  Service: Gastroenterology;   Laterality: N/A;  PRE- BILE DUCT OBSTRUCTION   POST- SAME      HYSTERECTOMY      REPLACEMENT TOTAL KNEE Left     THYROID SURGERY      TUBAL ABDOMINAL LIGATION        General Information       Row Name 02/14/25 1109          OT Time and Intention    Subjective Information no complaints  -BC     Document Type evaluation  -BC     Mode of Treatment occupational therapy  -BC     Patient Effort good  -BC       Row Name 02/14/25 1109          General Information    Patient Profile Reviewed yes  -BC     Prior Level of Function independent:;ADL's  questionable historian at this time, per chart lives alone  -BC     Existing Precautions/Restrictions fall  -BC     Barriers to Rehab cognitive status  -BC       Row Name 02/14/25 1109          Living Environment    People in Home alone  -BC       Row Name 02/14/25 1109          Home Main Entrance    Number of Stairs, Main Entrance five  -BC       Row Name 02/14/25 1109          Cognition    Orientation Status (Cognition) oriented to;person  -BC       Row Name 02/14/25 1109          Safety Issues/Impairments Affecting Functional Mobility    Safety Issues Affecting Function (Mobility) ability to follow commands;insight into deficits/self-awareness;judgment;positioning of assistive device;problem-solving;safety precaution awareness;sequencing abilities;steps too close to assistive device  -BC     Impairments Affecting Function (Mobility) balance;endurance/activity tolerance;strength;cognition;coordination;motor planning;sensation/sensory awareness  -BC     Cognitive Impairments, Mobility Safety/Performance attention;insight into deficits/self-awareness;judgment;problem-solving/reasoning;safety precaution awareness;safety precaution follow-through;sequencing abilities  -BC               User Key  (r) = Recorded By, (t) = Taken By, (c) = Cosigned By      Initials Name Provider Type    Leticia Martinez OT Occupational Therapist                     Mobility/ADL's       Row Name  02/14/25 1112          Bed Mobility    Bed Mobility supine-sit  -BC     Supine-Sit [place] (Bed Mobility) moderate assist (50% patient effort)  -BC     Bed Mobility, Safety Issues cognitive deficits limit understanding;decreased use of arms for pushing/pulling;decreased use of legs for bridging/pushing  -BC     Assistive Device (Bed Mobility) bed rails;head of bed elevated  -BC     Comment, (Bed Mobility) Pt w/ no initiation w/ bed mobility towards L side w/ max cues, attempting visual scanning and [place] A w/ multiple cues bt Pt unable to initiate or sequence steps herself w mod A required supine>sit unsupported today.  -BC       Row Name 02/14/25 1112          Transfers    Transfers sit-stand transfer;stand-sit transfer;toilet transfer;bed-chair transfer  -BC       Row Name 02/14/25 [facility]2          Bed-Chair Transfer    Bed-Chair [place] (Transfers) minimum assist (75% patient effort);moderate assist (50% patient effort)  -BC     Comment, (Bed-Chair Transfer) min to mod A w/ max cues, Pt w/ no awareness to her L side w/ fxnl mobility, tactile, visual, and manual aides for turning towards L to find chair  -BC       Row Name 02/14/25 1112          Sit-Stand Transfer    Sit-Stand [place] (Transfers) minimum assist (75% patient effort);moderate assist (50% patient effort)  -BC     Assistive Device (Sit-Stand Transfers) walker, front-wheeled  -BC     Comment, (Sit-Stand Transfer) min to mod A for boost up from lower surfaces. Mod cues for anterior weightshifting and hand placement.  -BC       Row Name 02/14/25 1112          Stand-Sit Transfer    Stand-Sit [place] (Transfers) minimum assist (75% patient effort);moderate assist (50% patient effort)  -BC       Row Name 02/14/25 1112          Toilet Transfer    Type (Toilet Transfer) sit-stand;stand-sit  -BC     [place] Level (Toilet Transfer) moderate assist (50% patient effort)  -BC     Comment, (Toilet Transfer) max cues 2/2 pt unable to find BR  commode due to being on her L side, tactile A for rotation to look to L to find commode and sequence steps, backing up and ease down. mod A for boost up  -BC       Row Name 02/14/25 1112          Functional Mobility    Functional Mobility- Ind. Level minimum assist (75% patient effort);moderate assist (50% patient effort)  -BC     Functional Mobility- Device walker, front-wheeled  -BC     Functional Mobility- Comment Pt completed fxnl mobiltiy w/ walker around bed up to BR commode for toileting, back to room and up in chair ~20 feet. Cont'd w/ stand and progressed w/ walker from chair outside doorway to stretcher. Pt required min to mod A for safety, no awareness on L side, attempting to hold onto R side of walker w/ B hands, unable to identify items on L side, and as tx progressed increased help w/ balance  -BC     Patient was able to Ambulate yes  -BC       Row Name 02/14/25 1112          Activities of Daily Living    BADL Assessment/Intervention lower body dressing;toileting  -BC       Row Name 02/14/25 1112          Lower Body Dressing Assessment/Training    [place] Level (Lower Body Dressing) don;socks;maximum assist (25% patient effort)  -BC     Position (Lower Body Dressing) edge of bed sitting  -BC     Comment, (Lower Body Dressing) Pt was unable to find LLE for socks. max A required  -BC       Row Name 02/14/25 1112          Toileting Assessment/Training    [place] Level (Toileting) toileting skills;adjust/manage clothing;perform perineal hygiene;dependent (less than 25% patient effort)  -BC     Assistive Devices (Toileting) commode  -BC               User Key  (r) = Recorded By, (t) = Taken By, (c) = Cosigned By      Initials Name Provider Type    BC Leticia Murphy OT Occupational Therapist                   Obj/Interventions       Row Name 02/14/25 1120          Sensory Assessment (Somatosensory)    Sensory Assessment questionable sensory and proprioceptive awareness L side, TBE in future POC   -[facility] Name 02/14/25 1120          Vision Assessment/Intervention    Visual Field Deficit homonymous hemianopsia, left  -BC     Visual Processing Deficit elaine-inattention/neglect, left  -BC     Vision Assessment Comment Substantial impairments w/ L sided awareness but not completely neglectful--was able to use L LE/hand when cued and w/ automatic but substantial inattention, Also L field cut.  -[facility] Name 02/14/25 1120          Range of Motion Comprehensive    Comment, General Range of Motion decreased L sided strenth/AROM but observed LUE fxnl w/ walker use, portions of ADLs  -[facility] Name 02/14/25 1120          Balance    Balance Assessment sitting static balance;sitting dynamic balance  -BC     Static Sitting Balance standby assist  -BC     Dynamic Sitting Balance standby assist  -BC     Position, Sitting Balance sitting edge of bed;unsupported  -BC     Static Standing Balance standby assist  -BC     Dynamic Standing Balance minimal assist;moderate assist  -BC     Position/Device Used, Standing Balance supported;walker, front-wheeled  -BC     Balance Interventions standing  -BC               User Key  (r) = Recorded By, (t) = Taken By, (c) = Cosigned By      Initials Name Provider Type    BC Leticia Murphy OT Occupational Therapist                   Goals/Plan       [facility] Name 02/14/25 1136          Bed Mobility Goal 1 (OT)    Activity/Assistive Device (Bed Mobility Goal 1, OT) bed mobility activities, all  -BC     [place] Level/Cues Needed (Bed Mobility Goal 1, OT) supervision required  -BC     Time Frame (Bed Mobility Goal 1, OT) short term goal (STG);2 weeks  -BC     Progress/Outcomes (Bed Mobility Goal 1, OT) new goal  -[facility] Name 02/14/25 1136          Transfer Goal 1 (OT)    Activity/Assistive Device (Transfer Goal 1, OT) sit-to-stand/stand-to-sit;bed-to-chair/chair-to-bed;toilet  -BC     [place] Level/Cues Needed (Transfer Goal 1, OT) supervision required  -BC     Time  Frame (Transfer Goal 1, OT) short term goal (STG);2 weeks  -BC     Progress/Outcome (Transfer Goal 1, OT) new goal  -BC       Row Name 02/14/25 1136          Dressing Goal 1 (OT)    Activity/Device (Dressing Goal 1, OT) upper body dressing;lower body dressing  -BC     [place]/Cues Needed (Dressing Goal 1, OT) supervision required  -BC     Time Frame (Dressing Goal 1, OT) short term goal (STG);2 weeks  -BC     Progress/Outcome (Dressing Goal 1, OT) new goal  -BC       Row Name 02/14/25 1136          Toileting Goal 1 (OT)    Activity/Device (Toileting Goal 1, OT) toileting skills, all;adjust/manage clothing;perform perineal hygiene  -BC     [place] Level/Cues Needed (Toileting Goal 1, OT) supervision required  -BC     Time Frame (Toileting Goal 1, OT) short term goal (STG);2 weeks  -BC     Progress/Outcome (Toileting Goal 1, OT) new goal  -BC       Row Name 02/14/25 1136          Grooming Goal 1 (OT)    Activity/Device (Grooming Goal 1, OT) grooming skills, all  -BC     [place] (Grooming Goal 1, OT) supervision required  -BC     Time Frame (Grooming Goal 1, OT) 2 weeks;short term goal (STG)  -BC     Strategies/Barriers (Grooming Goal 1, OT) standing sinkside for ADL IND  -BC     Progress/Outcome (Grooming Goal 1, OT) new goal  -BC       Row Name 02/14/25 1136          Problem Specific Goal 1 (OT)    Problem Specific Goal 1 (OT) Pt will increase ADL IND and L sided awareness to find items in L visual field 6/10 trials w/ mod cues.  -BC     Time Frame (Problem Specific Goal 1, OT) 2 weeks;short term goal (STG)  -BC     Progress/Outcome (Problem Specific Goal 1, OT) new goal  -BC       Row Name 02/14/25 1136          Therapy Assessment/Plan (OT)    Planned Therapy Interventions (OT) activity tolerance training;BADL retraining;cognitive/visual perception retraining;functional balance retraining;IADL retraining;neuromuscular control/coordination retraining;occupation/activity based  interventions;patient/caregiver education/training;ROM/therapeutic exercise;strengthening exercise;transfer/mobility retraining  -BC               User Key  (r) = Recorded By, (t) = Taken By, (c) = Cosigned By      Initials Name Provider Type    BC Leticia Murphy, ADELAIDA Occupational Therapist                   Clinical Impression       Row Name 02/14/25 1128          Pain Assessment    Pretreatment Pain Rating 0/10 - no pain  -BC     Posttreatment Pain Rating 0/10 - no pain  -BC       Row Name 02/14/25 1128          Plan of Care Review    Plan of Care Reviewed With patient  -BC     Progress improving  -BC     Outcome Evaluation Pt is an 89 year old F admitted to [facility] 2/12 w acute weakness and confusion, found down on the ground. Pt w acute Acute cystitis without hematuria. 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 Pt w/ substantial deficits from infarcts resulting in L homonymous hemianopia and L inattention. When Pt is cued to look/attention to LUE she did have awareness though limited. Pt attempting to put both hands on R side of walker, and max cues throughout session to look to L w/ manual assist to turn head for Pt to find commode today. pt is not safe to DC home w/ new deficits. Would benefit from IRF at [place].  -BC       Row Name 02/14/25 1128          Therapy Assessment/Plan (OT)    Rehab Potential (OT) good  -BC     Criteria for Skilled Therapeutic Interventions Met (OT) meets criteria;skilled treatment is necessary  -BC     Therapy Frequency (OT) 5 times/wk  -BC     Predicted Duration of Therapy Intervention (OT) 2 weeks  -BC       Row Name 02/14/25 1128          Therapy Plan Review/Discharge Plan (OT)    Anticipated Discharge Disposition (OT) inpatient rehabilitation facility  -BC       Row Name 02/14/25 1128          Vital Signs    O2 Delivery Pre Treatment room air  -BC     O2 Delivery Post Treatment room air  -BC     Pre Patient Position Supine  -BC     Intra Patient  Position Standing  -BC     Post Patient Position Supine  -BC       Row Name 02/14/25 1128          Positioning and Restraints    Pre-Treatment Position in bed  -BC     Post Treatment Position other  -BC     Other Position with other staff  stretcher going off floor  -BC               User Key  (r) = Recorded By, (t) = Taken By, (c) = Cosigned By      Initials Name Provider Type    BC Leticia Murphy, OT Occupational Therapist                   Outcome Measures       Row Name 02/14/25 1139          How much help from another is currently needed...    Putting on and taking off regular lower body clothing? 2  -BC     Bathing (including washing, rinsing, and drying) 2  -BC     Toileting (which includes using toilet bed pan or urinal) 1  -BC     Putting on and taking off regular upper body clothing 2  -BC     Taking care of personal grooming (such as brushing teeth) 2  -BC     Eating meals 3  -BC     AM-PAC 6 Clicks Score (OT) 12  -BC       Row Name 02/14/25 1045 02/14/25 0815       How much help from another person do you currently need...    Turning from your back to your side while in flat bed without using bedrails? 3  -SM 4  -KP    Moving from lying on back to sitting on the side of a flat bed without bedrails? 3  -SM 4  -KP    Moving to and from a bed to a chair (including a wheelchair)? 3  -SM 3  -KP    Standing up from a chair using your arms (e.g., wheelchair, bedside chair)? 2  -SM 3  -KP    Climbing 3-5 steps with a railing? 2  -SM 3  -KP    To walk in hospital room? 3  -SM 3  -KP    AM-PAC 6 Clicks Score (PT) 16  -SM 20  -KP    Highest Level of Mobility Goal 5 --> Static standing  -SM 6 --> Walk 10 steps or more  -KP      Row Name 02/14/25 1139          Modified Parker Scale    Modified Parker Scale 4 - Moderately severe disability.  Unable to walk without assistance, and unable to attend to own bodily needs without assistance.  -BC       Row Name 02/14/25 1139 02/14/25 1045       Functional Assessment     Outcome Measure Options AM-PAC 6 Clicks Daily Activity (OT);Modified [place]  -BC AM-PAC 6 Clicks Basic Mobility (PT)  -SM              User Key  (r) = Recorded By, (t) = Taken By, (c) = Cosigned By      Initials Name Provider Type    Cindy Traore, PTA Physical Therapist Assistant    Kaylene Mercado RN Registered Nurse    BC Leticia Murphy, OT Occupational Therapist                    Occupational Therapy Education       Title: PT OT SLP Therapies (In Progress)       Topic: Occupational Therapy (In Progress)       Point: ADL training (Done)       Description:   Instruct learner(s) on proper safety adaptation and remediation techniques during self care or transfers.   Instruct in proper use of assistive devices.                  Learning Progress Summary            Patient Acceptance, E,TB, VU by BC at 2/14/2025 1140    Comment: L sided awareness/attention, walker safety, ADL training, cont teaching                      Point: Home exercise program (Not Started)       Description:   Instruct learner(s) on appropriate technique for monitoring, assisting and/or progressing therapeutic exercises/activities.                  Learner Progress:  Not documented in this visit.              Point: Precautions (Not Started)       Description:   Instruct learner(s) on prescribed precautions during self-care and functional transfers.                  Learner Progress:  Not documented in this visit.              Point: Body mechanics (Not Started)       Description:   Instruct learner(s) on proper positioning and spine alignment during self-care, functional mobility activities and/or exercises.                  Learner Progress:  Not documented in this visit.                              User Key       Initials Effective Dates Name Provider Type Discipline    BC 07/29/24 -  Leticia Murphy OT Occupational Therapist OT                  OT Recommendation and Plan  Planned Therapy Interventions (OT): activity tolerance  training, BADL retraining, cognitive/visual perception retraining, functional balance retraining, IADL retraining, neuromuscular control/coordination retraining, occupation/activity based interventions, patient/caregiver education/training, ROM/therapeutic exercise, strengthening exercise, transfer/mobility retraining  Therapy Frequency (OT): 5 times/wk  Plan of Care Review  Plan of Care Reviewed With: patient  Progress: improving  Outcome Evaluation: [facility] 89 [facility] weakness and confusion, found down on the ground. Pt w acute Acute cystitis without hematuria. 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 Pt w/ substantial deficits from infarcts resulting in L homonymous hemianopia and L inattention. When Pt is cued to look/attention to LUE she did have awareness though limited. Pt attempting to put both hands on R side of walker, and max cues throughout session to look to L w/ manual assist to turn head for Pt to find commode today. pt is not safe to DC home w/ new deficits. Would benefit from IRF at [place].     Time Calculation:   Evaluation Complexity (OT)  Review Occupational Profile/Medical/Therapy History Complexity: expanded/moderate complexity  Assessment, Occupational Performance/Identification of Deficit Complexity: 3-5 performance deficits  Clinical Decision Making Complexity (OT): detailed assessment/moderate complexity  Overall Complexity of Evaluation (OT): moderate complexity     Time Calculation- OT       Row Name 02/14/25 1140             Time Calculation- OT    OT Start Time 0826  -BC      OT Stop Time 0859  -BC      OT Time Calculation (min) 33 min  -BC      Total Timed Code Minutes- OT 23 minute(s)  -BC      OT Received On 02/14/25  -BC      OT - Next Appointment 02/17/25  -BC      OT Goal Re-Cert Due Date 02/28/25  -BC         Timed Charges    98539 -  OT Neuromuscular Reeducation Minutes 10  -BC      80975 - OT Self Care/Mgmt  Minutes 13  -BC         Total Minutes    Timed Charges Total Minutes 23  -BC       Total Minutes 23  -BC                User Key  (r) = Recorded By, (t) = Taken By, (c) = Cosigned By      Initials Name Provider Type    BC Leticia Murphy OT Occupational Therapist                  Therapy Charges for Today       Code Description Service Date Service Provider Modifiers Qty    30492092313 HC OT NEUROMUSC RE EDUCATION EA 15 MIN 2/14/2025 Leticia Murphy OT GO 1    55021419109 HC OT SELF CARE/MGMT/TRAIN EA 15 MIN 2/14/2025 Leticia Murphy OT GO 1    [id number]  OT EVAL MOD COMPLEXITY 2 2/14/2025 Leticia Murphy OT GO 1                 Leticia Murphy OT  2/14/2025

## 2025-02-14 NOTE — THERAPY EVALUATION
Acute Care - Speech Language Pathology   Swallow Initial Evaluation [facility]     Patient Name: Maria Fernanda Gaviria  : 1923  MRN: [id number]  Today's Date: 2025               Admit Date: 2025    Visit Dx:     ICD-10-CM ICD-9-CM   1. Urinary tract infection in female  N39.0 599.0   2. Generalized weakness  R53.1 780.79   3. Acute encephalopathy  G93.40 348.30   4. Unwitnessed fall  R29.6 [id number]     Patient Active Problem List   Diagnosis    Osteoarthritis of lumbar spine    Osteoarthritis    Asthma    Carotid stenosis    Hyperlipidemia    GERD (gastroesophageal reflux disease)    Essential hypertension    Hypothyroidism (acquired)    Osteopenia    DNR (do not resuscitate)    Non-seasonal allergic rhinitis    AV block    Presence of cardiac pacemaker    Stroke-like symptoms    Urinary tract infection in female     Past Medical History:   Diagnosis Date    Arthritis     Arthritis of back     COVID-19 virus detected 2023    Disease of thyroid gland     GERD (gastroesophageal reflux disease)     Hip arthrosis     Age related    Hyperlipidemia     Hypertension     Low back strain Age related    Osteoporosis     Restless legs syndrome 2016     Past Surgical History:   Procedure Laterality Date    CARDIAC ELECTROPHYSIOLOGY PROCEDURE N/A 2023    Procedure: Pacemaker SC new  biotronik;  Surgeon: Deny Khanna MD;  Location: [facility] INVASIVE LOCATION;  Service: Cardiovascular;  Laterality: N/A;    CATARACT EXTRACTION      CHOLECYSTECTOMY WITH INTRAOPERATIVE CHOLANGIOGRAM N/A 2021    Procedure: CHOLECYSTECTOMY LAPAROSCOPIC INTRAOPERATIVE CHOLANGIOGRAM common bile duct exploration;  Surgeon: Huey Wilkinson MD;  Location: [facility] OR;  Service: General;  Laterality: N/A;    COLONOSCOPY      ERCP N/A 2021    Procedure: ENDOSCOPIC RETROGRADE CHOLANGIOPANCREATOGRAPHY WITH SPHINCTEROTOMY, BALLOON SWEEP AND STONE (2) EXTRACTION;  Surgeon: Justo Aden MD;   Location: [facility] ENDOSCOPY;  Service: Gastroenterology;  Laterality: N/A;  PRE- BILE DUCT OBSTRUCTION   POST- SAME      HYSTERECTOMY      REPLACEMENT TOTAL KNEE Left     THYROID SURGERY      TUBAL ABDOMINAL LIGATION         SLP Recommendation and Plan  SLP Swallowing Diagnosis: suspected pharyngeal dysphagia, oral dysphagia (02/14/25 1113)  SLP Diet Recommendation: mechanical ground textures, no mixed consistencies, nectar thick liquids (02/14/25 1113)  Recommended Precautions and Strategies: upright posture during/after eating, small bites of food and sips of liquid, check mouth frequently for oral residue/pocketing, 1:1 supervision, other (see comments) (ensure alertness prior to PO) (02/14/25 1113)  SLP Rec. for Method of Medication Administration: meds crushed, with puree (whole in puree if contraindicated) (02/14/25 1113)     Monitor for Signs of Aspiration: yes, notify SLP if any concerns (02/14/25 1113)  Recommended Diagnostics: reassess via clinical swallow evaluation, SLE/Cog/Motor Speech Evaluation (02/14/25 1113)  Swallow Criteria for Skilled Therapeutic Interventions Met: demonstrates skilled criteria (02/14/25 1113)     Rehab Potential/Prognosis, Swallowing: good, to achieve stated therapy goals (02/14/25 1113)  Therapy Frequency (Swallow): PRN (02/14/25 1113)  Predicted Duration Therapy Intervention (Days): until discharge (02/14/25 1113)  Oral Care Recommendations: Oral Care BID/PRN (02/14/25 1113)                                        Outcome Evaluation: Clinical swallow evaluation completed. Left neglect appreciated. Recommend mechanical ground diet with nectar thick liquids. Meds crushed in puree, or whole in puree if contraindicated. Ensure alertness prior to PO, sitting upright, slow rate, small bites/sips, supervision during meals by nursing or family, check mouth for pocketing. With negative lung changes or clinical s/s of aspiration, make NPO and notify SLP.      SWALLOW EVALUATION (Last 72  Hours)       SLP Adult Swallow Evaluation       Row Name 02/14/25 1113                   Rehab Evaluation    Document Type evaluation  -CR        Subjective Information no complaints  -CR        Patient Observations alert;cooperative  -CR        Patient Effort good  -CR        Symptoms Noted During/After Treatment none  -CR           General Information    Patient Profile Reviewed yes  -CR        Pertinent History Of Current Problem 89 y/o female found down. UTI. Multiple infarcts R PCA and MCA.  -CR        Current Method of Nutrition NPO  -CR        Precautions/Limitations, Vision neglect, left  -CR        Precautions/Limitations, Hearing WFL;for purposes of eval  -CR        Prior Level of Function-Communication WFL  -CR        Prior Level of Function-Swallowing soft to chew;thin liquids  -CR        Plans/Goals Discussed with patient and family;agreed upon  -CR        Barriers to Rehab none identified  -CR           Pain    Pretreatment Pain Rating 0/10 - no pain  -CR        Posttreatment Pain Rating 0/10 - no pain  -CR           Oral Motor Structure and Function    Dentition Assessment upper dentures/partial in place;lower dentures/partial in place  -CR        Secretion Management WNL/WFL  -CR        Mucosal Quality moist, healthy  -CR           Oral Musculature and Cranial Nerve Assessment    Oral Labial or Buccal Impairment, Detail, Cranial Nerve VII (Facial): left labial droop  slight  -CR        Velar Impairment, Detail, Cranial Nerves X, XI (Vagus and Accessory) other (see comments)  reduced elevation  -CR           General Eating/Swallowing Observations    Respiratory Support Currently in Use room air  -CR           Clinical Swallow Eval    Clinical Swallow Evaluation Summary Clinical swallow evaluation completed. Family at bedside. Slight left labial droop appreciated following mastication of solids. Left neglect appreciated. Some difficulties following directions at times. Speech/language appear  functional. Coughing with 2/2 thins via spoon. No overt s/s of aspiration with thins via cup/straw, nectar via spoon/cup/straw, puree, or soft/mixed solids. Audible swallow with thins via cup/straw, suspect mistiming. Double swallow with puree. Prolonged, slightly disorganized munching mastication with no oral residue following soft solid trials. Pt spontaneously using left lingual sweep. Recommend mechanical ground diet with nectar thick liquids. Meds crushed in puree, or whole in puree if contraindicated. Ensure alertness prior to PO, sitting upright, slow rate, small bites/sips, supervision during meals by nursing or family, check mouth for pocketing.  -CR           SLP Evaluation Clinical Impression    SLP Swallowing Diagnosis suspected pharyngeal dysphagia;oral dysphagia  -CR        Functional Impact risk of aspiration/pneumonia  -CR        Rehab Potential/Prognosis, Swallowing good, to achieve stated therapy goals  -CR        Swallow Criteria for Skilled Therapeutic Interventions Met demonstrates skilled criteria  -CR           Recommendations    Therapy Frequency (Swallow) PRN  -CR        Predicted Duration Therapy Intervention (Days) until discharge  -CR        SLP Diet Recommendation mechanical ground textures;no mixed consistencies;nectar thick liquids  -CR        Recommended Diagnostics reassess via clinical swallow evaluation;SLE/Cog/Motor Speech Evaluation  -CR        Recommended Precautions and Strategies upright posture during/after eating;small bites of food and sips of liquid;check mouth frequently for oral residue/pocketing;1:1 supervision;other (see comments)  ensure alertness prior to PO  -CR        Oral Care Recommendations Oral Care BID/PRN  -CR        SLP Rec. for Method of Medication Administration meds crushed;with puree  whole in puree if contraindicated  -CR        Monitor for Signs of Aspiration yes;notify SLP if any concerns  -CR           Swallow Goals (SLP)    Swallow LTGs Patient will  demonstrate functional swallow for  -CR           (LTG) Patient will demonstrate functional swallow for    Diet Texture (Demonstrate functional swallow) soft to chew (chopped) textures  -CR        Liquid viscosity (Demonstrate functional swallow) thin liquids  -CR        [place] (Demonstrate functional swallow) with minimal cues (75-90% accuracy)  -CR        Time Frame (Demonstrate functional swallow) by discharge  -CR        Progress/Outcomes (Demonstrate functional swallow) new goal  -CR                  User Key  (r) = Recorded By, (t) = Taken By, (c) = Cosigned By      Initials Name Effective Dates    Lisa Blas SLP 12/03/24 -                     EDUCATION  The patient has been educated in the following areas:   Dysphagia (Swallowing Impairment).        SLP GOALS       Row Name 02/14/25 1113             (LTG) Patient will demonstrate functional swallow for    Diet Texture (Demonstrate functional swallow) soft to chew (chopped) textures  -CR      Liquid viscosity (Demonstrate functional swallow) thin liquids  -CR      [place] (Demonstrate functional swallow) with minimal cues (75-90% accuracy)  -CR      Time Frame (Demonstrate functional swallow) by discharge  -CR      Progress/Outcomes (Demonstrate functional swallow) new goal  -CR                User Key  (r) = Recorded By, (t) = Taken By, (c) = Cosigned By      Initials Name Provider Type    Lisa Blas SLP Speech and Language Pathologist                         Time Calculation:    Time Calculation- SLP       Row Name 02/14/25 1128             Time Calculation- SLP    SLP Start Time 0730  -CR      SLP Received On 02/14/25  -CR         Untimed Charges    [id number] Eval Oral Pharyng Swallow Minutes 60  -CR         Total Minutes    Untimed Charges Total Minutes 60  -CR       Total Minutes 60  -CR                User Key  (r) = Recorded By, (t) = Taken By, (c) = Cosigned By      Initials Name Provider Type    Lisa Blas SLP  Speech and Language Pathologist                    Therapy Charges for Today       Code Description Service Date Service Provider Modifiers Qty    [id number]  ST EVAL ORAL PHARYNG SWALLOW 4 2/14/2025 Lisa Singh, OSCAR GN 1                 OSCAR Samano  2/14/2025

## 2025-02-14 NOTE — PLAN OF CARE
Goal Outcome Evaluation:  Plan of Care Reviewed With: patient        Progress: improving  Outcome Evaluation: Pt is an 89 year old F admitted to [facility] 2/12 w acute weakness and confusion, found down on the ground. Pt w acute Acute cystitis without hematuria. 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 Pt w/ substantial deficits from infarcts resulting in L homonymous hemianopia and L inattention. When Pt is cued to look/attention to LUE she did have awareness though limited. Pt attempting to put both hands on R side of walker, and max cues throughout session to look to L w/ manual assist to turn head for Pt to find commode today. pt is not safe to DC home w/ new deficits. Would benefit from IRF at DC.    Anticipated Discharge Disposition (OT): inpatient rehabilitation facility

## 2025-02-14 NOTE — PLAN OF CARE
Problem: Adult Inpatient Plan of Care  Goal: Plan of Care Review  Outcome: Progressing  Goal: Patient-Specific Goal (Individualized)  Outcome: Progressing  Goal: Absence of Hospital-Acquired Illness or Injury  Outcome: Progressing  Intervention: Identify and Manage Fall Risk  Recent Flowsheet Documentation  Taken 2/14/2025 1615 by Kaylene Maldonado RN  Safety Promotion/Fall Prevention:   clutter free environment maintained   activity supervised  Taken 2/14/2025 1415 by Kaylene Maldonado RN  Safety Promotion/Fall Prevention:   clutter free environment maintained   activity supervised  Taken 2/14/2025 0815 by Kaylene Maldonado RN  Safety Promotion/Fall Prevention:   clutter free environment maintained   activity supervised  Intervention: Prevent Infection  Recent Flowsheet Documentation  Taken 2/14/2025 1615 by Kaylene Maldonado RN  Infection Prevention: single patient room provided  Taken 2/14/2025 1415 by Kaylene Maldonado RN  Infection Prevention: single patient room provided  Taken 2/14/2025 0815 by Kaylene Maldonado RN  Infection Prevention: single patient room provided  Goal: Optimal Comfort and Wellbeing  Outcome: Progressing  Intervention: Provide Person-Centered Care  Recent Flowsheet Documentation  Taken 2/14/2025 1415 by Kaylene Maldonado RN  Trust Relationship/Rapport:   care explained   choices provided  Taken 2/14/2025 0815 by Kaylene Maldonado RN  Trust Relationship/Rapport:   care explained   choices provided  Goal: Readiness for Transition of Care  Outcome: Progressing   Goal Outcome Evaluation:              Outcome Evaluation: patient is alert and confused. MRI today. up in the chair. Room air. Neurology consulted.

## 2025-02-14 NOTE — THERAPY TREATMENT NOTE
Patient Name: Maria Fernanda Gaviria  : 1923    MRN: [id number]                              Today's Date: 2025       Admit Date: 2025    Visit Dx:     ICD-10-CM ICD-9-CM   1. Urinary tract infection in female  N39.0 599.0   2. Generalized weakness  R53.1 780.79   3. Acute encephalopathy  G93.40 348.30   4. Unwitnessed fall  R29.6 [id number]     Patient Active Problem List   Diagnosis    Osteoarthritis of lumbar spine    Osteoarthritis    Asthma    Carotid stenosis    Hyperlipidemia    GERD (gastroesophageal reflux disease)    Essential hypertension    Hypothyroidism (acquired)    Osteopenia    DNR (do not resuscitate)    Non-seasonal allergic rhinitis    AV block    Presence of cardiac pacemaker    Stroke-like symptoms    Urinary tract infection in female     Past Medical History:   Diagnosis Date    Arthritis     Arthritis of back     COVID-19 virus detected 2023    Disease of thyroid gland     GERD (gastroesophageal reflux disease)     Hip arthrosis     Age related    Hyperlipidemia     Hypertension     Low back strain Age related    Osteoporosis     Restless legs syndrome 2016     Past Surgical History:   Procedure Laterality Date    CARDIAC ELECTROPHYSIOLOGY PROCEDURE N/A 2023    Procedure: Pacemaker SC new  biotronik;  Surgeon: Deny Khanna MD;  Location: [facility] CATH INVASIVE LOCATION;  Service: Cardiovascular;  Laterality: N/A;    CATARACT EXTRACTION      CHOLECYSTECTOMY WITH INTRAOPERATIVE CHOLANGIOGRAM N/A 2021    Procedure: CHOLECYSTECTOMY LAPAROSCOPIC INTRAOPERATIVE CHOLANGIOGRAM common bile duct exploration;  Surgeon: Huey Wilkinosn MD;  Location: [facility] MAIN OR;  Service: General;  Laterality: N/A;    COLONOSCOPY      ERCP N/A 2021    Procedure: ENDOSCOPIC RETROGRADE CHOLANGIOPANCREATOGRAPHY WITH SPHINCTEROTOMY, BALLOON SWEEP AND STONE (2) EXTRACTION;  Surgeon: Justo Aden MD;  Location: [facility] ENDOSCOPY;  Service: Gastroenterology;   Laterality: N/A;  PRE- BILE DUCT OBSTRUCTION   POST- SAME      HYSTERECTOMY      REPLACEMENT TOTAL KNEE Left     THYROID SURGERY      TUBAL ABDOMINAL LIGATION        General Information       Row Name 02/14/25 1038          Physical Therapy Time and Intention    Document Type therapy note (daily note)  -     Mode of Treatment physical therapy  -[facility] Name 02/14/25 1038          General Information    Existing Precautions/Restrictions fall  -[facility] Name 02/14/25 1038          Cognition    Orientation Status (Cognition) oriented to;person  -               User Key  (r) = Recorded By, (t) = Taken By, (c) = Cosigned By      Initials Name Provider Type     Cindy Fontenot, NICK Physical Therapist Assistant                   Mobility       Row Name 02/14/25 1038          Bed Mobility    Bed Mobility sit-supine  -     Sit-Supine [place] (Bed Mobility) moderate assist (50% patient effort)  -     Assistive Device (Bed Mobility) bed rails;head of bed elevated  -[facility] Name 02/14/25 1038          Sit-Stand Transfer    Sit-Stand [place] (Transfers) minimum assist (75% patient effort);moderate assist (50% patient effort)  -     Assistive Device (Sit-Stand Transfers) walker, front-wheeled  -     Comment, (Sit-Stand Transfer) mod A required to stand from low commode  -[facility] Name 02/14/25 1038          Gait/Stairs (Locomotion)    [place] Level (Gait) contact guard;minimum assist (75% patient effort)  -     Assistive Device (Gait) walker, front-wheeled  -     Patient was able to Ambulate yes  -     Distance in Feet (Gait) 30  -     Deviations/Abnormal Patterns (Gait) jack decreased;stride length decreased  -     Bilateral Gait Deviations forward flexed posture  -     Comment, (Gait/Stairs) frequent assist to guide walker; some L neglect noted throughout; cues for direction, especially when in bathroom and directing pt to sit on toilet (pt wanting to step in the  shower for the toilet, though not understanding toilet was behind and to the L)  -               User Key  (r) = Recorded By, (t) = Taken By, (c) = Cosigned By      Initials Name Provider Type    Cindy Traore PTA Physical Therapist Assistant                   Obj/Interventions       Row Name 02/14/25 1043          Motor Skills    Therapeutic Exercise --  seated AP and LAQ x10 reps  -               User Key  (r) = Recorded By, (t) = Taken By, (c) = Cosigned By      Initials Name Provider Type    Cindy Traore PTA Physical Therapist Assistant                   Goals/Plan    No documentation.                  Clinical Impression       Row Name 02/14/25 1043          Pain    Pretreatment Pain Rating 0/10 - no pain  -SM     Posttreatment Pain Rating 0/10 - no pain  -       Row Name 02/14/25 1043          Positioning and Restraints    Pre-Treatment Position sitting in chair/recliner  -SM     Post Treatment Position bed  -SM     In Bed supine;call light within reach;encouraged to call for assist;exit alarm on;notified nsg  -               User Key  (r) = Recorded By, (t) = Taken By, (c) = Cosigned By      Initials Name Provider Type    Cindy Traore PTA Physical Therapist Assistant                   Outcome Measures       Row Name 02/14/25 1045 02/14/25 0815       How much help from another person do you currently need...    Turning from your back to your side while in flat bed without using bedrails? 3  -SM 4  -KP    Moving from lying on back to sitting on the side of a flat bed without bedrails? 3  -SM 4  -KP    Moving to and from a bed to a chair (including a wheelchair)? 3  -SM 3  -KP    Standing up from a chair using your arms (e.g., wheelchair, bedside chair)? 2  -SM 3  -KP    Climbing 3-5 steps with a railing? 2  -SM 3  -KP    To walk in hospital room? 3  -SM 3  -KP    AM-PAC 6 Clicks Score (PT) 16  -SM 20  -KP    Highest Level of Mobility Goal 5 --> Static standing  - 6 -->  Walk 10 steps or more  -      Row Name 02/14/25 1045          Functional Assessment    Outcome Measure Options AM-PAC 6 Clicks Basic Mobility (PT)  -               User Key  (r) = Recorded By, (t) = Taken By, (c) = Cosigned By      Initials Name Provider Type    Cindy Traore PTA Physical Therapist Assistant     Kaylene Maldonado RN Registered Nurse                                 Physical Therapy Education       Title: PT OT SLP Therapies (In Progress)       Topic: Physical Therapy (In Progress)       Point: Mobility training (In Progress)       Learning Progress Summary            Patient Acceptance, E,D, NR by  at 2/14/2025 1046    Acceptance, E,TB,D, NR,NL by  at 2/13/2025 0943                      Point: Home exercise program (In Progress)       Learning Progress Summary            Patient Acceptance, E,D, NR by  at 2/14/2025 1046    Acceptance, E,TB,D, NR,NL by  at 2/13/2025 0943                      Point: Body mechanics (In Progress)       Learning Progress Summary            Patient Acceptance, E,D, NR by  at 2/14/2025 1046    Acceptance, E,TB,D, NR,NL by  at 2/13/2025 0943                      Point: Precautions (In Progress)       Learning Progress Summary            Patient Acceptance, E,D, NR by  at 2/14/2025 1046    Acceptance, E,TB,D, NR,NL by  at 2/13/2025 0943                                      User Key       Initials Effective Dates Name Provider Type [facility] 03/07/18 -  Cindy Fontenot PTA Physical Therapist Assistant PT     04/08/22 -  Marine Hinton PT Physical Therapist PT                  PT Recommendation and Plan     Progress: improving  Outcome Evaluation: Pt tolerated treatment fair this date. Required min A to stand from chair, then CGA-min A to ambulated 30ft w/ Rw. Several cues needed throughout for direction as pt was running into items in room, requiring assist to guide walker frequently. Pt ambulated to the bathroom, though began to  attempt stepping into shower, thinking the toilet was there. Pt was directed to look towards the L and take steps backwards to get to the toilet, though pt not understanding w/out much assist to guide backwards. Mod A was needed to stand from low commode. L neglect noted throughout session, again w/ much cues to direct through tasks. Would highly recommend d/c to IRF at this time.     Time Calculation:         PT Charges       Row Name 02/14/25 1050             Time Calculation    Start Time 0959  -      Stop Time 1028  -      Time Calculation (min) 29 min  -      PT Received On 02/14/25  -      PT - Next Appointment 02/17/25  -                User Key  (r) = Recorded By, (t) = Taken By, (c) = Cosigned By      Initials Name Provider Type     Cindy Fontenot PTA Physical Therapist Assistant                  Therapy Charges for Today       Code Description Service Date Service Provider Modifiers Qty    03956464072 HC GAIT TRAINING EA 15 MIN 2/14/2025 Cindy Fontenot PTA GP 1    [id number]  PT THER PROC EA 15 MIN 2/14/2025 Cindy Fontenot PTA GP 1            PT G-Codes  Outcome Measure Options: AM-PAC 6 Clicks Basic Mobility (PT)  AM-PAC 6 Clicks Score (PT): 16  PT Discharge Summary  Anticipated Discharge Disposition (PT): inpatient rehabilitation facility    Cindy Fontenot PTA  2/14/2025

## 2025-02-15 LAB
ANION GAP SERPL CALCULATED.3IONS-SCNC: 8 MMOL/L (ref 5–15)
BASOPHILS # BLD AUTO: 0.06 10*3/MM3 (ref 0–0.2)
BASOPHILS NFR BLD AUTO: 0.6 % (ref 0–1.5)
BUN SERPL-MCNC: 12 MG/DL (ref 8–23)
BUN/CREAT SERPL: 16.9 (ref 7–25)
CALCIUM SPEC-SCNC: 8.6 MG/DL (ref 8.2–9.6)
CHLORIDE SERPL-SCNC: 105 MMOL/L (ref 98–107)
CO2 SERPL-SCNC: 22 MMOL/L (ref 22–29)
CREAT SERPL-MCNC: 0.71 MG/DL (ref 0.57–1)
DEPRECATED RDW RBC AUTO: 47.6 FL (ref 37–54)
EGFRCR SERPLBLD CKD-EPI 2021: 75.1 ML/MIN/1.73
EOSINOPHIL # BLD AUTO: 0.15 10*3/MM3 (ref 0–0.4)
EOSINOPHIL NFR BLD AUTO: 1.6 % (ref 0.3–6.2)
ERYTHROCYTE [DISTWIDTH] IN BLOOD BY AUTOMATED COUNT: 13.8 % (ref 12.3–15.4)
GLUCOSE BLDC GLUCOMTR-MCNC: 102 MG/DL (ref 70–130)
GLUCOSE BLDC GLUCOMTR-MCNC: 89 MG/DL (ref 70–130)
GLUCOSE BLDC GLUCOMTR-MCNC: 98 MG/DL (ref 70–130)
GLUCOSE SERPL-MCNC: 94 MG/DL (ref 65–99)
HCT VFR BLD AUTO: 49.3 % (ref 34–46.6)
HGB BLD-MCNC: 16 G/DL (ref 12–15.9)
IMM GRANULOCYTES # BLD AUTO: 0.09 10*3/MM3 (ref 0–0.05)
IMM GRANULOCYTES NFR BLD AUTO: 1 % (ref 0–0.5)
LYMPHOCYTES # BLD AUTO: 1.45 10*3/MM3 (ref 0.7–3.1)
LYMPHOCYTES NFR BLD AUTO: 15.7 % (ref 19.6–45.3)
MCH RBC QN AUTO: 30.4 PG (ref 26.6–33)
MCHC RBC AUTO-ENTMCNC: 32.5 G/DL (ref 31.5–35.7)
MCV RBC AUTO: 93.5 FL (ref 79–97)
MONOCYTES # BLD AUTO: 0.83 10*3/MM3 (ref 0.1–0.9)
MONOCYTES NFR BLD AUTO: 9 % (ref 5–12)
NEUTROPHILS NFR BLD AUTO: 6.67 10*3/MM3 (ref 1.7–7)
NEUTROPHILS NFR BLD AUTO: 72.1 % (ref 42.7–76)
NRBC BLD AUTO-RTO: 0 /100 WBC (ref 0–0.2)
PLATELET # BLD AUTO: 190 10*3/MM3 (ref 140–450)
PMV BLD AUTO: 9.7 FL (ref 6–12)
POTASSIUM SERPL-SCNC: 4.7 MMOL/L (ref 3.5–5.2)
RBC # BLD AUTO: 5.27 10*6/MM3 (ref 3.77–5.28)
SODIUM SERPL-SCNC: 135 MMOL/L (ref 136–145)
WBC NRBC COR # BLD AUTO: 9.25 10*3/MM3 (ref 3.4–10.8)

## 2025-02-15 PROCEDURE — 80048 BASIC METABOLIC PNL TOTAL CA: CPT | Performed by: INTERNAL MEDICINE

## 2025-02-15 PROCEDURE — 25010000002 CEFTRIAXONE PER 250 MG: Performed by: INTERNAL MEDICINE

## 2025-02-15 PROCEDURE — 25810000003 DEXTROSE 5 % AND SODIUM CHLORIDE 0.9 % 5-0.9 % SOLUTION: Performed by: INTERNAL MEDICINE

## 2025-02-15 PROCEDURE — 99232 SBSQ HOSP IP/OBS MODERATE 35: CPT | Performed by: PSYCHIATRY & NEUROLOGY

## 2025-02-15 PROCEDURE — 85025 COMPLETE CBC W/AUTO DIFF WBC: CPT | Performed by: INTERNAL MEDICINE

## 2025-02-15 PROCEDURE — 92610 EVALUATE SWALLOWING FUNCTION: CPT

## 2025-02-15 PROCEDURE — 82948 REAGENT STRIP/BLOOD GLUCOSE: CPT

## 2025-02-15 RX ORDER — ASPIRIN 81 MG/1
81 TABLET ORAL DAILY
Status: DISCONTINUED | OUTPATIENT
Start: 2025-02-16 | End: 2025-02-20 | Stop reason: HOSPADM

## 2025-02-15 RX ORDER — CLOPIDOGREL BISULFATE 75 MG/1
75 TABLET ORAL DAILY
Status: DISCONTINUED | OUTPATIENT
Start: 2025-02-15 | End: 2025-02-20 | Stop reason: HOSPADM

## 2025-02-15 RX ORDER — CLONIDINE HYDROCHLORIDE 0.1 MG/1
0.1 TABLET ORAL ONCE
Status: COMPLETED | OUTPATIENT
Start: 2025-02-15 | End: 2025-02-15

## 2025-02-15 RX ORDER — DEXTROSE MONOHYDRATE AND SODIUM CHLORIDE 5; .9 G/100ML; G/100ML
60 INJECTION, SOLUTION INTRAVENOUS CONTINUOUS
Status: DISCONTINUED | OUTPATIENT
Start: 2025-02-15 | End: 2025-02-18

## 2025-02-15 RX ADMIN — ATORVASTATIN CALCIUM 80 MG: 80 TABLET, FILM COATED ORAL at 20:17

## 2025-02-15 RX ADMIN — CLONIDINE HYDROCHLORIDE 0.1 MG: 0.1 TABLET ORAL at 01:09

## 2025-02-15 RX ADMIN — CLOPIDOGREL BISULFATE 75 MG: 75 TABLET ORAL at 16:14

## 2025-02-15 RX ADMIN — Medication 10 ML: at 20:17

## 2025-02-15 RX ADMIN — DEXTROSE MONOHYDRATE AND SODIUM CHLORIDE 60 ML/HR: 5; .9 INJECTION, SOLUTION INTRAVENOUS at 12:46

## 2025-02-15 RX ADMIN — CEFTRIAXONE SODIUM 1000 MG: 1 INJECTION, POWDER, FOR SOLUTION INTRAMUSCULAR; INTRAVENOUS at 16:18

## 2025-02-15 RX ADMIN — Medication 10 ML: at 09:33

## 2025-02-15 NOTE — THERAPY EVALUATION
Acute Care - Speech Language Pathology   Swallow Re-Evaluation [facility]     Patient Name: Maria Fernanda Gaviria  : 1923  MRN: [id number]  Today's Date: 2/15/2025               Admit Date: 2025    Visit Dx:     ICD-10-CM ICD-9-CM   1. Urinary tract infection in female  N39.0 599.0   2. Generalized weakness  R53.1 780.79   3. Acute encephalopathy  G93.40 348.30   4. Unwitnessed fall  R29.6 [id number]     Patient Active Problem List   Diagnosis    Osteoarthritis of lumbar spine    Osteoarthritis    Asthma    Carotid stenosis    Hyperlipidemia    GERD (gastroesophageal reflux disease)    Essential hypertension    Hypothyroidism (acquired)    Osteopenia    DNR (do not resuscitate)    Non-seasonal allergic rhinitis    AV block    Presence of cardiac pacemaker    Stroke-like symptoms    Urinary tract infection in female    UTI (urinary tract infection)     Past Medical History:   Diagnosis Date    Arthritis     Arthritis of back     COVID-19 virus detected 2023    Disease of thyroid gland     GERD (gastroesophageal reflux disease)     Hip arthrosis     Age related    Hyperlipidemia     Hypertension     Low back strain Age related    Osteoporosis     Restless legs syndrome 2016     Past Surgical History:   Procedure Laterality Date    CARDIAC ELECTROPHYSIOLOGY PROCEDURE N/A 2023    Procedure: Pacemaker SC new  biotronik;  Surgeon: Deny Khanna MD;  Location: [facility] INVASIVE LOCATION;  Service: Cardiovascular;  Laterality: N/A;    CATARACT EXTRACTION      CHOLECYSTECTOMY WITH INTRAOPERATIVE CHOLANGIOGRAM N/A 2021    Procedure: CHOLECYSTECTOMY LAPAROSCOPIC INTRAOPERATIVE CHOLANGIOGRAM common bile duct exploration;  Surgeon: Huey Wilkinson MD;  Location: [facility] OR;  Service: General;  Laterality: N/A;    COLONOSCOPY      ERCP N/A 2021    Procedure: ENDOSCOPIC RETROGRADE CHOLANGIOPANCREATOGRAPHY WITH SPHINCTEROTOMY, BALLOON SWEEP AND STONE (2) EXTRACTION;  Surgeon:  Justo Aden MD;  Location: [facility] ENDOSCOPY;  Service: Gastroenterology;  Laterality: N/A;  PRE- BILE DUCT OBSTRUCTION   POST- SAME      HYSTERECTOMY      REPLACEMENT TOTAL KNEE Left     THYROID SURGERY      TUBAL ABDOMINAL LIGATION         SLP Recommendation and Plan  SLP Swallowing Diagnosis: oral dysphagia, suspected pharyngeal dysphagia, other (see comments) (suspect cognitive impairment impacting swallow functioning) (02/15/25 1400)  SLP Diet Recommendation: puree, nectar thick liquids (02/15/25 1400)  Recommended Precautions and Strategies: upright posture during/after eating, small bites of food and sips of liquid, alternate between small bites of food and sips of liquid, check mouth frequently for oral residue/pocketing, fatigue precautions (02/15/25 1400)  SLP Rec. for Method of Medication Administration: meds crushed, with puree (02/15/25 1400)     Monitor for Signs of Aspiration: yes, notify SLP if any concerns (02/15/25 1400)  Recommended Diagnostics: reassess via clinical swallow evaluation (02/15/25 1400)  Swallow Criteria for Skilled Therapeutic Interventions Met: demonstrates skilled criteria (02/15/25 1400)     Rehab Potential/Prognosis, Swallowing: good, to achieve stated therapy goals (02/15/25 1400)  Therapy Frequency (Swallow): PRN (02/15/25 1400)  Predicted Duration Therapy Intervention (Days): until discharge (02/15/25 1400)  Oral Care Recommendations: Oral Care BID/PRN (02/15/25 1400)                                        Outcome Evaluation: Clinical swallow evaluation conducted. Recommend pureed and nectar thick liquids, meds crushed in applesauce. ST to follow for diet tolerance as indicated.      SWALLOW EVALUATION (Last 72 Hours)       SLP Adult Swallow Evaluation       Row Name 02/15/25 1400 02/14/25 6225                Rehab Evaluation    Document Type evaluation  -CB evaluation  -CR       Total Evaluation Minutes, SLP 60  -CB --       Subjective Information no complaints  -CB  no complaints  -CR       Patient Observations alert;agree to therapy;cooperative  -CB alert;cooperative  -CR       Patient Effort -- good  -CR       Symptoms Noted During/After Treatment -- none  -CR          General Information    Patient Profile Reviewed yes  -CB yes  -CR       Pertinent History Of Current Problem Patient is a 89 year old female admitted after unwitnessed fall at home, presenting withacute cystitis without hematuria and generalized weakness. MRI revealed Multiple infarcts R PCA and MCA.  - y/o female found down. UTI. Multiple infarcts R PCA and MCA.  -CR       Current Method of Nutrition NPO  -CB NPO  -CR       Precautions/Limitations, Vision -- neglect, left  -CR       Precautions/Limitations, Hearing -- WFL;for purposes of eval  -CR       Prior Level of Function-Communication -- WFL  -CR       Prior Level of Function-Swallowing -- soft to chew;thin liquids  -CR       Plans/Goals Discussed with -- patient and family;agreed upon  -CR       Barriers to Rehab -- none identified  -CR          Pain    Pretreatment Pain Rating 0/10 - no pain  -CB 0/10 - no pain  -CR       Posttreatment Pain Rating 0/10 - no pain  -CB 0/10 - no pain  -CR          Oral Motor Structure and Function    Dentition Assessment natural, present and adequate  -CB upper dentures/partial in place;lower dentures/partial in place  -CR       Secretion Management WNL/WFL  -CB WNL/WFL  -CR       Mucosal Quality moist, healthy  -CB moist, healthy  -CR          Oral Musculature and Cranial Nerve Assessment    Oral Motor General Assessment generalized oral motor weakness  -CB --       Oral Labial or Buccal Impairment, Detail, Cranial Nerve VII (Facial): -- left labial droop  slight  -CR       Lingual Impairment, Detail. Cranial Nerves IX, XII (Glossopharyngeal and Hypoglossal) reduced strength;reduced lingual ROM  -CB --       Velar Impairment, Detail, Cranial Nerves X, XI (Vagus and Accessory) -- other (see comments)  reduced  elevation  -CR          General Eating/Swallowing Observations    Respiratory Support Currently in Use -- room air  -CR          Clinical Swallow Eval    Oral Prep Phase impaired  -CB --       Oral Transit impaired  -CB --       Oral Residue WFL  -CB --       Pharyngeal Phase suspected pharyngeal impairment  -CB --       Esophageal Phase unremarkable  -CB --       Clinical Swallow Evaluation Summary Clinical swallow evaluation completed. Family at bedside. Patient followed majority of commands with cues, though nonsensical responses frequently noted to questions. Initial difficulty with oral acceptance of liquids by spoon and cup, improved with presentation by straw. Trials of thin by cup and straw demonstrated wet vocal quality in 2/4 trials, no coughing noted. No overt s/s of aspiration with nectar via cup/straw, or puree. Patient had difficulty again with oral acceptance and accepting bite of solids. Prolonged, disorganized munching mastication with no oral residue of soft solid. Recommend puree diet with nectar thick liquids. Meds crushed in puree, or whole in puree if contraindicated. Ensure alertness prior to PO, sitting upright, slow rate, small bites/sips, supervision during meals by nursing or family, check mouth for pocketing.  -CB Clinical swallow evaluation completed. Family at bedside. Slight left labial droop appreciated following mastication of solids. Left neglect appreciated. Some difficulties following directions at times. Speech/language appear functional. Coughing with 2/2 thins via spoon. No overt s/s of aspiration with thins via cup/straw, nectar via spoon/cup/straw, puree, or soft/mixed solids. Audible swallow with thins via cup/straw, suspect mistiming. Double swallow with puree. Prolonged, slightly disorganized munching mastication with no oral residue following soft solid trials. Pt spontaneously using left lingual sweep. Recommend mechanical ground diet with nectar thick liquids. Meds  crushed in puree, or whole in puree if contraindicated. Ensure alertness prior to PO, sitting upright, slow rate, small bites/sips, supervision during meals by nursing or family, check mouth for pocketing.  -CR          Oral Prep Concerns    Oral Prep Concerns prolonged mastication;inefficient mastication;incomplete or weak lip closure around spoon  -CB --       Prolonged Mastication mechanical soft  -CB --       Inefficient Mastication mechanical soft  -CB --       Incomplete or Weak Lip Closure Around Spoon thin;nectar  -CB --          Oral Transit Concerns    Oral Transit Concerns delayed initiation of bolus transit;increased oral transit time  -CB --       Delayed Intiation of Bolus Transit thin;nectar;mechanical soft  -CB --       Increased Oral Transit Time thin;nectar;mechanical soft  -CB --          Pharyngeal Phase Concerns    Pharyngeal Phase Concerns wet vocal quality  -CB --       Wet Vocal Quality thin  -CB --          SLP Evaluation Clinical Impression    SLP Swallowing Diagnosis oral dysphagia;suspected pharyngeal dysphagia;other (see comments)  suspect cognitive impairment impacting swallow functioning  -CB suspected pharyngeal dysphagia;oral dysphagia  -CR       Functional Impact risk of aspiration/pneumonia  -CB risk of aspiration/pneumonia  -CR       Rehab Potential/Prognosis, Swallowing good, to achieve stated therapy goals  -CB good, to achieve stated therapy goals  -CR       Swallow Criteria for Skilled Therapeutic Interventions Met demonstrates skilled criteria  -CB demonstrates skilled criteria  -CR          Recommendations    Therapy Frequency (Swallow) PRN  -CB PRN  -CR       Predicted Duration Therapy Intervention (Days) until discharge  -CB until discharge  -CR       SLP Diet Recommendation puree;nectar thick liquids  -CB mechanical ground textures;no mixed consistencies;nectar thick liquids  -CR       Recommended Diagnostics reassess via clinical swallow evaluation  -CB reassess via  clinical swallow evaluation;SLE/Cog/Motor Speech Evaluation  -CR       Recommended Precautions and Strategies upright posture during/after eating;small bites of food and sips of liquid;alternate between small bites of food and sips of liquid;check mouth frequently for oral residue/pocketing;fatigue precautions  -CB upright posture during/after eating;small bites of food and sips of liquid;check mouth frequently for oral residue/pocketing;1:1 supervision;other (see comments)  ensure alertness prior to PO  -CR       Oral Care Recommendations Oral Care BID/PRN  -CB Oral Care BID/PRN  -CR       SLP Rec. for Method of Medication Administration meds crushed;with puree  -CB meds crushed;with puree  whole in puree if contraindicated  -CR       Monitor for Signs of Aspiration yes;notify SLP if any concerns  -CB yes;notify SLP if any concerns  -CR          Swallow Goals (SLP)    Swallow LTGs Swallow Long Term Goal (free text)  -CB Patient will demonstrate functional swallow for  -CR          (LTG) Patient will demonstrate functional swallow for    Diet Texture (Demonstrate functional swallow) -- soft to chew (chopped) textures  -CR       Liquid viscosity (Demonstrate functional swallow) -- thin liquids  -CR       [place] (Demonstrate functional swallow) -- with minimal cues (75-90% accuracy)  -CR       Time Frame (Demonstrate functional swallow) -- by discharge  -CR       Progress/Outcomes (Demonstrate functional swallow) -- new goal  -CR          (LTG) Swallow    (LTG) Swallow Patient will tolerate safest least restrictive diet with no s/s aspiration.  -CB --       [place] (Swallow Long Term Goal) with minimal cues (75-90% accuracy)  -CB --       Time Frame (Swallow Long Term Goal) by discharge  -CB --                 User Key  (r) = Recorded By, (t) = Taken By, (c) = Cosigned By      Initials Name Effective Dates    CR Lisa Singh, SLP 12/03/24 -     Magdalena Sosa SLP 10/21/24 -                      EDUCATION  The patient has been educated in the following areas:   Dysphagia (Swallowing Impairment).        SLP GOALS       Row Name 02/15/25 1400 02/14/25 1113          (LTG) Patient will demonstrate functional swallow for    Diet Texture (Demonstrate functional swallow) -- soft to chew (chopped) textures  -CR     Liquid viscosity (Demonstrate functional swallow) -- thin liquids  -CR     [place] (Demonstrate functional swallow) -- with minimal cues (75-90% accuracy)  -CR     Time Frame (Demonstrate functional swallow) -- by discharge  -CR     Progress/Outcomes (Demonstrate functional swallow) -- new goal  -CR        (LTG) Swallow    (LTG) Swallow Patient will tolerate safest least restrictive diet with no s/s aspiration.  -CB --     [place] (Swallow Long Term Goal) with minimal cues (75-90% accuracy)  -CB --     Time Frame (Swallow Long Term Goal) by discharge  -CB --               User Key  (r) = Recorded By, (t) = Taken By, (c) = Cosigned By      Initials Name Provider Type    Lisa Blas SLP Speech and Language Pathologist    Magdalena Sosa SLP Speech and Language Pathologist                         Time Calculation:    Time Calculation- SLP       Row Name 02/15/25 1517             Time Calculation- SLP    SLP Start Time 0200  -CB      SLP Received On 02/15/25  -CB                User Key  (r) = Recorded By, (t) = Taken By, (c) = Cosigned By      Initials Name Provider Type    Magdalena Sosa SLP Speech and Language Pathologist                    Therapy Charges for Today       Code Description Service Date Service Provider Modifiers Qty    [id number]  ST EVAL ORAL PHARYNG SWALLOW 4 2/15/2025 Magdalena Dalal SLP GN 1                 OSCAR Leone  2/15/2025

## 2025-02-15 NOTE — PLAN OF CARE
Goal Outcome Evaluation:  Plan of Care Reviewed With: patient, family           Outcome Evaluation: Clinical swallow evaluation conducted. Recommend pureed and nectar thick liquids, meds crushed in applesauce. ST to follow for diet tolerance as indicated.

## 2025-02-15 NOTE — PROGRESS NOTES
"DOS: 2/15/2025  NAME: Maria Fernanda Gaviria   : 1923  PCP: Henok Salcido MD  Chief Complaint   Patient presents with    Altered Mental Status       Chief complaint: stroke  Subjective: New patient to me today.  This is 89-year-old female who presented to the hospital with left-sided vision loss and neglect.  She was found to have a right PCA infarct.  CT angiogram was done overnight.  Daughters at the bedside report fluctuating lethargy and altered mental status since yesterday.    Objective:  Vital signs: /77 (BP Location: Left leg, Patient Position: Lying)   Pulse 71   Temp 97.8 °F (36.6 °C) (Oral)   Resp 14   Ht 162.6 cm (64\")   Wt 52.6 kg (116 lb)   LMP  (LMP Unknown)   SpO2 97%   BMI 19.91 kg/m²    Gen: NAD, vitals reviewed  MS: Disoriented, intermittently perseverating, memory impaired, language intact, pleasant affect, left hemineglect  CN: Left homonymous hemianopia, PERRL, EOMI, no facial droop  Motor: 5/5 throughout upper and lower extremities, normal tone  Sensory: intact to cold temperature throughout    Laboratory results:  Lab Results   Component Value Date    GLUCOSE 94 02/15/2025    CALCIUM 8.6 02/15/2025     (L) 02/15/2025    K 4.7 02/15/2025    CO2 22.0 02/15/2025     02/15/2025    BUN 12 02/15/2025    CREATININE 0.71 02/15/2025    EGFRIFAFRI 83 2021    EGFRIFNONA 68 2021    BCR 16.9 02/15/2025    ANIONGAP 8.0 02/15/2025     Lab Results   Component Value Date    WBC 9.25 02/15/2025    HGB 16.0 (H) 02/15/2025    HCT 49.3 (H) 02/15/2025    MCV 93.5 02/15/2025     02/15/2025     Lab Results   Component Value Date     (H) 2025     (H) 2024     (H) 2023         Lab 25  0541   HEMOGLOBIN A1C 5.40        Review of labs: CBC, BMP reviewed,     Review and interpretation of imaging: I personally reviewed her MRI of the brain performed shortly after admission which shows watershed appearing strokes in the right " PCA and MCA inferior division territories.  Radiology report reviewed.  CTA significant for multifocal intracranial atherosclerosis including in the right inferior M2 distribution and right PCA distribution.    Diagnoses:  Due to right posterior cerebral artery stenosis  Intracranial atherosclerosis  Mixed hyperlipidemia  Hospital delirium    Comment: 89-year-old female with moderate size infarcts related to intracranial atherosclerosis.  Doing well apart from some fairly mild hospital delirium.  I would recommend short-term DAPT.  Discussed with family at length short, medium and long-term prognosis including risk of vascular dementia and need for 24/7 care going forward    Plan:  1.  Aspirin, Plavix for 21 days then Plavix monotherapy  2.  High-dose statin  3.  PT/OT  4.  Rehab placement    Management discussed with patient and family.  Follow-up with stroke clinic in 4 to 6 weeks.

## 2025-02-15 NOTE — PLAN OF CARE
Goal Outcome Evaluation:         Pt a/o to self only. NIH score of 7. BP elevated, LHA notified, x1 dose clonidine given with improvement.

## 2025-02-15 NOTE — PLAN OF CARE
Goal Outcome Evaluation:              Outcome Evaluation: VSS(see chart), D5 NS 60ml/hr, Speech conducted swallow eval pureed and nectar thick liquids, meds crushed in applesauce, left sided weakness and difficulty following verbal prompts up to BSC, continue plan of care

## 2025-02-15 NOTE — PROGRESS NOTES
Name: Maria Fernanda Gaviria ADMIT: 2025   : 1923  PCP: Henok Salcido MD    MRN: [id number] LOS: 1 days   AGE/SEX: 89 y.o. female  ROOM: [facility]     Subjective   Subjective   Patient is lying on the bed and does not appear to be any distress.  No more dizziness.  No reports of nausea, vomiting, chest pain, shortness of breath.  Dysphagia concerns per nursing staff.       Objective   Objective   Vital Signs  Temp:  [97.2 °F (36.2 °C)-98 °F (36.7 °C)] 97.8 °F (36.6 °C)  Heart Rate:  [] 71  Resp:  [14-16] 14  BP: (139-189)/(77-99) 159/83  SpO2:  [95 %-98 %] 97 %  on   ;   Device (Oxygen Therapy): room air  Body mass index is 19.91 kg/m².  Physical Exam  HEENT: PERRLA, extraocular movements intact, Scleras no icterus  Neck: Supple, no JVD  Cardiovascular: Regular rate and rhythm with normal S1 and S2  Respiratory: Fairly clear to auscultation anteriorly with no wheezes  GI: Soft, nontender, bowel sounds are present  Extremities: + edema, palpable pedal pulses.  Neurologic: Left upper and lower extremity strength 3/5, no facial asymmetry, no vertigo, no dysarthria    Results Review     I reviewed the patient's new clinical results.  Results from last 7 days   Lab Units 02/15/25  0530 02/14/25  0541 02/13/25  0517 02/12/25  1528   WBC 10*3/mm3 9.25 8.74 11.35* 11.86*   HEMOGLOBIN g/dL 16.0* 16.9* 14.7 16.3*   PLATELETS 10*3/mm3 190 199 192 222     Results from last 7 days   Lab Units 02/15/25  0530 25  1928 25  0541 25  0525  1528   SODIUM mmol/L 135*  --  139 141 136   POTASSIUM mmol/L 4.7 4.8 3.6 3.7 3.7   CHLORIDE mmol/L 105  --  106 106 100   CO2 mmol/L 22.0  --  24.0 23.8 26.0   BUN mg/dL 12  --  13 11 14   CREATININE mg/dL 0.71  --  0.75 0.67 0.81   GLUCOSE mg/dL 94  --  103* 101* 83   EGFR mL/min/1.73 75.1  --  70.3 77.2 64.1     Results from last 7 days   Lab Units 25  0517 25  1528   ALBUMIN g/dL 3.2* 4.0   BILIRUBIN mg/dL 0.8 1.1   ALK PHOS U/L 59 73   AST  (SGOT) U/L 23 34*   ALT (SGPT) U/L 16 24     Results from last 7 days   Lab Units 02/15/25  0530 02/14/25  0541 02/13/25  0517 02/12/25  1528   CALCIUM mg/dL 8.6 8.8 8.6 9.0   ALBUMIN g/dL  --   --  3.2* 4.0   MAGNESIUM mg/dL  --   --  2.0 2.2   PHOSPHORUS mg/dL  --   --  3.0  --      Results from last 7 days   Lab Units 02/12/25  1528   LACTATE mmol/L 1.5     Hemoglobin A1C   Date/Time Value Ref Range Status   02/14/2025 0541 5.40 4.80 - 5.60 % Final     Glucose   Date/Time Value Ref Range Status   02/15/2025 1105 89 70 - 130 mg/dL Final   02/14/2025 1600 95 70 - 130 mg/dL Final   02/14/2025 1109 93 70 - 130 mg/dL Final   02/14/2025 0630 83 70 - 130 mg/dL Final   02/13/2025 2049 147 (H) 70 - 130 mg/dL Final   02/13/2025 1612 175 (H) 70 - 130 mg/dL Final       CT Angiogram Head    Result Date: 2/14/2025   1. There are known foci of acute to subacute infarction within the posterior aspect of the right occipital lobe within the right PCA distribution, the right aspect of the splenium of the corpus callosum within the right PCA distribution, the right insular cortex within the right MCA distribution, and the lateral aspect the right parietal and occipital lobes within the right MCA distribution.  2. Moderately extensive intracranial atherosclerotic changes are again identified including severe stenosis within the left P2 segment, a mild to moderate degree of stenosis within the right P2 segment, a moderate to severe degree of stenosis within a branch of the inferior division of the left MCA, up to moderate to severe degrees of stenoses within the left A2 segment, and moderate to severe degree of stenosis of the superior division of the right MCA. Since the prior examination, there is progression of the distal A2 stenosis and the stenosis within the inferior division of the left MCA.  3. Mild to moderate stenosis within the distal aspect the right common carotid artery where the residual lumen diameter is approximately  3 to 4 mm. However, there is no significant NASCET stenosis within either internal carotid artery.   Radiation dose reduction techniques were utilized, including automated exposure control and exposure modulation based on body size.  This report was finalized on 2/14/2025 5:29 PM by Dr. Zackary Smith M.D on Workstation: [id number]      CT Angiogram Neck    Result Date: 2/14/2025   1. There are known foci of acute to subacute infarction within the posterior aspect of the right occipital lobe within the right PCA distribution, the right aspect of the splenium of the corpus callosum within the right PCA distribution, the right insular cortex within the right MCA distribution, and the lateral aspect the right parietal and occipital lobes within the right MCA distribution.  2. Moderately extensive intracranial atherosclerotic changes are again identified including severe stenosis within the left P2 segment, a mild to moderate degree of stenosis within the right P2 segment, a moderate to severe degree of stenosis within a branch of the inferior division of the left MCA, up to moderate to severe degrees of stenoses within the left A2 segment, and moderate to severe degree of stenosis of the superior division of the right MCA. Since the prior examination, there is progression of the distal A2 stenosis and the stenosis within the inferior division of the left MCA.  3. Mild to moderate stenosis within the distal aspect the right common carotid artery where the residual lumen diameter is approximately 3 to 4 mm. However, there is no significant NASCET stenosis within either internal carotid artery.   Radiation dose reduction techniques were utilized, including automated exposure control and exposure modulation based on body size.  This report was finalized on 2/14/2025 5:29 PM by Dr. Zackary Smith M.D on Workstation: [id number]       I have personally reviewed all medications:  Scheduled Medications  [START ON 2/16/2025]  aspirin, 81 mg, Oral, Daily  atorvastatin, 80 mg, Oral, Nightly  cefTRIAXone, 1,000 mg, Intravenous, Q24H  clopidogrel, 75 mg, Oral, Daily  levothyroxine, 50 mcg, Oral, Q AM  pantoprazole, 40 mg, Oral, Q AM  sodium chloride, 10 mL, Intravenous, Q12H  sodium chloride, 10 mL, Intravenous, Q12H  vitamin B-12, 500 mcg, Oral, Daily    Infusions  dextrose 5 % and sodium chloride 0.9 %, 60 mL/hr, Last Rate: 60 mL/hr (02/15/25 1246)    Diet  Diet: Regular/House; Texture: Pureed (NDD 1); Fluid Consistency: Nectar Thick    I have personally reviewed:  [x]  Laboratory   [x]  Microbiology   [x]  Radiology   [x]  EKG/Telemetry  [x]  Cardiology/Vascular   []  Pathology    []  Records       Assessment/Plan     Active Hospital Problems    Diagnosis  POA    **Urinary tract infection in female [N39.0]  Yes    UTI (urinary tract infection) [N39.0]  Yes    Presence of cardiac pacemaker [Z95.0]  Yes    AV block [I44.30]  Yes    Hypothyroidism (acquired) [E03.9]  Yes    GERD (gastroesophageal reflux disease) [K21.9]  Yes    Essential hypertension [I10]  Yes    Hyperlipidemia [E78.5]  Yes      Resolved Hospital Problems   No resolved problems to display.       1 Dizziness with unwitnessed fall, CT brain with no acute findings and MRI  revealed multiple small infarcts.  Currently on aspirin and statins and 2D echo with normal ejection fraction and no wall motion abnormalities.  EEG revealed diffuse slowing with no epileptiform discharge.  Neurology did evaluate and recommended medical management.  Dysphagia per nurse screening therefore n.p.o. at the moment and await speech clearance.  Initiated on D5 fluids.    2.  Acute multiple small infarcts, neurology did evalaute and is on aspirin and statins.     3.  Hypothyroidism, on Synthroid.    4.  History of AV block requiring pacemaker placement.    5.  Acute cystitis, on IV Rocephin and will be continued.    6.  On SCDs for DVT prophylaxis.    7.  CODE STATUS is DNR    Copy text on this note  has been reviewed by me on 2/15/2025    William Martinez MD  [facility]  02/15/25  15:28 EST

## 2025-02-16 LAB
ANION GAP SERPL CALCULATED.3IONS-SCNC: 9 MMOL/L (ref 5–15)
BASOPHILS # BLD AUTO: 0.07 10*3/MM3 (ref 0–0.2)
BASOPHILS NFR BLD AUTO: 0.7 % (ref 0–1.5)
BUN SERPL-MCNC: 9 MG/DL (ref 8–23)
BUN/CREAT SERPL: 14.3 (ref 7–25)
CALCIUM SPEC-SCNC: 9.1 MG/DL (ref 8.2–9.6)
CHLORIDE SERPL-SCNC: 104 MMOL/L (ref 98–107)
CO2 SERPL-SCNC: 26 MMOL/L (ref 22–29)
CREAT SERPL-MCNC: 0.63 MG/DL (ref 0.57–1)
DEPRECATED RDW RBC AUTO: 47 FL (ref 37–54)
EGFRCR SERPLBLD CKD-EPI 2021: 78.3 ML/MIN/1.73
EOSINOPHIL # BLD AUTO: 0.2 10*3/MM3 (ref 0–0.4)
EOSINOPHIL NFR BLD AUTO: 2.1 % (ref 0.3–6.2)
ERYTHROCYTE [DISTWIDTH] IN BLOOD BY AUTOMATED COUNT: 13.9 % (ref 12.3–15.4)
GLUCOSE BLDC GLUCOMTR-MCNC: 101 MG/DL (ref 70–130)
GLUCOSE BLDC GLUCOMTR-MCNC: 104 MG/DL (ref 70–130)
GLUCOSE BLDC GLUCOMTR-MCNC: 108 MG/DL (ref 70–130)
GLUCOSE BLDC GLUCOMTR-MCNC: 110 MG/DL (ref 70–130)
GLUCOSE SERPL-MCNC: 101 MG/DL (ref 65–99)
HCT VFR BLD AUTO: 52.2 % (ref 34–46.6)
HGB BLD-MCNC: 17.1 G/DL (ref 12–15.9)
IMM GRANULOCYTES # BLD AUTO: 0.07 10*3/MM3 (ref 0–0.05)
IMM GRANULOCYTES NFR BLD AUTO: 0.7 % (ref 0–0.5)
LYMPHOCYTES # BLD AUTO: 1.06 10*3/MM3 (ref 0.7–3.1)
LYMPHOCYTES NFR BLD AUTO: 11.1 % (ref 19.6–45.3)
MCH RBC QN AUTO: 30.3 PG (ref 26.6–33)
MCHC RBC AUTO-ENTMCNC: 32.8 G/DL (ref 31.5–35.7)
MCV RBC AUTO: 92.6 FL (ref 79–97)
MONOCYTES # BLD AUTO: 0.79 10*3/MM3 (ref 0.1–0.9)
MONOCYTES NFR BLD AUTO: 8.2 % (ref 5–12)
NEUTROPHILS NFR BLD AUTO: 7.4 10*3/MM3 (ref 1.7–7)
NEUTROPHILS NFR BLD AUTO: 77.2 % (ref 42.7–76)
NRBC BLD AUTO-RTO: 0 /100 WBC (ref 0–0.2)
PLATELET # BLD AUTO: 203 10*3/MM3 (ref 140–450)
PMV BLD AUTO: 9.6 FL (ref 6–12)
POTASSIUM SERPL-SCNC: 3.8 MMOL/L (ref 3.5–5.2)
RBC # BLD AUTO: 5.64 10*6/MM3 (ref 3.77–5.28)
SODIUM SERPL-SCNC: 139 MMOL/L (ref 136–145)
WBC NRBC COR # BLD AUTO: 9.59 10*3/MM3 (ref 3.4–10.8)

## 2025-02-16 PROCEDURE — 99233 SBSQ HOSP IP/OBS HIGH 50: CPT

## 2025-02-16 PROCEDURE — 25810000003 DEXTROSE 5 % AND SODIUM CHLORIDE 0.9 % 5-0.9 % SOLUTION: Performed by: INTERNAL MEDICINE

## 2025-02-16 PROCEDURE — 25010000002 CEFTRIAXONE PER 250 MG: Performed by: INTERNAL MEDICINE

## 2025-02-16 PROCEDURE — 85025 COMPLETE CBC W/AUTO DIFF WBC: CPT | Performed by: INTERNAL MEDICINE

## 2025-02-16 PROCEDURE — 80048 BASIC METABOLIC PNL TOTAL CA: CPT | Performed by: INTERNAL MEDICINE

## 2025-02-16 PROCEDURE — 82948 REAGENT STRIP/BLOOD GLUCOSE: CPT

## 2025-02-16 RX ORDER — CHOLECALCIFEROL (VITAMIN D3) 25 MCG
1000 TABLET ORAL DAILY
Status: DISCONTINUED | OUTPATIENT
Start: 2025-02-16 | End: 2025-02-20 | Stop reason: HOSPADM

## 2025-02-16 RX ORDER — AMLODIPINE BESYLATE 2.5 MG/1
2.5 TABLET ORAL
Status: DISCONTINUED | OUTPATIENT
Start: 2025-02-16 | End: 2025-02-18

## 2025-02-16 RX ADMIN — AMLODIPINE BESYLATE 2.5 MG: 2.5 TABLET ORAL at 18:51

## 2025-02-16 RX ADMIN — LEVOTHYROXINE SODIUM 50 MCG: 50 TABLET ORAL at 05:36

## 2025-02-16 RX ADMIN — Medication 1000 UNITS: at 14:48

## 2025-02-16 RX ADMIN — PANTOPRAZOLE SODIUM 40 MG: 40 TABLET, DELAYED RELEASE ORAL at 05:36

## 2025-02-16 RX ADMIN — CLOPIDOGREL BISULFATE 75 MG: 75 TABLET ORAL at 09:29

## 2025-02-16 RX ADMIN — Medication 10 ML: at 20:42

## 2025-02-16 RX ADMIN — ASPIRIN 81 MG: 81 TABLET, DELAYED RELEASE ORAL at 09:29

## 2025-02-16 RX ADMIN — ATORVASTATIN CALCIUM 80 MG: 80 TABLET, FILM COATED ORAL at 20:42

## 2025-02-16 RX ADMIN — Medication 500 MCG: at 09:29

## 2025-02-16 RX ADMIN — DEXTROSE MONOHYDRATE AND SODIUM CHLORIDE 60 ML/HR: 5; .9 INJECTION, SOLUTION INTRAVENOUS at 05:40

## 2025-02-16 RX ADMIN — CEFTRIAXONE SODIUM 1000 MG: 1 INJECTION, POWDER, FOR SOLUTION INTRAMUSCULAR; INTRAVENOUS at 18:37

## 2025-02-16 NOTE — PLAN OF CARE
Goal Outcome Evaluation:      VSS. A/o to self only. NIH of 9. Confused through the night. Meds crushed in applesauce.

## 2025-02-16 NOTE — PROGRESS NOTES
Name: Maria Fernanda Gaviria ADMIT: 2025   : 1923  PCP: Henok Salcido MD    MRN: [id number] LOS: 2 days   AGE/SEX: 89 y.o. female  ROOM: [facility]     Subjective   Subjective   Patient awake and resting in bed, family present at bedside, she is intermittently more sleepy, but mental status waxing and waning per discussion. She has no evidence of acute distress at present.       Objective   Objective   Vital Signs  Temp:  [97.3 °F (36.3 °C)-98.1 °F (36.7 °C)] 97.7 °F (36.5 °C)  Heart Rate:  [72-82] 79  Resp:  [14-16] 16  BP: (125-191)/(68-97) 138/74  SpO2:  [96 %-98 %] 98 %  on   ;   Device (Oxygen Therapy): room air  Body mass index is 19.91 kg/m².  Physical Exam  Vitals and nursing note reviewed.   Constitutional:       General: She is not in acute distress.  HENT:      Head: Atraumatic.   Eyes:      General: No scleral icterus.     Conjunctiva/sclera: Conjunctivae normal.   Cardiovascular:      Rate and Rhythm: Normal rate.      Pulses: Normal pulses.      Heart sounds: Normal heart sounds.   Pulmonary:      Effort: Pulmonary effort is normal.      Breath sounds: No wheezing.   Abdominal:      Palpations: Abdomen is soft.      Tenderness: There is no abdominal tenderness.   Musculoskeletal:      Right lower leg: No edema.      Left lower leg: No edema.   Skin:     General: Skin is warm and dry.   Neurological:      Mental Status: She is alert.      Motor: Weakness (left sided) present.       Results Review     I reviewed the patient's new clinical results.  Results from last 7 days   Lab Units 25  0640 02/15/25  0530 25  0541 25  0517   WBC 10*3/mm3 9.59 9.25 8.74 11.35*   HEMOGLOBIN g/dL 17.1* 16.0* 16.9* 14.7   PLATELETS 10*3/mm3 203 190 199 192     Results from last 7 days   Lab Units 25  0640 02/15/25  0530 25  1928 25  0541 25  0517   SODIUM mmol/L 139 135*  --  139 141   POTASSIUM mmol/L 3.8 4.7 4.8 3.6 3.7   CHLORIDE mmol/L 104 105  --  106 106   CO2 mmol/L  26.0 22.0  --  24.0 23.8   BUN mg/dL 9 12  --  13 11   CREATININE mg/dL 0.63 0.71  --  0.75 0.67   GLUCOSE mg/dL 101* 94  --  103* 101*   EGFR mL/min/1.73 78.3 75.1  --  70.3 77.2     Results from last 7 days   Lab Units 02/13/25  0517 02/12/25  1528   ALBUMIN g/dL 3.2* 4.0   BILIRUBIN mg/dL 0.8 1.1   ALK PHOS U/L 59 73   AST (SGOT) U/L 23 34*   ALT (SGPT) U/L 16 24     Results from last 7 days   Lab Units 02/16/25  0640 02/15/25  0530 02/14/25  0541 02/13/25  0517 02/12/25  1528   CALCIUM mg/dL 9.1 8.6 8.8 8.6 9.0   ALBUMIN g/dL  --   --   --  3.2* 4.0   MAGNESIUM mg/dL  --   --   --  2.0 2.2   PHOSPHORUS mg/dL  --   --   --  3.0  --      Results from last 7 days   Lab Units 02/12/25  1528   LACTATE mmol/L 1.5     Hemoglobin A1C   Date/Time Value Ref Range Status   02/14/2025 0541 5.40 4.80 - 5.60 % Final     Glucose   Date/Time Value Ref Range Status   02/16/2025 1156 110 70 - 130 mg/dL Final   02/16/2025 0643 101 70 - 130 mg/dL Final   02/15/2025 2029 98 70 - 130 mg/dL Final   02/15/2025 1605 102 70 - 130 mg/dL Final   02/15/2025 1105 89 70 - 130 mg/dL Final   02/14/2025 1600 95 70 - 130 mg/dL Final   02/14/2025 1109 93 70 - 130 mg/dL Final       CT Angiogram Head    Result Date: 2/14/2025   1. There are known foci of acute to subacute infarction within the posterior aspect of the right occipital lobe within the right PCA distribution, the right aspect of the splenium of the corpus callosum within the right PCA distribution, the right insular cortex within the right MCA distribution, and the lateral aspect the right parietal and occipital lobes within the right MCA distribution.  2. Moderately extensive intracranial atherosclerotic changes are again identified including severe stenosis within the left P2 segment, a mild to moderate degree of stenosis within the right P2 segment, a moderate to severe degree of stenosis within a branch of the inferior division of the left MCA, up to moderate to severe degrees of  stenoses within the left A2 segment, and moderate to severe degree of stenosis of the superior division of the right MCA. Since the prior examination, there is progression of the distal A2 stenosis and the stenosis within the inferior division of the left MCA.  3. Mild to moderate stenosis within the distal aspect the right common carotid artery where the residual lumen diameter is approximately 3 to 4 mm. However, there is no significant NASCET stenosis within either internal carotid artery.   Radiation dose reduction techniques were utilized, including automated exposure control and exposure modulation based on body size.  This report was finalized on 2/14/2025 5:29 PM by Dr. Zackary Smith M.D on Workstation: [id number]      CT Angiogram Neck    Result Date: 2/14/2025   1. There are known foci of acute to subacute infarction within the posterior aspect of the right occipital lobe within the right PCA distribution, the right aspect of the splenium of the corpus callosum within the right PCA distribution, the right insular cortex within the right MCA distribution, and the lateral aspect the right parietal and occipital lobes within the right MCA distribution.  2. Moderately extensive intracranial atherosclerotic changes are again identified including severe stenosis within the left P2 segment, a mild to moderate degree of stenosis within the right P2 segment, a moderate to severe degree of stenosis within a branch of the inferior division of the left MCA, up to moderate to severe degrees of stenoses within the left A2 segment, and moderate to severe degree of stenosis of the superior division of the right MCA. Since the prior examination, there is progression of the distal A2 stenosis and the stenosis within the inferior division of the left MCA.  3. Mild to moderate stenosis within the distal aspect the right common carotid artery where the residual lumen diameter is approximately 3 to 4 mm. However, there is no  significant NASCET stenosis within either internal carotid artery.   Radiation dose reduction techniques were utilized, including automated exposure control and exposure modulation based on body size.  This report was finalized on 2/14/2025 5:29 PM by Dr. Zackary Smith M.D on Workstation: [id number]       I have personally reviewed all medications:  Scheduled Medications  aspirin, 81 mg, Oral, Daily  atorvastatin, 80 mg, Oral, Nightly  cefTRIAXone, 1,000 mg, Intravenous, Q24H  cholecalciferol, 1,000 Units, Oral, Daily  clopidogrel, 75 mg, Oral, Daily  levothyroxine, 50 mcg, Oral, Q AM  pantoprazole, 40 mg, Oral, Q AM  sodium chloride, 10 mL, Intravenous, Q12H  sodium chloride, 10 mL, Intravenous, Q12H  vitamin B-12, 500 mcg, Oral, Daily    Infusions  dextrose 5 % and sodium chloride 0.9 %, 60 mL/hr, Last Rate: 60 mL/hr (02/16/25 0540)    Diet  Diet: Regular/House; Texture: Pureed (NDD 1); Fluid Consistency: Nectar Thick    I have personally reviewed:  [x]  Laboratory   []  Microbiology   [x]  Radiology   [x]  EKG/Telemetry  [x]  Cardiology/Vascular   []  Pathology    []  Records       Assessment/Plan     Active Hospital Problems    Diagnosis  POA    **Urinary tract infection in female [N39.0]  Yes    UTI (urinary tract infection) [N39.0]  Yes    Presence of cardiac pacemaker [Z95.0]  Yes    AV block [I44.30]  Yes    Hypothyroidism (acquired) [E03.9]  Yes    GERD (gastroesophageal reflux disease) [K21.9]  Yes    Essential hypertension [I10]  Yes    Hyperlipidemia [E78.5]  Yes      Resolved Hospital Problems   No resolved problems to display.       89 y.o. female admitted with Urinary tract infection in female.    Dizziness with unwitnessed fall, CT brain with no acute findings and MRI  revealed multiple small infarcts.  Currently on aspirin and statins and 2D echo with normal ejection fraction and no wall motion abnormalities.  EEG revealed diffuse slowing with no epileptiform discharge.  Neurology did evaluate  and recommended medical management.  Dysphagia per nurse screening therefore n.p.o. at the moment and await speech clearance.  Initiated on D5 fluids.     Acute multiple small infarcts, neurology did evalaute and is on aspirin and statins. There are plans for outpatient follow up.     Hypothyroidism, on Synthroid.    History of AV block requiring pacemaker placement.     Acute cystitis, on IV Rocephin and will be continued.    Hypertension, BP transiently elevated > than goal of <140 per Neurology. Going to check orthostatics first, then if okay, plan to initiate BP medication.      All workup, problems and plans new to me today. First day taking care of patient.    SCDs for DVT prophylaxis.  Limited code (no CPR, no intubation).  Discussed with patient, family, nursing staff, and consulting provider.  Anticipate discharge  rehab facility  once arrangements have been made.  Expected Discharge Date: 2/17/2025; Expected Discharge Time:       Murray Whipple DO  [facility]  02/16/25

## 2025-02-16 NOTE — PROGRESS NOTES
"DOS: 2025  NAME: Maria Fernanda Gaviria   : 1923  PCP: Henok Salcido MD  Chief Complaint   Patient presents with    Altered Mental Status     Neurology    Subjective:     Interval History  Pt seen in follow up today, however the problem is new to the examiner.  Patient Complaints:  No acute events overnight. Two daughters at bedside.  Patient in bed asleep. Reportedly had a good morning, ate breakfast, was conversant, no c/o pain.     Objective:  Vital signs: /88 (BP Location: Right arm, Patient Position: Lying)   Pulse 81   Temp 97.3 °F (36.3 °C) (Axillary)   Resp 14   Ht 162.6 cm (64\")   Wt 52.6 kg (116 lb)   LMP  (LMP Unknown)   SpO2 96%   BMI 19.91 kg/m²       Physical Exam:  HEENT: Normocephalic, atraumatic   Resp: Even and unlabored, no audible wheezes  Extremities: No signs of distal embolization. Normal joint ROM  Skin: Warm and dry, no rashes or birth marks.     Neurological:   MS: AO to self/person, recent/remote memory impaired, decreased attention/concentration, language intact, no obvious neglect   CN: Left visual neglect, left homonymous hemianopia, no facial droop  Motor: 4/5 RUE/RLE, 3/5 LUE/LLE. Normal tone. No tremor or abnormal movements noted.  Sensory: Intact to crude touch in all four ext.  Gait and station: Deferred    Results Review: I have reviewed MRI brain and see areas of restricted diffusion on DWI in the right PCA territory    I reviewed the patient's new clinical results.  I reviewed the patient's new imaging results and agree with the interpretation.  I reviewed the patient's other test results and agree with the interpretation  I personally viewed and interpreted the patient's EKG/Telemetry data  Discussed with Dr. Rivera        Current Medications:  Scheduled Medications:aspirin, 81 mg, Oral, Daily  atorvastatin, 80 mg, Oral, Nightly  cefTRIAXone, 1,000 mg, Intravenous, Q24H  clopidogrel, 75 mg, Oral, Daily  levothyroxine, 50 mcg, Oral, Q AM  pantoprazole, 40 " "mg, Oral, Q AM  sodium chloride, 10 mL, Intravenous, Q12H  sodium chloride, 10 mL, Intravenous, Q12H  vitamin B-12, 500 mcg, Oral, Daily      Infusions: dextrose 5 % and sodium chloride 0.9 %, 60 mL/hr, Last Rate: 60 mL/hr (02/16/25 0540)      PRN Medications:    senna-docusate sodium **AND** polyethylene glycol **AND** bisacodyl **AND** bisacodyl    Calcium Replacement - Follow Nurse / BPA Driven Protocol    Magnesium Standard Dose Replacement - Follow Nurse / BPA Driven Protocol    Phosphorus Replacement - Follow Nurse / BPA Driven Protocol    Potassium Replacement - Follow Nurse / BPA Driven Protocol    sodium chloride    sodium chloride    sodium chloride    sodium chloride    Laboratory results:  Lab Results   Component Value Date    GLUCOSE 101 (H) 02/16/2025    CALCIUM 9.1 02/16/2025     02/16/2025    K 3.8 02/16/2025    CO2 26.0 02/16/2025     02/16/2025    BUN 9 02/16/2025    CREATININE 0.63 02/16/2025    EGFRIFAFRI 83 09/22/2021    EGFRIFNONA 68 09/22/2021    BCR 14.3 02/16/2025    ANIONGAP 9.0 02/16/2025     Lab Results   Component Value Date    WBC 9.59 02/16/2025    HGB 17.1 (H) 02/16/2025    HCT 52.2 (H) 02/16/2025    MCV 92.6 02/16/2025     02/16/2025      Results from last 7 days   Lab Units 02/14/25  0541   CHOLESTEROL mg/dL 240*     Lab Results   Component Value Date    INR 1.04 07/22/2024    INR 0.99 07/23/2023    INR 1.04 02/21/2023    PROTIME 13.8 07/22/2024    PROTIME 13.2 07/23/2023    PROTIME 13.7 02/21/2023     Lab Results   Component Value Date    TSH 2.620 02/12/2025     No components found for: \"LDLCALC\"  Lab Results   Component Value Date    HGBA1C 5.40 02/14/2025     No components found for: \"B12\"    Brief Urine Lab Results  (Last result in the past 365 days)        Color   Clarity   Blood   Leuk Est   Nitrite   Protein   CREAT   Urine HCG        02/12/25 1528 Yellow   Cloudy   Trace   Large (3+)   Negative   Negative                 Pain Management Panel           No " data to display              Review and interpretation of imaging:   CT Angiogram Head    Result Date: 2/14/2025  CT ANGIOGRAM OF THE HEAD AND NECK WITH CONTRAST  CLINICAL HISTORY: Multiple cerebral infarcts. Left hemineglect and left homonymous hemianopsia.  TECHNIQUE: CT angiogram of the head and neck was obtained with 1 mm axial images following the administration of IV contrast. Sagittal, coronal, and 3-dimensional reconstructed images were obtained. Additionally, a CT scan of the head was obtained with 3 mm axial images before and after the administration of IV contrast.  COMPARISON: Brain MRI dated 2/13/2025 and CTA of the head and neck dated 7/22/2024.  FINDINGS:  CTA HEAD:  Head CT Findings: There is a focus of acute to subacute infarction within the posterior aspect the right occipital lobe measuring up to 2.3 x 2.2 cm in greatest axial dimensions that is within the right PCA distribution. Additionally, on the prior brain MRI, there were other acute to subacute infarcts within the right aspect of the splenium of the corpus callosum within the right PCA distribution, the right insular cortex within the right MCA distribution, and the lateral aspect of the right parietal and occipital lobe within the right MCA distribution. The smaller foci of acute to subacute infarction are not well delineated on this study. There is a 1.6 cm focus of encephalomalacia within the posterior aspect of the left occipital lobe which is probably representative of a chronic infarct within the left PCA distribution.  Posterior Circulation: Dominant right vertebral artery. Otherwise, unremarkable appearance of the vertebral arteries and basilar artery. Severe stenosis of the left P2 segment and a mild to moderate stenosis of the right P2 segment. Similar findings noted on the prior exam Anterior Circulation: Atherosclerotic calcifications within the carotid siphons resulting in only relatively mild degrees of luminal compromise.  Moderate to severe stenosis of a branch of the the inferior division of the left MCA. Moderate stenoses within the relatively proximal left A2 segment and severe stenosis within the more distal aspect of the left A2 segment. Moderate to severe stenosis of the superior division of the right MCA. There is progression of the distal left A2 stenosis and the stenosis within the inferior division of the left MCA CTA NECK:  Aortic Arch: Atherosclerotic calcifications within the aortic arch. Classic configuration. Subclavian Arteries: Atherosclerotic calcifications within the subclavian arteries without significant luminal compromise. Vertebral Arteries: Mild stenosis at the origin of both vertebral arteries. CCA/ICA's: Mild to moderate stenosis of the distal aspect of the right common carotid artery where the residual lumen diameter is approximately 3 to 4 mm. No significant interval change. Atherosclerotic changes within the carotid bifurcations and proximal internal carotid arteries. However, no significant NASCET stenosis is identified within either internal carotid artery.  Additional neck findings: There is adenopathy within the visualized superior mediastinum. In particular, there is a AP window lymph node which measures up to 2.1 x 1.6 cm in greatest axial dimensions. Similar findings were noted on the prior chest CT dated 7/14/2017.       1. There are known foci of acute to subacute infarction within the posterior aspect of the right occipital lobe within the right PCA distribution, the right aspect of the splenium of the corpus callosum within the right PCA distribution, the right insular cortex within the right MCA distribution, and the lateral aspect the right parietal and occipital lobes within the right MCA distribution.  2. Moderately extensive intracranial atherosclerotic changes are again identified including severe stenosis within the left P2 segment, a mild to moderate degree of stenosis within the right  P2 segment, a moderate to severe degree of stenosis within a branch of the inferior division of the left MCA, up to moderate to severe degrees of stenoses within the left A2 segment, and moderate to severe degree of stenosis of the superior division of the right MCA. Since the prior examination, there is progression of the distal A2 stenosis and the stenosis within the inferior division of the left MCA.  3. Mild to moderate stenosis within the distal aspect the right common carotid artery where the residual lumen diameter is approximately 3 to 4 mm. However, there is no significant NASCET stenosis within either internal carotid artery.   Radiation dose reduction techniques were utilized, including automated exposure control and exposure modulation based on body size.  This report was finalized on 2/14/2025 5:29 PM by Dr. Zackary Smith M.D on Workstation: [id number]      CT Angiogram Neck    Result Date: 2/14/2025  CT ANGIOGRAM OF THE HEAD AND NECK WITH CONTRAST  CLINICAL HISTORY: Multiple cerebral infarcts. Left hemineglect and left homonymous hemianopsia.  TECHNIQUE: CT angiogram of the head and neck was obtained with 1 mm axial images following the administration of IV contrast. Sagittal, coronal, and 3-dimensional reconstructed images were obtained. Additionally, a CT scan of the head was obtained with 3 mm axial images before and after the administration of IV contrast.  COMPARISON: Brain MRI dated 2/13/2025 and CTA of the head and neck dated 7/22/2024.  FINDINGS:  CTA HEAD:  Head CT Findings: There is a focus of acute to subacute infarction within the posterior aspect the right occipital lobe measuring up to 2.3 x 2.2 cm in greatest axial dimensions that is within the right PCA distribution. Additionally, on the prior brain MRI, there were other acute to subacute infarcts within the right aspect of the splenium of the corpus callosum within the right PCA distribution, the right insular cortex within the  right MCA distribution, and the lateral aspect of the right parietal and occipital lobe within the right MCA distribution. The smaller foci of acute to subacute infarction are not well delineated on this study. There is a 1.6 cm focus of encephalomalacia within the posterior aspect of the left occipital lobe which is probably representative of a chronic infarct within the left PCA distribution.  Posterior Circulation: Dominant right vertebral artery. Otherwise, unremarkable appearance of the vertebral arteries and basilar artery. Severe stenosis of the left P2 segment and a mild to moderate stenosis of the right P2 segment. Similar findings noted on the prior exam Anterior Circulation: Atherosclerotic calcifications within the carotid siphons resulting in only relatively mild degrees of luminal compromise. Moderate to severe stenosis of a branch of the the inferior division of the left MCA. Moderate stenoses within the relatively proximal left A2 segment and severe stenosis within the more distal aspect of the left A2 segment. Moderate to severe stenosis of the superior division of the right MCA. There is progression of the distal left A2 stenosis and the stenosis within the inferior division of the left MCA CTA NECK:  Aortic Arch: Atherosclerotic calcifications within the aortic arch. Classic configuration. Subclavian Arteries: Atherosclerotic calcifications within the subclavian arteries without significant luminal compromise. Vertebral Arteries: Mild stenosis at the origin of both vertebral arteries. CCA/ICA's: Mild to moderate stenosis of the distal aspect of the right common carotid artery where the residual lumen diameter is approximately 3 to 4 mm. No significant interval change. Atherosclerotic changes within the carotid bifurcations and proximal internal carotid arteries. However, no significant NASCET stenosis is identified within either internal carotid artery.  Additional neck findings: There is  adenopathy within the visualized superior mediastinum. In particular, there is a AP window lymph node which measures up to 2.1 x 1.6 cm in greatest axial dimensions. Similar findings were noted on the prior chest CT dated 7/14/2017.       1. There are known foci of acute to subacute infarction within the posterior aspect of the right occipital lobe within the right PCA distribution, the right aspect of the splenium of the corpus callosum within the right PCA distribution, the right insular cortex within the right MCA distribution, and the lateral aspect the right parietal and occipital lobes within the right MCA distribution.  2. Moderately extensive intracranial atherosclerotic changes are again identified including severe stenosis within the left P2 segment, a mild to moderate degree of stenosis within the right P2 segment, a moderate to severe degree of stenosis within a branch of the inferior division of the left MCA, up to moderate to severe degrees of stenoses within the left A2 segment, and moderate to severe degree of stenosis of the superior division of the right MCA. Since the prior examination, there is progression of the distal A2 stenosis and the stenosis within the inferior division of the left MCA.  3. Mild to moderate stenosis within the distal aspect the right common carotid artery where the residual lumen diameter is approximately 3 to 4 mm. However, there is no significant NASCET stenosis within either internal carotid artery.   Radiation dose reduction techniques were utilized, including automated exposure control and exposure modulation based on body size.  This report was finalized on 2/14/2025 5:29 PM by Dr. Zackary Smith M.D on Workstation: [id number]      EEG    Result Date: 2/14/2025  Table formatting from the original result was not included. Date of onset: 2/13/2025 Date of offset: 2/13/2025 Indication: unwitnessed fall, dementia, concern for CAA and son has epilepsy Technical  description:  This is a 21 channel digital EEG recording that began on 1610 and ended on 1637.  Background:  The background consists of a posteriorly dominant rhythm that is notably absent.  Anteriorly, there is diffuse theta greater than alpha activity.  There is fair spontaneous variability.  Hyperventilation was not performed and photic stimulation was performed without abnormal driving response.  There is no focal slowing.  There are no interictal epileptiform discharges and no electrographic seizures noted during the study.  EKG demonstrates normal rate.  Impression: This is an abnormal study due to the presence of diffuse theta slowing.  There are no interictal epileptiform discharges and no electrographic seizures.  These electrophysiologic findings are indicative of mild to moderate encephalopathy.       Duplex Venous Lower Extremity - Bilateral CAR    Result Date: 2/14/2025    Right popliteal fossa fluid collection.   Normal bilateral lower extremity venous duplex scan.     MRI Brain Without Contrast    Result Date: 2/14/2025  MRI OF THE BRAIN WITHOUT CONTRAST ON 02/13/2025  CLINICAL HISTORY: This is a 89-year-old female patient who had loss of consciousness and altered mental status and confusion.  TECHNIQUE: Axial T1, FLAIR, fat-suppressed T2, axial diffusion and gradient echo T2 and sagittal T1-weighted images were obtained of the entire head.  COMPARISON: This is correlated to a prior MRI of the brain from [facility] on 03/17/2015. This is also correlated to a series of 4 head CTs between 02/21/2023 and most recent head CT on 02/12/2025 and a prior CT angiogram of the head and neck on 07/22/2024.  FINDINGS: There are patchy nodular foci of T2 and FLAIR hyperintensity in the periventricular extending into the subcortical white matter of the cerebral hemispheres most consistent with moderate small vessel disease. 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 This is a new finding when compared to prior MRI of the brain on 03/17/2015. There is a cluster of small nodular and patchy diffusion hyperintense foci in the medial right occipital lobe that are high signal on the FLAIR and T2-weighted images and low signal on the ADC maps compatible with small cluster of acute infarct, the largest of which measures 2.2 x 2.3 cm in size in the posterior medial right occipital lobe with several subcentimeter foci in the mid and anterior medial right occipital lobe compatible with small acute infarcts in the distribution of medial occipital branches of the right PCA territory. Furthermore there is a diffusion hyperintense nodular focus in the posterior lateral right parietal white matter measuring 6 x 5 mm in size and 2 separate 3 and 6 mm diffusion hyperintense foci in the superior lateral right parietal cortex and a very tiny 7 x 3 mm focus in the posterior right insular cortex and these tiny acute infarcts are in the right MCA territory. The remainder of the brain parenchyma is normal in signal intensity. There is mild cerebral atrophy. The ventricles are upper limits of normal in size. I see no mass effect and no midline shift and no extra-axial fluid collections are identified. The calvarium and the skull base demonstrate normal marrow signal intensity. There are intraocular lens implants in the globes from previous bilateral cataract surgery. Otherwise the orbits are normal in appearance. The paranasal sinuses and the mastoid air cells and middle ear cavities are clear. Good flow voids are demonstrated within the cerebral vessels and in the dural  venous sinuses.       1. There is a cluster small patchy and nodular diffusion hyperintense foci in the medial right occipital lobe compatible with multiple small acute medial right occipital infarcts in the right PCA territory, the largest measuring 2.2 x 2.3 cm in size in the posterior medial right occipital lobe and there are multiple subcentimeter foci of acute infarction in the mid and anteromedial right occipital lobe in the right PCA territory. Furthermore there are several small acute infarcts in the right MCA territory including a 6 x 5 mm acute posterior lateral right parietal white matter infarct, 2 separate 3 and 6 mm acute infarcts in the superior lateral right occipital lobe and a 7 x 3 mm acute infarct in the posterior right insular cortex, all 4 of which are in the right MCA territory. No additional acute abnormality is seen.  2. There is moderate small vessel disease in the cerebral white matter. There is mild diffuse cerebral atrophy. 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 There is adjacent hemosiderin staining the adjacent left posterior parietal sulci from prior subarachnoid hemorrhage that likely occurred at the same time as the small focus of intraparenchymal hemorrhage in the posterior left parietal lobe and correlation with clinical history suggested.  3. The remainder of the MRI of the brain is unremarkable. All of the above findings were discussed in great detail with Dr. William Martinez, the hospitalist taking care of the patient, by telephone on 02/13/2025 at 1:20 p.m.  This report was finalized on 2/14/2025 6:13 AM by Dr. Morgan Horta M.D on Workstation: [id number]      CT Head Without Contrast    Result Date:  2/12/2025  CT HEAD WO CONTRAST-, CT CERVICAL SPINE WO CONTRAST-  HISTORY:  unwitnessed fall, confusion; N39.0-Urinary tract infection, site not specified; R53.1-Weakness; G93.40-Encephalopathy, unspecified; R29.6-Repeated falls  COMPARISON: CT angiogram neck and head 7/22/2024  CT HEAD WITHOUT CONTRAST: The brain ventricles are symmetrical. There is no evidence of hemorrhage, hydrocephalus or of a focal area of decreased attenuation to suggest acute infarction. Moderate small vessel ischemic disease is noted. Moderate vascular calcification is noted. Bone windows showed no evidence of a calvarial fracture. Further evaluation could be performed with a MRI examination of the brain as indicated.  CT CERVICAL SPINE WITHOUT CONTRAST: There is a grade 1 anterolisthesis of C3 upon C4 and to lesser extent C4 upon C5. There is severe loss of disc height at C5-6 and moderate to severe loss of disc height at C6-7. The facets are fused on the right from C3-4 to C6. There is no evidence of prevertebral edema or fracture.  C2-3: Mild facet degenerative disease is present bilaterally.  C3-4: Moderate facet degenerative disease is present on the left.  C5-6: Mild to moderate facet degenerative disease is present on the right.  C6-7: A central and right paramedian disc osteophyte complex is present which abuts and mildly flattens the ventral surface of the cord.  C7-T1: There is no evidence of a disc bulge or herniation.      Radiation dose reduction techniques were utilized, including automated exposure modulation based on body size.  This report was finalized on 2/12/2025 5:31 PM by Dr. Robert Galvan M.D on Workstation: [id number]      CT Cervical Spine Without Contrast    Result Date: 2/12/2025  CT HEAD WO CONTRAST-, CT CERVICAL SPINE WO CONTRAST-  HISTORY:  unwitnessed fall, confusion; N39.0-Urinary tract infection, site not specified; R53.1-Weakness; G93.40-Encephalopathy, unspecified; R29.6-Repeated falls  COMPARISON: CT  angiogram neck and head 7/22/2024  CT HEAD WITHOUT CONTRAST: The brain ventricles are symmetrical. There is no evidence of hemorrhage, hydrocephalus or of a focal area of decreased attenuation to suggest acute infarction. Moderate small vessel ischemic disease is noted. Moderate vascular calcification is noted. Bone windows showed no evidence of a calvarial fracture. Further evaluation could be performed with a MRI examination of the brain as indicated.  CT CERVICAL SPINE WITHOUT CONTRAST: There is a grade 1 anterolisthesis of C3 upon C4 and to lesser extent C4 upon C5. There is severe loss of disc height at C5-6 and moderate to severe loss of disc height at C6-7. The facets are fused on the right from C3-4 to C6. There is no evidence of prevertebral edema or fracture.  C2-3: Mild facet degenerative disease is present bilaterally.  C3-4: Moderate facet degenerative disease is present on the left.  C5-6: Mild to moderate facet degenerative disease is present on the right.  C6-7: A central and right paramedian disc osteophyte complex is present which abuts and mildly flattens the ventral surface of the cord.  C7-T1: There is no evidence of a disc bulge or herniation.      Radiation dose reduction techniques were utilized, including automated exposure modulation based on body size.  This report was finalized on 2/12/2025 5:31 PM by Dr. Robert Galvan M.D on Workstation: [id number]      XR Chest 1 View    Result Date: 2/12/2025  XR CHEST 1 VW-   INDICATION: Unwitnessed fall, altered mental status  COMPARISON: Chest radiograph February 23, 2023  TECHNIQUE: 1 view chest  FINDINGS:  Small bibasilar opacities. No effusions. Stable mediastinum. Heart is normal in size. Left-sided pacemaker with a right ventricular lead.      Stable chest with some subsegmental atelectasis or scarring at the bases  This report was finalized on 2/12/2025 3:49 PM by Dr. Jaron Jennings M.D on Workstation: [id number]       Results for  orders placed during the hospital encounter of 02/12/25    Adult Transthoracic Echo Complete W/ Cont if Necessary Per Protocol    Interpretation Summary    Left ventricular systolic function is normal. Calculated left ventricular EF = 57.8%    Left ventricular wall thickness is consistent with mild concentric hypertrophy. Sigmoid-shaped ventricular septum is present.    Left ventricular diastolic function was indeterminate.    There is moderate calcification of the aortic valve. Trace aortic valve regurgitation is present. No aortic valve stenosis is present.    Estimated right ventricular systolic pressure from tricuspid regurgitation is normal (<35 mmHg).    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 Last known normal was around 5 PM the day before admission. Family felt that she appeared off balance and was not paying attention to her left side. She also had urinated on herself. BP on arrival 156/91. She had a noncontrast CT head on admission that revealed no acute intracranial abnormalities. MRI brain wo shows watershed appearing strokes in the right PCA and MCA inferior division territories. CTA significant for multifocal intracranial atherosclerosis including in the right inferior M2 distribution and right PCA distribution. 2D Echo LVEF 57.8%, normal LA cavity size, mild concentric hypertrophy noted. Bilateral LE duplex with right popliteal fossa fluid collection otherwise normal venous duplex scan. Short course of  "DAPT recommended. No further Neuro work-up planned. We will sign off and see again upon request. Call RRT for acute Neurologic change/concern.     Plan:  - Continue Aspirin 81 mg, Plavix for 21 days then Plavix monotherapy - aspirin allergy documented in chart for nausea/vomiting with administration    - High-dose statin Lipitor 80 mg,   - Continue cyanocobalamin daily, start Vit D3 replacement   - Neuro checks  - TEDs/SCDs  - Stroke Education  - BP <140/90  - Therapies as written  - Follow-up in clinic with Dr. Bauer in 4-6 weeks, message sent to scheduling     I have discussed the above with the patient, daughters bedside and Dr. Rivera who are in agreement with the plan.     Pascale Rutledge, APRN  02/16/25  10:32 EST    \"Dictated utilizing Dragon dictation\".       As of April 2021, as required by the Federal 21st Century Cures Act, medical records (including provider notes and laboratory/imaging results) are to be made available to patient’s and/or their designees as soon as the documents are signed/resulted. While the intention is to ensure transparency and to engage the patient in their healthcare, this immediate access may create unintended consequences as this document uses language intended for communication between medical experts and diagnostic results are interpreted with the entirety of the patient’s clinical picture in mind. It is recommended that patients and/or their designees review all available information with their primary or specialist providers for explanation and guidance to avoid misinterpretation based on layperson understanding, non-medical expert opinions, or Internet searches.   "

## 2025-02-17 LAB
ANION GAP SERPL CALCULATED.3IONS-SCNC: 10.4 MMOL/L (ref 5–15)
BASOPHILS # BLD AUTO: 0.07 10*3/MM3 (ref 0–0.2)
BASOPHILS NFR BLD AUTO: 0.4 % (ref 0–1.5)
BUN SERPL-MCNC: 8 MG/DL (ref 8–23)
BUN/CREAT SERPL: 12.3 (ref 7–25)
CALCIUM SPEC-SCNC: 9 MG/DL (ref 8.2–9.6)
CHLORIDE SERPL-SCNC: 102 MMOL/L (ref 98–107)
CO2 SERPL-SCNC: 25.6 MMOL/L (ref 22–29)
CREAT SERPL-MCNC: 0.65 MG/DL (ref 0.57–1)
DEPRECATED RDW RBC AUTO: 45 FL (ref 37–54)
EGFRCR SERPLBLD CKD-EPI 2021: 77.7 ML/MIN/1.73
EOSINOPHIL # BLD AUTO: 0.23 10*3/MM3 (ref 0–0.4)
EOSINOPHIL NFR BLD AUTO: 1.4 % (ref 0.3–6.2)
ERYTHROCYTE [DISTWIDTH] IN BLOOD BY AUTOMATED COUNT: 13.4 % (ref 12.3–15.4)
GLUCOSE BLDC GLUCOMTR-MCNC: 105 MG/DL (ref 70–130)
GLUCOSE BLDC GLUCOMTR-MCNC: 109 MG/DL (ref 70–130)
GLUCOSE BLDC GLUCOMTR-MCNC: 76 MG/DL (ref 70–130)
GLUCOSE BLDC GLUCOMTR-MCNC: 92 MG/DL (ref 70–130)
GLUCOSE SERPL-MCNC: 100 MG/DL (ref 65–99)
HCT VFR BLD AUTO: 49.9 % (ref 34–46.6)
HGB BLD-MCNC: 17 G/DL (ref 12–15.9)
IMM GRANULOCYTES # BLD AUTO: 0.09 10*3/MM3 (ref 0–0.05)
IMM GRANULOCYTES NFR BLD AUTO: 0.6 % (ref 0–0.5)
LYMPHOCYTES # BLD AUTO: 1.18 10*3/MM3 (ref 0.7–3.1)
LYMPHOCYTES NFR BLD AUTO: 7.4 % (ref 19.6–45.3)
MCH RBC QN AUTO: 31 PG (ref 26.6–33)
MCHC RBC AUTO-ENTMCNC: 34.1 G/DL (ref 31.5–35.7)
MCV RBC AUTO: 91.1 FL (ref 79–97)
MONOCYTES # BLD AUTO: 1.2 10*3/MM3 (ref 0.1–0.9)
MONOCYTES NFR BLD AUTO: 7.5 % (ref 5–12)
NEUTROPHILS NFR BLD AUTO: 13.14 10*3/MM3 (ref 1.7–7)
NEUTROPHILS NFR BLD AUTO: 82.7 % (ref 42.7–76)
NRBC BLD AUTO-RTO: 0 /100 WBC (ref 0–0.2)
PLATELET # BLD AUTO: 193 10*3/MM3 (ref 140–450)
PMV BLD AUTO: 9.9 FL (ref 6–12)
POTASSIUM SERPL-SCNC: 3.9 MMOL/L (ref 3.5–5.2)
RBC # BLD AUTO: 5.48 10*6/MM3 (ref 3.77–5.28)
SODIUM SERPL-SCNC: 138 MMOL/L (ref 136–145)
WBC NRBC COR # BLD AUTO: 15.91 10*3/MM3 (ref 3.4–10.8)

## 2025-02-17 PROCEDURE — 25810000003 DEXTROSE 5 % AND SODIUM CHLORIDE 0.9 % 5-0.9 % SOLUTION: Performed by: INTERNAL MEDICINE

## 2025-02-17 PROCEDURE — 80048 BASIC METABOLIC PNL TOTAL CA: CPT | Performed by: INTERNAL MEDICINE

## 2025-02-17 PROCEDURE — 92526 ORAL FUNCTION THERAPY: CPT

## 2025-02-17 PROCEDURE — 85025 COMPLETE CBC W/AUTO DIFF WBC: CPT | Performed by: INTERNAL MEDICINE

## 2025-02-17 PROCEDURE — 82948 REAGENT STRIP/BLOOD GLUCOSE: CPT

## 2025-02-17 PROCEDURE — 97535 SELF CARE MNGMENT TRAINING: CPT

## 2025-02-17 PROCEDURE — 25010000002 CEFTRIAXONE PER 250 MG: Performed by: STUDENT IN AN ORGANIZED HEALTH CARE EDUCATION/TRAINING PROGRAM

## 2025-02-17 PROCEDURE — 97110 THERAPEUTIC EXERCISES: CPT

## 2025-02-17 PROCEDURE — 97116 GAIT TRAINING THERAPY: CPT

## 2025-02-17 PROCEDURE — 97530 THERAPEUTIC ACTIVITIES: CPT

## 2025-02-17 RX ADMIN — Medication 10 ML: at 09:00

## 2025-02-17 RX ADMIN — LEVOTHYROXINE SODIUM 50 MCG: 50 TABLET ORAL at 06:22

## 2025-02-17 RX ADMIN — Medication 10 ML: at 20:19

## 2025-02-17 RX ADMIN — ATORVASTATIN CALCIUM 80 MG: 80 TABLET, FILM COATED ORAL at 20:19

## 2025-02-17 RX ADMIN — Medication 500 MCG: at 09:43

## 2025-02-17 RX ADMIN — ASPIRIN 81 MG: 81 TABLET, DELAYED RELEASE ORAL at 09:50

## 2025-02-17 RX ADMIN — AMLODIPINE BESYLATE 2.5 MG: 2.5 TABLET ORAL at 09:44

## 2025-02-17 RX ADMIN — PANTOPRAZOLE SODIUM 40 MG: 40 TABLET, DELAYED RELEASE ORAL at 06:22

## 2025-02-17 RX ADMIN — CEFTRIAXONE SODIUM 1000 MG: 1 INJECTION, POWDER, FOR SOLUTION INTRAMUSCULAR; INTRAVENOUS at 17:55

## 2025-02-17 RX ADMIN — CLOPIDOGREL BISULFATE 75 MG: 75 TABLET ORAL at 09:44

## 2025-02-17 RX ADMIN — DEXTROSE MONOHYDRATE AND SODIUM CHLORIDE 60 ML/HR: 5; .9 INJECTION, SOLUTION INTRAVENOUS at 17:58

## 2025-02-17 RX ADMIN — Medication 1000 UNITS: at 09:43

## 2025-02-17 NOTE — PLAN OF CARE
Problem: Adult Inpatient Plan of Care  Goal: Plan of Care Review  Outcome: Progressing  Flowsheets (Taken 2/17/2025 1822)  Plan of Care Reviewed With:   patient   child   family   grandchild(dejon)  Goal: Patient-Specific Goal (Individualized)  Outcome: Progressing  Goal: Absence of Hospital-Acquired Illness or Injury  Outcome: Progressing  Intervention: Identify and Manage Fall Risk  Recent Flowsheet Documentation  Taken 2/17/2025 1800 by René Maurer RN  Safety Promotion/Fall Prevention:   activity supervised   assistive device/personal items within reach   clutter free environment maintained   fall prevention program maintained   mobility aid in reach   muscle strengthening facilitated   nonskid shoes/slippers when out of bed   room organization consistent   safety round/check completed   lighting adjusted   gait belt  Taken 2/17/2025 1600 by René Maurer RN  Safety Promotion/Fall Prevention:   activity supervised   assistive device/personal items within reach   clutter free environment maintained   fall prevention program maintained   gait belt   lighting adjusted   mobility aid in reach   muscle strengthening facilitated   nonskid shoes/slippers when out of bed   room organization consistent   safety round/check completed  Taken 2/17/2025 1400 by René Maurer RN  Safety Promotion/Fall Prevention:   activity supervised   assistive device/personal items within reach   clutter free environment maintained   fall prevention program maintained   gait belt   lighting adjusted   mobility aid in reach   muscle strengthening facilitated   nonskid shoes/slippers when out of bed   room organization consistent   safety round/check completed  Taken 2/17/2025 1200 by René Maurer RN  Safety Promotion/Fall Prevention:   activity supervised   assistive device/personal items within reach   clutter free environment maintained   fall prevention program maintained   gait belt   lighting adjusted   mobility aid  in reach   muscle strengthening facilitated   nonskid shoes/slippers when out of bed   room organization consistent   safety round/check completed  Taken 2/17/2025 1000 by René Maurer RN  Safety Promotion/Fall Prevention:   activity supervised   assistive device/personal items within reach   clutter free environment maintained   fall prevention program maintained   gait belt   lighting adjusted   mobility aid in reach   muscle strengthening facilitated   nonskid shoes/slippers when out of bed   room organization consistent   safety round/check completed  Taken 2/17/2025 0800 by René Maurer RN  Safety Promotion/Fall Prevention:   activity supervised   assistive device/personal items within reach   clutter free environment maintained   fall prevention program maintained   gait belt   lighting adjusted   mobility aid in reach   muscle strengthening facilitated   nonskid shoes/slippers when out of bed   room organization consistent   safety round/check completed  Intervention: Prevent Skin Injury  Recent Flowsheet Documentation  Taken 2/17/2025 1600 by René Maurer RN  Body Position:   tilted   right   weight shifting  Taken 2/17/2025 1400 by René Maurer RN  Body Position:   tilted   weight shifting  Taken 2/17/2025 1000 by René Maurer RN  Body Position:   supine   tilted   weight shifting   right  Taken 2/17/2025 0800 by René Maurer RN  Body Position:   supine   tilted   weight shifting   left  Goal: Optimal Comfort and Wellbeing  Outcome: Progressing  Intervention: Provide Person-Centered Care  Recent Flowsheet Documentation  Taken 2/17/2025 0800 by René Maurer RN  Trust Relationship/Rapport:   care explained   choices provided   questions answered   questions encouraged  Goal: Readiness for Transition of Care  Outcome: Progressing     Problem: Skin Injury Risk Increased  Goal: Skin Health and Integrity  Outcome: Progressing  Intervention: Optimize Skin Protection  Recent  Flowsheet Documentation  Taken 2/17/2025 1400 by René Maurer, RN  Activity Management: up in chair  Pressure Reduction Techniques:   frequent weight shift encouraged   heels elevated off bed  Taken 2/17/2025 1000 by René Maurer, RN  Head of Bed (HOB) Positioning: HOB lowered  Taken 2/17/2025 0800 by René Maurer, RN  Pressure Reduction Techniques: frequent weight shift encouraged  Head of Bed (HOB) Positioning: HOB flat   Goal Outcome Evaluation:  Plan of Care Reviewed With: patient, child, family, grandchild(djeon)

## 2025-02-17 NOTE — CASE MANAGEMENT/SOCIAL WORK
Continued Stay Note  [facility]     Patient Name: Maria Fernanda Gaviria  MRN: [id number]  Today's Date: 2/17/2025    Admit Date: 2/12/2025    Plan: [facility] acute rehab   Discharge Plan       Row Name 02/17/25 1726       Plan    Plan [facility] acute rehab    Patient/Family in Agreement with Plan yes    Plan Comments Met with pt and her daughters in room. They confirmed the plan remains for the pt to go to [facility] for acute rehab at discharge. [facility] has accepted and precert was started. They denied any further needs at this time. CCP following. Rakesh CASTRO RN                   Discharge Codes    No documentation.                 Expected Discharge Date and Time       Expected Discharge Date Expected Discharge Time    Feb 18, 2025               Rakesh Washington RN

## 2025-02-17 NOTE — PLAN OF CARE
Goal Outcome Evaluation:  Plan of Care Reviewed With: patient        Progress: improving  Outcome Evaluation: Pt tolerated treatment well this date. Required min A to stand from chair, w/ cues for hand placement. Pt ambulated 35ft w/ Rw and CGA-min A, assist to guide walker at times. Also needed several cues for direction and to keep feet moving. Pt still having L neglect and L visual deficits, though w/ good movement in all extremities, slight weakness in L UE. Pt would highly benefit from continued skilled PT in acute rehab setting upon d/c.    Anticipated Discharge Disposition (PT): inpatient rehabilitation facility

## 2025-02-17 NOTE — THERAPY TREATMENT NOTE
Patient Name: Maria Fernanda Gaviria  : 1923    MRN: [id number]                              Today's Date: 2025       Admit Date: 2025    Visit Dx:     ICD-10-CM ICD-9-CM   1. Urinary tract infection in female  N39.0 599.0   2. Generalized weakness  R53.1 780.79   3. Acute encephalopathy  G93.40 348.30   4. Unwitnessed fall  R29.6 [id number]     Patient Active Problem List   Diagnosis    Osteoarthritis of lumbar spine    Osteoarthritis    Asthma    Carotid stenosis    Hyperlipidemia    GERD (gastroesophageal reflux disease)    Essential hypertension    Hypothyroidism (acquired)    Osteopenia    DNR (do not resuscitate)    Non-seasonal allergic rhinitis    AV block    Presence of cardiac pacemaker    Stroke-like symptoms    Urinary tract infection in female    UTI (urinary tract infection)     Past Medical History:   Diagnosis Date    Arthritis     Arthritis of back     COVID-19 virus detected 2023    Disease of thyroid gland     GERD (gastroesophageal reflux disease)     Hip arthrosis     Age related    Hyperlipidemia     Hypertension     Low back strain Age related    Osteoporosis     Restless legs syndrome 2016     Past Surgical History:   Procedure Laterality Date    CARDIAC ELECTROPHYSIOLOGY PROCEDURE N/A 2023    Procedure: Pacemaker SC new  biotronik;  Surgeon: Deny Khanna MD;  Location: [facility] CATH INVASIVE LOCATION;  Service: Cardiovascular;  Laterality: N/A;    CATARACT EXTRACTION      CHOLECYSTECTOMY WITH INTRAOPERATIVE CHOLANGIOGRAM N/A 2021    Procedure: CHOLECYSTECTOMY LAPAROSCOPIC INTRAOPERATIVE CHOLANGIOGRAM common bile duct exploration;  Surgeon: Huey Wilkinson MD;  Location: [facility] OR;  Service: General;  Laterality: N/A;    COLONOSCOPY      ERCP N/A 2021    Procedure: ENDOSCOPIC RETROGRADE CHOLANGIOPANCREATOGRAPHY WITH SPHINCTEROTOMY, BALLOON SWEEP AND STONE (2) EXTRACTION;  Surgeon: Justo Aden MD;  Location: [facility] ENDOSCOPY;   Service: Gastroenterology;  Laterality: N/A;  PRE- BILE DUCT OBSTRUCTION   POST- SAME      HYSTERECTOMY      REPLACEMENT TOTAL KNEE Left     THYROID SURGERY      TUBAL ABDOMINAL LIGATION        General Information       Row Name 02/17/25 [facility]          Physical Therapy Time and Intention    Document Type therapy note (daily note)  -     Mode of Treatment physical therapy  -       Row Name 02/17/25 1222          General Information    Existing Precautions/Restrictions fall  -       Row Name 02/17/25 1222          Cognition    Orientation Status (Cognition) oriented to;person;place  -               User Key  (r) = Recorded By, (t) = Taken By, (c) = Cosigned By      Initials Name Provider Type     Cindy Fontenot PTA Physical Therapist Assistant                   Mobility       Row Name 02/17/25 1222          Bed Mobility    Comment, (Bed Mobility) up in chair  -       Row Name 02/17/25 [facility]          Sit-Stand Transfer    Sit-Stand [place] (Transfers) minimum assist (75% patient effort)  -     Assistive Device (Sit-Stand Transfers) walker, 4-wheeled  -     Comment, (Sit-Stand Transfer) cues for hand placement  -       Row Name 02/17/25 [facility]          Gait/Stairs (Locomotion)    [place] Level (Gait) contact guard;minimum assist (75% patient effort)  -     Assistive Device (Gait) walker, 4-wheeled  -     Patient was able to Ambulate yes  -     Distance in Feet (Gait) 35  -     Deviations/Abnormal Patterns (Gait) jack decreased;stride length decreased  -     Bilateral Gait Deviations forward flexed posture  -     Comment, (Gait/Stairs) some L neglect, several cues to pay attention to objects on L side, and assist to guide walker at times especially when turning towards the L or backwards  -               User Key  (r) = Recorded By, (t) = Taken By, (c) = Cosigned By      Initials Name Provider Type     Cindy Fontenot PTA Physical Therapist Assistant                    Obj/Interventions    No documentation.                  Goals/Plan    No documentation.                  Clinical Impression       Row Name 02/17/25 1224          Pain    Pretreatment Pain Rating 0/10 - no pain  -     Posttreatment Pain Rating 0/10 - no pain  -       Row Name 02/17/25 1224          Positioning and Restraints    Pre-Treatment Position sitting in chair/recliner  -SM     Post Treatment Position chair  -SM     In Chair reclined;call light within reach;encouraged to call for assist;exit alarm on;with family/caregiver  -               User Key  (r) = Recorded By, (t) = Taken By, (c) = Cosigned By      Initials Name Provider Type    Cindy Traore PTA Physical Therapist Assistant                   Outcome Measures       Row Name 02/17/25 1225 02/17/25 0800       How much help from another person do you currently need...    Turning from your back to your side while in flat bed without using bedrails? 3  -SM 3  -TS    Moving from lying on back to sitting on the side of a flat bed without bedrails? 3  -SM 3  -TS    Moving to and from a bed to a chair (including a wheelchair)? 3  -SM 2  -TS    Standing up from a chair using your arms (e.g., wheelchair, bedside chair)? 3  -SM 2  -TS    Climbing 3-5 steps with a railing? 1  -SM 2  -TS    To walk in hospital room? 3  -SM 2  -TS    AM-PAC 6 Clicks Score (PT) 16  -SM 14  -TS    Highest Level of Mobility Goal 5 --> Static standing  - 4 --> Transfer to chair/commode  -TS      Row Name 02/17/25 1225 02/17/25 1140       Functional Assessment    Outcome Measure Options AM-PAC 6 Clicks Basic Mobility (PT)  - AM-PAC 6 Clicks Daily Activity (OT)  -              User Key  (r) = Recorded By, (t) = Taken By, (c) = Cosigned By      Initials Name Provider Type    Cindy Traore PTA Physical Therapist Assistant    René Saha RN Registered Nurse    Cindy Yarbrough, OT Occupational Therapist                                 Physical  Therapy Education       Title: PT OT SLP Therapies (In Progress)       Topic: Physical Therapy (In Progress)       Point: Mobility training (In Progress)       Learning Progress Summary            Patient Acceptance, E,D, NR by  at 2/17/2025 1226    Acceptance, E,D, NR by  at 2/14/2025 1046    Acceptance, E,TB,D, NR,NL by  at 2/13/2025 0943                      Point: Home exercise program (In Progress)       Learning Progress Summary            Patient Acceptance, E,D, NR by  at 2/17/2025 1226    Acceptance, E,D, NR by  at 2/14/2025 1046    Acceptance, E,TB,D, NR,NL by  at 2/13/2025 0943                      Point: Body mechanics (In Progress)       Learning Progress Summary            Patient Acceptance, E,D, NR by  at 2/17/2025 1226    Acceptance, E,D, NR by  at 2/14/2025 1046    Acceptance, E,TB,D, NR,NL by  at 2/13/2025 0943                      Point: Precautions (In Progress)       Learning Progress Summary            Patient Acceptance, E,D, NR by  at 2/17/2025 1226    Acceptance, E,D, NR by  at 2/14/2025 1046    Acceptance, E,TB,D, NR,NL by  at 2/13/2025 0943                                      User Key       Initials Effective Dates Name Provider Type Discipline     03/07/18 -  Cindy Fontenot, PTA Physical Therapist Assistant PT     04/08/22 -  Mraine Hinton PT Physical Therapist PT                  PT Recommendation and Plan     Progress: improving  Outcome Evaluation: Pt tolerated treatment well this date. Required min A to stand from chair, w/ cues for hand placement. Pt ambulated 35ft w/ Rw and CGA-min A, assist to guide walker at times. Also needed several cues for direction and to keep feet moving. Pt still having L neglect and L visual deficits, though w/ good movement in all extremities, slight weakness in L UE. Pt would highly benefit from continued skilled PT in acute rehab setting upon d/c.     Time Calculation:         PT Charges       Row Name 02/17/25  1233             Time Calculation    Start Time 1112  -      Stop Time 1156  -SM      Time Calculation (min) 44 min  -      PT Received On 02/17/25  -      PT - Next Appointment 02/18/25  -                User Key  (r) = Recorded By, (t) = Taken By, (c) = Cosigned By      Initials Name Provider Type    Cindy Traore PTA Physical Therapist Assistant                  Therapy Charges for Today       Code Description Service Date Service Provider Modifiers Qty    47721403829 HC GAIT TRAINING EA 15 MIN 2/17/2025 Cindy Fontenot, NICK GP 1    74090369214 HC PT THER PROC EA 15 MIN 2/17/2025 Cindy Fontenot, NICK GP 1    09322395448 HC PT THERAPEUTIC ACT EA 15 MIN 2/17/2025 Cindy Fontenot, NICK GP 1            PT G-Codes  Outcome Measure Options: AM-PAC 6 Clicks Basic Mobility (PT)  AM-PAC 6 Clicks Score (PT): 16  AM-PAC 6 Clicks Score (OT): 13  Modified [place] Scale: 4 - Moderately severe disability.  Unable to walk without assistance, and unable to attend to own bodily needs without assistance.  PT Discharge Summary  Anticipated Discharge Disposition (PT): inpatient rehabilitation facility    Cindy Fontenot PTA  2/17/2025

## 2025-02-17 NOTE — THERAPY RE-EVALUATION
Acute Care - Speech Language Pathology   Swallow Re-Evaluation [facility]     Patient Name: Maria Fernanda Gaviria  : 1923  MRN: [id number]  Today's Date: 2025               Admit Date: 2025    Visit Dx:     ICD-10-CM ICD-9-CM   1. Urinary tract infection in female  N39.0 599.0   2. Generalized weakness  R53.1 780.79   3. Acute encephalopathy  G93.40 348.30   4. Unwitnessed fall  R29.6 [id number]     Patient Active Problem List   Diagnosis    Osteoarthritis of lumbar spine    Osteoarthritis    Asthma    Carotid stenosis    Hyperlipidemia    GERD (gastroesophageal reflux disease)    Essential hypertension    Hypothyroidism (acquired)    Osteopenia    DNR (do not resuscitate)    Non-seasonal allergic rhinitis    AV block    Presence of cardiac pacemaker    Stroke-like symptoms    Urinary tract infection in female    UTI (urinary tract infection)     Past Medical History:   Diagnosis Date    Arthritis     Arthritis of back     COVID-19 virus detected 2023    Disease of thyroid gland     GERD (gastroesophageal reflux disease)     Hip arthrosis     Age related    Hyperlipidemia     Hypertension     Low back strain Age related    Osteoporosis     Restless legs syndrome 2016     Past Surgical History:   Procedure Laterality Date    CARDIAC ELECTROPHYSIOLOGY PROCEDURE N/A 2023    Procedure: Pacemaker SC new  biotronik;  Surgeon: Deny Khanna MD;  Location: [facility] INVASIVE LOCATION;  Service: Cardiovascular;  Laterality: N/A;    CATARACT EXTRACTION      CHOLECYSTECTOMY WITH INTRAOPERATIVE CHOLANGIOGRAM N/A 2021    Procedure: CHOLECYSTECTOMY LAPAROSCOPIC INTRAOPERATIVE CHOLANGIOGRAM common bile duct exploration;  Surgeon: Huey Wilkinson MD;  Location: [facility] OR;  Service: General;  Laterality: N/A;    COLONOSCOPY      ERCP N/A 2021    Procedure: ENDOSCOPIC RETROGRADE CHOLANGIOPANCREATOGRAPHY WITH SPHINCTEROTOMY, BALLOON SWEEP AND STONE (2) EXTRACTION;  Surgeon:  Justo Aden MD;  Location: [facility] ENDOSCOPY;  Service: Gastroenterology;  Laterality: N/A;  PRE- BILE DUCT OBSTRUCTION   POST- SAME      HYSTERECTOMY      REPLACEMENT TOTAL KNEE Left     THYROID SURGERY      TUBAL ABDOMINAL LIGATION         SLP Recommendation and Plan  SLP Swallowing Diagnosis: other (see comments) (feeding difficulties) (02/17/25 0930)  SLP Diet Recommendation: mechanical ground textures, thin liquids (02/17/25 0930)  Recommended Precautions and Strategies: upright posture during/after eating, small bites of food and sips of liquid, alternate between small bites of food and sips of liquid, check mouth frequently for oral residue/pocketing, fatigue precautions (02/17/25 0930)  SLP Rec. for Method of Medication Administration: meds whole, with puree, as tolerated (02/17/25 0930)     Monitor for Signs of Aspiration: yes, notify SLP if any concerns (02/17/25 0930)  Recommended Diagnostics: reassess via clinical swallow evaluation (02/17/25 0930)  Swallow Criteria for Skilled Therapeutic Interventions Met: demonstrates skilled criteria (02/17/25 0930)  Anticipated Discharge Disposition (SLP): unknown (02/17/25 0930)  Rehab Potential/Prognosis, Swallowing: good, to achieve stated therapy goals (02/17/25 0930)  Therapy Frequency (Swallow): PRN (02/17/25 0930)  Predicted Duration Therapy Intervention (Days): until discharge (02/17/25 0930)  Oral Care Recommendations: Oral Care BID/PRN, Toothbrush (02/17/25 0930)        Daily Summary of Progress (SLP): progress toward functional goals as expected (02/17/25 0930)               Treatment Assessment (SLP): continued (02/17/25 0930)     Plan for Continued Treatment (SLP): continue treatment per plan of care (02/17/25 0930)         Outcome Evaluation: Clinical swallow re-eval completed for pt admitted with UTI and multiple small CVAs. Pt's swallow appears to be improving. No overt clinical signs of aspiration across trials. Delayed cough with thin  liquids (~2 minutes post trials) with associated mild eructation. ?Sign of GERD/LPR. Diet texture recommendation: Suggest advance diet to mechanical ground textures, thin liquids, with aspiration precautions and supervision. Make NPO and request SLP re-eval, if pt demonstrates signs of aspiration/ sequelae. Meds: Meds whole with puree. Aspiration precautions: Fully upright for all oral intake. Supervision. Assistance, as needed.      SWALLOW EVALUATION (Last 72 Hours)       SLP Adult Swallow Evaluation       Row Name 02/17/25 0999       Rehab Evaluation    Document Type re-evaluation  -BB    Total Evaluation Minutes, SLP --    Subjective Information no complaints  -BB    Patient Observations alert;agree to therapy;cooperative  -BB    Patient Effort good  -BB    Symptoms Noted During/After Treatment none  -BB       General Information    Patient Profile Reviewed yes  -BB    Pertinent History Of Current Problem --    Current Method of Nutrition pureed;nectar/syrup-thick liquids  -BB    Precautions/Limitations, Vision neglect, left  -BB    Precautions/Limitations, Hearing WFL;for purposes of eval  -BB    Prior Level of Function-Communication WFL  -BB    Prior Level of Function-Swallowing soft to chew;thin liquids  -BB    Plans/Goals Discussed with patient and family;agreed upon  -BB    Barriers to Rehab none identified  -BB    Patient's Goals for Discharge patient did not state  -BB    Family Goals for Discharge other (see comments)  return to thin liquids and soft/bite sized textures  -BB       Pain    Pretreatment Pain Rating 0/10 - no pain  -BB    Posttreatment Pain Rating 0/10 - no pain  -BB       Oral Motor Structure and Function    Dentition Assessment natural, present and adequate  -BB    Secretion Management WNL/WFL  -BB    Mucosal Quality moist, healthy  -BB       Oral Musculature and Cranial Nerve Assessment    Oral Motor General Assessment --    Oral Labial or Buccal Impairment, Detail, Cranial Nerve VII  (Facial): left labial droop  slight  -BB    Lingual Impairment, Detail. Cranial Nerves IX, XII (Glossopharyngeal and Hypoglossal) --    Velar Impairment, Detail, Cranial Nerves X, XI (Vagus and Accessory) --       General Eating/Swallowing Observations    Respiratory Support Currently in Use room air  -BB    Eating/Swallowing Skills self-fed;needed assist  -BB    Positioning During Eating upright 90 degree;upright in bed  -BB    Utensils Used spoon;cup;straw  -BB    Consistencies Trialed soft to chew textures;mixed consistency;thin liquids;ice chips  -BB       Clinical Swallow Eval    Oral Prep Phase --    Oral Transit --    Oral Residue --    Pharyngeal Phase --    Esophageal Phase --    Clinical Swallow Evaluation Summary Clinical swallow re-eval completed. Dtrs at bedside.      Cognition: A/Oxperson. Pt able to follow basic commands.     Expressive communication: occasionally incongruent. Pt is confused.     Voice: Vocal quality and loudness appear good.     Cough: Appears robust.    Affect: Pleasant.     Speech: Intelligible.     Bulbar mech exam: Very slight L labial asymmetry.   Dysphagia symptoms: pt/family report occasional cough with thin liquids PTA.     Patient agreeable to PO trials.     Dysphagia signs: No overt clinical signs of aspiration across trials. Delayed cough with thin liquids (~2 minutes post trials) with associated eructation. ?Sign of GERD/LPR.   [place] swallow protocol: passed; good predictor of safe oral alimentation so long as the patient remains stable medically / neurologically.         Assessment / Plan: Clinical swallow re-eval completed for pt admitted with UTI and multiple small CVAs. Pt's swallow appears to be improving. No overt clinical signs of aspiration across trials. Delayed cough with thin liquids (~2 minutes post trials) with associated mild eructation. ?Sign of GERD/LPR. Suggest advance diet to mechanical ground textures, thin liquids, with aspiration precautions and  supervision. Make NPO and request SLP re-eval, if pt demonstrates signs of aspiration/ sequelae. Meds whole with puree.  -BB       Oral Prep Concerns    Oral Prep Concerns --    Prolonged Mastication --    Inefficient Mastication --    Incomplete or Weak Lip Closure Around Spoon --       Oral Transit Concerns    Oral Transit Concerns --    Delayed Intiation of Bolus Transit --    Increased Oral Transit Time --       Pharyngeal Phase Concerns    Pharyngeal Phase Concerns --    Wet Vocal Quality --       SLP Evaluation Clinical Impression    SLP Swallowing Diagnosis other (see comments)  feeding difficulties  -BB    Functional Impact --    Rehab Potential/Prognosis, Swallowing good, to achieve stated therapy goals  -BB    Swallow Criteria for Skilled Therapeutic Interventions Met demonstrates skilled criteria  -BB       SLP Treatment Clinical Impressions    Treatment Assessment (SLP) continued  -BB    Daily Summary of Progress (SLP) progress toward functional goals as expected  -BB    Barriers to Overall Progress (SLP) Advanced age  -BB    Plan for Continued Treatment (SLP) continue treatment per plan of care  -BB    Care Plan Review care plan/treatment goals reviewed;risks/benefits reviewed;evaluation/treatment results reviewed;patient/other agree to care plan;current/potential barriers reviewed  -BB    Care Plan Review, Other Participant(s) daughter;family  -BB       Recommendations    Therapy Frequency (Swallow) PRN  -BB    Predicted Duration Therapy Intervention (Days) until discharge  -BB    SLP Diet Recommendation mechanical ground textures;thin liquids  -BB    Recommended Diagnostics reassess via clinical swallow evaluation  -BB    Recommended Precautions and Strategies upright posture during/after eating;small bites of food and sips of liquid;alternate between small bites of food and sips of liquid;check mouth frequently for oral residue/pocketing;fatigue precautions  -BB    Oral Care Recommendations Oral Care  BID/PRN;Toothbrush  -BB    SLP Rec. for Method of Medication Administration meds whole;with puree;as tolerated  -BB    Monitor for Signs of Aspiration yes;notify SLP if any concerns  -BB    Anticipated Discharge Disposition (SLP) unknown  -BB       Swallow Goals (SLP)    Swallow LTGs --       (LTG) Patient will demonstrate functional swallow for    Diet Texture (Demonstrate functional swallow) --    Liquid viscosity (Demonstrate functional swallow) --    [place] (Demonstrate functional swallow) --    Time Frame (Demonstrate functional swallow) --    Progress/Outcomes (Demonstrate functional swallow) --       (LTG) Swallow    (LTG) Swallow --    [place] (Swallow Long Term Goal) --    Time Frame (Swallow Long Term Goal) --              User Key  (r) = Recorded By, (t) = Taken By, (c) = Cosigned By      Initials Name Effective Dates    CR Lisa Singh, SLP 12/03/24 -     Benton Murillo, SLP 02/19/23 -     Magdalena Sosa, SLP 10/21/24 -                     EDUCATION  The patient has been educated in the following areas:   Dysphagia (Swallowing Impairment) Oral Care/Hydration Modified Diet Instruction.        SLP GOALS       Row Name 02/15/25 1400 02/14/25 1113          (LTG) Patient will demonstrate functional swallow for    Diet Texture (Demonstrate functional swallow) -- soft to chew (chopped) textures  -CR     Liquid viscosity (Demonstrate functional swallow) -- thin liquids  -CR     [place] (Demonstrate functional swallow) -- with minimal cues (75-90% accuracy)  -CR     Time Frame (Demonstrate functional swallow) -- by discharge  -CR     Progress/Outcomes (Demonstrate functional swallow) -- new goal  -CR        (LTG) Swallow    (LTG) Swallow Patient will tolerate safest least restrictive diet with no s/s aspiration.  -CB --     [place] (Swallow Long Term Goal) with minimal cues (75-90% accuracy)  -CB --     Time Frame (Swallow Long Term Goal) by discharge  -CB --               User Key   (r) = Recorded By, (t) = Taken By, (c) = Cosigned By      Initials Name Provider Type    CR Lisa Singh, SLP Speech and Language Pathologist    Magdalena Sosa, SLP Speech and Language Pathologist                         Time Calculation:    Time Calculation- SLP       Row Name 02/17/25 1033             Time Calculation- SLP    SLP Start Time 0930  -BB      SLP Stop Time 1030  -BB      SLP Time Calculation (min) 60 min  -BB      SLP Received On 02/17/25  -BB         Untimed Charges    77628-AJ Treatment Swallow Minutes 60  -BB         Total Minutes    Untimed Charges Total Minutes 60  -BB       Total Minutes 60  -BB                User Key  (r) = Recorded By, (t) = Taken By, (c) = Cosigned By      Initials Name Provider Type    Benton Murillo SLP Speech and Language Pathologist                    Therapy Charges for Today       Code Description Service Date Service Provider Modifiers Qty    [id number]  ST TREATMENT SWALLOW 4 2/17/2025 Benton Benoit SLP GN 1                 OSCAR Cortez  2/17/2025

## 2025-02-17 NOTE — PLAN OF CARE
Goal Outcome Evaluation:  Plan of Care Reviewed With: patient        Progress: no change  Outcome Evaluation: Pt confused. Able to move all extremities. NIH 6. MIVF infusing. All meds given with NTL. Purewick in place. Plan of care ongoing.

## 2025-02-17 NOTE — PLAN OF CARE
Goal Outcome Evaluation:  Plan of Care Reviewed With: patient, daughter        Progress: improving  Outcome Evaluation: Pt daughters report pt is much improved today with her mobility during OT. She was able to get up to the sink to perform grooming task as well as getting up to BSC for toileting (incontinent in brief as well as continent episode on commode). She needs max cues for attention to task, sequencing steps. She will follow simple one step commands but does require signficant extra time which limites safety and engagement in ADLs. Pt does well with locating grooming items placed on her R side. She has increased difficulty attending to items and obsticles on L side.    Anticipated Discharge Disposition (OT): inpatient rehabilitation facility

## 2025-02-17 NOTE — CONSULTS
"Nutrition Services    Patient Name:  Maria Fernanda Gaviria  YOB: 1923  MRN: [id number]  Admit Date:  2/12/2025  Assessment Date:  02/17/25    CLINICAL NUTRITION - EDUCATION     ASSESSMENT  Encounter Information         Consult from Family/Caregiver Had questions about diet    Education topic Thickened liquids Pureed diet    Learner Patient Family not available at time of visit    Learning readiness Cognitive barriers with pt    Pertinent nutrition history Speech evaluated for po diet  Pt with left side weakness and difficulty following verbal prompts     Anthropometrics         Height Height: 162.6 cm (64\")    Weight Weight: 52.6 kg (116 lb) (02/14/25 0910)    BMI  Body mass index is 19.91 kg/m².   Normal/Healthy (18.4 - 24.9)    Comments      Medication/Biochemical          Pertinent medications Other: IVF's      Pertinent lab values Results from last 7 days   Lab Units 02/17/25  0706 02/16/25  0640 02/15/25  0530 02/14/25  0541 02/13/25  0517 02/12/25  1528   SODIUM mmol/L 138 139 135*   < > 141 136   POTASSIUM mmol/L 3.9 3.8 4.7   < > 3.7 3.7   CHLORIDE mmol/L 102 104 105   < > 106 100   CO2 mmol/L 25.6 26.0 22.0   < > 23.8 26.0   BUN mg/dL 8 9 12   < > 11 14   CREATININE mg/dL 0.65 0.63 0.71   < > 0.67 0.81   CALCIUM mg/dL 9.0 9.1 8.6   < > 8.6 9.0   BILIRUBIN mg/dL  --   --   --   --  0.8 1.1   ALK PHOS U/L  --   --   --   --  59 73   ALT (SGPT) U/L  --   --   --   --  16 24   AST (SGOT) U/L  --   --   --   --  23 34*   GLUCOSE mg/dL 100* 101* 94   < > 101* 83    < > = values in this interval not displayed.       Lab Results   Component Value Date    HGBA1C 5.40 02/14/2025        DIAGNOSIS  PES Statement         Problem  Food and nutrition knowledge deficit    Etiology Medical Diagnosis - weakness, multiple infarct, dysphagia    Signs/Symptoms Report/observation     INTERVENTION  Intervention/Evaluation         Goal   Disease management/therapy, Meet nutritional needs for diagnosis/condition, Provide " information     Educated regarding Other: Provided material for family, will revisit to see if family has any questions    Resources given Printed materials    Monitor/evaluation  Per protocol    Discharge planning Contact RD if questions/concerns     RD to follow per protocol.     Electronically signed by:  Iraida Quevedo RD  02/17/25 08:42 EST

## 2025-02-17 NOTE — PLAN OF CARE
Goal Outcome Evaluation:              Outcome Evaluation: Clinical swallow re-eval completed for pt admitted with UTI and multiple small CVAs. Pt's swallow appears to be improving. No overt clinical signs of aspiration across trials. Delayed cough with thin liquids (~2 minutes post trials) with associated mild eructation. ?Sign of GERD/LPR.     Diet texture recommendation: Suggest advance diet to mechanical ground textures, thin liquids, with aspiration precautions and supervision. Make NPO and request SLP re-eval, if pt demonstrates signs of aspiration/ sequelae.     Meds: Meds whole with puree.     Aspiration precautions: Fully upright for all oral intake. Supervision. Assistance, as needed.        Anticipated Discharge Disposition (SLP): unknown          SLP Swallowing Diagnosis: other (see comments) (feeding difficulties) (02/17/25 0930)  Treatment Assessment (SLP): continued (02/17/25 0930)     Plan for Continued Treatment (SLP): continue treatment per plan of care (02/17/25 0930)

## 2025-02-17 NOTE — THERAPY TREATMENT NOTE
Patient Name: Maria Fernanda Gaviria  : 1923    MRN: [id number]                              Today's Date: 2025       Admit Date: 2025    Visit Dx:     ICD-10-CM ICD-9-CM   1. Urinary tract infection in female  N39.0 599.0   2. Generalized weakness  R53.1 780.79   3. Acute encephalopathy  G93.40 348.30   4. Unwitnessed fall  R29.6 [id number]     Patient Active Problem List   Diagnosis    Osteoarthritis of lumbar spine    Osteoarthritis    Asthma    Carotid stenosis    Hyperlipidemia    GERD (gastroesophageal reflux disease)    Essential hypertension    Hypothyroidism (acquired)    Osteopenia    DNR (do not resuscitate)    Non-seasonal allergic rhinitis    AV block    Presence of cardiac pacemaker    Stroke-like symptoms    Urinary tract infection in female    UTI (urinary tract infection)     Past Medical History:   Diagnosis Date    Arthritis     Arthritis of back     COVID-19 virus detected 2023    Disease of thyroid gland     GERD (gastroesophageal reflux disease)     Hip arthrosis     Age related    Hyperlipidemia     Hypertension     Low back strain Age related    Osteoporosis     Restless legs syndrome 2016     Past Surgical History:   Procedure Laterality Date    CARDIAC ELECTROPHYSIOLOGY PROCEDURE N/A 2023    Procedure: Pacemaker SC new  biotronik;  Surgeon: Deny Khanna MD;  Location: [facility] CATH INVASIVE LOCATION;  Service: Cardiovascular;  Laterality: N/A;    CATARACT EXTRACTION      CHOLECYSTECTOMY WITH INTRAOPERATIVE CHOLANGIOGRAM N/A 2021    Procedure: CHOLECYSTECTOMY LAPAROSCOPIC INTRAOPERATIVE CHOLANGIOGRAM common bile duct exploration;  Surgeon: Huey Wilkinson MD;  Location: [facility] OR;  Service: General;  Laterality: N/A;    COLONOSCOPY      ERCP N/A 2021    Procedure: ENDOSCOPIC RETROGRADE CHOLANGIOPANCREATOGRAPHY WITH SPHINCTEROTOMY, BALLOON SWEEP AND STONE (2) EXTRACTION;  Surgeon: Justo Aden MD;  Location: [facility] ENDOSCOPY;   Service: Gastroenterology;  Laterality: N/A;  PRE- BILE DUCT OBSTRUCTION   POST- SAME      HYSTERECTOMY      REPLACEMENT TOTAL KNEE Left     THYROID SURGERY      TUBAL ABDOMINAL LIGATION        General Information       Row Name 02/17/25 1129          OT Time and Intention    Document Type therapy note (daily note)  -     Mode of Treatment occupational therapy;individual therapy  -     Patient Effort good  -       Row Name 02/17/25 1129          General Information    Patient Profile Reviewed yes  -     Existing Precautions/Restrictions fall  -     Barriers to Rehab cognitive status  -       Row Name 02/17/25 1129          Cognition    Orientation Status (Cognition) oriented to;place  daughter reports pt thinking she was in a hardware store all last night, slightly more oriented today  -       Row Name 02/17/25 1129          Safety Issues/Impairments Affecting Functional Mobility    Safety Issues Affecting Function (Mobility) ability to follow commands;positioning of assistive device;safety precaution awareness;insight into deficits/self-awareness;problem-solving;judgment;sequencing abilities  -     Impairments Affecting Function (Mobility) balance;endurance/activity tolerance;strength;cognition;coordination;sensation/sensory awareness;visual/perceptual  -               User Key  (r) = Recorded By, (t) = Taken By, (c) = Cosigned By      Initials Name Provider Type     Cindy Ibarra, OT Occupational Therapist                     Mobility/ADL's       Row Name 02/17/25 1131          Bed Mobility    Supine-Sit [place] (Bed Mobility) moderate assist (50% patient effort)  -       Row Name 02/17/25 1131          Transfers    Transfers toilet transfer  -       Row Name 02/17/25 1131          Sit-Stand Transfer    Sit-Stand [place] (Transfers) minimum assist (75% patient effort);moderate assist (50% patient effort);verbal cues;nonverbal cues (demo/gesture)  -     Assistive Device  (Sit-Stand Transfers) walker, front-wheeled  -     Comment, (Sit-Stand Transfer) improved while up. Initally mod but progressed to min A during encounter. Multiple stands during ADLs.  -       Row Name 02/17/25 1131          Toilet Transfer    [place] Level (Toilet Transfer) verbal cues;nonverbal cues (demo/gesture);minimum assist (75% patient effort)  -     Assistive Device (Toilet Transfer) commode, bedside without drop arms  -       Row Name 02/17/25 1131          Functional Mobility    Functional Mobility- Ind. Level minimum assist (75% patient effort);moderate assist (50% patient effort)  -     Functional Mobility- Device walker, front-wheeled  -     Functional Mobility-Distance (Feet) --  5+10  -     Functional Mobility- Comment max cues for attention, guiding rwx, avoiding obsticles on L side, overall sequencing each step of task  -       Row Name 02/17/25 1131          Activities of Daily Living    BADL Assessment/Intervention grooming  -[facility] Name 02/17/25 1131          Lower Body Dressing Assessment/Training    [place] Level (Lower Body Dressing) don;socks;maximum assist (25% patient effort)  -     Position (Lower Body Dressing) edge of bed sitting  -       Row Name 02/17/25 1131          Toileting Assessment/Training    [place] Level (Toileting) dependent (less than 25% patient effort);adjust/manage clothing;change pad/brief  -     Assistive Devices (Toileting) commode, bedside without drop arms  -       Row Name 02/17/25 1131          Grooming Assessment/Training    [place] Level (Grooming) hair care, combing/brushing;oral care regimen;contact guard assist;nonverbal cues (demo/gesture);verbal cues  -     Position (Grooming) sink side;supported standing;supported sitting  -     Comment, (Grooming) preseveration on brushing teeth, needs cues for when to appropriately end task. Sequencing and set up assist required as well.  -               User Key   (r) = Recorded By, (t) = Taken By, (c) = Cosigned By      Initials Name Provider Type     Cindy Ibarra OT Occupational Therapist                   Obj/Interventions       Row Name 02/17/25 1135          Vision Assessment/Intervention    Visual Processing Deficit elaine-inattention/neglect, left  -[facility] Name 02/17/25 1135          Balance    Static Sitting Balance standby assist  -     Dynamic Sitting Balance contact guard;standby assist  -     Position, Sitting Balance unsupported  -     Static Standing Balance contact guard  -     Dynamic Standing Balance minimal assist;moderate assist  -     Position/Device Used, Standing Balance supported;walker, rolling  -               User Key  (r) = Recorded By, (t) = Taken By, (c) = Cosigned By      Initials Name Provider Type     Cindy Ibarra OT Occupational Therapist                   Goals/Plan    No documentation.                  Clinical Impression       [facility] Name 02/17/25 1136          Pain Assessment    Pain Management Interventions positioning techniques utilized  -     Pre/Posttreatment Pain Comment c/o of back hurting with mobility. transfered into recliner  -     Additional Documentation Pain Scale: FACES Pre/Post-Treatment (Group)  -[facility] Name 02/17/25 1136          Pain Scale: FACES Pre/Post-Treatment    Pain: FACES Scale, Pretreatment 2-->hurts little bit  -     Posttreatment Pain Rating 2-->hurts little bit  -[facility] Name 02/17/25 1136          Plan of Care Review    Plan of Care Reviewed With patient;daughter  -     Progress improving  -     Outcome Evaluation Pt daughters report pt is much improved today with her mobility during OT. She was able to get up to the sink to perform grooming task as well as getting up to [facility] for toileting (incontinent in brief as well as continent episode on commode). She needs max cues for attention to task, sequencing steps. She will follow simple one step commands but does  require signficant extra time which limites safety and engagement in ADLs. Pt does well with locating grooming items placed on her R side. She has increased difficulty attending to items and obsticles on L side.  -       Row Name 02/17/25 1136          Therapy Plan Review/Discharge Plan (OT)    Anticipated Discharge Disposition (OT) inpatient rehabilitation facility  -       Row Name 02/17/25 1136          Positioning and Restraints    Pre-Treatment Position in bed  -     Post Treatment Position chair  -SM     In Chair reclined;call light within reach;encouraged to call for assist;exit alarm on;notified nsg;with family/caregiver  -               User Key  (r) = Recorded By, (t) = Taken By, (c) = Cosigned By      Initials Name Provider Type    Cindy Rush, ADELAIDA Occupational Therapist                   Outcome Measures       Row Name 02/17/25 1140          How much help from another is currently needed...    Putting on and taking off regular lower body clothing? 2  -SM     Bathing (including washing, rinsing, and drying) 2  -SM     Toileting (which includes using toilet bed pan or urinal) 1  -SM     Putting on and taking off regular upper body clothing 2  -SM     Taking care of personal grooming (such as brushing teeth) 3  -SM     Eating meals 3  -SM     AM-PAC 6 Clicks Score (OT) 13  -       Row Name 02/17/25 0800          How much help from another person do you currently need...    Turning from your back to your side while in flat bed without using bedrails? 3  -TS     Moving from lying on back to sitting on the side of a flat bed without bedrails? 3  -TS     Moving to and from a bed to a chair (including a wheelchair)? 2  -TS     Standing up from a chair using your arms (e.g., wheelchair, bedside chair)? 2  -TS     Climbing 3-5 steps with a railing? 2  -TS     To walk in hospital room? 2  -TS     AM-PAC 6 Clicks Score (PT) 14  -TS     Highest Level of Mobility Goal 4 --> Transfer to chair/commode   -TS       Row Name 02/17/25 1140          Functional Assessment    Outcome Measure Options AM-PAC 6 Clicks Daily Activity (OT)  -SM               User Key  (r) = Recorded By, (t) = Taken By, (c) = Cosigned By      Initials Name Provider Type    TS René Maurer, RN Registered Nurse    Cindy Rush, ADELAIDA Occupational Therapist                    Occupational Therapy Education       Title: PT OT SLP Therapies (In Progress)       Topic: Occupational Therapy (In Progress)       Point: ADL training (Done)       Description:   Instruct learner(s) on proper safety adaptation and remediation techniques during self care or transfers.   Instruct in proper use of assistive devices.                  Learning Progress Summary            Patient Acceptance, E,TB, VU by BC at 2/14/2025 1140    Comment: L sided awareness/attention, walker safety, ADL training, cont teaching                      Point: Home exercise program (Not Started)       Description:   Instruct learner(s) on appropriate technique for monitoring, assisting and/or progressing therapeutic exercises/activities.                  Learner Progress:  Not documented in this visit.              Point: Precautions (Not Started)       Description:   Instruct learner(s) on prescribed precautions during self-care and functional transfers.                  Learner Progress:  Not documented in this visit.              Point: Body mechanics (Not Started)       Description:   Instruct learner(s) on proper positioning and spine alignment during self-care, functional mobility activities and/or exercises.                  Learner Progress:  Not documented in this visit.                              User Key       Initials Effective Dates Name Provider Type [facility] 07/29/24 -  Leticia Murphy OT Occupational Therapist OT                  OT Recommendation and Plan     Plan of Care Review  Plan of Care Reviewed With: patient, daughter  Progress: improving  Outcome  Evaluation: Pt daughters report pt is much improved today with her mobility during OT. She was able to get up to the sink to perform grooming task as well as getting up to [facility] for toileting (incontinent in brief as well as continent episode on commode). She needs max cues for attention to task, sequencing steps. She will follow simple one step commands but does require signficant extra time which limites safety and engagement in ADLs. Pt does well with locating grooming items placed on her R side. She has increased difficulty attending to items and obsticles on L side.     Time Calculation:         Time Calculation- OT       Row Name 02/17/25 1141             Time Calculation- OT    OT Start Time 1018  -SM      OT Stop Time 1056  -SM      OT Time Calculation (min) 38 min  -SM      OT Received On 02/17/25  -      OT - Next Appointment 02/18/25  -         Timed Charges    64922 - OT Self Care/Mgmt Minutes 38  -SM         Total Minutes    Timed Charges Total Minutes 38  -SM       Total Minutes 38  -SM                User Key  (r) = Recorded By, (t) = Taken By, (c) = Cosigned By      Initials Name Provider Type    Cindy Rush OT Occupational Therapist                  Therapy Charges for Today       Code Description Service Date Service Provider Modifiers Qty    [id number] HC OT SELF CARE/MGMT/TRAIN EA 15 MIN 2/17/2025 Cindy Ibarra OT GO 3                 Cindy Ibarra OT  2/17/2025

## 2025-02-17 NOTE — PROGRESS NOTES
Name: Maria Fernanda Gaviria ADMIT: 2025   : 1923  PCP: Henok Salcido MD    MRN: [id number] LOS: 3 days   AGE/SEX: 89 y.o. female  ROOM: [facility]     Subjective   Subjective   Patient awake and resting in bed, family present at bedside, she is intermittently more sleepy, but mental status waxing and waning per discussion. She has no evidence of acute distress at present.       Objective   Objective   Vital Signs  Temp:  [97.9 °F (36.6 °C)] 97.9 °F (36.6 °C)  Heart Rate:  [70-91] 70  Resp:  [16] 16  BP: (130-188)/() 130/83  SpO2:  [77 %-100 %] 100 %  on   ;   Device (Oxygen Therapy): room air  Body mass index is 19.91 kg/m².  Physical Exam  Vitals and nursing note reviewed.   Constitutional:       General: She is not in acute distress.  Cardiovascular:      Rate and Rhythm: Normal rate.      Pulses: Normal pulses.      Heart sounds: Normal heart sounds.   Pulmonary:      Effort: Pulmonary effort is normal.      Breath sounds: No wheezing.   Abdominal:      General: There is no distension.      Palpations: Abdomen is soft.      Tenderness: There is no abdominal tenderness.   Skin:     General: Skin is warm and dry.   Neurological:      Mental Status: She is alert.      Motor: Weakness (left sided) present.       Results Review     I reviewed the patient's new clinical results.  Results from last 7 days   Lab Units 25  0706 25  0640 02/15/25  0530 02/14/25  0541   WBC 10*3/mm3 15.91* 9.59 9.25 8.74   HEMOGLOBIN g/dL 17.0* 17.1* 16.0* 16.9*   PLATELETS 10*3/mm3 193 203 190 199     Results from last 7 days   Lab Units 25  0706 25  0640 02/15/25  0530 25  0541   SODIUM mmol/L 138 139 135*  --  139   POTASSIUM mmol/L 3.9 3.8 4.7 4.8 3.6   CHLORIDE mmol/L 102 104 105  --  106   CO2 mmol/L 25.6 26.0 22.0  --  24.0   BUN mg/dL 8 9 12  --  13   CREATININE mg/dL 0.65 0.63 0.71  --  0.75   GLUCOSE mg/dL 100* 101* 94  --  103*   EGFR mL/min/1.73 77.7 78.3 75.1  --  70.3      Results from last 7 days   Lab Units 02/13/25  0517 02/12/25  1528   ALBUMIN g/dL 3.2* 4.0   BILIRUBIN mg/dL 0.8 1.1   ALK PHOS U/L 59 73   AST (SGOT) U/L 23 34*   ALT (SGPT) U/L 16 24     Results from last 7 days   Lab Units 02/17/25  0706 02/16/25  0640 02/15/25  0530 02/14/25  0541 02/13/25  0517 02/12/25  1528   CALCIUM mg/dL 9.0 9.1 8.6 8.8 8.6 9.0   ALBUMIN g/dL  --   --   --   --  3.2* 4.0   MAGNESIUM mg/dL  --   --   --   --  2.0 2.2   PHOSPHORUS mg/dL  --   --   --   --  3.0  --      Results from last 7 days   Lab Units 02/12/25  1528   LACTATE mmol/L 1.5     Glucose   Date/Time Value Ref Range Status   02/17/2025 1117 76 70 - 130 mg/dL Final   02/17/2025 0527 92 70 - 130 mg/dL Final   02/16/2025 2036 104 70 - 130 mg/dL Final   02/16/2025 1626 108 70 - 130 mg/dL Final   02/16/2025 1156 110 70 - 130 mg/dL Final   02/16/2025 0643 101 70 - 130 mg/dL Final   02/15/2025 2029 98 70 - 130 mg/dL Final       No radiology results for the last day    I have personally reviewed all medications:  Scheduled Medications  amLODIPine, 2.5 mg, Oral, Q24H  aspirin, 81 mg, Oral, Daily  atorvastatin, 80 mg, Oral, Nightly  cefTRIAXone, 1,000 mg, Intravenous, Q24H  cholecalciferol, 1,000 Units, Oral, Daily  clopidogrel, 75 mg, Oral, Daily  levothyroxine, 50 mcg, Oral, Q AM  pantoprazole, 40 mg, Oral, Q AM  sodium chloride, 10 mL, Intravenous, Q12H  sodium chloride, 10 mL, Intravenous, Q12H  vitamin B-12, 500 mcg, Oral, Daily    Infusions  dextrose 5 % and sodium chloride 0.9 %, 60 mL/hr, Last Rate: 60 mL/hr (02/16/25 0540)    Diet  Diet: Regular/House; Feeding Assistance - Nursing; Texture: Mechanical Ground (NDD 2); Fluid Consistency: Thin (IDDSI 0)    I have personally reviewed:  [x]  Laboratory   []  Microbiology   []  Radiology   [x]  EKG/Telemetry  []  Cardiology/Vascular   []  Pathology    []  Records       Assessment/Plan     Active Hospital Problems    Diagnosis  POA    **Urinary tract infection in female [N39.0]  Yes     UTI (urinary tract infection) [N39.0]  Yes    Presence of cardiac pacemaker [Z95.0]  Yes    AV block [I44.30]  Yes    Hypothyroidism (acquired) [E03.9]  Yes    GERD (gastroesophageal reflux disease) [K21.9]  Yes    Essential hypertension [I10]  Yes    Hyperlipidemia [E78.5]  Yes      Resolved Hospital Problems   No resolved problems to display.       89 y.o. female admitted with Urinary tract infection in female.    Dizziness with unwitnessed fall, CT brain with no acute findings and MRI  revealed multiple small infarcts.  Currently on aspirin and statins and 2D echo with normal ejection fraction and no wall motion abnormalities.  EEG revealed diffuse slowing with no epileptiform discharge.  Neurology did evaluate and recommended medical management. Speech has reassessed and she has been cleared for diet. Overall, weakness still persists, plans for rehab at discharge. She is going to need neurology follow up at discharge.    Acute cystitis, she has been on Ceftriaxone, WBC worse today, but clinically she seems to be doing better, unclear etiology for worsening, urine culture is pending. She is afebrile, no other signs/symptoms to suggest infection, will continue regimen as ordered for now, repeat CBC in AM to reassess.    BP elevated and she was started on Amlodipine 2.5 mg daily. Blood pressure slightly more improved, goal <140. Will follow up on repeat pressures today, consider further increasing medication. Orthostatics negative.    Hypothyroidism, on Synthroid.    History of AV block requiring pacemaker placement.     SCDs for DVT prophylaxis.  Limited code (no CPR, no intubation).  Discussed with patient, family, nursing staff, and consulting provider.  Anticipate discharge  rehab facility  tomorrow.  Expected Discharge Date: 2/17/2025; Expected Discharge Time:       Murray Whipple DO  [facility]  02/17/25

## 2025-02-18 ENCOUNTER — APPOINTMENT (OUTPATIENT)
Dept: GENERAL RADIOLOGY | Facility: HOSPITAL | Age: OVER 89
End: 2025-02-18
Payer: MEDICARE

## 2025-02-18 LAB
ANION GAP SERPL CALCULATED.3IONS-SCNC: 12.6 MMOL/L (ref 5–15)
BACTERIA UR QL AUTO: ABNORMAL /HPF
BASOPHILS # BLD AUTO: 0.04 10*3/MM3 (ref 0–0.2)
BASOPHILS NFR BLD AUTO: 0.2 % (ref 0–1.5)
BILIRUB UR QL STRIP: NEGATIVE
BUN SERPL-MCNC: 10 MG/DL (ref 8–23)
BUN/CREAT SERPL: 16.9 (ref 7–25)
CALCIUM SPEC-SCNC: 8.3 MG/DL (ref 8.2–9.6)
CHLORIDE SERPL-SCNC: 102 MMOL/L (ref 98–107)
CLARITY UR: CLEAR
CO2 SERPL-SCNC: 20.4 MMOL/L (ref 22–29)
COLOR UR: YELLOW
CREAT SERPL-MCNC: 0.59 MG/DL (ref 0.57–1)
DEPRECATED RDW RBC AUTO: 46.4 FL (ref 37–54)
EGFRCR SERPLBLD CKD-EPI 2021: 79.6 ML/MIN/1.73
EOSINOPHIL # BLD AUTO: 0.1 10*3/MM3 (ref 0–0.4)
EOSINOPHIL NFR BLD AUTO: 0.5 % (ref 0.3–6.2)
ERYTHROCYTE [DISTWIDTH] IN BLOOD BY AUTOMATED COUNT: 13.8 % (ref 12.3–15.4)
GLUCOSE SERPL-MCNC: 90 MG/DL (ref 65–99)
GLUCOSE UR STRIP-MCNC: NEGATIVE MG/DL
HCT VFR BLD AUTO: 47.2 % (ref 34–46.6)
HGB BLD-MCNC: 15.8 G/DL (ref 12–15.9)
HGB UR QL STRIP.AUTO: ABNORMAL
HYALINE CASTS UR QL AUTO: ABNORMAL /LPF
IMM GRANULOCYTES # BLD AUTO: 0.12 10*3/MM3 (ref 0–0.05)
IMM GRANULOCYTES NFR BLD AUTO: 0.7 % (ref 0–0.5)
KETONES UR QL STRIP: NEGATIVE
LEUKOCYTE ESTERASE UR QL STRIP.AUTO: ABNORMAL
LYMPHOCYTES # BLD AUTO: 1.12 10*3/MM3 (ref 0.7–3.1)
LYMPHOCYTES NFR BLD AUTO: 6.1 % (ref 19.6–45.3)
MCH RBC QN AUTO: 30.6 PG (ref 26.6–33)
MCHC RBC AUTO-ENTMCNC: 33.5 G/DL (ref 31.5–35.7)
MCV RBC AUTO: 91.3 FL (ref 79–97)
MONOCYTES # BLD AUTO: 1.21 10*3/MM3 (ref 0.1–0.9)
MONOCYTES NFR BLD AUTO: 6.6 % (ref 5–12)
NEUTROPHILS NFR BLD AUTO: 15.69 10*3/MM3 (ref 1.7–7)
NEUTROPHILS NFR BLD AUTO: 85.9 % (ref 42.7–76)
NITRITE UR QL STRIP: NEGATIVE
NRBC BLD AUTO-RTO: 0 /100 WBC (ref 0–0.2)
PH UR STRIP.AUTO: 5.5 [PH] (ref 5–8)
PLATELET # BLD AUTO: 185 10*3/MM3 (ref 140–450)
PMV BLD AUTO: 10.3 FL (ref 6–12)
POTASSIUM SERPL-SCNC: 3.5 MMOL/L (ref 3.5–5.2)
POTASSIUM SERPL-SCNC: 3.6 MMOL/L (ref 3.5–5.2)
PROT UR QL STRIP: ABNORMAL
RBC # BLD AUTO: 5.17 10*6/MM3 (ref 3.77–5.28)
RBC # UR STRIP: ABNORMAL /HPF
REF LAB TEST METHOD: ABNORMAL
SODIUM SERPL-SCNC: 135 MMOL/L (ref 136–145)
SP GR UR STRIP: 1.02 (ref 1–1.03)
SQUAMOUS #/AREA URNS HPF: ABNORMAL /HPF
UROBILINOGEN UR QL STRIP: ABNORMAL
WBC # UR STRIP: ABNORMAL /HPF
WBC NRBC COR # BLD AUTO: 18.28 10*3/MM3 (ref 3.4–10.8)

## 2025-02-18 PROCEDURE — 25010000002 PIPERACILLIN SOD-TAZOBACTAM PER 1 G: Performed by: STUDENT IN AN ORGANIZED HEALTH CARE EDUCATION/TRAINING PROGRAM

## 2025-02-18 PROCEDURE — 71045 X-RAY EXAM CHEST 1 VIEW: CPT

## 2025-02-18 PROCEDURE — 25810000003 DEXTROSE 5 % AND SODIUM CHLORIDE 0.9 % 5-0.9 % SOLUTION: Performed by: INTERNAL MEDICINE

## 2025-02-18 PROCEDURE — 81001 URINALYSIS AUTO W/SCOPE: CPT | Performed by: STUDENT IN AN ORGANIZED HEALTH CARE EDUCATION/TRAINING PROGRAM

## 2025-02-18 PROCEDURE — 87086 URINE CULTURE/COLONY COUNT: CPT | Performed by: STUDENT IN AN ORGANIZED HEALTH CARE EDUCATION/TRAINING PROGRAM

## 2025-02-18 PROCEDURE — 87040 BLOOD CULTURE FOR BACTERIA: CPT | Performed by: STUDENT IN AN ORGANIZED HEALTH CARE EDUCATION/TRAINING PROGRAM

## 2025-02-18 PROCEDURE — 84132 ASSAY OF SERUM POTASSIUM: CPT | Performed by: STUDENT IN AN ORGANIZED HEALTH CARE EDUCATION/TRAINING PROGRAM

## 2025-02-18 PROCEDURE — 80048 BASIC METABOLIC PNL TOTAL CA: CPT | Performed by: INTERNAL MEDICINE

## 2025-02-18 PROCEDURE — 85025 COMPLETE CBC W/AUTO DIFF WBC: CPT | Performed by: INTERNAL MEDICINE

## 2025-02-18 RX ORDER — AMLODIPINE BESYLATE 2.5 MG/1
2.5 TABLET ORAL ONCE
Status: COMPLETED | OUTPATIENT
Start: 2025-02-18 | End: 2025-02-18

## 2025-02-18 RX ORDER — POTASSIUM CHLORIDE 1500 MG/1
40 TABLET, EXTENDED RELEASE ORAL EVERY 4 HOURS
Status: COMPLETED | OUTPATIENT
Start: 2025-02-18 | End: 2025-02-19

## 2025-02-18 RX ORDER — POTASSIUM CHLORIDE 1500 MG/1
40 TABLET, EXTENDED RELEASE ORAL EVERY 4 HOURS
Status: COMPLETED | OUTPATIENT
Start: 2025-02-18 | End: 2025-02-18

## 2025-02-18 RX ORDER — AMLODIPINE BESYLATE 5 MG/1
5 TABLET ORAL
Status: DISCONTINUED | OUTPATIENT
Start: 2025-02-19 | End: 2025-02-20 | Stop reason: HOSPADM

## 2025-02-18 RX ADMIN — Medication 1000 UNITS: at 09:14

## 2025-02-18 RX ADMIN — Medication 10 ML: at 09:16

## 2025-02-18 RX ADMIN — LEVOTHYROXINE SODIUM 50 MCG: 50 TABLET ORAL at 09:14

## 2025-02-18 RX ADMIN — PIPERACILLIN AND TAZOBACTAM 3.38 G: 3; .375 INJECTION, POWDER, FOR SOLUTION INTRAVENOUS at 15:34

## 2025-02-18 RX ADMIN — ASPIRIN 81 MG: 81 TABLET, DELAYED RELEASE ORAL at 09:15

## 2025-02-18 RX ADMIN — Medication 500 MCG: at 09:14

## 2025-02-18 RX ADMIN — CLOPIDOGREL BISULFATE 75 MG: 75 TABLET ORAL at 09:15

## 2025-02-18 RX ADMIN — ATORVASTATIN CALCIUM 80 MG: 80 TABLET, FILM COATED ORAL at 20:57

## 2025-02-18 RX ADMIN — POTASSIUM CHLORIDE 40 MEQ: 1500 TABLET, EXTENDED RELEASE ORAL at 11:00

## 2025-02-18 RX ADMIN — PANTOPRAZOLE SODIUM 40 MG: 40 TABLET, DELAYED RELEASE ORAL at 09:15

## 2025-02-18 RX ADMIN — POTASSIUM CHLORIDE 40 MEQ: 1500 TABLET, EXTENDED RELEASE ORAL at 15:35

## 2025-02-18 RX ADMIN — Medication 10 ML: at 20:57

## 2025-02-18 RX ADMIN — DEXTROSE MONOHYDRATE AND SODIUM CHLORIDE 60 ML/HR: 5; .9 INJECTION, SOLUTION INTRAVENOUS at 11:45

## 2025-02-18 RX ADMIN — AMLODIPINE BESYLATE 2.5 MG: 2.5 TABLET ORAL at 09:15

## 2025-02-18 RX ADMIN — PIPERACILLIN AND TAZOBACTAM 3.38 G: 3; .375 INJECTION, POWDER, FOR SOLUTION INTRAVENOUS at 18:00

## 2025-02-18 RX ADMIN — AMLODIPINE BESYLATE 2.5 MG: 2.5 TABLET ORAL at 15:00

## 2025-02-18 RX ADMIN — POTASSIUM CHLORIDE 40 MEQ: 1500 TABLET, EXTENDED RELEASE ORAL at 22:44

## 2025-02-18 NOTE — PROGRESS NOTES
[facility] Clinical Pharmacy Services: Piperacillin-Tazobactam Consult    Pt Name: Maria Fernanda Gaviria   : 1923    Indication: Pneumonia    Relevant clinical data and objective history reviewed:    Past Medical History:   Diagnosis Date    Arthritis     Arthritis of back     COVID-19 virus detected 2023    Disease of thyroid gland     GERD (gastroesophageal reflux disease)     Hip arthrosis     Age related    Hyperlipidemia     Hypertension     Low back strain Age related    Osteoporosis     Restless legs syndrome 2016     Creatinine   Date Value Ref Range Status   2025 0.59 0.57 - 1.00 mg/dL Final   2025 0.65 0.57 - 1.00 mg/dL Final   2025 0.63 0.57 - 1.00 mg/dL Final     BUN   Date Value Ref Range Status   2025 10 8 - 23 mg/dL Final     Estimated Creatinine Clearance: 40 mL/min (by C-G formula based on SCr of 0.59 mg/dL).    Lab Results   Component Value Date    WBC 18.28 (H) 2025     Temp Readings from Last 3 Encounters:   25 97.8 °F (36.6 °C) (Oral)   25 98.2 °F (36.8 °C) (Temporal)   24 96.9 °F (36.1 °C) (Temporal)      Assessment/Plan  Estimated CrCl >20 mL/min at this time; BMI 19 kg/m2  Will start piperacillin-tazobactam 3.375 g IV every 8 hours     Pharmacy will continue to follow daily while on piperacillin-tazobactam and adjust as needed. Thank you for this consult.    Edwin Puente RP  Clinical Pharmacist

## 2025-02-18 NOTE — PROGRESS NOTES
Name: Maria Fernanda Gaviria ADMIT: 2025   : 1923  PCP: Henok Salcido MD    MRN: [id number] LOS: 4 days   AGE/SEX: 89 y.o. female  ROOM: [facility]     Subjective   Subjective   Patient awake and resting in bed, conversant and pleasant, doing okay at this time.       Objective   Objective   Vital Signs  Temp:  [97.5 °F (36.4 °C)-99.8 °F (37.7 °C)] 97.5 °F (36.4 °C)  Heart Rate:  [71-81] 74  Resp:  [16] 16  BP: (131-166)/(56-95) 136/56  SpO2:  [94 %-100 %] 96 %  on   ;   Device (Oxygen Therapy): room air  Body mass index is 19.91 kg/m².  Physical Exam  Vitals and nursing note reviewed.   Constitutional:       General: She is not in acute distress.  Cardiovascular:      Rate and Rhythm: Normal rate.      Pulses: Normal pulses.      Heart sounds: Normal heart sounds.   Pulmonary:      Effort: Pulmonary effort is normal.      Breath sounds: Decreased breath sounds present. No wheezing.   Abdominal:      General: There is no distension.      Palpations: Abdomen is soft.      Tenderness: There is no abdominal tenderness.   Skin:     General: Skin is warm and dry.   Neurological:      Mental Status: She is alert.      Motor: Weakness (left sided) present.       Results Review     I reviewed the patient's new clinical results.  Results from last 7 days   Lab Units 25  0452 25  0706 25  0640 02/15/25  0530   WBC 10*3/mm3 18.28* 15.91* 9.59 9.25   HEMOGLOBIN g/dL 15.8 17.0* 17.1* 16.0*   PLATELETS 10*3/mm3 185 193 203 190     Results from last 7 days   Lab Units 25  0452 25  0706 25  0640 02/15/25  0530   SODIUM mmol/L 135* 138 139 135*   POTASSIUM mmol/L 3.6 3.9 3.8 4.7   CHLORIDE mmol/L 102 102 104 105   CO2 mmol/L 20.4* 25.6 26.0 22.0   BUN mg/dL 10 8 9 12   CREATININE mg/dL 0.59 0.65 0.63 0.71   GLUCOSE mg/dL 90 100* 101* 94   EGFR mL/min/1.73 79.6 77.7 78.3 75.1     Results from last 7 days   Lab Units 25  0517 25  1528   ALBUMIN g/dL 3.2* 4.0   BILIRUBIN mg/dL 0.8  1.1   ALK PHOS U/L 59 73   AST (SGOT) U/L 23 34*   ALT (SGPT) U/L 16 24     Results from last 7 days   Lab Units 02/18/25  0452 02/17/25  0706 02/16/25  0640 02/15/25  0530 02/14/25  0541 02/13/25  0517 02/12/25  1528   CALCIUM mg/dL 8.3 9.0 9.1 8.6   < > 8.6 9.0   ALBUMIN g/dL  --   --   --   --   --  3.2* 4.0   MAGNESIUM mg/dL  --   --   --   --   --  2.0 2.2   PHOSPHORUS mg/dL  --   --   --   --   --  3.0  --     < > = values in this interval not displayed.     Results from last 7 days   Lab Units 02/12/25  1528   LACTATE mmol/L 1.5     Glucose   Date/Time Value Ref Range Status   02/17/2025 2009 109 70 - 130 mg/dL Final   02/17/2025 1632 105 70 - 130 mg/dL Final   02/17/2025 1117 76 70 - 130 mg/dL Final   02/17/2025 0527 92 70 - 130 mg/dL Final   02/16/2025 2036 104 70 - 130 mg/dL Final   02/16/2025 1626 108 70 - 130 mg/dL Final   02/16/2025 1156 110 70 - 130 mg/dL Final       XR Chest 1 View    Result Date: 2/18/2025  Ill-defined bibasilar opacities with blunting of each costophrenic angle, which could represent small bilateral pleural effusions with underlying atelectasis, scarring, and/or pneumonia.  This report was finalized on 2/18/2025 11:42 AM by Hussain Brink MD on Workstation: [id number]       I have personally reviewed all medications:  Scheduled Medications  amLODIPine, 2.5 mg, Oral, Q24H  aspirin, 81 mg, Oral, Daily  atorvastatin, 80 mg, Oral, Nightly  cholecalciferol, 1,000 Units, Oral, Daily  clopidogrel, 75 mg, Oral, Daily  levothyroxine, 50 mcg, Oral, Q AM  pantoprazole, 40 mg, Oral, Q AM  piperacillin-tazobactam, 3.375 g, Intravenous, Once  piperacillin-tazobactam, 3.375 g, Intravenous, Q8H  potassium chloride ER, 40 mEq, Oral, Q4H  sodium chloride, 10 mL, Intravenous, Q12H  sodium chloride, 10 mL, Intravenous, Q12H  vitamin B-12, 500 mcg, Oral, Daily    Infusions  dextrose 5 % and sodium chloride 0.9 %, 60 mL/hr, Last Rate: 60 mL/hr (02/18/25 4115)    Diet  Diet: Regular/House; Feeding  Assistance - Nursing; Texture: Mechanical Ground (NDD 2); Fluid Consistency: Thin (IDDSI 0)    I have personally reviewed:  [x]  Laboratory   []  Microbiology   [x]  Radiology   [x]  EKG/Telemetry  []  Cardiology/Vascular   []  Pathology    []  Records       Assessment/Plan     Active Hospital Problems    Diagnosis  POA    **Urinary tract infection in female [N39.0]  Yes    UTI (urinary tract infection) [N39.0]  Yes    Presence of cardiac pacemaker [Z95.0]  Yes    AV block [I44.30]  Yes    Hypothyroidism (acquired) [E03.9]  Yes    GERD (gastroesophageal reflux disease) [K21.9]  Yes    Essential hypertension [I10]  Yes    Hyperlipidemia [E78.5]  Yes      Resolved Hospital Problems   No resolved problems to display.       89 y.o. female admitted with Urinary tract infection in female.    Dizziness with unwitnessed fall, CT brain with no acute findings and MRI  revealed multiple small infarcts.  Currently on aspirin and statins and 2D echo with normal ejection fraction and no wall motion abnormalities.  EEG revealed diffuse slowing with no epileptiform discharge.  Neurology did evaluate and recommended medical management. Speech has reassessed and she has been cleared for modified diet. Overall, weakness still persists, plans for rehab at discharge. She is going to need neurology follow up at discharge.    Acute cystitis, she has been on Ceftriaxone, urine culture pending. WBC worse today, nursing informed me that she had small episode of aspiration previously. CXR obtained this morning, showing findings concerning for pneumonia, likely aspiration. Change antibiotics to Zosyn, check blood cultures. Repeat CBC in AM.    BP elevated and she was started on Amlodipine 2.5 mg daily. Values intermittently improved, but still room for improvement. Increase Amlodipine to 5 mg daily, give additional 2.5 mg now to account for change. Trend BP to guide further management. Consider additional medication interventions as  indicated.     Hypothyroidism, on Synthroid.    History of AV block requiring pacemaker placement.     SCDs for DVT prophylaxis.  Limited code (no CPR, no intubation).  Discussed with patient, family, nursing staff, consulting provider, and CCP.  Anticipate discharge  rehab facility  tomorrow. If WBC improved.  Expected Discharge Date: 2/18/2025; Expected Discharge Time:       Murray Whipple DO  [facility]  02/18/25

## 2025-02-19 LAB
ANION GAP SERPL CALCULATED.3IONS-SCNC: 10.6 MMOL/L (ref 5–15)
BASOPHILS # BLD AUTO: 0.04 10*3/MM3 (ref 0–0.2)
BASOPHILS NFR BLD AUTO: 0.2 % (ref 0–1.5)
BUN SERPL-MCNC: 14 MG/DL (ref 8–23)
BUN/CREAT SERPL: 15.6 (ref 7–25)
CALCIUM SPEC-SCNC: 8.5 MG/DL (ref 8.2–9.6)
CHLORIDE SERPL-SCNC: 105 MMOL/L (ref 98–107)
CO2 SERPL-SCNC: 20.4 MMOL/L (ref 22–29)
CREAT SERPL-MCNC: 0.9 MG/DL (ref 0.57–1)
DEPRECATED RDW RBC AUTO: 45.1 FL (ref 37–54)
EGFRCR SERPLBLD CKD-EPI 2021: 56.5 ML/MIN/1.73
EOSINOPHIL # BLD AUTO: 0.2 10*3/MM3 (ref 0–0.4)
EOSINOPHIL NFR BLD AUTO: 1.2 % (ref 0.3–6.2)
ERYTHROCYTE [DISTWIDTH] IN BLOOD BY AUTOMATED COUNT: 13.8 % (ref 12.3–15.4)
GLUCOSE SERPL-MCNC: 107 MG/DL (ref 65–99)
HCT VFR BLD AUTO: 45.4 % (ref 34–46.6)
HGB BLD-MCNC: 16 G/DL (ref 12–15.9)
IMM GRANULOCYTES # BLD AUTO: 0.14 10*3/MM3 (ref 0–0.05)
IMM GRANULOCYTES NFR BLD AUTO: 0.9 % (ref 0–0.5)
LYMPHOCYTES # BLD AUTO: 1.08 10*3/MM3 (ref 0.7–3.1)
LYMPHOCYTES NFR BLD AUTO: 6.6 % (ref 19.6–45.3)
MCH RBC QN AUTO: 31.7 PG (ref 26.6–33)
MCHC RBC AUTO-ENTMCNC: 35.2 G/DL (ref 31.5–35.7)
MCV RBC AUTO: 90.1 FL (ref 79–97)
MONOCYTES # BLD AUTO: 1.33 10*3/MM3 (ref 0.1–0.9)
MONOCYTES NFR BLD AUTO: 8.1 % (ref 5–12)
NEUTROPHILS NFR BLD AUTO: 13.61 10*3/MM3 (ref 1.7–7)
NEUTROPHILS NFR BLD AUTO: 83 % (ref 42.7–76)
NRBC BLD AUTO-RTO: 0 /100 WBC (ref 0–0.2)
PLATELET # BLD AUTO: 170 10*3/MM3 (ref 140–450)
PMV BLD AUTO: 9.9 FL (ref 6–12)
POTASSIUM SERPL-SCNC: 3.9 MMOL/L (ref 3.5–5.2)
RBC # BLD AUTO: 5.04 10*6/MM3 (ref 3.77–5.28)
SODIUM SERPL-SCNC: 136 MMOL/L (ref 136–145)
WBC NRBC COR # BLD AUTO: 16.4 10*3/MM3 (ref 3.4–10.8)

## 2025-02-19 PROCEDURE — 80048 BASIC METABOLIC PNL TOTAL CA: CPT | Performed by: INTERNAL MEDICINE

## 2025-02-19 PROCEDURE — 97116 GAIT TRAINING THERAPY: CPT

## 2025-02-19 PROCEDURE — 97110 THERAPEUTIC EXERCISES: CPT

## 2025-02-19 PROCEDURE — 97535 SELF CARE MNGMENT TRAINING: CPT

## 2025-02-19 PROCEDURE — 25010000002 PIPERACILLIN SOD-TAZOBACTAM PER 1 G: Performed by: STUDENT IN AN ORGANIZED HEALTH CARE EDUCATION/TRAINING PROGRAM

## 2025-02-19 PROCEDURE — 97530 THERAPEUTIC ACTIVITIES: CPT

## 2025-02-19 PROCEDURE — 85025 COMPLETE CBC W/AUTO DIFF WBC: CPT | Performed by: INTERNAL MEDICINE

## 2025-02-19 RX ADMIN — AMLODIPINE BESYLATE 5 MG: 5 TABLET ORAL at 09:22

## 2025-02-19 RX ADMIN — Medication 500 MCG: at 09:22

## 2025-02-19 RX ADMIN — PIPERACILLIN AND TAZOBACTAM 3.38 G: 3; .375 INJECTION, POWDER, FOR SOLUTION INTRAVENOUS at 18:00

## 2025-02-19 RX ADMIN — CLOPIDOGREL BISULFATE 75 MG: 75 TABLET ORAL at 09:22

## 2025-02-19 RX ADMIN — Medication 10 ML: at 20:44

## 2025-02-19 RX ADMIN — Medication 1000 UNITS: at 09:22

## 2025-02-19 RX ADMIN — PIPERACILLIN AND TAZOBACTAM 3.38 G: 3; .375 INJECTION, POWDER, FOR SOLUTION INTRAVENOUS at 02:12

## 2025-02-19 RX ADMIN — Medication 10 ML: at 09:23

## 2025-02-19 RX ADMIN — ATORVASTATIN CALCIUM 80 MG: 80 TABLET, FILM COATED ORAL at 20:44

## 2025-02-19 RX ADMIN — PANTOPRAZOLE SODIUM 40 MG: 40 TABLET, DELAYED RELEASE ORAL at 05:56

## 2025-02-19 RX ADMIN — PIPERACILLIN AND TAZOBACTAM 3.38 G: 3; .375 INJECTION, POWDER, FOR SOLUTION INTRAVENOUS at 09:22

## 2025-02-19 RX ADMIN — POTASSIUM CHLORIDE 40 MEQ: 1500 TABLET, EXTENDED RELEASE ORAL at 02:12

## 2025-02-19 RX ADMIN — LEVOTHYROXINE SODIUM 50 MCG: 50 TABLET ORAL at 05:56

## 2025-02-19 RX ADMIN — ASPIRIN 81 MG: 81 TABLET, DELAYED RELEASE ORAL at 09:22

## 2025-02-19 NOTE — THERAPY TREATMENT NOTE
Patient Name: Maria Fernanda Gaviria  : 1923    MRN: [id number]                              Today's Date: 2025       Admit Date: 2025    Visit Dx:     ICD-10-CM ICD-9-CM   1. Urinary tract infection in female  N39.0 599.0   2. Generalized weakness  R53.1 780.79   3. Acute encephalopathy  G93.40 348.30   4. Unwitnessed fall  R29.6 [id number]     Patient Active Problem List   Diagnosis    Osteoarthritis of lumbar spine    Osteoarthritis    Asthma    Carotid stenosis    Hyperlipidemia    GERD (gastroesophageal reflux disease)    Essential hypertension    Hypothyroidism (acquired)    Osteopenia    DNR (do not resuscitate)    Non-seasonal allergic rhinitis    AV block    Presence of cardiac pacemaker    Stroke-like symptoms    Urinary tract infection in female    UTI (urinary tract infection)     Past Medical History:   Diagnosis Date    Arthritis     Arthritis of back     COVID-19 virus detected 2023    Disease of thyroid gland     GERD (gastroesophageal reflux disease)     Hip arthrosis     Age related    Hyperlipidemia     Hypertension     Low back strain Age related    Osteoporosis     Restless legs syndrome 2016     Past Surgical History:   Procedure Laterality Date    CARDIAC ELECTROPHYSIOLOGY PROCEDURE N/A 2023    Procedure: Pacemaker SC new  biotronik;  Surgeon: Deny Khanna MD;  Location: [facility] CATH INVASIVE LOCATION;  Service: Cardiovascular;  Laterality: N/A;    CATARACT EXTRACTION      CHOLECYSTECTOMY WITH INTRAOPERATIVE CHOLANGIOGRAM N/A 2021    Procedure: CHOLECYSTECTOMY LAPAROSCOPIC INTRAOPERATIVE CHOLANGIOGRAM common bile duct exploration;  Surgeon: Huey Wilkinson MD;  Location: [facility] OR;  Service: General;  Laterality: N/A;    COLONOSCOPY      ERCP N/A 2021    Procedure: ENDOSCOPIC RETROGRADE CHOLANGIOPANCREATOGRAPHY WITH SPHINCTEROTOMY, BALLOON SWEEP AND STONE (2) EXTRACTION;  Surgeon: Justo Aden MD;  Location: [facility] ENDOSCOPY;   Service: Gastroenterology;  Laterality: N/A;  PRE- BILE DUCT OBSTRUCTION   POST- SAME      HYSTERECTOMY      REPLACEMENT TOTAL KNEE Left     THYROID SURGERY      TUBAL ABDOMINAL LIGATION        General Information       Row Name 02/19/25 1505          OT Time and Intention    Document Type therapy note (daily note)  -     Mode of Treatment occupational therapy;individual therapy  -     Patient Effort good  -       Row Name 02/19/25 1505          General Information    Patient Profile Reviewed yes  -     Existing Precautions/Restrictions fall  -       Row Name 02/19/25 1505          Cognition    Orientation Status (Cognition) oriented to;person  -[facility] Name 02/19/25 1505          Safety Issues/Impairments Affecting Functional Mobility    Safety Issues Affecting Function (Mobility) judgment;problem-solving;insight into deficits/self-awareness  -     Impairments Affecting Function (Mobility) balance;endurance/activity tolerance;strength;cognition;visual/perceptual  -               User Key  (r) = Recorded By, (t) = Taken By, (c) = Cosigned By      Initials Name Provider Type     Cindy Ibarra, ADELAIDA Occupational Therapist                     Mobility/ADL's       Row Name 02/19/25 1506          Bed Mobility    Supine-Sit [place] (Bed Mobility) moderate assist (50% patient effort)  -     Assistive Device (Bed Mobility) head of bed elevated  -       Row Name 02/19/25 1506          Sit-Stand Transfer    Sit-Stand [place] (Transfers) minimum assist (75% patient effort)  -     Assistive Device (Sit-Stand Transfers) walker, 4-wheeled  -[facility] Name 02/19/25 1506          Functional Mobility    Functional Mobility- Ind. Level minimum assist (75% patient effort);contact guard assist  -     Functional Mobility- Device walker, front-wheeled  -     Functional Mobility-Distance (Feet) --  50  -     Functional Mobility- Comment improved attention overall today, does still need cues  to avoid obsticles on L side intermittently.  -       Row Name 02/19/25 1506          Activities of Daily Living    BADL Assessment/Intervention upper body dressing  -       Row Name 02/19/25 1506          Lower Body Dressing Assessment/Training    [place] Level (Lower Body Dressing) don;maximum assist (25% patient effort);shoes/slippers  -     Position (Lower Body Dressing) edge of bed sitting  -       Row Name 02/19/25 1506          Upper Body Dressing Assessment/Training    [place] Level (Upper Body Dressing) don;pajama/robe;minimum assist (75% patient effort)  -     Position (Upper Body Dressing) edge of bed sitting  -               User Key  (r) = Recorded By, (t) = Taken By, (c) = Cosigned By      Initials Name Provider Type    Cindy Rush OT Occupational Therapist                   Obj/Interventions       Row Name 02/19/25 1511          Vision Assessment/Intervention    Visual Processing Deficit elaine-inattention/neglect, left  -     Vision Assessment Comment activity seated focusing on scanning and searching on table top finding one item moved around the table with a second item (different color) for distraction. Pt able to complete with ~90% accuracy with minimal cues.  -       Row Name 02/19/25 1511          Balance    Static Sitting Balance standby assist  -     Position, Sitting Balance sitting edge of bed  -     Static Standing Balance contact guard  -     Dynamic Standing Balance contact guard;minimal assist  -     Position/Device Used, Standing Balance supported;walker, rolling  -               User Key  (r) = Recorded By, (t) = Taken By, (c) = Cosigned By      Initials Name Provider Type    Cindy Rush OT Occupational Therapist                   Goals/Plan    No documentation.                  Clinical Impression       Row Name 02/19/25 1513          Pain Assessment    Pretreatment Pain Rating 0/10 - no pain  -     Posttreatment Pain Rating  0/10 - no pain  -SM       Row Name 02/19/25 1513          Plan of Care Review    Plan of Care Reviewed With patient;family  -     Progress improving  -     Outcome Evaluation Pt is much improved today with OT session. She attends to tasks with less cues today. She is able to mobilize while talking to her daughters compared to last session when she needed cues to sequence each step. She was able to work on dressing with min A for UB and max A for LB as well as visual scanning and searching with improvement. She fed herself her lunch today. Family is very supportive and discussing group home placements following a rehab stay to improve her independence.  -       Row Name 02/19/25 1513          Therapy Plan Review/Discharge Plan (OT)    Anticipated Discharge Disposition (OT) inpatient rehabilitation facility  -       Row Name 02/19/25 1513          Positioning and Restraints    Pre-Treatment Position in bed  -SM     Post Treatment Position chair  -SM     In Chair reclined;call light within reach;encouraged to call for assist;exit alarm on;notified nsg;with family/caregiver  -               User Key  (r) = Recorded By, (t) = Taken By, (c) = Cosigned By      Initials Name Provider Type    SM Cindy Ibarra, ADELAIDA Occupational Therapist                   Outcome Measures       Row Name 02/19/25 1515          How much help from another is currently needed...    Putting on and taking off regular lower body clothing? 2  -SM     Bathing (including washing, rinsing, and drying) 2  -SM     Toileting (which includes using toilet bed pan or urinal) 2  -SM     Putting on and taking off regular upper body clothing 3  -SM     Taking care of personal grooming (such as brushing teeth) 3  -SM     Eating meals 3  -SM     AM-PAC 6 Clicks Score (OT) 15  -SM       Row Name 02/19/25 0968          How much help from another person do you currently need...    Turning from your back to your side while in flat bed without using bedrails? 3   -KT     Moving from lying on back to sitting on the side of a flat bed without bedrails? 3  -KT     Moving to and from a bed to a chair (including a wheelchair)? 2  -KT     Standing up from a chair using your arms (e.g., wheelchair, bedside chair)? 2  -KT     Climbing 3-5 steps with a railing? 2  -KT     To walk in hospital room? 2  -KT     AM-PAC 6 Clicks Score (PT) 14  -KT     Highest Level of Mobility Goal 4 --> Transfer to chair/commode  -KT       Row Name 02/19/25 1515          Functional Assessment    Outcome Measure Options AM-PAC 6 Clicks Daily Activity (OT)  -DAVE               User Key  (r) = Recorded By, (t) = Taken By, (c) = Cosigned By      Initials Name Provider Type    Fatimah Harvey, RN Registered Nurse    Cindy Rush OT Occupational Therapist                    Occupational Therapy Education       Title: PT OT SLP Therapies (Done)       Topic: Occupational Therapy (Done)       Point: ADL training (Done)       Description:   Instruct learner(s) on proper safety adaptation and remediation techniques during self care or transfers.   Instruct in proper use of assistive devices.                  Learning Progress Summary            Patient Acceptance, E, VU,NR by KT at 2/19/2025 0815    Acceptance, E, VU,NR by KT at 2/18/2025 1112    Acceptance, E,TB, VU by BC at 2/14/2025 1140    Comment: L sided awareness/attention, walker safety, ADL training, cont teaching   Family Acceptance, E, VU,NR by KT at 2/18/2025 1112                      Point: Home exercise program (Done)       Description:   Instruct learner(s) on appropriate technique for monitoring, assisting and/or progressing therapeutic exercises/activities.                  Learning Progress Summary            Patient Acceptance, E, VU,NR by KT at 2/19/2025 0815    Acceptance, E, VU,NR by KT at 2/18/2025 1112   Family Acceptance, E, VU,NR by KT at 2/18/2025 1112                      Point: Precautions (Done)       Description:    Instruct learner(s) on prescribed precautions during self-care and functional transfers.                  Learning Progress Summary            Patient Acceptance, E, VU,NR by KT at 2/19/2025 0815    Acceptance, E, VU,NR by KT at 2/18/2025 1112   Family Acceptance, E, VU,NR by KT at 2/18/2025 1112                      Point: Body mechanics (Done)       Description:   Instruct learner(s) on proper positioning and spine alignment during self-care, functional mobility activities and/or exercises.                  Learning Progress Summary            Patient Acceptance, E, VU,NR by KT at 2/19/2025 0815    Acceptance, E, VU,NR by KT at 2/18/2025 1112   Family Acceptance, E, VU,NR by KT at 2/18/2025 1112                                      User Key       Initials Effective Dates Name Provider Type Discipline     06/16/21 -  Fatimah Anderson RN Registered Nurse Nurse    BC 07/29/24 -  Leticia Murphy OT Occupational Therapist OT                  OT Recommendation and Plan     Plan of Care Review  Plan of Care Reviewed With: patient, family  Progress: improving  Outcome Evaluation: Pt is much improved today with OT session. She attends to tasks with less cues today. She is able to mobilize while talking to her daughters compared to last session when she needed cues to sequence each step. She was able to work on dressing with min A for UB and max A for LB as well as visual scanning and searching with improvement. She fed herself her lunch today. Family is very supportive and discussing prison placements following a rehab stay to improve her independence.     Time Calculation:         Time Calculation- OT       Row Name 02/19/25 2526             Time Calculation- OT    OT Start Time 1358  -      OT Stop Time 1423  -      OT Time Calculation (min) 25 min  -      OT Received On 02/19/25  -      OT - Next Appointment 02/20/25  -         Timed Charges    29376 - OT Therapeutic Activity Minutes 15  -SM      81646 -  OT Self Care/Mgmt Minutes 10  -SM         Total Minutes    Timed Charges Total Minutes 25  -SM       Total Minutes 25  -SM                User Key  (r) = Recorded By, (t) = Taken By, (c) = Cosigned By      Initials Name Provider Type    Cindy Rush OT Occupational Therapist                  Therapy Charges for Today       Code Description Service Date Service Provider Modifiers Qty    [id number]  OT THERAPEUTIC ACT EA 15 MIN 2/19/2025 Cindy Ibarra OT GO 1    [id number]  OT SELF CARE/MGMT/TRAIN EA 15 MIN 2/19/2025 Cindy Ibarra OT GO 1                 Cindy Ibarra OT  2/19/2025

## 2025-02-19 NOTE — PROGRESS NOTES
Name: Maria Fernanda Gaviria ADMIT: 2025   : 1923  PCP: Henok Salcido MD    MRN: [id number] LOS: 5 days   AGE/SEX: 89 y.o. female  ROOM: [facility]     Subjective   Subjective   Patient awake and resting in bed, family present at bedside, she is doing okay at present, she is currently on room air.  She denies acute complaints.     Objective   Objective   Vital Signs  Temp:  [97.4 °F (36.3 °C)-98.9 °F (37.2 °C)] 98 °F (36.7 °C)  Heart Rate:  [74-79] 79  Resp:  [16-20] 20  BP: (119-147)/(65-81) 141/81  SpO2:  [95 %-97 %] 95 %  on   ;   Device (Oxygen Therapy): room air  Body mass index is 19.91 kg/m².  Physical Exam  Vitals and nursing note reviewed.   Constitutional:       General: She is not in acute distress.  Cardiovascular:      Rate and Rhythm: Normal rate.      Pulses: Normal pulses.      Heart sounds: Normal heart sounds.   Pulmonary:      Effort: Pulmonary effort is normal.      Breath sounds: Decreased breath sounds present. No wheezing or rales.   Abdominal:      General: There is no distension.      Palpations: Abdomen is soft.      Tenderness: There is no abdominal tenderness.   Skin:     General: Skin is warm and dry.   Neurological:      Mental Status: She is alert.      Motor: Weakness (left sided) present.       Results Review     I reviewed the patient's new clinical results.  Results from last 7 days   Lab Units 25  03325  0452 25  0706 25  0640   WBC 10*3/mm3 16.40* 18.28* 15.91* 9.59   HEMOGLOBIN g/dL 16.0* 15.8 17.0* 17.1*   PLATELETS 10*3/mm3 170 185 193 203     Results from last 7 days   Lab Units 25  0331 25  1834 25  0452 25  0706 25  0640   SODIUM mmol/L 136  --  135* 138 139   POTASSIUM mmol/L 3.9 3.5 3.6 3.9 3.8   CHLORIDE mmol/L 105  --  102 102 104   CO2 mmol/L 20.4*  --  20.4* 25.6 26.0   BUN mg/dL 14  --  10 8 9   CREATININE mg/dL 0.90  --  0.59 0.65 0.63   GLUCOSE mg/dL 107*  --  90 100* 101*   EGFR mL/min/1.73 56.5*  --   79.6 77.7 78.3     Results from last 7 days   Lab Units 02/13/25  0517 02/12/25  1528   ALBUMIN g/dL 3.2* 4.0   BILIRUBIN mg/dL 0.8 1.1   ALK PHOS U/L 59 73   AST (SGOT) U/L 23 34*   ALT (SGPT) U/L 16 24     Results from last 7 days   Lab Units 02/19/25  0331 02/18/25  0452 02/17/25  0706 02/16/25  0640 02/14/25  0541 02/13/25  0517 02/12/25  1528   CALCIUM mg/dL 8.5 8.3 9.0 9.1   < > 8.6 9.0   ALBUMIN g/dL  --   --   --   --   --  3.2* 4.0   MAGNESIUM mg/dL  --   --   --   --   --  2.0 2.2   PHOSPHORUS mg/dL  --   --   --   --   --  3.0  --     < > = values in this interval not displayed.     Results from last 7 days   Lab Units 02/12/25  1528   LACTATE mmol/L 1.5     Glucose   Date/Time Value Ref Range Status   02/17/2025 2009 109 70 - 130 mg/dL Final   02/17/2025 1632 105 70 - 130 mg/dL Final   02/17/2025 1117 76 70 - 130 mg/dL Final   02/17/2025 0527 92 70 - 130 mg/dL Final   02/16/2025 2036 104 70 - 130 mg/dL Final   02/16/2025 1626 108 70 - 130 mg/dL Final       XR Chest 1 View    Result Date: 2/18/2025  Ill-defined bibasilar opacities with blunting of each costophrenic angle, which could represent small bilateral pleural effusions with underlying atelectasis, scarring, and/or pneumonia.  This report was finalized on 2/18/2025 11:42 AM by Hussain Brink MD on Workstation: [id number]       I have personally reviewed all medications:  Scheduled Medications  amLODIPine, 5 mg, Oral, Q24H  aspirin, 81 mg, Oral, Daily  atorvastatin, 80 mg, Oral, Nightly  cholecalciferol, 1,000 Units, Oral, Daily  clopidogrel, 75 mg, Oral, Daily  levothyroxine, 50 mcg, Oral, Q AM  pantoprazole, 40 mg, Oral, Q AM  piperacillin-tazobactam, 3.375 g, Intravenous, Q8H  sodium chloride, 10 mL, Intravenous, Q12H  sodium chloride, 10 mL, Intravenous, Q12H  vitamin B-12, 500 mcg, Oral, Daily    Infusions     Diet  Diet: Regular/House; Feeding Assistance - Nursing; Texture: Mechanical Ground (NDD 2); Fluid Consistency: Thin (IDDSI 0)    I  have personally reviewed:  [x]  Laboratory   []  Microbiology   [x]  Radiology   [x]  EKG/Telemetry  []  Cardiology/Vascular   []  Pathology    []  Records       Assessment/Plan     Active Hospital Problems    Diagnosis  POA    **Urinary tract infection in female [N39.0]  Yes    UTI (urinary tract infection) [N39.0]  Yes    Presence of cardiac pacemaker [Z95.0]  Yes    AV block [I44.30]  Yes    Hypothyroidism (acquired) [E03.9]  Yes    GERD (gastroesophageal reflux disease) [K21.9]  Yes    Essential hypertension [I10]  Yes    Hyperlipidemia [E78.5]  Yes      Resolved Hospital Problems   No resolved problems to display.       89 y.o. female admitted with Urinary tract infection in female.    Dizziness with unwitnessed fall, CT brain with no acute findings and MRI  revealed multiple small infarcts.  Currently on aspirin and statins and 2D echo with normal ejection fraction and no wall motion abnormalities.  EEG revealed diffuse slowing with no epileptiform discharge.  Neurology did evaluate and recommended medical management. Speech has reassessed and she has been cleared for modified diet. Overall, given weakness, plan is for rehab at discharge.  Continue medications as prescribed, she will need outpatient follow-up with neurology.    Acute cystitis, she was on ceftriaxone, urine culture has resulted, showing no growth.  On 02/18, her WBC count had worsened, there was no of possible concern for aspiration, so chest x-ray was obtained, showing evidence suggestive of pneumonia.  Her antibiotics were changed to Zosyn, which she remains on at present.  WBC count remains elevated, however, is now beginning to improve.  Blood cultures no growth to date.  For now, continue Zosyn as prescribed, repeat CBC in the morning for reassessment of WBC count.  Tentative plan to transition to Augmentin at discharge.    BP better controlled after adjustment to amlodipine on 02/18.  For now, blood pressure values appear acceptable,  continue amlodipine 5 mg daily as prescribed.  Trend BP to guide further management.    Hypothyroidism, on Synthroid.    History of AV block requiring pacemaker placement.     SCDs for DVT prophylaxis.  Limited code (no CPR, no intubation).  Discussed with patient, family, nursing staff, and CCP.  Anticipate discharge  rehab facility  tomorrow. If WBC improved.  Expected Discharge Date: 2/20/2025; Expected Discharge Time:       Murray Whipple DO  [facility]  02/19/25

## 2025-02-19 NOTE — THERAPY TREATMENT NOTE
Patient Name: Maria Fernanda Gaviria  : 1923    MRN: [id number]                              Today's Date: 2025       Admit Date: 2025    Visit Dx:     ICD-10-CM ICD-9-CM   1. Urinary tract infection in female  N39.0 599.0   2. Generalized weakness  R53.1 780.79   3. Acute encephalopathy  G93.40 348.30   4. Unwitnessed fall  R29.6 [id number]     Patient Active Problem List   Diagnosis    Osteoarthritis of lumbar spine    Osteoarthritis    Asthma    Carotid stenosis    Hyperlipidemia    GERD (gastroesophageal reflux disease)    Essential hypertension    Hypothyroidism (acquired)    Osteopenia    DNR (do not resuscitate)    Non-seasonal allergic rhinitis    AV block    Presence of cardiac pacemaker    Stroke-like symptoms    Urinary tract infection in female    UTI (urinary tract infection)     Past Medical History:   Diagnosis Date    Arthritis     Arthritis of back     COVID-19 virus detected 2023    Disease of thyroid gland     GERD (gastroesophageal reflux disease)     Hip arthrosis     Age related    Hyperlipidemia     Hypertension     Low back strain Age related    Osteoporosis     Restless legs syndrome 2016     Past Surgical History:   Procedure Laterality Date    CARDIAC ELECTROPHYSIOLOGY PROCEDURE N/A 2023    Procedure: Pacemaker SC new  biotronik;  Surgeon: Deny Khanna MD;  Location: [facility] CATH INVASIVE LOCATION;  Service: Cardiovascular;  Laterality: N/A;    CATARACT EXTRACTION      CHOLECYSTECTOMY WITH INTRAOPERATIVE CHOLANGIOGRAM N/A 2021    Procedure: CHOLECYSTECTOMY LAPAROSCOPIC INTRAOPERATIVE CHOLANGIOGRAM common bile duct exploration;  Surgeon: Huey Wilkinson MD;  Location: [facility] OR;  Service: General;  Laterality: N/A;    COLONOSCOPY      ERCP N/A 2021    Procedure: ENDOSCOPIC RETROGRADE CHOLANGIOPANCREATOGRAPHY WITH SPHINCTEROTOMY, BALLOON SWEEP AND STONE (2) EXTRACTION;  Surgeon: Justo Aden MD;  Location: [facility] ENDOSCOPY;   Service: Gastroenterology;  Laterality: N/A;  PRE- BILE DUCT OBSTRUCTION   POST- SAME      HYSTERECTOMY      REPLACEMENT TOTAL KNEE Left     THYROID SURGERY      TUBAL ABDOMINAL LIGATION        General Information       Row Name 02/19/25 1659          Physical Therapy Time and Intention    Document Type therapy note (daily note)  -     Mode of Treatment physical therapy  -       Row Name 02/19/25 1659          General Information    Existing Precautions/Restrictions fall  -       Row Name 02/19/25 1659          Cognition    Orientation Status (Cognition) oriented to;person;place  -               User Key  (r) = Recorded By, (t) = Taken By, (c) = Cosigned By      Initials Name Provider Type     Cindy Fontenot PTA Physical Therapist Assistant                   Mobility       Row Name 02/19/25 1659          Bed Mobility    Comment, (Bed Mobility) up in chair  -[facility] Name 02/19/25 1659          Sit-Stand Transfer    Sit-Stand [place] (Transfers) minimum assist (75% patient effort)  -     Assistive Device (Sit-Stand Transfers) walker, 4-wheeled  -[facility] Name 02/19/25 1659          Gait/Stairs (Locomotion)    [place] Level (Gait) contact guard;minimum assist (75% patient effort)  -     Assistive Device (Gait) walker, 4-wheeled  -     Patient was able to Ambulate yes  -     Distance in Feet (Gait) 30  -     Deviations/Abnormal Patterns (Gait) jack decreased;stride length decreased  -     Bilateral Gait Deviations forward flexed posture  -     Comment, (Gait/Stairs) cues to look towards the L for direction  -               User Key  (r) = Recorded By, (t) = Taken By, (c) = Cosigned By      Initials Name Provider Type     Cindy Fontenot PTA Physical Therapist Assistant                   Obj/Interventions       Row Name 02/19/25 1704          Motor Skills    Therapeutic Exercise --  seated AP and LAQ x10 reps  -               User Key  (r) = Recorded By, (t)  = Taken By, (c) = Cosigned By      Initials Name Provider Type    Cindy Traore PTA Physical Therapist Assistant                   Goals/Plan    No documentation.                  Clinical Impression       Row Name 02/19/25 1704          Pain    Pretreatment Pain Rating 0/10 - no pain  -SM     Posttreatment Pain Rating 0/10 - no pain  -SM       Row Name 02/19/25 1704          Positioning and Restraints    Pre-Treatment Position sitting in chair/recliner  -SM     Post Treatment Position chair  -SM     In Chair reclined;call light within reach;encouraged to call for assist;exit alarm on  -               User Key  (r) = Recorded By, (t) = Taken By, (c) = Cosigned By      Initials Name Provider Type    Cindy Traore PTA Physical Therapist Assistant                   Outcome Measures       Row Name 02/19/25 1709 02/19/25 0922       How much help from another person do you currently need...    Turning from your back to your side while in flat bed without using bedrails? 3  -SM 3  -KT    Moving from lying on back to sitting on the side of a flat bed without bedrails? 3  -SM 3  -KT    Moving to and from a bed to a chair (including a wheelchair)? 3  -SM 2  -KT    Standing up from a chair using your arms (e.g., wheelchair, bedside chair)? 3  -SM 2  -KT    Climbing 3-5 steps with a railing? 2  -SM 2  -KT    To walk in hospital room? 3  -SM 2  -KT    AM-PAC 6 Clicks Score (PT) 17  -SM 14  -KT    Highest Level of Mobility Goal 5 --> Static standing  -SM 4 --> Transfer to chair/commode  -KT      Row Name 02/19/25 1709 02/19/25 1515       Functional Assessment    Outcome Measure Options AM-PAC 6 Clicks Basic Mobility (PT)  - AM-PAC 6 Clicks Daily Activity (OT)  -[facility]              User Key  (r) = Recorded By, (t) = Taken By, (c) = Cosigned By      Initials Name Provider Type    Fatimah Harvey, RN Registered Nurse    Cindy Traore PTA Physical Therapist Assistant    Cindy Yarbrough, OT  Occupational Therapist                                 Physical Therapy Education       Title: PT OT SLP Therapies (Done)       Topic: Physical Therapy (Done)       Point: Mobility training (Done)       Learning Progress Summary            Patient Acceptance, E,TB,D, VU,NR by  at 2/19/2025 1710    Acceptance, E, VU,NR by KT at 2/19/2025 0815    Acceptance, E, VU,NR by KT at 2/18/2025 1112    Acceptance, E,D, NR by  at 2/17/2025 1226    Acceptance, E,D, NR by  at 2/14/2025 1046    Acceptance, E,TB,D, NR,NL by  at 2/13/2025 0943   Family Acceptance, E, VU,NR by KT at 2/18/2025 1112                      Point: Home exercise program (Done)       Learning Progress Summary            Patient Acceptance, E,TB,D, VU,NR by  at 2/19/2025 1710    Acceptance, E, VU,NR by KT at 2/19/2025 0815    Acceptance, E, VU,NR by KT at 2/18/2025 1112    Acceptance, E,D, NR by  at 2/17/2025 1226    Acceptance, E,D, NR by  at 2/14/2025 1046    Acceptance, E,TB,D, NR,NL by  at 2/13/2025 0943   Family Acceptance, E, VU,NR by KT at 2/18/2025 1112                      Point: Body mechanics (Done)       Learning Progress Summary            Patient Acceptance, E,TB,D, VU,NR by  at 2/19/2025 1710    Acceptance, E, VU,NR by KT at 2/19/2025 0815    Acceptance, E, VU,NR by KT at 2/18/2025 1112    Acceptance, E,D, NR by  at 2/17/2025 1226    Acceptance, E,D, NR by  at 2/14/2025 1046    Acceptance, E,TB,D, NR,NL by  at 2/13/2025 0943   Family Acceptance, E, VU,NR by KT at 2/18/2025 1112                      Point: Precautions (Done)       Learning Progress Summary            Patient Acceptance, E,TB,D, VU,NR by  at 2/19/2025 1710    Acceptance, E, VU,NR by KT at 2/19/2025 0815    Acceptance, E, VU,NR by KT at 2/18/2025 1112    Acceptance, E,D, NR by SM at 2/17/2025 1226    Acceptance, E,D, NR by SM at 2/14/2025 1046    Acceptance, E,TB,D, NR,NL by BH at 2/13/2025 0943   Family Acceptance, E, VU,NR by KT at 2/18/2025 1112                                       User Key       Initials Effective Dates Name Provider Type Discipline     06/16/21 -  Fatimah Anderson RN Registered Nurse Nurse     03/07/18 -  Cindy Fontenot PTA Physical Therapist Assistant PT     04/08/22 -  Marine Hinton, PT Physical Therapist PT                  PT Recommendation and Plan     Progress: improving  Outcome Evaluation: Pt tolerated treatment well this date. Required min A to stand from chair, then ambulated 30ft w/ rollator. Pt still having difficulty w/ visual field towards the L, needing cues to direct attention to objects. Pt easily distracted and again requiring cues to stay on task.     Time Calculation:         PT Charges       Row Name 02/19/25 1718             Time Calculation    Start Time 1601  -      Stop Time 1624  -      Time Calculation (min) 23 min  -      PT Received On 02/19/25  -      PT - Next Appointment 02/20/25  -                User Key  (r) = Recorded By, (t) = Taken By, (c) = Cosigned By      Initials Name Provider Type     Cindy Fontenot PTA Physical Therapist Assistant                  Therapy Charges for Today       Code Description Service Date Service Provider Modifiers Qty    20080281126 HC GAIT TRAINING EA 15 MIN 2/19/2025 Cindy Fontenot PTA GP 1    41287910252 HC PT THER PROC EA 15 MIN 2/19/2025 Cindy Fontenot PTA GP 1            PT G-Codes  Outcome Measure Options: AM-PAC 6 Clicks Basic Mobility (PT)  AM-PAC 6 Clicks Score (PT): 17  AM-PAC 6 Clicks Score (OT): 15  Modified [place] Scale: 4 - Moderately severe disability.  Unable to walk without assistance, and unable to attend to own bodily needs without assistance.  PT Discharge Summary  Anticipated Discharge Disposition (PT): inpatient rehabilitation facility    Cindy Fontenot PTA  2/19/2025

## 2025-02-19 NOTE — PROGRESS NOTES
Nutrition Services    Patient Name:  Maria Fernanda Gaviria  YOB: 1923  MRN: [id number]  Admit Date:  2/12/2025      PROGRESS NOTE      Encounter Information: Follow up       PO Diet: Diet: Regular/House; Feeding Assistance - Nursing; Texture: Mechanical Ground (NDD 2); Fluid Consistency: Thin (IDDSI 0)   PO Supplements: Magic cup   PO Intake:  25%       Current nutrition support: --   Nutrition support review:        Labs (reviewed below): See below       GI Function:  BM 2/17       Nutrition Intervention Updates: Diet advanced to mechanical ground, thin liquids. Po intake encouraged. Supplements provided and will also send Boost BID  RD to follow       Results from last 7 days   Lab Units 02/19/25  0331 02/18/25  1834 02/18/25  0452 02/17/25  0706 02/14/25  0541 02/13/25  0517 02/12/25  1528   SODIUM mmol/L 136  --  135* 138   < > 141 136   POTASSIUM mmol/L 3.9 3.5 3.6 3.9   < > 3.7 3.7   CHLORIDE mmol/L 105  --  102 102   < > 106 100   CO2 mmol/L 20.4*  --  20.4* 25.6   < > 23.8 26.0   BUN mg/dL 14  --  10 8   < > 11 14   CREATININE mg/dL 0.90  --  0.59 0.65   < > 0.67 0.81   CALCIUM mg/dL 8.5  --  8.3 9.0   < > 8.6 9.0   BILIRUBIN mg/dL  --   --   --   --   --  0.8 1.1   ALK PHOS U/L  --   --   --   --   --  59 73   ALT (SGPT) U/L  --   --   --   --   --  16 24   AST (SGOT) U/L  --   --   --   --   --  23 34*   GLUCOSE mg/dL 107*  --  90 100*   < > 101* 83    < > = values in this interval not displayed.     Results from last 7 days   Lab Units 02/19/25  0331 02/15/25  0530 02/14/25  0541 02/13/25  0517 02/12/25  1528   MAGNESIUM mg/dL  --   --   --  2.0 2.2   PHOSPHORUS mg/dL  --   --   --  3.0  --    HEMOGLOBIN g/dL 16.0*   < > 16.9* 14.7 16.3*   HEMATOCRIT % 45.4   < > 47.8* 43.7 49.7*   TRIGLYCERIDES mg/dL  --   --  65  --   --     < > = values in this interval not displayed.     COVID19   Date Value Ref Range Status   02/25/2023 Detected (C) Not Detected - Ref. Range Final     Lab Results    Component Value Date    HGBA1C 5.40 02/14/2025       Wt Readings from Last 10 Encounters:   02/14/25 0910 52.6 kg (116 lb)   02/12/25 2201 51.3 kg (113 lb 1.5 oz)   02/12/25 1525 47.4 kg (104 lb 8 oz)   02/04/25 1101 47.4 kg (104 lb 8 oz)   01/24/25 1040 49 kg (108 lb)   09/05/24 1546 49.5 kg (109 lb 3.2 oz)   07/23/24 0846 48.1 kg (106 lb)   07/22/24 1146 48.3 kg (106 lb 7.7 oz)   07/22/24 1142 47.6 kg (104 lb 15 oz)   06/07/24 0924 47.6 kg (105 lb)   05/15/24 0936 47.6 kg (105 lb)   03/25/24 1110 46.3 kg (102 lb)   01/29/24 1124 49.1 kg (108 lb 3.2 oz)   01/11/24 0902 49.8 kg (109 lb 12.8 oz)       RD to follow up per protocol.    Electronically signed by:  Iraida Quevedo RD  02/19/25 15:14 EST

## 2025-02-19 NOTE — PLAN OF CARE
Goal Outcome Evaluation:  Plan of Care Reviewed With: patient        Progress: no change     Vital signs stable. Alert to self and family. On room air. Ventricular paced cardiac rhythm. Tolerating mechanical soft diet poorly, no appetite. Encouraged oral intake. IV fluids discontinued per physician order. IV antibiotic changed to IV piperacillin from IV Rocephin. External catheter patent to wall suction. Urine lyndsay colored. Remains confused and disoriented to time, place and situation. Bed alarm / chair alarm on. Call light within patient's reach. Family visiting at bedside. Straight cath in / out U/A obtained and sent to lab. Tylenol prn given for complaint of headache. Sleeping for most of the day. Arouses easily. Fraile, pale in color. Rehab placement pre certification pending. Likely discharged to rehab facility in the morning.

## 2025-02-19 NOTE — PLAN OF CARE
Goal Outcome Evaluation:  Plan of Care Reviewed With: patient        Progress: improving  Outcome Evaluation: Pt tolerated treatment well this date. Required min A to stand from chair, then ambulated 30ft w/ rollator. Pt still having difficulty w/ visual field towards the L, needing cues to direct attention to objects. Pt easily distracted and again requiring cues to stay on task.    Anticipated Discharge Disposition (PT): inpatient rehabilitation facility

## 2025-02-19 NOTE — PLAN OF CARE
Goal Outcome Evaluation:  VSS. No c/o pain. Only oriented to self. Replaced potassium per protocol. Continue IV antibiotics. Turn Q2 hours. Pulmonary hygiene encouraged. Plan of care ongoing.

## 2025-02-19 NOTE — PLAN OF CARE
Goal Outcome Evaluation:  Plan of Care Reviewed With: patient, family        Progress: improving  Outcome Evaluation: Pt is much improved today with OT session. She attends to tasks with less cues today. She is able to mobilize while talking to her daughters compared to last session when she needed cues to sequence each step. She was able to work on dressing with min A for UB and max A for LB as well as visual scanning and searching with improvement. She fed herself her lunch today. Family is very supportive and discussing correction placements following a rehab stay to improve her independence.    Anticipated Discharge Disposition (OT): inpatient rehabilitation facility

## 2025-02-20 VITALS
SYSTOLIC BLOOD PRESSURE: 124 MMHG | HEART RATE: 73 BPM | HEIGHT: 64 IN | WEIGHT: 116 LBS | BODY MASS INDEX: 19.81 KG/M2 | RESPIRATION RATE: 20 BRPM | OXYGEN SATURATION: 97 % | DIASTOLIC BLOOD PRESSURE: 62 MMHG | TEMPERATURE: 97.1 F

## 2025-02-20 LAB
ANION GAP SERPL CALCULATED.3IONS-SCNC: 10.3 MMOL/L (ref 5–15)
BACTERIA SPEC AEROBE CULT: NO GROWTH
BASOPHILS # BLD AUTO: 0.05 10*3/MM3 (ref 0–0.2)
BASOPHILS NFR BLD AUTO: 0.5 % (ref 0–1.5)
BUN SERPL-MCNC: 11 MG/DL (ref 8–23)
BUN/CREAT SERPL: 14.5 (ref 7–25)
CALCIUM SPEC-SCNC: 9 MG/DL (ref 8.2–9.6)
CHLORIDE SERPL-SCNC: 104 MMOL/L (ref 98–107)
CO2 SERPL-SCNC: 24.7 MMOL/L (ref 22–29)
CREAT SERPL-MCNC: 0.76 MG/DL (ref 0.57–1)
DEPRECATED RDW RBC AUTO: 46.3 FL (ref 37–54)
EGFRCR SERPLBLD CKD-EPI 2021: 69.2 ML/MIN/1.73
EOSINOPHIL # BLD AUTO: 0.34 10*3/MM3 (ref 0–0.4)
EOSINOPHIL NFR BLD AUTO: 3.1 % (ref 0.3–6.2)
ERYTHROCYTE [DISTWIDTH] IN BLOOD BY AUTOMATED COUNT: 13.6 % (ref 12.3–15.4)
GLUCOSE SERPL-MCNC: 83 MG/DL (ref 65–99)
HCT VFR BLD AUTO: 47.2 % (ref 34–46.6)
HGB BLD-MCNC: 15.7 G/DL (ref 12–15.9)
IMM GRANULOCYTES # BLD AUTO: 0.06 10*3/MM3 (ref 0–0.05)
IMM GRANULOCYTES NFR BLD AUTO: 0.6 % (ref 0–0.5)
LYMPHOCYTES # BLD AUTO: 1.24 10*3/MM3 (ref 0.7–3.1)
LYMPHOCYTES NFR BLD AUTO: 11.4 % (ref 19.6–45.3)
MCH RBC QN AUTO: 30.6 PG (ref 26.6–33)
MCHC RBC AUTO-ENTMCNC: 33.3 G/DL (ref 31.5–35.7)
MCV RBC AUTO: 92 FL (ref 79–97)
MONOCYTES # BLD AUTO: 0.84 10*3/MM3 (ref 0.1–0.9)
MONOCYTES NFR BLD AUTO: 7.7 % (ref 5–12)
NEUTROPHILS NFR BLD AUTO: 76.7 % (ref 42.7–76)
NEUTROPHILS NFR BLD AUTO: 8.34 10*3/MM3 (ref 1.7–7)
NRBC BLD AUTO-RTO: 0 /100 WBC (ref 0–0.2)
PLATELET # BLD AUTO: 171 10*3/MM3 (ref 140–450)
PMV BLD AUTO: 10 FL (ref 6–12)
POTASSIUM SERPL-SCNC: 3.1 MMOL/L (ref 3.5–5.2)
RBC # BLD AUTO: 5.13 10*6/MM3 (ref 3.77–5.28)
SODIUM SERPL-SCNC: 139 MMOL/L (ref 136–145)
WBC NRBC COR # BLD AUTO: 10.87 10*3/MM3 (ref 3.4–10.8)

## 2025-02-20 PROCEDURE — 85025 COMPLETE CBC W/AUTO DIFF WBC: CPT | Performed by: INTERNAL MEDICINE

## 2025-02-20 PROCEDURE — 80048 BASIC METABOLIC PNL TOTAL CA: CPT | Performed by: INTERNAL MEDICINE

## 2025-02-20 PROCEDURE — 25010000002 PIPERACILLIN SOD-TAZOBACTAM PER 1 G: Performed by: STUDENT IN AN ORGANIZED HEALTH CARE EDUCATION/TRAINING PROGRAM

## 2025-02-20 RX ORDER — CEFDINIR 300 MG/1
300 CAPSULE ORAL 2 TIMES DAILY
Qty: 10 CAPSULE | Refills: 0 | Status: SHIPPED | OUTPATIENT
Start: 2025-02-20 | End: 2025-03-07

## 2025-02-20 RX ORDER — POTASSIUM CHLORIDE 1500 MG/1
40 TABLET, EXTENDED RELEASE ORAL EVERY 4 HOURS
Status: DISCONTINUED | OUTPATIENT
Start: 2025-02-20 | End: 2025-02-20 | Stop reason: HOSPADM

## 2025-02-20 RX ORDER — LANOLIN ALCOHOL/MO/W.PET/CERES
500 CREAM (GRAM) TOPICAL DAILY
Qty: 15 TABLET | Refills: 1 | Status: SHIPPED | OUTPATIENT
Start: 2025-02-20

## 2025-02-20 RX ORDER — CLOPIDOGREL BISULFATE 75 MG/1
75 TABLET ORAL DAILY
Qty: 30 TABLET | Refills: 2 | Status: SHIPPED | OUTPATIENT
Start: 2025-02-20

## 2025-02-20 RX ORDER — AMLODIPINE BESYLATE 5 MG/1
5 TABLET ORAL
Qty: 30 TABLET | Refills: 1 | Status: SHIPPED | OUTPATIENT
Start: 2025-02-20

## 2025-02-20 RX ORDER — ATORVASTATIN CALCIUM 80 MG/1
80 TABLET, FILM COATED ORAL NIGHTLY
Qty: 90 TABLET | Refills: 0 | Status: SHIPPED | OUTPATIENT
Start: 2025-02-20

## 2025-02-20 RX ORDER — ASPIRIN 81 MG/1
81 TABLET ORAL DAILY
Qty: 17 TABLET | Refills: 0 | Status: SHIPPED | OUTPATIENT
Start: 2025-02-20 | End: 2025-03-19

## 2025-02-20 RX ORDER — PANTOPRAZOLE SODIUM 40 MG/1
40 TABLET, DELAYED RELEASE ORAL
Qty: 30 TABLET | Refills: 1 | Status: SHIPPED | OUTPATIENT
Start: 2025-02-21

## 2025-02-20 RX ADMIN — PIPERACILLIN AND TAZOBACTAM 3.38 G: 3; .375 INJECTION, POWDER, FOR SOLUTION INTRAVENOUS at 02:26

## 2025-02-20 RX ADMIN — PIPERACILLIN AND TAZOBACTAM 3.38 G: 3; .375 INJECTION, POWDER, FOR SOLUTION INTRAVENOUS at 10:00

## 2025-02-20 RX ADMIN — LEVOTHYROXINE SODIUM 50 MCG: 50 TABLET ORAL at 05:34

## 2025-02-20 RX ADMIN — ASPIRIN 81 MG: 81 TABLET, DELAYED RELEASE ORAL at 09:49

## 2025-02-20 RX ADMIN — AMLODIPINE BESYLATE 5 MG: 5 TABLET ORAL at 09:49

## 2025-02-20 RX ADMIN — Medication 1000 UNITS: at 09:49

## 2025-02-20 RX ADMIN — PANTOPRAZOLE SODIUM 40 MG: 40 TABLET, DELAYED RELEASE ORAL at 05:34

## 2025-02-20 RX ADMIN — Medication 500 MCG: at 09:49

## 2025-02-20 RX ADMIN — POTASSIUM CHLORIDE 40 MEQ: 1500 TABLET, EXTENDED RELEASE ORAL at 11:29

## 2025-02-20 RX ADMIN — CLOPIDOGREL BISULFATE 75 MG: 75 TABLET ORAL at 09:49

## 2025-02-20 NOTE — PLAN OF CARE
Goal Outcome Evaluation:              Outcome Evaluation: Patient a/o x2, orientated to name and location, pt has conversations that make no sense and have difficulty getting back on track. All meds given as ordered. Pt has purewick in place due to weakness. No new issues noted. Pt to d/c Thursday . See v/s and labs.

## 2025-02-20 NOTE — CASE MANAGEMENT/SOCIAL WORK
Continued Stay Note  [facility]     Patient Name: Maria Fernanda Gaviria  MRN: [id number]  Today's Date: 2/20/2025    Admit Date: 2/12/2025    Plan: [facility] acute rehab   Discharge Plan       Row Name 02/20/25 1309       Plan    Plan [facility] acute rehab    Patient/Family in Agreement with Plan yes    Plan Comments Plan remains for pt to go to [facility] for acute rehab at discharge. She will need transportation. Discharge order noted. Called G[facility]/ [facility] who confirmed they have a bed and can accept today. They have set up 1pm transport today. Called and updated pt's daughter Jillian. Updated primary RN and MD. Packet complete and given to primary RN. Discharge summary faxed to facility. No further needs assessed at this time. CCP following. Rakesh CASTRO RN                   Discharge Codes    No documentation.                 Expected Discharge Date and Time       Expected Discharge Date Expected Discharge Time    Feb 20, 2025               Rakesh Washington RN

## 2025-02-20 NOTE — SIGNIFICANT NOTE
Case Management Discharge Note      Final Note: (P) [facility] acute rehab         Selected Continued Care - Discharged on 2/20/2025 Admission date: 2/12/2025 - Discharge disposition: Skilled Nursing Facility (DC - External)      Destination Coordination complete.      Service Provider Services Address Phone Fax Patient Preferred    [facility] [address] [address] [address] [phone] --              Durable Medical Equipment    No services have been selected for the patient.                Dialysis/Infusion    No services have been selected for the patient.                Home Medical Care    No services have been selected for the patient.                Therapy    No services have been selected for the patient.                Community Resources    No services have been selected for the patient.                Community & DME    No services have been selected for the patient.                         Final Discharge Disposition Code: (P) 62 - inpatient rehab facility

## 2025-02-20 NOTE — PLAN OF CARE
Goal Outcome Evaluation:  Plan of Care Reviewed With: patient        Progress: improving     Vital signs stable. Alert and oriented to person and place. Disoriented to time and situation. Up with assistance out of bed with one and use of walker. Unsteady on her feet, and frail. Patient is high risk of falls.  Bed alarm / chair alarm on. Call light within patient's reach. Patient needs frequent reorienting. Short term memory loss noted, forgetful. Family visited earlier today. Tolerating mechanical soft diet poorly, refusing to eat except a few bites. Incontinent of bladder / bowel. Diaper and external catheter in place. Urine dark lyndsay colored. Encouraged po fluid intake. Perineum reddened, skin cleansing and barrier cream provided. IV antibiotics given as ordered for UTI. Patient likely will be discharged tomorrow to [facility] rehab.

## 2025-02-20 NOTE — DISCHARGE INSTRUCTIONS
# Discharge plan   -Follow-up with PCP in 3 to 5 days   -Follow-up with neurology in 4 to 6 weeks, neurology has been appointment\   - Regular diet with mechanical ground and thin liquid   -Take Norvasc 5 mg p.o. daily   -Take aspirin 81 mg p.o. daily for 70 more doses   -Take Lipitor 80 mg p.o. daily   -Take Omnicef 300 mg p.o. twice daily x 5 days   -Take Plavix 75 mg p.o. daily   -Continue vitamin B12 500 mcg p.o. daily   -Take Protonix 40 mg p.o. daily   -Discharge to rehab

## 2025-02-20 NOTE — DISCHARGE SUMMARY
Patient Name: Maria Fernanda Gaviria  : 1923  MRN: [id number]    Date of Admission: 2025  Date of Discharge:  2025  Primary Care Physician: Henok Salcido MD      Chief Complaint:   Altered Mental Status      Discharge Diagnoses     Active Hospital Problems    Diagnosis  POA    **Urinary tract infection in female [N39.0]  Yes    UTI (urinary tract infection) [N39.0]  Yes    Presence of cardiac pacemaker [Z95.0]  Yes    AV block [I44.30]  Yes    Hypothyroidism (acquired) [E03.9]  Yes    GERD (gastroesophageal reflux disease) [K21.9]  Yes    Essential hypertension [I10]  Yes    Hyperlipidemia [E78.5]  Yes      Resolved Hospital Problems   No resolved problems to display.        Hospital Course     Maria Fernanda is a 89-year-old female with past medical history of hypertension, hyperlipidemia, hypothyroid, GERD, AV block status postcardiac pacemaker who presented to [facility] on 2025 after being found down by family.  Workup revealed UTI she was started on antibiotics.  CT head and C-spine negative.  An MRI was done and revealed multiple ischemic infarcts in the right PCA and MCA territory.  Patient started on stroke protocol and neurology was consulted.  CTA head/neck done.  Patient did have left-sided vision loss and neglect.  Neurology recommended a short-term of DAPT and recommended to follow-up in 4 to 6 weeks.  Speech advance diet to mechanical ground with thin liquids.  There was concern for an aspiration episode as she did have worsening white count and x-ray showed pneumonia.  She was started on antibiotics and this improved.  She did not have another episode of aspiration.  She was discharged to rehab on 2025 in agreement with plan below.    # Discharge plan   -Follow-up with PCP in 3 to 5 days   -Follow-up with neurology in 4 to 6 weeks, neurology has been appointment\   - Regular diet with mechanical ground and thin liquid   -Take Norvasc 5 mg p.o. daily   -Take aspirin 81 mg  p.o. daily for 70 more doses   -Take Lipitor 80 mg p.o. daily   -Take Omnicef 300 mg p.o. twice daily x 5 days   -Take Plavix 75 mg p.o. daily   -Continue vitamin B12 500 mcg p.o. daily   -Take Protonix 40 mg p.o. daily   -Discharge to rehab    Day of Discharge     Subjective:  2/20/2025  Patient seen and examined at bedside, no overnight events.  She is on room air with decreasing white count.  Plan discharge to SNF.    Physical Exam:  Temp:  [97.5 °F (36.4 °C)-98.9 °F (37.2 °C)] 97.9 °F (36.6 °C)  Heart Rate:  [73-84] 73  Resp:  [18-20] 20  BP: (127-136)/(67-76) 127/67  Body mass index is 19.91 kg/m².  Physical Exam  Constitutional:       General: She is not in acute distress.     Appearance: Normal appearance.   HENT:      Head: Normocephalic and atraumatic.      Nose: No congestion.      Mouth/Throat:      Pharynx: No oropharyngeal exudate.   Eyes:      General: No scleral icterus.  Cardiovascular:      Rate and Rhythm: Normal rate and regular rhythm.      Heart sounds: No murmur heard.     No friction rub. No gallop.   Pulmonary:      Effort: No respiratory distress.      Breath sounds: No wheezing, rhonchi or rales.   Abdominal:      General: There is no distension.      Tenderness: There is no abdominal tenderness. There is no guarding.   Musculoskeletal:         General: No swelling or deformity.      Cervical back: Normal range of motion. No rigidity.      Right lower leg: No edema.      Left lower leg: No edema.   Skin:     Coloration: Skin is not jaundiced.      Findings: No bruising or lesion.   Neurological:      Mental Status: She is alert.      Comments: Left-sided deficits         Consultants     Consult Orders (all) (From admission, onward)       Start     Ordered    02/16/25 1624  Inpatient Nutrition Consult  Once        Provider:  (Not yet assigned)    02/16/25 1623    02/13/25 1523  Inpatient Case Management  Consult  Once        Comments: Advancing dementia, need to discuss 24  hour care options   Provider:  (Not yet assigned)    02/13/25 1523    02/13/25 1521  Notify Stroke Coordinator  Once        Provider:  (Not yet assigned)    02/13/25 1523    02/13/25 1521  Inpatient Case Management  Consult  Once        Provider:  (Not yet assigned)    02/13/25 1523    02/13/25 1521  Inpatient Diabetes Educator Consult  Once,   Status:  Canceled        Provider:  (Not yet assigned)    02/13/25 1523    02/13/25 1521  Inpatient Neurology Consult Stroke  Once        Specialty:  Neurology  Provider:  Mendoza Rajput MD    02/13/25 1523    02/13/25 1333  Inpatient Neurology Consult Stroke  Once        Specialty:  Neurology  Provider:  Zelalem Schroeder MD    02/13/25 1332    02/12/25 2330  Inpatient Case Management  Consult  Once        Provider:  (Not yet assigned)    02/12/25 2330    02/12/25 1759  [facility] (on-call MD unless specified) Details  Once        Specialty:  Hospitalist  Provider:  (Not yet assigned)    02/12/25 1758                  Procedures     * Surgery not found *    Imaging Results (All)       Procedure Component Value Units Date/Time    XR Chest 1 View [[id number]] Collected: 02/18/25 1140     Updated: 02/18/25 1146    Narrative:      XR CHEST 1 VW-     DATE OF EXAM: 2/18/2025 10:51 AM     INDICATION: Worsening leukocytosis, concern for aspiration pneumonia.     COMPARISON: Radiographs 2/12/2025 and 2/23/2023. CTA neck 2/14/2025. CT  abdomen and pelvis 7/5/2024. CT chest 7/14/2017.     TECHNIQUE: A single portable AP view of the chest was obtained.     FINDINGS:  Lordotic positioning. Overlying artifacts. Patient's chin partially  secures the left lung apex. Stable left chest wall cardiac pacer device.  Ill-defined bibasilar opacities with blunting of each costophrenic  angle. No pneumothorax. Unchanged cardiac and mediastinal contours.  Calcified atherosclerotic disease in the thoracic aorta. Incompletely  evaluated chronic changes in the  thoracic spine and both shoulders. No  acute osseous abnormality is identified.       Impression:      Ill-defined bibasilar opacities with blunting of each costophrenic  angle, which could represent small bilateral pleural effusions with  underlying atelectasis, scarring, and/or pneumonia.     This report was finalized on 2/18/2025 11:42 AM by Hussain Brink MD on  Workstation: [id number]       CT Angiogram Head [[id number]] Collected: 02/14/25 1705     Updated: 02/14/25 1732    Narrative:      CT ANGIOGRAM OF THE HEAD AND NECK WITH CONTRAST     CLINICAL HISTORY: Multiple cerebral infarcts. Left hemineglect and left  homonymous hemianopsia.     TECHNIQUE: CT angiogram of the head and neck was obtained with 1 mm  axial images following the administration of IV contrast. Sagittal,  coronal, and 3-dimensional reconstructed images were obtained.  Additionally, a CT scan of the head was obtained with 3 mm axial images  before and after the administration of IV contrast.     COMPARISON: Brain MRI dated 2/13/2025 and CTA of the head and neck dated  7/22/2024.     FINDINGS:     CTA HEAD:     Head CT Findings: There is a focus of acute to subacute infarction  within the posterior aspect the right occipital lobe measuring up to 2.3  x 2.2 cm in greatest axial dimensions that is within the right PCA  distribution. Additionally, on the prior brain MRI, there were other  acute to subacute infarcts within the right aspect of the splenium of  the corpus callosum within the right PCA distribution, the right insular  cortex within the right MCA distribution, and the lateral aspect of the  right parietal and occipital lobe within the right MCA distribution. The  smaller foci of acute to subacute infarction are not well delineated on  this study. There is a 1.6 cm focus of encephalomalacia within the  posterior aspect of the left occipital lobe which is probably  representative of a chronic infarct within the left PCA distribution.      Posterior Circulation: Dominant right vertebral artery. Otherwise,  unremarkable appearance of the vertebral arteries and basilar artery.  Severe stenosis of the left P2 segment and a mild to moderate stenosis  of the right P2 segment. Similar findings noted on the prior exam  Anterior Circulation: Atherosclerotic calcifications within the carotid  siphons resulting in only relatively mild degrees of luminal compromise.  Moderate to severe stenosis of a branch of the the inferior division of  the left MCA. Moderate stenoses within the relatively proximal left A2  segment and severe stenosis within the more distal aspect of the left A2  segment. Moderate to severe stenosis of the superior division of the  right MCA. There is progression of the distal left A2 stenosis and the  stenosis within the inferior division of the left MCA  CTA NECK:     Aortic Arch: Atherosclerotic calcifications within the aortic arch.  Classic configuration.  Subclavian Arteries: Atherosclerotic calcifications within the  subclavian arteries without significant luminal compromise.  Vertebral Arteries: Mild stenosis at the origin of both vertebral  arteries.  CCA/ICA's: Mild to moderate stenosis of the distal aspect of the right  common carotid artery where the residual lumen diameter is approximately  3 to 4 mm. No significant interval change. Atherosclerotic changes  within the carotid bifurcations and proximal internal carotid arteries.  However, no significant NASCET stenosis is identified within either  internal carotid artery.     Additional neck findings: There is adenopathy within the visualized  superior mediastinum. In particular, there is a AP window lymph node  which measures up to 2.1 x 1.6 cm in greatest axial dimensions. Similar  findings were noted on the prior chest CT dated 7/14/2017.       Impression:         1. There are known foci of acute to subacute infarction within the  posterior aspect of the right occipital lobe  within the right PCA  distribution, the right aspect of the splenium of the corpus callosum  within the right PCA distribution, the right insular cortex within the  right MCA distribution, and the lateral aspect the right parietal and  occipital lobes within the right MCA distribution.     2. Moderately extensive intracranial atherosclerotic changes are again  identified including severe stenosis within the left P2 segment, a mild  to moderate degree of stenosis within the right P2 segment, a moderate  to severe degree of stenosis within a branch of the inferior division of  the left MCA, up to moderate to severe degrees of stenoses within the  left A2 segment, and moderate to severe degree of stenosis of the  superior division of the right MCA. Since the prior examination, there  is progression of the distal A2 stenosis and the stenosis within the  inferior division of the left MCA.     3. Mild to moderate stenosis within the distal aspect the right common  carotid artery where the residual lumen diameter is approximately 3 to 4  mm. However, there is no significant NASCET stenosis within either  internal carotid artery.        Radiation dose reduction techniques were utilized, including automated  exposure control and exposure modulation based on body size.     This report was finalized on 2/14/2025 5:29 PM by Dr. Zackary Smith M.D  on Workstation: [id number]       CT Angiogram Neck [[id number]] Collected: 02/14/25 1705     Updated: 02/14/25 1732    Narrative:      CT ANGIOGRAM OF THE HEAD AND NECK WITH CONTRAST     CLINICAL HISTORY: Multiple cerebral infarcts. Left hemineglect and left  homonymous hemianopsia.     TECHNIQUE: CT angiogram of the head and neck was obtained with 1 mm  axial images following the administration of IV contrast. Sagittal,  coronal, and 3-dimensional reconstructed images were obtained.  Additionally, a CT scan of the head was obtained with 3 mm axial images  before and after the  administration of IV contrast.     COMPARISON: Brain MRI dated 2/13/2025 and CTA of the head and neck dated  7/22/2024.     FINDINGS:     CTA HEAD:     Head CT Findings: There is a focus of acute to subacute infarction  within the posterior aspect the right occipital lobe measuring up to 2.3  x 2.2 cm in greatest axial dimensions that is within the right PCA  distribution. Additionally, on the prior brain MRI, there were other  acute to subacute infarcts within the right aspect of the splenium of  the corpus callosum within the right PCA distribution, the right insular  cortex within the right MCA distribution, and the lateral aspect of the  right parietal and occipital lobe within the right MCA distribution. The  smaller foci of acute to subacute infarction are not well delineated on  this study. There is a 1.6 cm focus of encephalomalacia within the  posterior aspect of the left occipital lobe which is probably  representative of a chronic infarct within the left PCA distribution.     Posterior Circulation: Dominant right vertebral artery. Otherwise,  unremarkable appearance of the vertebral arteries and basilar artery.  Severe stenosis of the left P2 segment and a mild to moderate stenosis  of the right P2 segment. Similar findings noted on the prior exam  Anterior Circulation: Atherosclerotic calcifications within the carotid  siphons resulting in only relatively mild degrees of luminal compromise.  Moderate to severe stenosis of a branch of the the inferior division of  the left MCA. Moderate stenoses within the relatively proximal left A2  segment and severe stenosis within the more distal aspect of the left A2  segment. Moderate to severe stenosis of the superior division of the  right MCA. There is progression of the distal left A2 stenosis and the  stenosis within the inferior division of the left MCA  CTA NECK:     Aortic Arch: Atherosclerotic calcifications within the aortic arch.  Classic  configuration.  Subclavian Arteries: Atherosclerotic calcifications within the  subclavian arteries without significant luminal compromise.  Vertebral Arteries: Mild stenosis at the origin of both vertebral  arteries.  CCA/ICA's: Mild to moderate stenosis of the distal aspect of the right  common carotid artery where the residual lumen diameter is approximately  3 to 4 mm. No significant interval change. Atherosclerotic changes  within the carotid bifurcations and proximal internal carotid arteries.  However, no significant NASCET stenosis is identified within either  internal carotid artery.     Additional neck findings: There is adenopathy within the visualized  superior mediastinum. In particular, there is a AP window lymph node  which measures up to 2.1 x 1.6 cm in greatest axial dimensions. Similar  findings were noted on the prior chest CT dated 7/14/2017.       Impression:         1. There are known foci of acute to subacute infarction within the  posterior aspect of the right occipital lobe within the right PCA  distribution, the right aspect of the splenium of the corpus callosum  within the right PCA distribution, the right insular cortex within the  right MCA distribution, and the lateral aspect the right parietal and  occipital lobes within the right MCA distribution.     2. Moderately extensive intracranial atherosclerotic changes are again  identified including severe stenosis within the left P2 segment, a mild  to moderate degree of stenosis within the right P2 segment, a moderate  to severe degree of stenosis within a branch of the inferior division of  the left MCA, up to moderate to severe degrees of stenoses within the  left A2 segment, and moderate to severe degree of stenosis of the  superior division of the right MCA. Since the prior examination, there  is progression of the distal A2 stenosis and the stenosis within the  inferior division of the left MCA.     3. Mild to moderate stenosis  within the distal aspect the right common  carotid artery where the residual lumen diameter is approximately 3 to 4  mm. However, there is no significant NASCET stenosis within either  internal carotid artery.        Radiation dose reduction techniques were utilized, including automated  exposure control and exposure modulation based on body size.     This report was finalized on 2/14/2025 5:29 PM by Dr. Zackary Smith M.D  on Workstation: [id number]       MRI Brain Without Contrast [[id number]] Collected: 02/13/25 1347     Updated: 02/14/25 0617    Narrative:      MRI OF THE BRAIN WITHOUT CONTRAST ON 02/13/2025     CLINICAL HISTORY: This is a 89-year-old female patient who had loss of  consciousness and altered mental status and confusion.     TECHNIQUE: Axial T1, FLAIR, fat-suppressed T2, axial diffusion and  gradient echo T2 and sagittal T1-weighted images were obtained of the  entire head.     COMPARISON: This is correlated to a prior MRI of the brain from [facility] on 03/17/2015. This is also correlated to a series of  4 head CTs between 02/21/2023 and most recent head CT on 02/12/2025 and  a prior CT angiogram of the head and neck on 07/22/2024.     FINDINGS: There are patchy nodular foci of T2 and FLAIR hyperintensity  in the periventricular extending into the subcortical white matter of  the cerebral hemispheres most consistent with moderate small vessel  disease. 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 This is a new finding when compared to prior MRI of the  brain on 03/17/2015. There is a cluster of small nodular  and patchy  diffusion hyperintense foci in the medial right occipital lobe that are  high signal on the FLAIR and T2-weighted images and low signal on the  ADC maps compatible with small cluster of acute infarct, the largest of  which measures 2.2 x 2.3 cm in size in the posterior medial right  occipital lobe with several subcentimeter foci in the mid and anterior  medial right occipital lobe compatible with small acute infarcts in the  distribution of medial occipital branches of the right PCA territory.  Furthermore there is a diffusion hyperintense nodular focus in the  posterior lateral right parietal white matter measuring 6 x 5 mm in size  and 2 separate 3 and 6 mm diffusion hyperintense foci in the superior  lateral right parietal cortex and a very tiny 7 x 3 mm focus in the  posterior right insular cortex and these tiny acute infarcts are in the  right MCA territory. The remainder of the brain parenchyma is normal in  signal intensity. There is mild cerebral atrophy. The ventricles are  upper limits of normal in size. I see no mass effect and no midline  shift and no extra-axial fluid collections are identified. The calvarium  and the skull base demonstrate normal marrow signal intensity. There are  intraocular lens implants in the globes from previous bilateral cataract  surgery. Otherwise the orbits are normal in appearance. The paranasal  sinuses and the mastoid air cells and middle ear cavities are clear.  Good flow voids are demonstrated within the cerebral vessels and in the  dural venous sinuses.        Impression:         1. There is a cluster small patchy and nodular diffusion hyperintense  foci in the medial right occipital lobe compatible with multiple small  acute medial right occipital infarcts in the right PCA territory, the  largest measuring 2.2 x 2.3 cm in size in the posterior medial right  occipital lobe and there are multiple subcentimeter foci of acute  infarction in the mid and  anteromedial right occipital lobe in the right  PCA territory. Furthermore there are several small acute infarcts in the  right MCA territory including a 6 x 5 mm acute posterior lateral right  parietal white matter infarct, 2 separate 3 and 6 mm acute infarcts in  the superior lateral right occipital lobe and a 7 x 3 mm acute infarct  in the posterior right insular cortex, all 4 of which are in the right  MCA territory. No additional acute abnormality is seen.     2. There is moderate small vessel disease in the cerebral white matter.  There is mild diffuse cerebral atrophy. This patient has a 16 x 5 mm  hemosiderin lined area of encephalomalacia in the posterior inferior  left parietal cortex from the sequela of either an old infarct that had  hemorrhage and or possibly old head trauma with hemorrhage or focus of  primary intraparenchymal hemorrhage from amyloid that bled into the  parenchyma. There is adjacent hemosiderin staining the adjacent left  posterior parietal sulci from prior subarachnoid hemorrhage that likely  occurred at the same time as the small focus of intraparenchymal  hemorrhage in the posterior left parietal lobe and correlation with  clinical history suggested.     3. The remainder of the MRI of the brain is unremarkable. All of the  above findings were discussed in great detail with Dr. William Martinez, the  hospitalist taking care of the patient, by telephone on 02/13/2025 at  1:20 p.m.     This report was finalized on 2/14/2025 6:13 AM by Dr. Morgan Horta M.D  on Workstation: [id number]       CT Head Without Contrast [[id number]] Collected: 02/12/25 1723     Updated: 02/12/25 1734    Narrative:      CT HEAD WO CONTRAST-, CT CERVICAL SPINE WO CONTRAST-     HISTORY:  unwitnessed fall, confusion; N39.0-Urinary tract infection,  site not specified; R53.1-Weakness; G93.40-Encephalopathy, unspecified;  R29.6-Repeated falls     COMPARISON: CT angiogram neck and head 7/22/2024     CT HEAD WITHOUT  CONTRAST: The brain ventricles are symmetrical. There is  no evidence of hemorrhage, hydrocephalus or of a focal area of decreased  attenuation to suggest acute infarction. Moderate small vessel ischemic  disease is noted. Moderate vascular calcification is noted. Bone windows  showed no evidence of a calvarial fracture. Further evaluation could be  performed with a MRI examination of the brain as indicated.     CT CERVICAL SPINE WITHOUT CONTRAST: There is a grade 1 anterolisthesis  of C3 upon C4 and to lesser extent C4 upon C5. There is severe loss of  disc height at C5-6 and moderate to severe loss of disc height at C6-7.  The facets are fused on the right from C3-4 to C6. There is no evidence  of prevertebral edema or fracture.     C2-3: Mild facet degenerative disease is present bilaterally.     C3-4: Moderate facet degenerative disease is present on the left.     C5-6: Mild to moderate facet degenerative disease is present on the  right.     C6-7: A central and right paramedian disc osteophyte complex is present  which abuts and mildly flattens the ventral surface of the cord.     C7-T1: There is no evidence of a disc bulge or herniation.                 Radiation dose reduction techniques were utilized, including automated  exposure modulation based on body size.     This report was finalized on 2/12/2025 5:31 PM by Dr. Robert Galvan M.D  on Workstation: [id number]       CT Cervical Spine Without Contrast [[id number]] Collected: 02/12/25 1723     Updated: 02/12/25 1734    Narrative:      CT HEAD WO CONTRAST-, CT CERVICAL SPINE WO CONTRAST-     HISTORY:  unwitnessed fall, confusion; N39.0-Urinary tract infection,  site not specified; R53.1-Weakness; G93.40-Encephalopathy, unspecified;  R29.6-Repeated falls     COMPARISON: CT angiogram neck and head 7/22/2024     CT HEAD WITHOUT CONTRAST: The brain ventricles are symmetrical. There is  no evidence of hemorrhage, hydrocephalus or of a focal area of  decreased  attenuation to suggest acute infarction. Moderate small vessel ischemic  disease is noted. Moderate vascular calcification is noted. Bone windows  showed no evidence of a calvarial fracture. Further evaluation could be  performed with a MRI examination of the brain as indicated.     CT CERVICAL SPINE WITHOUT CONTRAST: There is a grade 1 anterolisthesis  of C3 upon C4 and to lesser extent C4 upon C5. There is severe loss of  disc height at C5-6 and moderate to severe loss of disc height at C6-7.  The facets are fused on the right from C3-4 to C6. There is no evidence  of prevertebral edema or fracture.     C2-3: Mild facet degenerative disease is present bilaterally.     C3-4: Moderate facet degenerative disease is present on the left.     C5-6: Mild to moderate facet degenerative disease is present on the  right.     C6-7: A central and right paramedian disc osteophyte complex is present  which abuts and mildly flattens the ventral surface of the cord.     C7-T1: There is no evidence of a disc bulge or herniation.                 Radiation dose reduction techniques were utilized, including automated  exposure modulation based on body size.     This report was finalized on 2/12/2025 5:31 PM by Dr. Robert Galvan M.D  on Workstation: [id number]       XR Chest 1 View [[id number]] Collected: 02/12/25 1547     Updated: 02/12/25 1552    Narrative:      XR CHEST 1 VW-        INDICATION: Unwitnessed fall, altered mental status     COMPARISON: Chest radiograph February 23, 2023     TECHNIQUE: 1 view chest     FINDINGS:      Small bibasilar opacities. No effusions. Stable mediastinum. Heart is  normal in size. Left-sided pacemaker with a right ventricular lead.       Impression:      Stable chest with some subsegmental atelectasis or scarring at the bases     This report was finalized on 2/12/2025 3:49 PM by Dr. Jaron Jennings M.D on Workstation: [id number]             Results for orders placed during the  hospital encounter of 02/12/25    Duplex Venous Lower Extremity - Bilateral CAR    Interpretation Summary    Right popliteal fossa fluid collection.    Normal bilateral lower extremity venous duplex scan.    Results for orders placed during the hospital encounter of 02/12/25    Adult Transthoracic Echo Complete W/ Cont if Necessary Per Protocol    Interpretation Summary    Left ventricular systolic function is normal. Calculated left ventricular EF = 57.8%    Left ventricular wall thickness is consistent with mild concentric hypertrophy. Sigmoid-shaped ventricular septum is present.    Left ventricular diastolic function was indeterminate.    There is moderate calcification of the aortic valve. Trace aortic valve regurgitation is present. No aortic valve stenosis is present.    Estimated right ventricular systolic pressure from tricuspid regurgitation is normal (<35 mmHg).    Pertinent Labs     Results from last 7 days   Lab Units 02/20/25  0540 02/19/25  0331 02/18/25  0452 02/17/25  0706   WBC 10*3/mm3 10.87* 16.40* 18.28* 15.91*   HEMOGLOBIN g/dL 15.7 16.0* 15.8 17.0*   PLATELETS 10*3/mm3 171 170 185 193     Results from last 7 days   Lab Units 02/20/25  0540 02/19/25  0331 02/18/25  1834 02/18/25  0452 02/17/25  0706   SODIUM mmol/L 139 136  --  135* 138   POTASSIUM mmol/L 3.1* 3.9 3.5 3.6 3.9   CHLORIDE mmol/L 104 105  --  102 102   CO2 mmol/L 24.7 20.4*  --  20.4* 25.6   BUN mg/dL 11 14  --  10 8   CREATININE mg/dL 0.76 0.90  --  0.59 0.65   GLUCOSE mg/dL 83 107*  --  90 100*   EGFR mL/min/1.73 69.2 56.5*  --  79.6 77.7       Results from last 7 days   Lab Units 02/20/25  0540 02/19/25  0331 02/18/25  0452 02/17/25  0706   CALCIUM mg/dL 9.0 8.5 8.3 9.0           Results from last 7 days   Lab Units 02/14/25  0541   CHOLESTEROL mg/dL 240*   TRIGLYCERIDES mg/dL 65   HDL CHOL mg/dL 64*   LDL CHOL mg/dL 165*     Results from last 7 days   Lab Units 02/18/25  1213 02/18/25  1052 02/18/25  1051   BLOODCX   --  No  growth at 24 hours No growth at 24 hours   URINECX  No growth  --   --          Test Results Pending at Discharge     Pending Results       None              Discharge Details        Discharge Medications        New Medications        Instructions Start Date   amLODIPine 5 MG tablet  Commonly known as: NORVASC   5 mg, Oral, Every 24 Hours Scheduled      aspirin 81 MG EC tablet   81 mg, Oral, Daily      atorvastatin 80 MG tablet  Commonly known as: LIPITOR   80 mg, Oral, Nightly      cefdinir 300 MG capsule  Commonly known as: OMNICEF   300 mg, Oral, 2 Times Daily      clopidogrel 75 MG tablet  Commonly known as: PLAVIX   75 mg, Oral, Daily      cyanocobalamin 500 MCG tablet  Commonly known as: VITAMIN B-12   500 mcg, Oral, Daily      pantoprazole 40 MG EC tablet  Commonly known as: PROTONIX   40 mg, Oral, Every Early Morning   Start Date: February 21, 2025            Continue These Medications        Instructions Start Date   acetaminophen 325 MG tablet  Commonly known as: TYLENOL   650 mg, Every 6 Hours PRN      fexofenadine 180 MG tablet  Commonly known as: ALLEGRA   180 mg, Daily      levothyroxine 50 MCG tablet  Commonly known as: SYNTHROID, LEVOTHROID   TAKE 1 TABLET EVERY MORNING               Allergies   Allergen Reactions    Aspirin Nausea And Vomiting and Nausea Only    Aspirin Buf(Cacarb-Mgcarb-Mgo) Nausea And Vomiting    Latex Rash     Redness on skin       Discharge Disposition:  Skilled Nursing Facility (DC - External)      Discharge Diet:  Diet Order   Procedures    Diet: Regular/House; Feeding Assistance - Nursing; Texture: Mechanical Ground (NDD 2); Fluid Consistency: Thin (IDDSI 0)       Discharge Activity:       CODE STATUS:    Code Status and Medical Interventions: No CPR (Do Not Attempt to Resuscitate); Limited Support; No intubation (DNI), No cardioversion   Ordered at: 02/12/25 2503     Medical Intervention Limits:    No intubation (DNI)    No cardioversion     Code Status (Patient has no pulse  and is not breathing):    No CPR (Do Not Attempt to Resuscitate)     Medical Interventions (Patient has pulse or is breathing):    Limited Support       Future Appointments   Date Time Provider Department Center   2/25/2025  2:30 PM Henok Salcido MD [facility]  [place]   3/31/2025  1:00 PM Henok Salcido MD [facility]  [place]   4/21/2025  3:00 PM Christiano Bauer MD [facility] [place]   5/6/2025 11:10 AM Pascual Crain APRN [facility] [address] [place]   5/14/2025 10:15 AM [facility] SPT [facility] DEVICE CHECK [facility] [place]   1/19/2026 11:45 AM Deny Khanna MD [facility] [place]      Contact information for follow-up providers       Henok Salcido MD .    Specialty: Internal Medicine  Contact information:  [address] [address]  [phone]                       Contact information for after-discharge care       Destination       [facility] .    Service: Inpatient Rehabilitation  Contact information:  [address] [facility] [address] [phone]                                   Time Spent on Discharge: 45 minutes      Mckinley Dinero DO  [facility]  02/20/25  07:17 EST

## 2025-02-23 LAB
BACTERIA SPEC AEROBE CULT: NORMAL
BACTERIA SPEC AEROBE CULT: NORMAL

## 2025-02-24 ENCOUNTER — TELEPHONE (OUTPATIENT)
Dept: INTERNAL MEDICINE | Age: OVER 89
End: 2025-02-24

## 2025-02-24 NOTE — TELEPHONE ENCOUNTER
Caller: Juan Jones    Relationship: Emergency Contact    Best call back number: [phone]     What was the call regarding: PATIENT WILL BE GOING TO A NURSING HOME AND SHE WILL NOT BE COMING BACK TO DR. ROBERTSON FOR CARE.

## 2025-03-05 ENCOUNTER — APPOINTMENT (OUTPATIENT)
Dept: GENERAL RADIOLOGY | Facility: HOSPITAL | Age: OVER 89
End: 2025-03-05
Payer: MEDICARE

## 2025-03-05 ENCOUNTER — APPOINTMENT (OUTPATIENT)
Dept: CT IMAGING | Facility: HOSPITAL | Age: OVER 89
End: 2025-03-05
Payer: MEDICARE

## 2025-03-05 ENCOUNTER — HOSPITAL ENCOUNTER (EMERGENCY)
Facility: HOSPITAL | Age: OVER 89
Discharge: NURSING, REHAB OR LONG-TERM CARE | End: 2025-03-05
Attending: STUDENT IN AN ORGANIZED HEALTH CARE EDUCATION/TRAINING PROGRAM | Admitting: STUDENT IN AN ORGANIZED HEALTH CARE EDUCATION/TRAINING PROGRAM
Payer: MEDICARE

## 2025-03-05 VITALS
WEIGHT: 116 LBS | HEIGHT: 64 IN | OXYGEN SATURATION: 97 % | DIASTOLIC BLOOD PRESSURE: 76 MMHG | HEART RATE: 69 BPM | SYSTOLIC BLOOD PRESSURE: 133 MMHG | TEMPERATURE: 99 F | BODY MASS INDEX: 19.81 KG/M2 | RESPIRATION RATE: 18 BRPM

## 2025-03-05 DIAGNOSIS — R41.82 ALTERED MENTAL STATUS, UNSPECIFIED ALTERED MENTAL STATUS TYPE: ICD-10-CM

## 2025-03-05 DIAGNOSIS — R51.9 FRONTAL HEADACHE: Primary | ICD-10-CM

## 2025-03-05 LAB
ALBUMIN SERPL-MCNC: 2.7 G/DL (ref 3.5–5.2)
ALBUMIN/GLOB SERPL: 0.8 G/DL
ALP SERPL-CCNC: 79 U/L (ref 39–117)
ALT SERPL W P-5'-P-CCNC: 16 U/L (ref 1–33)
ANION GAP SERPL CALCULATED.3IONS-SCNC: 11.8 MMOL/L (ref 5–15)
AST SERPL-CCNC: 26 U/L (ref 1–32)
BILIRUB SERPL-MCNC: 0.8 MG/DL (ref 0–1.2)
BILIRUB UR QL STRIP: NEGATIVE
BUN SERPL-MCNC: 14 MG/DL (ref 8–23)
BUN/CREAT SERPL: 21.5 (ref 7–25)
CALCIUM SPEC-SCNC: 8.9 MG/DL (ref 8.2–9.6)
CHLORIDE SERPL-SCNC: 100 MMOL/L (ref 98–107)
CLARITY UR: CLEAR
CO2 SERPL-SCNC: 24.2 MMOL/L (ref 22–29)
COLOR UR: ABNORMAL
CREAT SERPL-MCNC: 0.65 MG/DL (ref 0.57–1)
DEPRECATED RDW RBC AUTO: 44.4 FL (ref 37–54)
EGFRCR SERPLBLD CKD-EPI 2021: 77.7 ML/MIN/1.73
EOSINOPHIL # BLD MANUAL: 0.17 10*3/MM3 (ref 0–0.4)
EOSINOPHIL NFR BLD MANUAL: 2 % (ref 0.3–6.2)
ERYTHROCYTE [DISTWIDTH] IN BLOOD BY AUTOMATED COUNT: 13.1 % (ref 12.3–15.4)
GLOBULIN UR ELPH-MCNC: 3.5 GM/DL
GLUCOSE SERPL-MCNC: 90 MG/DL (ref 65–99)
GLUCOSE UR STRIP-MCNC: NEGATIVE MG/DL
HCT VFR BLD AUTO: 48.5 % (ref 34–46.6)
HGB BLD-MCNC: 16 G/DL (ref 12–15.9)
HGB UR QL STRIP.AUTO: NEGATIVE
KETONES UR QL STRIP: NEGATIVE
LEUKOCYTE ESTERASE UR QL STRIP.AUTO: NEGATIVE
LYMPHOCYTES # BLD MANUAL: 2.05 10*3/MM3 (ref 0.7–3.1)
LYMPHOCYTES NFR BLD MANUAL: 8 % (ref 5–12)
MCH RBC QN AUTO: 30.7 PG (ref 26.6–33)
MCHC RBC AUTO-ENTMCNC: 33 G/DL (ref 31.5–35.7)
MCV RBC AUTO: 93.1 FL (ref 79–97)
MONOCYTES # BLD: 0.68 10*3/MM3 (ref 0.1–0.9)
NEUTROPHILS # BLD AUTO: 5.63 10*3/MM3 (ref 1.7–7)
NEUTROPHILS NFR BLD MANUAL: 66 % (ref 42.7–76)
NITRITE UR QL STRIP: NEGATIVE
PH UR STRIP.AUTO: 6.5 [PH] (ref 5–8)
PLAT MORPH BLD: NORMAL
PLATELET # BLD AUTO: 208 10*3/MM3 (ref 140–450)
PMV BLD AUTO: 10.4 FL (ref 6–12)
POTASSIUM SERPL-SCNC: 4 MMOL/L (ref 3.5–5.2)
PROT SERPL-MCNC: 6.2 G/DL (ref 6–8.5)
PROT UR QL STRIP: NEGATIVE
RBC # BLD AUTO: 5.21 10*6/MM3 (ref 3.77–5.28)
RBC MORPH BLD: NORMAL
SODIUM SERPL-SCNC: 136 MMOL/L (ref 136–145)
SP GR UR STRIP: 1.02 (ref 1–1.03)
UROBILINOGEN UR QL STRIP: ABNORMAL
VARIANT LYMPHS NFR BLD MANUAL: 24 % (ref 19.6–45.3)
WBC MORPH BLD: NORMAL
WBC NRBC COR # BLD AUTO: 8.53 10*3/MM3 (ref 3.4–10.8)

## 2025-03-05 PROCEDURE — 71045 X-RAY EXAM CHEST 1 VIEW: CPT

## 2025-03-05 PROCEDURE — 85007 BL SMEAR W/DIFF WBC COUNT: CPT | Performed by: STUDENT IN AN ORGANIZED HEALTH CARE EDUCATION/TRAINING PROGRAM

## 2025-03-05 PROCEDURE — 80053 COMPREHEN METABOLIC PANEL: CPT | Performed by: STUDENT IN AN ORGANIZED HEALTH CARE EDUCATION/TRAINING PROGRAM

## 2025-03-05 PROCEDURE — 81003 URINALYSIS AUTO W/O SCOPE: CPT | Performed by: STUDENT IN AN ORGANIZED HEALTH CARE EDUCATION/TRAINING PROGRAM

## 2025-03-05 PROCEDURE — P9612 CATHETERIZE FOR URINE SPEC: HCPCS

## 2025-03-05 PROCEDURE — 99284 EMERGENCY DEPT VISIT MOD MDM: CPT

## 2025-03-05 PROCEDURE — 70450 CT HEAD/BRAIN W/O DYE: CPT

## 2025-03-05 PROCEDURE — 85025 COMPLETE CBC W/AUTO DIFF WBC: CPT | Performed by: STUDENT IN AN ORGANIZED HEALTH CARE EDUCATION/TRAINING PROGRAM

## 2025-03-05 NOTE — CASE MANAGEMENT/SOCIAL WORK
Discharge Planning Assessment  Psychiatric     Patient Name: Maria Fernanda Gaviria  MRN: [id number]  Today's Date: 3/5/2025    Admit Date: 3/5/2025        Discharge Needs Assessment    No documentation.                  Discharge Plan       Row Name 03/05/25 1130       Plan    Plan Comments Received return call from Cecilio with [facility], who informs that they are unable to provide transport today. I reached out to [facility] dispatch and spoke with Kathryn, who informs they can pick patient up in about 15 minutes with wheelchair van. Primary RN ELIEL Reynoso RN      Row Name 03/05/25 1110       Plan    Plan Comments Contacted Cecilio, clinical liaison with [facility], to inform patient is being discharged back to rehab. Cecilio will reach out to admissions coordinator to arrange wheelchair van transport back to facility. Awaiting ETA. ELIEL Jones RN                  Continued Care and Services - Admitted Since 3/5/2025    No active coordination exists for this encounter.       Selected Continued Care - Prior Encounters Includes continued care and service providers with selected services from prior encounters from 12/5/2024 to 3/5/2025      Discharged on 2/20/2025 Admission date: 2/12/2025 - Discharge disposition: Rehab Facility or Unit (DC - External)      Destination       Service Provider Services Address Phone Fax Patient Preferred    [facility] [id number] [place] [id number] [phone] --                             Demographic Summary    No documentation.                  Functional Status    No documentation.                  Psychosocial    No documentation.                  Abuse/Neglect    No documentation.                  Legal    No documentation.                  Substance Abuse    No documentation.                  Patient Forms    No documentation.                     Jaron Jung, FRAN

## 2025-03-05 NOTE — ED NOTES
Pt to ER via EMS from [facility] for slurred speech x this morning. Pt reports dizziness and headache x today. Pt had a prior stroke with residual deficits.
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
## 2025-03-05 NOTE — CASE MANAGEMENT/SOCIAL WORK
Discharge Planning Assessment  [facility]     Patient Name: Maria Fernanda Gaivria  MRN: [id number]  Today's Date: 3/5/2025    Admit Date: 3/5/2025        Discharge Needs Assessment    No documentation.                  Discharge Plan       Row Name 03/05/25 1110       Plan    Plan Comments Contacted Cecilio, clinical liaison with [facility], to inform patient is being discharged back to rehab. Cecilio will reach out to admissions coordinator to arrange wheelchair van transport back to facility. Awaiting ETA. ELIEL Jones RN                  Continued Care and Services - Admitted Since 3/5/2025    No active coordination exists for this encounter.       Selected Continued Care - Prior Encounters Includes continued care and service providers with selected services from prior encounters from 12/5/2024 to 3/5/2025      Discharged on 2/20/2025 Admission date: 2/12/2025 - Discharge disposition: Rehab Facility or Unit (DC - External)      Destination       Service Provider Services Address Phone Fax Patient Preferred    [facility] [address] [id number] [phone] --                             Demographic Summary    No documentation.                  Functional Status    No documentation.                  Psychosocial    No documentation.                  Abuse/Neglect    No documentation.                  Legal    No documentation.                  Substance Abuse    No documentation.                  Patient Forms    No documentation.                     Jaron Jung, RN

## 2025-04-14 ENCOUNTER — TELEPHONE (OUTPATIENT)
Dept: CARDIOLOGY | Facility: CLINIC | Age: OVER 89
End: 2025-04-14

## 2025-04-14 NOTE — TELEPHONE ENCOUNTER
"“Please be informed that patient has passed. Patient has been marked  in the system. The date of death is: 2025\".    Caller: Jillian Patel    Relationship: Emergency Contact    Best call back number: [phone]    Did the patient have surgery within 30 days of their passing (Y/N): NO    PT'S DAUGHTER WANTED TO KNOW IF SHE SHOULD RETURN THE PACEMAKER DEVICE TO US OR IF SHE COULD DONATE IT? PLEASE CALL DAUGHTER   "

## (undated) DEVICE — SUREFIT, DUAL DISPERSIVE ELECTRODE, CONTACT QUALITY MONITOR: Brand: SUREFIT

## (undated) DEVICE — GLV SURG BIOGEL LTX PF 6

## (undated) DEVICE — PENCL ES MEGADINE EZ/CLEAN BUTN W/HOLSTR 10FT

## (undated) DEVICE — ENDOCUT SCISSOR TIP, DISPOSABLE: Brand: RENEW

## (undated) DEVICE — LAPAROSCOPIC SMOKE ELIMINATION DEVICE: Brand: PNEUVIEW XE

## (undated) DEVICE — GOWN ,SIRUS,NONREINFORCED SMALL: Brand: MEDLINE

## (undated) DEVICE — LN SMPL CO2 SHTRM SD STREAM W/M LUER

## (undated) DEVICE — PATIENT RETURN ELECTRODE, SINGLE-USE, CONTACT QUALITY MONITORING, ADULT, WITH 9FT CORD, FOR PATIENTS WEIGING OVER 33LBS. (15KG): Brand: MEGADYNE

## (undated) DEVICE — LOU LAP CHOLE: Brand: MEDLINE INDUSTRIES, INC.

## (undated) DEVICE — INTRO SHEATH PRELUDE SNAP .038 6F 13CM W/SDPRT

## (undated) DEVICE — ENDOPATH XCEL BLADELESS TROCARS WITH STABILITY SLEEVES: Brand: ENDOPATH XCEL

## (undated) DEVICE — KT ORCA ORCAPOD DISP STRL

## (undated) DEVICE — TUBING, SUCTION, 1/4" X 10', STRAIGHT: Brand: MEDLINE

## (undated) DEVICE — CATH CHOLANG 4.5F18IN BRGNDY

## (undated) DEVICE — SPHINCTEROTOME: Brand: HYDRATOME RX 44

## (undated) DEVICE — ADAPT CLN BIOGUARD AIR/H2O DISP

## (undated) DEVICE — CONTAINER,SPECIMEN,OR STERILE,4OZ: Brand: MEDLINE

## (undated) DEVICE — CATH IV INSYTE AUTOGARD 14G 1 1/2IN ORNG

## (undated) DEVICE — ENDOPOUCH RETRIEVER SPECIMEN RETRIEVAL BAGS: Brand: ENDOPOUCH RETRIEVER

## (undated) DEVICE — EXTENSION SET, MALE LUER LOCK ADAPTER WITH RETRACTABLE COLLAR

## (undated) DEVICE — DRAPE,REIN 53X77,STERILE: Brand: MEDLINE

## (undated) DEVICE — GLV SURG PREMIERPRO ORTHO LTX PF SZ7.5 BRN

## (undated) DEVICE — SUT VIC 0 TN 27IN DYED JTN0G

## (undated) DEVICE — SENSR O2 OXIMAX FNGR A/ 18IN NONSTR

## (undated) DEVICE — SUT VIC 5/0 PS2 18IN J495H

## (undated) DEVICE — CANN O2 ETCO2 FITS ALL CONN CO2 SMPL A/ 7IN DISP LF

## (undated) DEVICE — ADHS SKIN SURG TISS VISC PREMIERPRO EXOFIN HI/VISC FAST/DRY

## (undated) DEVICE — STPCK 3WY D201 DISCOFIX

## (undated) DEVICE — SKIN PREP TRAY W/CHG: Brand: MEDLINE INDUSTRIES, INC.

## (undated) DEVICE — DEV LK WIREGUIDE FUSN OLYMP SCP

## (undated) DEVICE — RETRIEVAL BALLOON CATHETER: Brand: EXTRACTOR™ PRO RX

## (undated) DEVICE — Device

## (undated) DEVICE — BITEBLOCK OMNI BLOC

## (undated) DEVICE — ERBE NESSY®PLATE 170 SPLIT; 168CM²; CABLE 3M: Brand: ERBE

## (undated) DEVICE — LOU PACE DEFIB: Brand: MEDLINE INDUSTRIES, INC.

## (undated) DEVICE — NITINOL STONE RETRIEVAL BASKET: Brand: ZERO TIP

## (undated) DEVICE — ENDOPATH PNEUMONEEDLE INSUFFLATION NEEDLES WITH LUER LOCK CONNECTORS 120MM: Brand: ENDOPATH

## (undated) DEVICE — SOL NACL 0.9PCT 1000ML